# Patient Record
Sex: FEMALE | Race: BLACK OR AFRICAN AMERICAN | NOT HISPANIC OR LATINO | Employment: UNEMPLOYED | URBAN - METROPOLITAN AREA
[De-identification: names, ages, dates, MRNs, and addresses within clinical notes are randomized per-mention and may not be internally consistent; named-entity substitution may affect disease eponyms.]

---

## 2018-06-11 ENCOUNTER — APPOINTMENT (EMERGENCY)
Dept: RADIOLOGY | Facility: HOSPITAL | Age: 58
End: 2018-06-11
Payer: COMMERCIAL

## 2018-06-11 ENCOUNTER — HOSPITAL ENCOUNTER (EMERGENCY)
Facility: HOSPITAL | Age: 58
Discharge: HOME | End: 2018-06-11
Attending: EMERGENCY MEDICINE | Admitting: EMERGENCY MEDICINE
Payer: COMMERCIAL

## 2018-06-11 VITALS
SYSTOLIC BLOOD PRESSURE: 116 MMHG | HEART RATE: 84 BPM | RESPIRATION RATE: 19 BRPM | OXYGEN SATURATION: 95 % | DIASTOLIC BLOOD PRESSURE: 65 MMHG | TEMPERATURE: 97.8 F

## 2018-06-11 DIAGNOSIS — R76.11 PPD POSITIVE: Primary | ICD-10-CM

## 2018-06-11 DIAGNOSIS — Z11.1 NORMAL SCREENING CHEST X-RAY FOR TUBERCULOSIS: ICD-10-CM

## 2018-06-11 PROCEDURE — 99285 EMERGENCY DEPT VISIT HI MDM: CPT

## 2018-06-11 PROCEDURE — 93005 ELECTROCARDIOGRAM TRACING: CPT

## 2018-06-11 PROCEDURE — 71045 X-RAY EXAM CHEST 1 VIEW: CPT

## 2018-06-11 RX ORDER — NAPROXEN 500 MG/1
250 TABLET ORAL 2 TIMES DAILY WITH MEALS
COMMUNITY
End: 2020-05-24

## 2018-06-11 RX ORDER — INSULIN GLARGINE 100 [IU]/ML
20 INJECTION, SOLUTION SUBCUTANEOUS
COMMUNITY
End: 2019-03-22 | Stop reason: SDUPTHER

## 2018-06-13 LAB
ATRIAL RATE: 76 BPM
P AXIS: 71 DEGREES
PR INTERVAL: 130 MS
QRS AXIS: 6 DEGREES
QRSD INTERVAL: 78 MS
QT INTERVAL: 378 MS
QTC INTERVAL: 425 MS
T WAVE AXIS: 60 DEGREES
VENTRICULAR RATE: 76 BPM

## 2018-06-13 PROCEDURE — 93010 ELECTROCARDIOGRAM REPORT: CPT | Performed by: INTERNAL MEDICINE

## 2018-06-18 NOTE — ED PROVIDER NOTES
History  Chief Complaint   Patient presents with    Chest Pain     chest pains for two days  sleepy  states doesnt sleep well ,moved here from [address] a couple of weeks ago  had tb skin test which was positive on June 6, sent in for xray       History provided by:  Patient   used: No    Evaluation of Abnormal Diagnostic Test   Time since result:  5 days  Referred by: Methadone Program Intake Process - New Pt  Resulting agency:  External  Result type comment:  (+) PPD test      Prior to Admission Medications   Prescriptions Last Dose Informant Patient Reported? Taking? METHADONE HCL PO   Yes Yes   Sig: Take 35 mg by mouth   insulin glargine (LANTUS) 100 units/mL subcutaneous injection   Yes Yes   Sig: Inject 20 Units under the skin daily at bedtime   naproxen (NAPROSYN) 500 mg tablet   Yes Yes   Sig: Take 250 mg by mouth 2 (two) times a day with meals      Facility-Administered Medications: None       Past Medical History:   Diagnosis Date    Diabetes mellitus ([facility] [address] )        Past Surgical History:   Procedure Laterality Date    ABDOMINAL SURGERY         History reviewed  No pertinent family history  I have reviewed and agree with the history as documented  Social History   Substance Use Topics    Smoking status: Current Every Day Smoker     Packs/day: 1 00    Smokeless tobacco: Never Used    Alcohol use Yes      Comment: socially        Review of Systems   Constitutional: Positive for fatigue  Negative for chills, diaphoresis, fever and unexpected weight change  HENT: Negative for congestion  Respiratory: Negative for cough, chest tightness and shortness of breath  Cardiovascular: Negative for chest pain  Pt states since she heard about the (+) tb test I get on/off pain, now I don't have it   Gastrointestinal: Negative for abdominal pain, diarrhea and vomiting  Genitourinary: Negative for difficulty urinating     Musculoskeletal: Negative for back pain, neck pain and neck stiffness  Skin: Negative for color change, pallor, rash and wound  (+) redness where tb test was done   Allergic/Immunologic: Negative for immunocompromised state  Neurological: Negative for dizziness, syncope, light-headedness and headaches  Psychiatric/Behavioral: The patient is nervous/anxious  Physical Exam  Physical Exam   Constitutional: She is oriented to person, place, and time  She appears well-developed and well-nourished  No distress  HENT:   Head: Normocephalic and atraumatic  Mouth/Throat: Oropharynx is clear and moist    Eyes: Conjunctivae and EOM are normal  Pupils are equal, round, and reactive to light  2 mm pupils b/l   Neck: Normal range of motion  Neck supple  Cardiovascular: Normal rate, regular rhythm and intact distal pulses  Pulmonary/Chest: Effort normal and breath sounds normal  No respiratory distress  Abdominal: Soft  She exhibits no distension  There is no tenderness  Musculoskeletal: Normal range of motion  Neurological: She is alert and oriented to person, place, and time  Sleepy, but arousable  S/p Methadone, likely high   Skin: Skin is warm and dry  She is not diaphoretic  Psychiatric: She has a normal mood and affect  Pt appears high/intoxicated   Nursing note and vitals reviewed        Vital Signs  ED Triage Vitals [06/11/18 1120]   Temperature Pulse Respirations Blood Pressure SpO2   97 8 °F (36 6 °C) 83 20 116/65 96 %      Temp Source Heart Rate Source Patient Position - Orthostatic VS BP Location FiO2 (%)   Tympanic Monitor Lying Right arm --      Pain Score       9           Vitals:    06/11/18 1120 06/11/18 1130   BP: 116/65    Pulse: 83 84   Patient Position - Orthostatic VS: Lying        Visual Acuity      ED Medications  Medications - No data to display    Diagnostic Studies  Results Reviewed     None                 XR chest 1 view portable   Final Result by Hever Bourgeois DO (06/11 1156)   No active pulmonary disease on examination which is somewhat limited secondary to low lung volumes  Workstation performed: [id number]                    Procedures  Procedures       Phone Contacts  ED Phone Contact    ED Course                               MDM  Number of Diagnoses or Management Options  Normal screening chest x-ray for tuberculosis: new and does not require workup  PPD positive: new and requires workup  Diagnosis management comments: S/p CXR in ED  - f/u outpt       Amount and/or Complexity of Data Reviewed  Tests in the radiology section of CPT®: ordered and reviewed  Decide to obtain previous medical records or to obtain history from someone other than the patient: yes  Obtain history from someone other than the patient: yes (Friend who's at bedside  - reports pt started methadone program recently and just had her dose earlier today prior to coming to the ED, denies other drug/alcohol at this time)  Review and summarize past medical records: yes  Independent visualization of images, tracings, or specimens: yes    Risk of Complications, Morbidity, and/or Mortality  Presenting problems: moderate  Diagnostic procedures: low  Management options: low    Patient Progress  Patient progress: stable    CritCare Time    Disposition  Final diagnoses:   PPD positive   Normal screening chest x-ray for tuberculosis     Time reflects when diagnosis was documented in both MDM as applicable and the Disposition within this note     Time User Action Codes Description Comment    6/11/2018 12:42 PM Dorrine [place] Add [R76 11] PPD positive     6/11/2018 12:42 PM Dorrine [place] Add [Z11 1] Normal screening chest x-ray for tuberculosis       ED Disposition     ED Disposition Condition Comment    Discharge  Gwendel Rinne discharge to home/self care      Condition at discharge: Good        Follow-up Information     Follow up With Specialties Details Why Contact Info    [employer]  Schedule an appointment as soon as possible for a visit [phone]            Discharge Medication List as of 6/11/2018 12:42 PM      CONTINUE these medications which have NOT CHANGED    Details   insulin glargine (LANTUS) 100 units/mL subcutaneous injection Inject 20 Units under the skin daily at bedtime, Historical Med      METHADONE HCL PO Take 35 mg by mouth, Historical Med      naproxen (NAPROSYN) 500 mg tablet Take 250 mg by mouth 2 (two) times a day with meals, Historical Med           No discharge procedures on file      ED Provider  Electronically Signed by           Magdalena Chaidez MD  06/18/18 4291

## 2019-03-22 ENCOUNTER — OFFICE VISIT (OUTPATIENT)
Dept: FAMILY MEDICINE CLINIC | Facility: CLINIC | Age: 59
End: 2019-03-22
Payer: COMMERCIAL

## 2019-03-22 VITALS
OXYGEN SATURATION: 89 % | DIASTOLIC BLOOD PRESSURE: 84 MMHG | BODY MASS INDEX: 23.51 KG/M2 | TEMPERATURE: 98.9 F | HEIGHT: 57 IN | HEART RATE: 111 BPM | SYSTOLIC BLOOD PRESSURE: 122 MMHG | WEIGHT: 109 LBS

## 2019-03-22 DIAGNOSIS — Z12.2 ENCOUNTER FOR SCREENING FOR LUNG CANCER: Primary | ICD-10-CM

## 2019-03-22 DIAGNOSIS — E11.8 TYPE 2 DIABETES MELLITUS WITH COMPLICATION, WITH LONG-TERM CURRENT USE OF INSULIN (HCC): ICD-10-CM

## 2019-03-22 DIAGNOSIS — J45.909 ASTHMA, UNSPECIFIED ASTHMA SEVERITY, UNSPECIFIED WHETHER COMPLICATED, UNSPECIFIED WHETHER PERSISTENT: ICD-10-CM

## 2019-03-22 DIAGNOSIS — Z79.4 TYPE 2 DIABETES MELLITUS WITH COMPLICATION, WITH LONG-TERM CURRENT USE OF INSULIN (HCC): ICD-10-CM

## 2019-03-22 PROCEDURE — 99214 OFFICE O/P EST MOD 30 MIN: CPT | Performed by: FAMILY MEDICINE

## 2019-03-22 PROCEDURE — 3008F BODY MASS INDEX DOCD: CPT | Performed by: FAMILY MEDICINE

## 2019-03-22 RX ORDER — INSULIN GLARGINE 100 [IU]/ML
20 INJECTION, SOLUTION SUBCUTANEOUS
Qty: 5 PEN | Refills: 1 | Status: SHIPPED | OUTPATIENT
Start: 2019-03-22 | End: 2020-05-24 | Stop reason: ALTCHOICE

## 2019-03-22 RX ORDER — INSULIN GLARGINE 100 [IU]/ML
INJECTION, SOLUTION SUBCUTANEOUS
COMMUNITY
Start: 2019-01-09 | End: 2019-03-22 | Stop reason: SDUPTHER

## 2019-03-22 RX ORDER — ALBUTEROL SULFATE 90 UG/1
2 AEROSOL, METERED RESPIRATORY (INHALATION) EVERY 6 HOURS PRN
Qty: 18 G | Refills: 1 | Status: ON HOLD | OUTPATIENT
Start: 2019-03-22 | End: 2021-11-17 | Stop reason: SDUPTHER

## 2019-03-22 RX ORDER — INSULIN GLARGINE 100 [IU]/ML
20 INJECTION, SOLUTION SUBCUTANEOUS
Qty: 10 ML | Refills: 3 | Status: SHIPPED | OUTPATIENT
Start: 2019-03-22 | End: 2020-05-25 | Stop reason: HOSPADM

## 2019-03-22 NOTE — PROGRESS NOTES
Assessment/Plan:     Problem List Items Addressed This Visit        Endocrine    Type 2 diabetes mellitus with complication, with long-term current use of insulin ([facility] [address] )     · Patient advised to check blood sugars twice a day and p r n  Will follow up on lab results  Patient will return for annual physical and annual gynecology visit  Relevant Medications    insulin glargine (LANTUS) 100 units/mL subcutaneous injection    BASAGLAR KWIKPEN 100 units/mL injection pen    Insulin Pen Needle 29G X 12MM MISC    Other Relevant Orders    Glucometer    Lancets    HEMOGLOBIN A1C W/ EAG ESTIMATION    Comprehensive metabolic panel    Lipid Panel with Direct LDL reflex      Other Visit Diagnoses     Encounter for screening for lung cancer    -  Primary    Relevant Orders    CT lung screening program    Asthma, unspecified asthma severity, unspecified whether complicated, unspecified whether persistent        Relevant Medications    albuterol (VENTOLIN HFA) 90 mcg/act inhaler            Subjective:      Patient ID: Troy Hare is a 66 y o  female  HPI    The following portions of the patient's history were reviewed and updated as appropriate: current medications, past family history, past medical history, past social history and problem list     Patient presents to establish care  Patient has history of type 2 diabetes mellitus, arthritis, asthma, and tobacco abuse  Patient currently smokes 1 pack per day, started smoking at 14years old  Patient admits to prior alcohol abuse, currently does not use alcohol  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  Patient shares that her son passed away of lung cancer at 45 y o  which was a life altering event for her, she decided to quit substance abuse and alcohol abuse thereafter  Patient has not been taking insulin for the past 3 months  Current symptoms include: paresthesia of the feet, polydipsia, polyuria, visual disturbances and weight loss  Symptoms have gradually worsened, she admits she has not been taking care of herself well, not eating appropriately and not taking medications as she recently moved area 3 months ago and was awaiting to reestablish  Patient denies foot ulcerations, unable to assess blood sugars as she has not been checking blood sugars at home  In the past followed with PCP in [place], [place]  Patient has been on Lantus 20 units HS in the past     Past Medical History:   Diagnosis Date    Arthritis     Asthma     Diabetes mellitus ([address] )        Current Outpatient Medications:     BASAGLAR KWIKPEN 100 units/mL injection pen, Inject 20 Units under the skin daily at bedtime, Disp: 5 pen, Rfl: 1    insulin glargine (LANTUS) 100 units/mL subcutaneous injection, Inject 20 Units under the skin daily at bedtime, Disp: 10 mL, Rfl: 3    METHADONE HCL PO, Take 35 mg by mouth, Disp: , Rfl:     naproxen (NAPROSYN) 500 mg tablet, Take 250 mg by mouth 2 (two) times a day with meals, Disp: , Rfl:     albuterol (VENTOLIN HFA) 90 mcg/act inhaler, Inhale 2 puffs every 6 (six) hours as needed for wheezing, Disp: 18 g, Rfl: 1    Insulin Pen Needle 29G X 12MM MISC, by Does not apply route daily at bedtime, Disp: 30 each, Rfl: 3      Review of Systems   Constitutional: Positive for appetite change (decreased appetite) and unexpected weight change (weight loss, reports she was 197lbs in 2007)  Eyes: Positive for visual disturbance  Respiratory: Positive for cough and shortness of breath (With exertion)  Gastrointestinal: Negative for abdominal pain  Musculoskeletal: Positive for arthralgias (left knee joint)  Objective:      /84 (BP Location: Left arm, Patient Position: Sitting, Cuff Size: Large)   Pulse (!) 111   Temp 98 9 °F (37 2 °C) (Tympanic)   Ht 4' 9" (1 448 m)   Wt 49 4 kg (109 lb)   SpO2 (!) 89%   BMI 23 59 kg/m²        Physical Exam   Constitutional: She is oriented to person, place, and time   She appears well-developed  No distress  HENT:   Head: Normocephalic and atraumatic  Right Ear: External ear normal    Left Ear: External ear normal    Nose: Nose normal    Mouth/Throat: Oropharynx is clear and moist    Eyes: Pupils are equal, round, and reactive to light  Conjunctivae and EOM are normal    Neck: Normal range of motion  Neck supple  No thyromegaly present  Cardiovascular: Normal rate, regular rhythm, normal heart sounds and intact distal pulses  Pulmonary/Chest: Effort normal  She has no wheezes  Abdominal: Soft  Bowel sounds are normal  There is no tenderness  Musculoskeletal: Normal range of motion  Tenderness: upon palpation of left knee  Lymphadenopathy:     She has no cervical adenopathy  Neurological: She is alert and oriented to person, place, and time  No sensory deficit  Skin: Skin is warm and dry  Psychiatric: She has a normal mood and affect   Her behavior is normal  Thought content normal

## 2019-03-22 NOTE — ASSESSMENT & PLAN NOTE
· Patient advised to check blood sugars twice a day and p r n  Will follow up on lab results  Patient will return for annual physical and annual gynecology visit

## 2019-03-25 ENCOUNTER — TELEPHONE (OUTPATIENT)
Dept: FAMILY MEDICINE CLINIC | Facility: CLINIC | Age: 59
End: 2019-03-25

## 2019-03-26 ENCOUNTER — TELEPHONE (OUTPATIENT)
Dept: FAMILY MEDICINE CLINIC | Facility: CLINIC | Age: 59
End: 2019-03-26

## 2019-04-01 ENCOUNTER — TELEPHONE (OUTPATIENT)
Dept: FAMILY MEDICINE CLINIC | Facility: CLINIC | Age: 59
End: 2019-04-01

## 2019-05-23 ENCOUNTER — TRANSCRIBE ORDERS (OUTPATIENT)
Dept: ADMINISTRATIVE | Facility: HOSPITAL | Age: 59
End: 2019-05-23

## 2019-05-23 ENCOUNTER — HOSPITAL ENCOUNTER (OUTPATIENT)
Dept: RADIOLOGY | Facility: HOSPITAL | Age: 59
Discharge: HOME | End: 2019-05-23
Payer: COMMERCIAL

## 2019-05-23 DIAGNOSIS — Z12.2 ENCOUNTER FOR SCREENING FOR LUNG CANCER: ICD-10-CM

## 2019-06-13 ENCOUNTER — TRANSCRIBE ORDERS (OUTPATIENT)
Dept: ADMINISTRATIVE | Facility: HOSPITAL | Age: 59
End: 2019-06-13

## 2019-06-13 ENCOUNTER — OFFICE VISIT (OUTPATIENT)
Dept: LAB | Facility: HOSPITAL | Age: 59
End: 2019-06-13
Payer: COMMERCIAL

## 2019-06-13 DIAGNOSIS — R94.31 NONSPECIFIC ABNORMAL ELECTROCARDIOGRAM (ECG) (EKG): Primary | ICD-10-CM

## 2019-06-13 DIAGNOSIS — R94.31 NONSPECIFIC ABNORMAL ELECTROCARDIOGRAM (ECG) (EKG): ICD-10-CM

## 2019-06-13 DIAGNOSIS — I45.81 LONG QT SYNDROME: ICD-10-CM

## 2019-06-13 PROCEDURE — 93005 ELECTROCARDIOGRAM TRACING: CPT

## 2019-06-14 LAB
ATRIAL RATE: 87 BPM
P AXIS: 70 DEGREES
PR INTERVAL: 136 MS
QRS AXIS: 8 DEGREES
QRSD INTERVAL: 74 MS
QT INTERVAL: 412 MS
QTC INTERVAL: 495 MS
T WAVE AXIS: 70 DEGREES
VENTRICULAR RATE: 87 BPM

## 2019-06-14 PROCEDURE — 93010 ELECTROCARDIOGRAM REPORT: CPT | Performed by: INTERNAL MEDICINE

## 2019-06-19 ENCOUNTER — TELEPHONE (OUTPATIENT)
Dept: FAMILY MEDICINE CLINIC | Facility: CLINIC | Age: 59
End: 2019-06-19

## 2019-06-19 NOTE — TELEPHONE ENCOUNTER
Attempt made again to call patient  Patient did not present for annual gynecology visit  Patient needs follow-up in office for diabetes as well

## 2019-07-10 ENCOUNTER — APPOINTMENT (INPATIENT)
Dept: RADIOLOGY | Facility: HOSPITAL | Age: 59
DRG: 521 | End: 2019-07-10
Payer: COMMERCIAL

## 2019-07-10 ENCOUNTER — HOSPITAL ENCOUNTER (INPATIENT)
Facility: HOSPITAL | Age: 59
LOS: 1 days | Discharge: HOME | DRG: 521 | End: 2019-07-11
Attending: EMERGENCY MEDICINE | Admitting: FAMILY MEDICINE
Payer: COMMERCIAL

## 2019-07-10 ENCOUNTER — APPOINTMENT (EMERGENCY)
Dept: RADIOLOGY | Facility: HOSPITAL | Age: 59
DRG: 521 | End: 2019-07-10
Payer: COMMERCIAL

## 2019-07-10 DIAGNOSIS — F11.20 METHADONE DEPENDENCE (HCC): ICD-10-CM

## 2019-07-10 DIAGNOSIS — A41.9 SEPSIS (HCC): ICD-10-CM

## 2019-07-10 DIAGNOSIS — J69.0 ASPIRATION PNEUMONIA (HCC): ICD-10-CM

## 2019-07-10 DIAGNOSIS — R41.82 ALTERED MENTAL STATUS: Primary | ICD-10-CM

## 2019-07-10 DIAGNOSIS — F10.929 ALCOHOL INTOXICATION (HCC): ICD-10-CM

## 2019-07-10 DIAGNOSIS — I26.99 PULMONARY EMBOLISM (HCC): ICD-10-CM

## 2019-07-10 DIAGNOSIS — R45.851 SUICIDAL IDEATION: ICD-10-CM

## 2019-07-10 PROBLEM — F19.929 INTOXICATION BY DRUG (HCC): Status: ACTIVE | Noted: 2019-07-10

## 2019-07-10 PROBLEM — R09.02 HYPOXIA: Status: ACTIVE | Noted: 2019-07-10

## 2019-07-10 LAB
ALBUMIN SERPL BCP-MCNC: 3 G/DL (ref 3.5–5)
ALP SERPL-CCNC: 170 U/L (ref 46–116)
ALT SERPL W P-5'-P-CCNC: 18 U/L (ref 12–78)
ANION GAP SERPL CALCULATED.3IONS-SCNC: 12 MMOL/L (ref 4–13)
APTT PPP: 24 SECONDS (ref 23–37)
AST SERPL W P-5'-P-CCNC: 15 U/L (ref 5–45)
BASOPHILS # BLD AUTO: 0.04 THOUSANDS/ΜL (ref 0–0.1)
BASOPHILS NFR BLD AUTO: 1 % (ref 0–1)
BETA-HYDROXYBUTYRATE: 0.1 MMOL/L
BILIRUB SERPL-MCNC: 0.2 MG/DL (ref 0.2–1)
BUN SERPL-MCNC: 16 MG/DL (ref 5–25)
CALCIUM SERPL-MCNC: 7.9 MG/DL (ref 8.3–10.1)
CHLORIDE SERPL-SCNC: 109 MMOL/L (ref 100–108)
CO2 SERPL-SCNC: 27 MMOL/L (ref 21–32)
CREAT SERPL-MCNC: 0.67 MG/DL (ref 0.6–1.3)
EOSINOPHIL # BLD AUTO: 0.11 THOUSAND/ΜL (ref 0–0.61)
EOSINOPHIL NFR BLD AUTO: 2 % (ref 0–6)
ERYTHROCYTE [DISTWIDTH] IN BLOOD BY AUTOMATED COUNT: 16.4 % (ref 11.6–15.1)
ETHANOL SERPL-MCNC: 361 MG/DL (ref 0–3)
GFR SERPL CREATININE-BSD FRML MDRD: 113 ML/MIN/1.73SQ M
GLUCOSE SERPL-MCNC: 76 MG/DL (ref 65–140)
GLUCOSE SERPL-MCNC: 89 MG/DL (ref 65–140)
GLUCOSE SERPL-MCNC: 95 MG/DL (ref 65–140)
HCT VFR BLD AUTO: 40.1 % (ref 34.8–46.1)
HGB BLD-MCNC: 12.8 G/DL (ref 11.5–15.4)
HOLD SPECIMEN: NORMAL
IMM GRANULOCYTES # BLD AUTO: 0.03 THOUSAND/UL (ref 0–0.2)
IMM GRANULOCYTES NFR BLD AUTO: 1 % (ref 0–2)
INR PPP: 0.94 (ref 0.86–1.16)
LACTATE SERPL-SCNC: 2 MMOL/L (ref 0.5–2)
LACTATE SERPL-SCNC: 3.3 MMOL/L (ref 0.5–2)
LYMPHOCYTES # BLD AUTO: 2.5 THOUSANDS/ΜL (ref 0.6–4.47)
LYMPHOCYTES NFR BLD AUTO: 45 % (ref 14–44)
MCH RBC QN AUTO: 29.4 PG (ref 26.8–34.3)
MCHC RBC AUTO-ENTMCNC: 31.9 G/DL (ref 31.4–37.4)
MCV RBC AUTO: 92 FL (ref 82–98)
MONOCYTES # BLD AUTO: 0.55 THOUSAND/ΜL (ref 0.17–1.22)
MONOCYTES NFR BLD AUTO: 10 % (ref 4–12)
NEUTROPHILS # BLD AUTO: 2.27 THOUSANDS/ΜL (ref 1.85–7.62)
NEUTS SEG NFR BLD AUTO: 41 % (ref 43–75)
NRBC BLD AUTO-RTO: 0 /100 WBCS
PLATELET # BLD AUTO: 250 THOUSANDS/UL (ref 149–390)
PMV BLD AUTO: 9.8 FL (ref 8.9–12.7)
POTASSIUM SERPL-SCNC: 3.2 MMOL/L (ref 3.5–5.3)
PROT SERPL-MCNC: 7.1 G/DL (ref 6.4–8.2)
PROTHROMBIN TIME: 9.9 SECONDS (ref 9.4–11.7)
RBC # BLD AUTO: 4.36 MILLION/UL (ref 3.81–5.12)
SODIUM SERPL-SCNC: 148 MMOL/L (ref 136–145)
TROPONIN I SERPL-MCNC: <0.02 NG/ML
WBC # BLD AUTO: 5.5 THOUSAND/UL (ref 4.31–10.16)

## 2019-07-10 PROCEDURE — 71045 X-RAY EXAM CHEST 1 VIEW: CPT

## 2019-07-10 PROCEDURE — 99223 1ST HOSP IP/OBS HIGH 75: CPT | Performed by: FAMILY MEDICINE

## 2019-07-10 PROCEDURE — 80320 DRUG SCREEN QUANTALCOHOLS: CPT | Performed by: EMERGENCY MEDICINE

## 2019-07-10 PROCEDURE — 85610 PROTHROMBIN TIME: CPT | Performed by: PHYSICIAN ASSISTANT

## 2019-07-10 PROCEDURE — 96365 THER/PROPH/DIAG IV INF INIT: CPT

## 2019-07-10 PROCEDURE — 99285 EMERGENCY DEPT VISIT HI MDM: CPT

## 2019-07-10 PROCEDURE — 80053 COMPREHEN METABOLIC PANEL: CPT | Performed by: EMERGENCY MEDICINE

## 2019-07-10 PROCEDURE — 87081 CULTURE SCREEN ONLY: CPT | Performed by: FAMILY MEDICINE

## 2019-07-10 PROCEDURE — 83605 ASSAY OF LACTIC ACID: CPT | Performed by: PHYSICIAN ASSISTANT

## 2019-07-10 PROCEDURE — 87040 BLOOD CULTURE FOR BACTERIA: CPT | Performed by: PHYSICIAN ASSISTANT

## 2019-07-10 PROCEDURE — 82010 KETONE BODYS QUAN: CPT | Performed by: PHYSICIAN ASSISTANT

## 2019-07-10 PROCEDURE — 36415 COLL VENOUS BLD VENIPUNCTURE: CPT

## 2019-07-10 PROCEDURE — 85730 THROMBOPLASTIN TIME PARTIAL: CPT | Performed by: PHYSICIAN ASSISTANT

## 2019-07-10 PROCEDURE — 82948 REAGENT STRIP/BLOOD GLUCOSE: CPT

## 2019-07-10 PROCEDURE — 71275 CT ANGIOGRAPHY CHEST: CPT

## 2019-07-10 PROCEDURE — 93005 ELECTROCARDIOGRAM TRACING: CPT

## 2019-07-10 PROCEDURE — 84484 ASSAY OF TROPONIN QUANT: CPT | Performed by: PHYSICIAN ASSISTANT

## 2019-07-10 PROCEDURE — 70450 CT HEAD/BRAIN W/O DYE: CPT

## 2019-07-10 PROCEDURE — 83036 HEMOGLOBIN GLYCOSYLATED A1C: CPT | Performed by: STUDENT IN AN ORGANIZED HEALTH CARE EDUCATION/TRAINING PROGRAM

## 2019-07-10 PROCEDURE — 85025 COMPLETE CBC W/AUTO DIFF WBC: CPT | Performed by: EMERGENCY MEDICINE

## 2019-07-10 RX ORDER — ALBUTEROL SULFATE 90 UG/1
2 AEROSOL, METERED RESPIRATORY (INHALATION) EVERY 6 HOURS PRN
Status: DISCONTINUED | OUTPATIENT
Start: 2019-07-10 | End: 2019-07-11 | Stop reason: HOSPADM

## 2019-07-10 RX ORDER — LORAZEPAM 2 MG/ML
1 INJECTION INTRAMUSCULAR EVERY 6 HOURS PRN
Status: DISCONTINUED | OUTPATIENT
Start: 2019-07-10 | End: 2019-07-10

## 2019-07-10 RX ORDER — LORAZEPAM 2 MG/ML
1 INJECTION INTRAMUSCULAR ONCE
Status: COMPLETED | OUTPATIENT
Start: 2019-07-10 | End: 2019-07-10

## 2019-07-10 RX ORDER — LEVOFLOXACIN 5 MG/ML
750 INJECTION, SOLUTION INTRAVENOUS ONCE
Status: COMPLETED | OUTPATIENT
Start: 2019-07-10 | End: 2019-07-10

## 2019-07-10 RX ORDER — LORAZEPAM 2 MG/ML
INJECTION INTRAMUSCULAR
Status: COMPLETED
Start: 2019-07-10 | End: 2019-07-10

## 2019-07-10 RX ORDER — LORAZEPAM 2 MG/ML
1 INJECTION INTRAMUSCULAR EVERY 6 HOURS PRN
Status: DISCONTINUED | OUTPATIENT
Start: 2019-07-10 | End: 2019-07-11 | Stop reason: HOSPADM

## 2019-07-10 RX ORDER — SODIUM CHLORIDE 9 MG/ML
75 INJECTION, SOLUTION INTRAVENOUS CONTINUOUS
Status: DISCONTINUED | OUTPATIENT
Start: 2019-07-10 | End: 2019-07-11 | Stop reason: HOSPADM

## 2019-07-10 RX ORDER — NICOTINE 21 MG/24HR
1 PATCH, TRANSDERMAL 24 HOURS TRANSDERMAL DAILY
Status: DISCONTINUED | OUTPATIENT
Start: 2019-07-11 | End: 2019-07-11 | Stop reason: HOSPADM

## 2019-07-10 RX ORDER — ACETAMINOPHEN 325 MG/1
650 TABLET ORAL EVERY 6 HOURS PRN
Status: DISCONTINUED | OUTPATIENT
Start: 2019-07-10 | End: 2019-07-11 | Stop reason: HOSPADM

## 2019-07-10 RX ADMIN — SODIUM CHLORIDE 1000 ML: 0.9 INJECTION, SOLUTION INTRAVENOUS at 17:09

## 2019-07-10 RX ADMIN — SODIUM CHLORIDE 1000 ML: 0.9 INJECTION, SOLUTION INTRAVENOUS at 20:36

## 2019-07-10 RX ADMIN — LORAZEPAM 1 MG: 2 INJECTION INTRAMUSCULAR; INTRAVENOUS at 22:15

## 2019-07-10 RX ADMIN — LORAZEPAM 1 MG: 2 INJECTION INTRAMUSCULAR at 18:38

## 2019-07-10 RX ADMIN — PIPERACILLIN SODIUM,TAZOBACTAM SODIUM 3.38 G: 3; .375 INJECTION, POWDER, FOR SOLUTION INTRAVENOUS at 20:55

## 2019-07-10 RX ADMIN — LEVOFLOXACIN 750 MG: 5 INJECTION, SOLUTION INTRAVENOUS at 17:37

## 2019-07-10 RX ADMIN — LORAZEPAM 1 MG: 2 INJECTION INTRAMUSCULAR; INTRAVENOUS at 18:38

## 2019-07-10 NOTE — ED PROVIDER NOTES
History  Chief Complaint   Patient presents with    Altered Mental Status     Pt found on the sidewalk passed out, admits to drinking alcohol today  Pt c/o pain "everywhere", pt tearful and "wish I was dead"  Pt denies plan or attempt to kill self  64year old female hx diabetes, asthma presents here with altered mental status x1 day  She was brought in by the police after being found passed out on the ground after drinking  She feels sick and states that she needs help  She states that her son passed away several months ago and she has never been the same since  Her son was 45years old when he  from lung cancer  She states that she just wants to die  She has pain all over  She admits to drinking a pint of vodka and one beer today  She states she normally does not drink this much  She does not know with to do with herself  She is a current smoker  She is not on oxygen at home  She has a cough  She states it is sometimes productive  She denies any fevers or chills  She states she has not had her insulin in quite some time  She has not seen a doctor in several months  She is currently on methadone  Prior to Admission Medications   Prescriptions Last Dose Informant Patient Reported? Taking?    BASAGLAR KWIKPEN 100 units/mL injection pen   No No   Sig: Inject 20 Units under the skin daily at bedtime   Insulin Pen Needle 29G X 12MM MISC   No No   Sig: by Does not apply route daily at bedtime   METHADONE HCL PO   Yes No   Sig: Take 35 mg by mouth   albuterol (VENTOLIN HFA) 90 mcg/act inhaler   No No   Sig: Inhale 2 puffs every 6 (six) hours as needed for wheezing   insulin glargine (LANTUS) 100 units/mL subcutaneous injection   No No   Sig: Inject 20 Units under the skin daily at bedtime   naproxen (NAPROSYN) 500 mg tablet   Yes No   Sig: Take 250 mg by mouth 2 (two) times a day with meals      Facility-Administered Medications: None       Past Medical History:   Diagnosis Date    Arthritis  Asthma     Diabetes mellitus ([facility] )        Past Surgical History:   Procedure Laterality Date    ABDOMINAL SURGERY         History reviewed  No pertinent family history  I have reviewed and agree with the history as documented  Social History     Tobacco Use    Smoking status: Current Every Day Smoker     Packs/day: 1 00    Smokeless tobacco: Never Used   Substance Use Topics    Alcohol use: Yes     Comment: socially    Drug use: No     Comment: on methadone        Review of Systems   Constitutional: Negative for chills and fever  HENT: Negative for sneezing and sore throat  Respiratory: Positive for cough  Negative for shortness of breath  Cardiovascular: Negative for chest pain, palpitations and leg swelling  Gastrointestinal: Negative for abdominal pain, constipation, diarrhea, nausea and vomiting  Musculoskeletal: Negative for back pain, gait problem and joint swelling  Skin: Negative for color change, pallor, rash and wound  Neurological: Negative for dizziness, syncope, weakness, light-headedness, numbness and headaches  Psychiatric/Behavioral: Positive for dysphoric mood and suicidal ideas  All other systems reviewed and are negative  Physical Exam  Physical Exam   Constitutional: She appears well-developed and well-nourished  She appears distressed  Patient is thin appearing, tearful, crying in room   HENT:   Head: Normocephalic and atraumatic  Nose: Nose normal    Eyes: Pupils are equal, round, and reactive to light  Conjunctivae and EOM are normal  Right eye exhibits no discharge  Left eye exhibits no discharge  No scleral icterus  Neck: Normal range of motion  Neck supple  Cardiovascular: Normal rate, regular rhythm, normal heart sounds and intact distal pulses  Exam reveals no gallop and no friction rub  No murmur heard  Pulmonary/Chest: Effort normal  No stridor  No respiratory distress  She has no wheezes   She has rhonchi in the right upper field, the right middle field, the right lower field, the left upper field, the left middle field and the left lower field  She has no rales  Diffuse rhonchi noted throughout lung fields   Abdominal: Soft  Bowel sounds are normal  She exhibits no distension and no mass  There is no tenderness  There is no guarding  Neurological: She is alert  Skin: Skin is warm and dry  Capillary refill takes less than 2 seconds  No rash noted  She is not diaphoretic  No erythema  No pallor  Nursing note and vitals reviewed        Vital Signs  ED Triage Vitals [07/10/19 1623]   Temperature Pulse Respirations Blood Pressure SpO2   97 8 °F (36 6 °C) 100 16 98/55 (!) 89 %      Temp Source Heart Rate Source Patient Position - Orthostatic VS BP Location FiO2 (%)   Tympanic Monitor Lying Left arm --      Pain Score       --           Vitals:    07/10/19 1815 07/10/19 2100 07/10/19 2120 07/10/19 2126   BP: 129/83 104/63  116/79   Pulse: 84 85 101    Patient Position - Orthostatic VS:  Lying  Lying         Visual Acuity      ED Medications  Medications   albuterol (PROVENTIL HFA,VENTOLIN HFA) inhaler 2 puff (has no administration in time range)   nicotine (NICODERM CQ) 21 mg/24 hr TD 24 hr patch 1 patch (has no administration in time range)   enoxaparin (LOVENOX) subcutaneous injection 40 mg (has no administration in time range)   acetaminophen (TYLENOL) tablet 650 mg (has no administration in time range)   insulin lispro (HumaLOG) 100 units/mL subcutaneous injection 1-5 Units (has no administration in time range)   sodium chloride 0 9 % infusion (has no administration in time range)   sodium chloride 0 9 % bolus 1,000 mL (1,000 mL Intravenous New Bag 7/10/19 2036)   piperacillin-tazobactam (ZOSYN) 3 375 g in sodium chloride 0 9 % 50 mL IVPB (3 375 g Intravenous New Bag 7/10/19 2055)   LORazepam (ATIVAN) 2 mg/mL injection 1 mg (has no administration in time range)   sodium chloride 0 9 % bolus 1,000 mL (0 mL Intravenous Stopped 7/10/19 1836) levofloxacin (LEVAQUIN) IVPB (premix) 750 mg (750 mg Intravenous New Bag 7/10/19 1737)   LORazepam (ATIVAN) 2 mg/mL injection 1 mg (1 mg Intravenous Given 7/10/19 1838)   LORazepam (ATIVAN) 2 mg/mL injection 1 mg (1 mg Intravenous Given 7/10/19 2215)       Diagnostic Studies  Results Reviewed     Procedure Component Value Units Date/Time    Hemoglobin A1C w/ EAG Estimation [[id number]] Collected:  07/10/19 1633    Lab Status: In process Specimen:  Blood from Arm, Left Updated:  07/10/19 1956    Lactic acid x2 [[id number]]  (Normal) Collected:  07/10/19 1843    Lab Status:  Final result Specimen:  Blood from Arm, Left Updated:  07/10/19 1914     LACTIC ACID 2 0 mmol/L     Narrative:       Result may be elevated if tourniquet was used during collection  [place] draw [[id number]] Collected:  07/10/19 1633    Lab Status:  Final result Specimen:  Blood from Arm, Left Updated:  07/10/19 1801    Narrative: The following orders were created for panel order [place] draw  Procedure                               Abnormality         Status                     ---------                               -----------         ------                     Rosy Fern Top on hold[[id number]]                                                      Green / Black tube on hold[[id number]]                       Final result                 Please view results for these tests on the individual orders      Troponin I [[id number]]  (Normal) Collected:  07/10/19 1708    Lab Status:  Final result Specimen:  Blood from Arm, Left Updated:  07/10/19 1742     Troponin I <0 02 ng/mL     Beta Hydroxybutyrate [[id number]]  (Normal) Collected:  07/10/19 1708    Lab Status:  Final result Specimen:  Blood from Arm, Left Updated:  07/10/19 1722     BETA-HYDROXYBUTYRATE 0 1 mmol/L     Lactic acid x2 [[id number]]  (Abnormal) Collected:  07/10/19 1644    Lab Status:  Final result Specimen:  Blood from Arm, Left Updated:  07/10/19 1713     LACTIC ACID 3 3 mmol/L Narrative:       Result may be elevated if tourniquet was used during collection  APTT [[id number]]  (Normal) Collected:  07/10/19 1633    Lab Status:  Final result Specimen:  Blood Updated:  07/10/19 1711     PTT 24 seconds     Protime-INR [[id number]]  (Normal) Collected:  07/10/19 1633    Lab Status:  Final result Specimen:  Blood Updated:  07/10/19 1711     Protime 9 9 seconds      INR 0 94    Ethanol [[id number]]  (Abnormal) Collected:  07/10/19 1633    Lab Status:  Final result Specimen:  Blood from Arm, Left Updated:  07/10/19 1658     Ethanol Lvl 361 mg/dL     Comprehensive metabolic panel [[id number]]  (Abnormal) Collected:  07/10/19 1633    Lab Status:  Final result Specimen:  Blood from Arm, Left Updated:  07/10/19 1656     Sodium 148 mmol/L      Potassium 3 2 mmol/L      Chloride 109 mmol/L      CO2 27 mmol/L      ANION GAP 12 mmol/L      BUN 16 mg/dL      Creatinine 0 67 mg/dL      Glucose 95 mg/dL      Calcium 7 9 mg/dL      AST 15 U/L      ALT 18 U/L      Alkaline Phosphatase 170 U/L      Total Protein 7 1 g/dL      Albumin 3 0 g/dL      Total Bilirubin 0 20 mg/dL      eGFR 113 ml/min/1 73sq m     Narrative:       [place] guidelines for Chronic Kidney Disease (CKD):     Stage 1 with normal or high GFR (GFR > 90 mL/min/1 73 square meters)    Stage 2 Mild CKD (GFR = 60-89 mL/min/1 73 square meters)    Stage 3A Moderate CKD (GFR = 45-59 mL/min/1 73 square meters)    Stage 3B Moderate CKD (GFR = 30-44 mL/min/1 73 square meters)    Stage 4 Severe CKD (GFR = 15-29 mL/min/1 73 square meters)    Stage 5 End Stage CKD (GFR <15 mL/min/1 73 square meters)  Note: GFR calculation is accurate only with a steady state creatinine    Blood culture #2 [[id number]] Collected:  07/10/19 1645    Lab Status: In process Specimen:  Blood from Arm, Right Updated:  07/10/19 1649    Blood culture #1 [[id number]] Collected:  07/10/19 1644    Lab Status:   In process Specimen:  Blood from Arm, Left Updated:  07/10/19 1649    UA w Reflex to Microscopic w Reflex to Culture [[id number]]     Lab Status:  No result Specimen:  Urine     CBC and differential [[id number]]  (Abnormal) Collected:  07/10/19 1633    Lab Status:  Final result Specimen:  Blood from Arm, Left Updated:  07/10/19 1639     WBC 5 50 Thousand/uL      RBC 4 36 Million/uL      Hemoglobin 12 8 g/dL      Hematocrit 40 1 %      MCV 92 fL      MCH 29 4 pg      MCHC 31 9 g/dL      RDW 16 4 %      MPV 9 8 fL      Platelets 739 Thousands/uL      nRBC 0 /100 WBCs      Neutrophils Relative 41 %      Immat GRANS % 1 %      Lymphocytes Relative 45 %      Monocytes Relative 10 %      Eosinophils Relative 2 %      Basophils Relative 1 %      Neutrophils Absolute 2 27 Thousands/µL      Immature Grans Absolute 0 03 Thousand/uL      Lymphocytes Absolute 2 50 Thousands/µL      Monocytes Absolute 0 55 Thousand/µL      Eosinophils Absolute 0 11 Thousand/µL      Basophils Absolute 0 04 Thousands/µL     Fingerstick Glucose (POCT) [[id number]]  (Normal) Collected:  07/10/19 1624    Lab Status:  Final result Updated:  07/10/19 1630     POC Glucose 89 mg/dl                  CT head without contrast   Final Result by Emmanuel Givens DO (07/10 1758)      No acute intracranial abnormality  Workstation performed: [id number]         XR chest 1 view portable   Final Result by Emmanuel Givens DO (07/10 2055)      No acute cardiopulmonary disease  Workstation performed: [id number]         FL barium swallow video w speech    (Results Pending)   CT chest w contrast    (Results Pending)   CTA chest pe study    (Results Pending)              Procedures  ECG 12 Lead Documentation Only  Date/Time: 7/10/2019 4:47 PM  Performed by: Vesta Matta PA-C  Authorized by:  Vesta Matta PA-C     Indications / Diagnosis:  Ams  ECG reviewed by me, the ED Provider: yes    Patient location:  ED  Interpretation:     Interpretation: normal    Rate:     ECG rate:  88    ECG rate assessment: normal    Rhythm:     Rhythm: sinus rhythm    Ectopy:     Ectopy: none    QRS:     QRS axis:  Normal    QRS intervals:  Normal  Conduction:     Conduction: normal    ST segments:     ST segments:  Normal  T waves:     T waves: normal             ED Course  ED Course as of Jul 10 2222   Wed Jul 10, 2019   1700 Patient placed on 3L nasal cannula oxygen, 02 sat improved to 98-99%      1714 Called sepsis alert, patient likely has aspiration pneumonia      1800 Paged hospitalist      9880 Patient became agitated and states that she had to leave however her etoh is 361, would require several more hours to be clinically sober  Restraints were ordered  Face to face completed  [facility] resident Vanda Caicedo at bedside and evaluated patient                      Initial Sepsis Screening     [address] Name 07/10/19 1716                Is the patient's history suggestive of a new or worsening infection? (!) Yes (Proceed)  -YP        Suspected source of infection  pneumonia  -YP        Are two or more of the following signs & symptoms of infection both present and new to the patient? (!) Yes (Proceed)  -YP        Indicate SIRS criteria  Tachycardia > 90 bpm;Altered mental status  -YP        If the answer is yes to both questions, suspicion of sepsis is present          If severe sepsis is present AND tissue hypoperfusion perists in the hour after fluid resuscitation or lactate > 4, the patient meets criteria for SEPTIC SHOCK          Are any of the following organ dysfunction criteria present within 6 hours of suspected infection and SIRS criteria that are NOT considered to be chronic conditions?   (!) Yes  -YP        Organ dysfunction  Lactate > 2 0 mmol/L  -YP        Date of presentation of severe sepsis          Time of presentation of severe sepsis          Tissue hypoperfusion persists in the hour after crystalloid fluid administration, evidenced, by either:          Was hypotension present within one hour of the conclusion of crystalloid fluid administration?         Date of presentation of septic shock          Time of presentation of septic shock            User Key  (r) = Recorded By, (t) = Taken By, (c) = Cosigned By    [address] Name Provider Type    JORGE Floyd PA-C Physician Assistant                  MDM  Number of Diagnoses or Management Options  Alcohol intoxication ([facility] [address] ): Altered mental status:   Aspiration pneumonia ([facility] ):   Sepsis [facility]):   Diagnosis management comments: High clinical suspicion aspiration pneumonia, diffuse rhonchi on lung exam, hypoxia on room air, tachycardia  Patient meets criteria for sepsis, started on levaquin  Patient requires inpatient psych consult for thoughts of self harm, she became violent and agitated while getting patient ready for transport to upstairs bed assignment, she became a harm to herself and to others around her  She told several staff members that she just wanted to die   Ordered restraints, explained patient would need to wait several hours before clinically sober to sign out AMA as patient has altered mental status  Patient evaluated by [place] resident       Amount and/or Complexity of Data Reviewed  Clinical lab tests: reviewed and ordered  Tests in the radiology section of CPT®: reviewed and ordered  Tests in the medicine section of CPT®: ordered and reviewed  Discussion of test results with the performing providers: yes  Review and summarize past medical records: yes  Discuss the patient with other providers: yes (Dr See Dc, Dr Ella Rodriguez)  Independent visualization of images, tracings, or specimens: yes        Disposition  Final diagnoses:    Altered mental status   Sepsis ([facility] [address] )   Aspiration pneumonia ([facility] [address] )   Alcohol intoxication ([facility] )     Time reflects when diagnosis was documented in both MDM as applicable and the Disposition within this note     Time User Action Codes Description Comment    7/10/2019  6:05 PM Avtar Quintanilla Add [R40 06] Altered mental status     7/10/2019  6:05 PM Claudis Failing Add [A41 9] Sepsis ([facility] [address] )     7/10/2019  6:05 PM Claudis Failing Add [J69 0] Aspiration pneumonia ([facility] )     7/10/2019  6:05 PM Claudis Failing Add [F10 929] Alcohol intoxication [facility])       ED Disposition     ED Disposition Condition Date/Time Comment    Admit Stable Wed Jul 10, 2019  6:05 PM Case was discussed with Dr Sherif Santiago and the patient's admission status was agreed to be Admission Status: inpatient status to the service of Dr Sherif Santiago          Follow-up Information    None         Current Discharge Medication List      CONTINUE these medications which have NOT CHANGED    Details   albuterol (VENTOLIN HFA) 90 mcg/act inhaler Inhale 2 puffs every 6 (six) hours as needed for wheezing  Qty: 18 g, Refills: 1    Associated Diagnoses: Asthma, unspecified asthma severity, unspecified whether complicated, unspecified whether persistent      BASAGLAR KWIKPEN 100 units/mL injection pen Inject 20 Units under the skin daily at bedtime  Qty: 5 pen, Refills: 1    Associated Diagnoses: Type 2 diabetes mellitus with complication, with long-term current use of insulin ([facility])      insulin glargine (LANTUS) 100 units/mL subcutaneous injection Inject 20 Units under the skin daily at bedtime  Qty: 10 mL, Refills: 3    Associated Diagnoses: Type 2 diabetes mellitus with complication, with long-term current use of insulin ([facility])      Insulin Pen Needle 29G X 12MM MISC by Does not apply route daily at bedtime  Qty: 30 each, Refills: 3    Associated Diagnoses: Type 2 diabetes mellitus with complication, with long-term current use of insulin ([facility])      METHADONE HCL PO Take 35 mg by mouth      naproxen (NAPROSYN) 500 mg tablet Take 250 mg by mouth 2 (two) times a day with meals           No discharge procedures on file      ED Provider  Electronically Signed by           Josep Leyva PA-C  07/10/19 3212

## 2019-07-10 NOTE — RESTRAINT FACE TO FACE
Restraint Face to Face   Chani Hinkle 64 y o  female MRN: [id number]  Unit/Bed#: ED 07 Encounter: [id number]      Physical Evaluation - patient aggressive and agitated  Purpose for Restraints/ Seclusion High risk for self harm, harm to others  Patient's reaction to the intervention - angry  Patient's medical condition - alcohol intoxication, aspiration pneumonia  Patient's Behavioral condition - agitated  Restraints to be Continued

## 2019-07-10 NOTE — ED NOTES
Patient became agitated while staff was preparing to transfer her to assigned unit,yelling at staff and telling staff she is not staying,that she is leaving,that there is nothing wrong with her  ER Charge Nurse in to speak with patient,yelled and cursed at her telling her she is not staying here  ER MD at bedside,patient yelling cursing at him,giving the middle finger to him,security at bedside with restraints,patient became combative fighting staff and trying to hit and kick them  Restraints placed to all four limbs  PAtient was informed she cannot be released due to her elevated alcohol level,patient became angry swearing at staff and stating she is going to ruth the hospital and staff for not letting her go home  PAtient in restraints and 1:1 observation with tech at bedside       Blela Brown RN  07/10/19 6212

## 2019-07-11 ENCOUNTER — APPOINTMENT (INPATIENT)
Dept: RADIOLOGY | Facility: HOSPITAL | Age: 59
DRG: 521 | End: 2019-07-11
Payer: COMMERCIAL

## 2019-07-11 VITALS
HEIGHT: 59 IN | TEMPERATURE: 98.6 F | DIASTOLIC BLOOD PRESSURE: 78 MMHG | BODY MASS INDEX: 21.69 KG/M2 | HEART RATE: 96 BPM | OXYGEN SATURATION: 100 % | WEIGHT: 107.58 LBS | RESPIRATION RATE: 18 BRPM | SYSTOLIC BLOOD PRESSURE: 136 MMHG

## 2019-07-11 PROBLEM — F17.200 NICOTINE DEPENDENCE: Status: ACTIVE | Noted: 2019-07-11

## 2019-07-11 PROBLEM — F19.10 POLYSUBSTANCE ABUSE (HCC): Status: ACTIVE | Noted: 2019-07-10

## 2019-07-11 PROBLEM — F10.929 ALCOHOL INTOXICATION (HCC): Status: ACTIVE | Noted: 2019-07-10

## 2019-07-11 PROBLEM — I26.99 PULMONARY EMBOLISM (HCC): Status: ACTIVE | Noted: 2019-07-10

## 2019-07-11 PROBLEM — R45.851 SUICIDAL IDEATION: Status: RESOLVED | Noted: 2019-07-11 | Resolved: 2019-07-11

## 2019-07-11 PROBLEM — G92.8 TOXIC METABOLIC ENCEPHALOPATHY: Status: RESOLVED | Noted: 2019-07-11 | Resolved: 2019-07-11

## 2019-07-11 PROBLEM — G92.8 TOXIC METABOLIC ENCEPHALOPATHY: Status: ACTIVE | Noted: 2019-07-11

## 2019-07-11 PROBLEM — R45.851 SUICIDAL IDEATION: Status: ACTIVE | Noted: 2019-07-11

## 2019-07-11 LAB
ALBUMIN SERPL BCP-MCNC: 2.7 G/DL (ref 3.5–5)
ALP SERPL-CCNC: 153 U/L (ref 46–116)
ALT SERPL W P-5'-P-CCNC: 15 U/L (ref 12–78)
AMPHETAMINES SERPL QL SCN: NEGATIVE
ANION GAP SERPL CALCULATED.3IONS-SCNC: 9 MMOL/L (ref 4–13)
AST SERPL W P-5'-P-CCNC: 16 U/L (ref 5–45)
BACTERIA UR QL AUTO: ABNORMAL /HPF
BARBITURATES UR QL: NEGATIVE
BASOPHILS # BLD AUTO: 0.04 THOUSANDS/ΜL (ref 0–0.1)
BASOPHILS NFR BLD AUTO: 1 % (ref 0–1)
BENZODIAZ UR QL: NEGATIVE
BILIRUB SERPL-MCNC: 0.2 MG/DL (ref 0.2–1)
BILIRUB UR QL STRIP: NEGATIVE
BUN SERPL-MCNC: 9 MG/DL (ref 5–25)
CALCIUM SERPL-MCNC: 7.5 MG/DL (ref 8.3–10.1)
CHLORIDE SERPL-SCNC: 109 MMOL/L (ref 100–108)
CHOLEST SERPL-MCNC: 192 MG/DL (ref 50–200)
CLARITY UR: CLEAR
CO2 SERPL-SCNC: 26 MMOL/L (ref 21–32)
COCAINE UR QL: NEGATIVE
COLOR UR: YELLOW
CREAT SERPL-MCNC: 0.6 MG/DL (ref 0.6–1.3)
EOSINOPHIL # BLD AUTO: 0.12 THOUSAND/ΜL (ref 0–0.61)
EOSINOPHIL NFR BLD AUTO: 2 % (ref 0–6)
ERYTHROCYTE [DISTWIDTH] IN BLOOD BY AUTOMATED COUNT: 17.1 % (ref 11.6–15.1)
EST. AVERAGE GLUCOSE BLD GHB EST-MCNC: 128 MG/DL
ETHANOL SERPL-MCNC: <3 MG/DL (ref 0–3)
GFR SERPL CREATININE-BSD FRML MDRD: 117 ML/MIN/1.73SQ M
GLUCOSE SERPL-MCNC: 78 MG/DL (ref 65–140)
GLUCOSE SERPL-MCNC: 82 MG/DL (ref 65–140)
GLUCOSE SERPL-MCNC: 82 MG/DL (ref 65–140)
GLUCOSE SERPL-MCNC: 85 MG/DL (ref 65–140)
GLUCOSE UR STRIP-MCNC: NEGATIVE MG/DL
HBA1C MFR BLD: 6.1 % (ref 4.2–6.3)
HCT VFR BLD AUTO: 39.7 % (ref 34.8–46.1)
HDLC SERPL-MCNC: 87 MG/DL (ref 40–60)
HGB BLD-MCNC: 12 G/DL (ref 11.5–15.4)
HGB UR QL STRIP.AUTO: NEGATIVE
IMM GRANULOCYTES # BLD AUTO: 0.05 THOUSAND/UL (ref 0–0.2)
IMM GRANULOCYTES NFR BLD AUTO: 1 % (ref 0–2)
KETONES UR STRIP-MCNC: NEGATIVE MG/DL
LDLC SERPL CALC-MCNC: 84 MG/DL (ref 0–100)
LEUKOCYTE ESTERASE UR QL STRIP: NEGATIVE
LYMPHOCYTES # BLD AUTO: 1.7 THOUSANDS/ΜL (ref 0.6–4.47)
LYMPHOCYTES NFR BLD AUTO: 29 % (ref 14–44)
MAGNESIUM SERPL-MCNC: 1.7 MG/DL (ref 1.6–2.6)
MCH RBC QN AUTO: 29.6 PG (ref 26.8–34.3)
MCHC RBC AUTO-ENTMCNC: 30.2 G/DL (ref 31.4–37.4)
MCV RBC AUTO: 98 FL (ref 82–98)
METHADONE UR QL: POSITIVE
MONOCYTES # BLD AUTO: 0.39 THOUSAND/ΜL (ref 0.17–1.22)
MONOCYTES NFR BLD AUTO: 7 % (ref 4–12)
NEUTROPHILS # BLD AUTO: 3.59 THOUSANDS/ΜL (ref 1.85–7.62)
NEUTS SEG NFR BLD AUTO: 60 % (ref 43–75)
NITRITE UR QL STRIP: NEGATIVE
NON-SQ EPI CELLS URNS QL MICRO: ABNORMAL /HPF
NONHDLC SERPL-MCNC: 105 MG/DL
NRBC BLD AUTO-RTO: 0 /100 WBCS
OPIATES UR QL SCN: POSITIVE
PCP UR QL: NEGATIVE
PH UR STRIP.AUTO: 5.5 [PH]
PHOSPHATE SERPL-MCNC: 2 MG/DL (ref 2.7–4.5)
PLATELET # BLD AUTO: 232 THOUSANDS/UL (ref 149–390)
PMV BLD AUTO: 10.1 FL (ref 8.9–12.7)
POTASSIUM SERPL-SCNC: 3.7 MMOL/L (ref 3.5–5.3)
PROT SERPL-MCNC: 6.6 G/DL (ref 6.4–8.2)
PROT UR STRIP-MCNC: ABNORMAL MG/DL
RBC # BLD AUTO: 4.05 MILLION/UL (ref 3.81–5.12)
RBC #/AREA URNS AUTO: ABNORMAL /HPF
SODIUM SERPL-SCNC: 144 MMOL/L (ref 136–145)
SP GR UR STRIP.AUTO: 1.02 (ref 1–1.03)
THC UR QL: POSITIVE
TRIGL SERPL-MCNC: 103 MG/DL
UROBILINOGEN UR QL STRIP.AUTO: 0.2 E.U./DL
WBC # BLD AUTO: 5.89 THOUSAND/UL (ref 4.31–10.16)
WBC #/AREA URNS AUTO: ABNORMAL /HPF

## 2019-07-11 PROCEDURE — 80307 DRUG TEST PRSMV CHEM ANLYZR: CPT | Performed by: STUDENT IN AN ORGANIZED HEALTH CARE EDUCATION/TRAINING PROGRAM

## 2019-07-11 PROCEDURE — 80320 DRUG SCREEN QUANTALCOHOLS: CPT | Performed by: STUDENT IN AN ORGANIZED HEALTH CARE EDUCATION/TRAINING PROGRAM

## 2019-07-11 PROCEDURE — 93970 EXTREMITY STUDY: CPT

## 2019-07-11 PROCEDURE — 80061 LIPID PANEL: CPT | Performed by: STUDENT IN AN ORGANIZED HEALTH CARE EDUCATION/TRAINING PROGRAM

## 2019-07-11 PROCEDURE — 81001 URINALYSIS AUTO W/SCOPE: CPT | Performed by: PHYSICIAN ASSISTANT

## 2019-07-11 PROCEDURE — 99253 IP/OBS CNSLTJ NEW/EST LOW 45: CPT | Performed by: PSYCHIATRY & NEUROLOGY

## 2019-07-11 PROCEDURE — 80053 COMPREHEN METABOLIC PANEL: CPT | Performed by: STUDENT IN AN ORGANIZED HEALTH CARE EDUCATION/TRAINING PROGRAM

## 2019-07-11 PROCEDURE — 83735 ASSAY OF MAGNESIUM: CPT | Performed by: STUDENT IN AN ORGANIZED HEALTH CARE EDUCATION/TRAINING PROGRAM

## 2019-07-11 PROCEDURE — 85025 COMPLETE CBC W/AUTO DIFF WBC: CPT | Performed by: STUDENT IN AN ORGANIZED HEALTH CARE EDUCATION/TRAINING PROGRAM

## 2019-07-11 PROCEDURE — NC001 PR NO CHARGE: Performed by: FAMILY MEDICINE

## 2019-07-11 PROCEDURE — 82948 REAGENT STRIP/BLOOD GLUCOSE: CPT

## 2019-07-11 PROCEDURE — 84100 ASSAY OF PHOSPHORUS: CPT | Performed by: STUDENT IN AN ORGANIZED HEALTH CARE EDUCATION/TRAINING PROGRAM

## 2019-07-11 RX ORDER — DABIGATRAN ETEXILATE 150 MG/1
150 CAPSULE, COATED PELLETS ORAL EVERY 12 HOURS SCHEDULED
Status: DISCONTINUED | OUTPATIENT
Start: 2019-07-11 | End: 2019-07-11 | Stop reason: HOSPADM

## 2019-07-11 RX ORDER — DABIGATRAN ETEXILATE 150 MG/1
150 CAPSULE, COATED PELLETS ORAL EVERY 12 HOURS SCHEDULED
Qty: 180 CAPSULE | Refills: 1 | Status: SHIPPED | OUTPATIENT
Start: 2019-07-11 | End: 2020-05-24

## 2019-07-11 RX ORDER — NICOTINE 21 MG/24HR
21 PATCH, TRANSDERMAL 24 HOURS TRANSDERMAL DAILY
Status: DISCONTINUED | OUTPATIENT
Start: 2019-07-11 | End: 2019-07-11 | Stop reason: SDUPTHER

## 2019-07-11 RX ORDER — METHADONE HYDROCHLORIDE 10 MG/1
130 TABLET ORAL DAILY
Status: DISCONTINUED | OUTPATIENT
Start: 2019-07-11 | End: 2019-07-11 | Stop reason: HOSPADM

## 2019-07-11 RX ADMIN — METHADONE HYDROCHLORIDE 130 MG: 10 TABLET ORAL at 10:04

## 2019-07-11 RX ADMIN — SODIUM CHLORIDE 125 ML/HR: 0.9 INJECTION, SOLUTION INTRAVENOUS at 08:41

## 2019-07-11 RX ADMIN — LORAZEPAM 1 MG: 2 INJECTION INTRAMUSCULAR; INTRAVENOUS at 04:26

## 2019-07-11 RX ADMIN — SODIUM CHLORIDE 125 ML/HR: 0.9 INJECTION, SOLUTION INTRAVENOUS at 00:43

## 2019-07-11 RX ADMIN — IOHEXOL 85 ML: 350 INJECTION, SOLUTION INTRAVENOUS at 00:04

## 2019-07-11 RX ADMIN — NICOTINE 1 PATCH: 21 PATCH, EXTENDED RELEASE TRANSDERMAL at 08:41

## 2019-07-11 RX ADMIN — ENOXAPARIN SODIUM 50 MG: 60 INJECTION SUBCUTANEOUS at 02:08

## 2019-07-11 RX ADMIN — ENOXAPARIN SODIUM 50 MG: 60 INJECTION SUBCUTANEOUS at 08:42

## 2019-07-11 NOTE — PROGRESS NOTES
Patient had been asleep after IV ativan dose  Restraints had been removed from all limbs as intervention has been effective  Preparing patient to be transported to radiology for CTscan but found IV site to be infiltrated while NS bolus was ongoing

## 2019-07-11 NOTE — CONSULTS
Consultation - [address] Mariaelena 64 y o  female MRN: [id number]  Unit/Bed#: 2 [place] [address] Encounter: [id number]      Chief Complaint: alcohol intoxication, loss of consciousness    History of Present Illness   Physician Requesting Consult: Feroz Joshi MD  Reason for Consult / Principal Problem: suicidal statements    Patient is a 64 y o  female presents to the hospital with alcohol intoxication and loss of consciousness and consulted for suicidal ideation  Patient was admitted to medical unit for assessment and treatment of pulmonary embolism  Psychiatry consulted to evaluate above psychiatric complaint  Patient reports that she recalls drinking yesterday but does not recall how much she drink and does not recall making any suicidal statements in the emergency room  Her the primary team's H&P, the patient made statements of drinking and not taking my meds for the purpose of killing herself  She denies most depressive symptoms other than lack of interest and depressed mood and denies any suicidal intent  She denies any other anxiety, psychotic symptoms, manic symptoms  She reports she attends [facility] for methadone maintenance  She reports being interested in possibly seeing a therapist and/or psychiatrist for possible depression  She reports that she was drinking yesterday when she was visiting her younger sister  Made an attempt to call younger sister twice but there is no answer on the phone  Spoke with her son shot heme whom she is in regular contact with  He reports that though she has been more depressed lately she has not made any recent suicidal attempts, gestures or even statements  He reports that she does state at times that she does not like her life" but he is not concerned that she is high risk to harm herself if she is not admitted to a psychiatric inpatient facility    He also asked to get an update regarding her medical condition and I forwarded his number to the medical team   I asked the patient if it is okay for the primary team to talk about her medical condition with her son and she granted verbal consent  Primary complaints include: alcohol abuse, feeling depressed and feeling suicidal   Onset of symptoms was abrupt starting 1 day ago with completely resolved course since that time  Psychosocial Stressors drug and alcohol  Guns at home?: no    Consults    Psychiatric Review Of Systems:  Sleep changes: no  appetite changes: no  weight changes: no  Anergy?: no  Anhedonia?: yes  somatic symptoms: no  anxiety/panic: no  ashok: no  guilty/hopeless: no  self injurious behavior/risky behavior: no  Hallucinations: no  Delusions: no    Historical Information   Past Psychiatric History: Therapy, Out Patient -currently attends [facility] for methadone maintenance and In Patient -none  Past Suicide attempts:  Denies  Past Violent behavior:  Denies  Past Psychiatric medication trial:  Denies    Substance Abuse History: Denies all current drug use , has a past history of heroin and cocaine use  Use of Alcohol: Denies drinking as heavily as yesterday on a regular basis  States previous time she drank before yesterday which was a couple weeks prior to that  Longest clean time:  Does not remember  History of IP/OP rehabilitation program:  Does not remember  Smoking history: current - packs per day1 for Unknown year(s)  Use of Caffeine: denies use    Family Psychiatric History: Denies any psych, drug or suicide history    Social History  Marital history: single  Living arrangement, social support: The patient Lives alone but gets regular help with chores around the house from her son  Occupational History: unemployed  Functioning Relationships: good support system  Other Pertinent History: Legal: No legal history and  Service: No  history      Traumatic History:   Abuse: Not assessed  Other Traumatic Events: Not assessed    Past Medical History: Diagnosis Date    Arthritis     Asthma     Diabetes mellitus ([facility] [address] )        Medical Review Of Systems:  Review of Systems    Meds/Allergies   all current active meds have been reviewed and current meds:   Current Facility-Administered Medications   Medication Dose Route Frequency    acetaminophen (TYLENOL) tablet 650 mg  650 mg Oral Q6H PRN    albuterol (PROVENTIL HFA,VENTOLIN HFA) inhaler 2 puff  2 puff Inhalation Q6H PRN    dabigatran etexilate (PRADAXA) capsule 150 mg  150 mg Oral Q12H [facility]    insulin lispro (HumaLOG) 100 units/mL subcutaneous injection 1-5 Units  1-5 Units Subcutaneous 4x Daily (with meals and at bedtime)    LORazepam (ATIVAN) 2 mg/mL injection 1 mg  1 mg Intravenous Q6H PRN    methadone (DOLOPHINE) tablet 130 mg  130 mg Oral Daily    nicotine (NICODERM CQ) 21 mg/24 hr TD 24 hr patch 1 patch  1 patch Transdermal Daily    pneumococcal 23-valent polysaccharide vaccine (PNEUMOVAX-23) injection 0 5 mL  0 5 mL Subcutaneous Prior to discharge    sodium chloride 0 9 % infusion  75 mL/hr Intravenous Continuous     No Known Allergies    Objective   Vital signs in last 24 hours:  Temp:  [97 3 °F (36 3 °C)-98 6 °F (37 °C)] 98 6 °F (37 °C)  HR:  [] 96  Resp:  [16-20] 18  BP: ()/(55-83) 136/78      Intake/Output Summary (Last 24 hours) at 7/11/2019 1209  Last data filed at 7/11/2019 0617  Gross per 24 hour   Intake 4245 83 ml   Output    Net 4245 83 ml       Mental Status Evaluation:  Appearance:  disheveled and older than stated age   Behavior:  Calm, cooperative, falling asleep at times   Speech:  normal pitch and normal volume   Mood:  normal   Affect:  blunted and constricted   Language: naming objects and repeating phrases   Thought Process:  normal   Thought Content:  normal   Perceptual Disturbances: None   Risk Potential: Potential for Aggression Yes - past agitation in the ED and Denies SI/HI   Sensorium:  person, place, month of year and year   Cognition:  grossly intact Consciousness:  sedated    Attention: attention span appeared shorter than expected for age   Intellect: not examined   Fund of Knowledge: awareness of current events: yes   Insight:  limited   Judgment: limited   Muscle Strength and Tone: Not assessed   Gait/Station: normal gait/station and normal balance   Motor Activity: no abnormal movements     Lab Results:   UDS: positive for marijuana and Methadone, opiates  All pertinent labs reviewed, ethanol level currently <3  Imaging Studies: I have reviewed pertinent imaging   EKG, Pathology, and Other Studies: QTc = 495    Code Status: Level 1 - Full Code  Advance Directive and Living Will:      Power of :    POLST:    Assessment/Plan     Provisional Psychiatric Diagnosis:  Primary Diagnosis: Alcohol Use Disorder - Moderate and Depressive Disorder, Unspecified  Secondary Diagnosis: Deferred  Medical Diagnosis(es):  Pulmonary embolism, type 2 diabetes      Assessment:  Patient is a 64 y o  female presents with Alcohol intoxication and loss of consciousness, psych consulted for suicidal statements made in the ED  Patient reports that she has no desire to hurt herself but has been more depressed as of late  Patient's behaviors include calm and cooperative, somewhat sleepy during assessment  Relevant medical issues include -see above  Plan:   1  No need for psychiatric inpatient at this time  One-to-one discontinued  2  Will give referrals for outpatient psychiatric care  3  Will not start psychiatric medications at this time  Counseling / Coordination of Care  Total floor / unit time spent today 30 minutes  Greater than 50% of total time was spent with the patient and / or family counseling and / or coordination of care   A description of the counseling / coordination of care:  Collateral from son, assessment of patient, coordination with primary team    Basil Jane MD

## 2019-07-11 NOTE — PLAN OF CARE
Problem: Potential for Falls  Goal: Patient will remain free of falls  Description  INTERVENTIONS:  - Assess patient frequently for physical needs  -  Identify cognitive and physical deficits and behaviors that affect risk of falls    -  [place] fall precautions as indicated by assessment   - Educate patient/family on patient safety including physical limitations  - Instruct patient to call for assistance with activity based on assessment  - Modify environment to reduce risk of injury  - Consider OT/PT consult to assist with strengthening/mobility  7/11/2019 1415 by Juan Jose Mays RN  Outcome: Adequate for Discharge  7/11/2019 1049 by Juan Jose Mays RN  Outcome: Progressing

## 2019-07-11 NOTE — QUICK NOTE
Patient's CTA came back positive for a LLL PE  Patient started on Lovenox 1mg/kg  Barium swallow study and Zosyn D/cd

## 2019-07-11 NOTE — PROGRESS NOTES
Patient awake and agitated again  She states she doesn't want to stay here and will leave in the morning  She complains of being thirsty  Explained to her that she is not allowed anything by mouth pending a swallow eval in the morning  Patient states she doesn't want anymore testing done  Assisted her to the bathroom using a walker because of her unsteady gait  While in the bathroom she drank some water from the faucet  When kept from further doing so, she became more agitated and pushed nurse away while storming out of the bathroom  Patient medicated with IV ativan for this agitation

## 2019-07-11 NOTE — UTILIZATION REVIEW
Initial Clinical Review    Admission: Date/Time/Statement: 7/10/19 @ 1806     Orders Placed This Encounter   Procedures    Inpatient Admission     Standing Status:   Standing     Number of Occurrences:   1     Order Specific Question:   Admitting Physician     Answer:   Jordy Bryant [[id number]]     Order Specific Question:   Level of Care     Answer:   Med Surg [[id number]]     Order Specific Question:   Estimated length of stay     Answer:   More than 2 Midnights     Order Specific Question:   Certification     Answer:   I certify that inpatient services are medically necessary for this patient for a duration of greater than two midnights  See H&P and MD Progress Notes for additional information about the patient's course of treatment  ED Arrival Information     Expected Arrival Acuity Means of Arrival Escorted By Service Admission Type    - 7/10/2019 16:17 Emergent Walk-In [facility] Medicine Emergency    Arrival Complaint    -        Chief Complaint   Patient presents with    Altered Mental Status     Pt found on the sidewalk passed out, admits to drinking alcohol today  Pt c/o pain "everywhere", pt tearful and "wish I was dead"  Pt denies plan or attempt to kill self  Assessment/Plan:     64year old female presents to ed accompanied by police for evaluation and treatment after a fall after drinking alcohol and expressing suicidal ideation without a plan  She was found down on the sidewalk  Her son  of lung cancer recently at the age of 12  She has pain all over  She became agitated in ed  Yelling and cursing  Restrained and placed on 1-1 diffuse rhonchi present throughout all lung fields  O2 sats of 89% ra  Alcohol 361  UDS positive for methadone, opiaes and thc  Treated in ed with supplemental oxygen  3L , iv ativan, iv fluds and iv antibiotic  Plan CTA  Chest PE study, supplemental oxygen and iv fluids            ED Triage Vitals   07/10/19 1623 07/10/19 1623 07/10/19 1623 07/10/19 1623 07/10/19 1623   97 8 °F (36 6 °C) 100 16 98/55 (!) 89 %      No Pain       07/10/19 48 8 kg (107 lb 9 4 oz)     Additional Vital Signs:     07/10/19 2246    56  18  117/76  83 %Abnormal   Nasal cannula 2L     07/10/19 2126        116/79       07/10/19 2120    101  18    97 %  Nasal cannula  2L   07/10/19 2100    85  18  104/63  93 %  None (Room air)   07/10/19 1815    84  16  129/83  100 %     admit               07/10/19 1800    82  16  135/78  100 %     07/10/19 1745    82  18  143/81  100 %     07/10/19 1738    81  18  143/81  100 %  Nasal cannula  3L           Pertinent Labs/Diagnostic Test Results:     Ct head no acute finding  Chest   No acute finding           Results from last 7 days   Lab Units 07/11/19  0617 07/10/19  1633   WBC Thousand/uL 5 89 5 50   HEMOGLOBIN g/dL 12 0 12 8   HEMATOCRIT % 39 7 40 1   PLATELETS Thousands/uL 232 250   NEUTROS ABS Thousands/µL 3 59 2 27         Results from last 7 days   Lab Units 07/11/19  0617 07/10/19  1633   SODIUM mmol/L 144 148*   POTASSIUM mmol/L 3 7 3 2*   CHLORIDE mmol/L 109* 109*   CO2 mmol/L 26 27   ANION GAP mmol/L 9 12   BUN mg/dL 9 16   CREATININE mg/dL 0 60 0 67   EGFR ml/min/1 73sq m 117 113   CALCIUM mg/dL 7 5* 7 9*   MAGNESIUM mg/dL 1 7  --    PHOSPHORUS mg/dL 2 0*  --      Results from last 7 days   Lab Units 07/11/19  0617 07/10/19  1633   AST U/L 16 15   ALT U/L 15 18   ALK PHOS U/L 153* 170*   TOTAL PROTEIN g/dL 6 6 7 1   ALBUMIN g/dL 2 7* 3 0*   TOTAL BILIRUBIN mg/dL 0 20 0 20     Results from last 7 days   Lab Units 07/11/19  1125 07/11/19  0545 07/11/19  0035 07/10/19  2149 07/10/19  1624   POC GLUCOSE mg/dl 78 82 85 76 89           Results from last 7 days   Lab Units 07/10/19  1633   HEMOGLOBIN A1C % 6 1   EAG mg/dl 128       Results from last 7 days   Lab Units 07/10/19  1708   TROPONIN I ng/mL <0 02         Results from last 7 days   Lab Units 07/10/19  1633   PROTIME seconds 9 9   INR  0 94   PTT seconds 24 Results from last 7 days   Lab Units 07/10/19  1843 07/10/19  1644   LACTIC ACID mmol/L 2 0 3 3*       Results from last 7 days   Lab Units 07/11/19  0212   CLARITY UA  Clear   COLOR UA  Yellow   SPEC GRAV UA  1 020   PH UA  5 5   GLUCOSE UA mg/dl Negative   KETONES UA mg/dl Negative   BLOOD UA  Negative   PROTEIN UA mg/dl Trace*   NITRITE UA  Negative   BILIRUBIN UA  Negative   UROBILINOGEN UA E U /dl 0 2   LEUKOCYTES UA  Negative   WBC UA /hpf 0-1*   RBC UA /hpf None Seen   BACTERIA UA /hpf Occasional   EPITHELIAL CELLS WET PREP /hpf Occasional         Results from last 7 days   Lab Units 07/11/19  0212   AMPH/METH  Negative   BARBITURATE UR  Negative   BENZODIAZEPINE UR  Negative   COCAINE UR  Negative   METHADONE URINE  Positive*   OPIATE UR  Positive*   PCP UR  Negative   THC UR  Positive*     Results from last 7 days   Lab Units 07/11/19  0617 07/10/19  1633   ETHANOL LVL mg/dL <3 361*       ED Treatment:   Medication Administration from 07/10/2019 1617 to 07/10/2019 1928       Date/Time Order Dose Route     07/10/2019 1709 sodium chloride 0 9 % bolus 1,000 mL 1,000 mL Intravenous     07/10/2019 1737 levofloxacin (LEVAQUIN) IVPB (premix) 750 mg 750 mg Intravenous     07/10/2019 1838 LORazepam (ATIVAN) 2 mg/mL injection 1 mg 1 mg Intravenous        Past Medical History:   Diagnosis    Arthritis    Asthma    Diabetes mellitus ([place] [address] )     Present on Admission:  **None**      Admitting Diagnosis:   Alcohol intoxication ([place] [address] ) [F10 929]  Aspiration pneumonia ([place] [address] ) [J69 0]  Altered mental status [R41 82]  Altered mental state [R41 82]  Sepsis ([place] [address] ) [A41 9]    Age/Sex: 64 y o  female     Admission Orders:    Current Facility-Administered Medications:  acetaminophen 650 mg Oral Q6H PRN    albuterol 2 puff Inhalation Q6H PRN    dabigatran etexilate 150 mg Oral Q12H [address]    insulin lispro 1-5 Units Subcutaneous 4x Daily (with meals and at bedtime)    LORazepam 1 mg Intravenous Q6H PRN    methadone 130 mg Oral Daily nicotine 1 patch Transdermal Daily    pneumococcal 23-valent polysaccharide vaccine 0 5 mL Subcutaneous Prior to discharge    sodium chloride 75 mL/hr Intravenous Continuous Last Rate: 75 mL/hr (07/11/19 1004)       IP CONSULT TO PSYCHIATRY  IP CONSULT TO CASE MANAGEMENT  IP CONSULT TO ALCOHOL BRIEF INTERVENTION TRAUMA    Network Utilization Review Department  Phone: [phone]; Fax [phone]  [email or web address]  ATTENTION: Please call with any questions or concerns to [phone]  and carefully listen to the prompts so that you are directed to the right person  Send all requests for admission clinical reviews, approved or denied determinations and any other requests to fax [phone]   All voicemails are confidential

## 2019-07-11 NOTE — PLAN OF CARE
Problem: SAFETY, RESTRAINT - VIOLENT/SELF-DESTRUCTIVE  Goal: Remains free of harm/injury from restraints (Restraint for Violent/Self-Destructive Behavior)  Description  INTERVENTIONS:  - Instruct patient/family regarding restraint use   - Assess and monitor physiologic and psychological status   - Provide interventions and comfort measures to meet assessed patient needs   - Ensure continuous in person monitoring is provided   - Identify and implement measures to help patient regain control  - Assess readiness for release of restraint  Note:   Patient received from ED with 4-point restraints for violent behavior  Patient was initially sedated, coming to the floor, but after an hour was wide awake, verbally aggressive and uncooperative with care  She pushed overbed table, tipped it which threw contents on the floor out of spite because she couldn't get what she wanted

## 2019-07-11 NOTE — PROGRESS NOTES
Progress Note - Vivian Juan 64 y o  female MRN: [id number]    Unit/Bed#: [address] [place] [address] Encounter: [id number]      Assessment & Plan:    64year old female with PMH as previously noted, presenting initially with hypoxia and alcohol intoxication, found to have pulmonary embolism  Yesterday patient was extremely combative, tearful, and expressed suicidal ideation  Her son  recently at age 45 from lung cancer  Psychiatry is consulted  Continuing methadone, verified dose  Patient was transitioned from therapeutic lovenox to pradaxa  Obtain LE dopplers to r/o DVT  * [facility] )  Assessment & Plan  · As evidenced by CTA PE study  Small LLL embolus  · Started on therapeutic Lovenox 1 milligram/kilogram q 12 hours  · Transition to pradaxa 150 mg po bid today  · Bilateral LE duplex rule out DVTs  · Monitor O2 requirements  Suicidal ideation  Assessment & Plan  · Patient claimed that she was drinking and not taking any of her medications because she "wanted to die "  · Denies current SI/HI today  · Consult psychiatry  Recommendations greatly appreciated  Polysubstance abuse ([facility] )  Assessment & Plan  · Patient arrives acutely intoxicated, alcohol level on arrival 361  · AM alcohol level < 3  No tremors or anxiety on exam  Will hold off on CIWA or librium for now  · UDS positive for methadone (gets methadone at [facility]), THC and opiates  · Methadone dose confirmed at 135 mg with [place]  · Ativan 1 mg IV p r n  agitation  · Patient passed nursing dysphagia assessment  Can start on clear liquids as patient is diabetic and sugars have been running low  · Pending official speech / swallow eval   · Case management consulted         Type 2 diabetes mellitus with complication, with long-term current use of insulin [facility])  Assessment & Plan  No results found for: HGBA1C    Recent Labs     07/10/19  1624 07/10/19  2149   POCGLU 89 76       Blood Sugar Average: Last 72 hrs:  (P) 82 5     · Repeat A1c 6 1, despite patient claiming she does not take her medications  · Held hold basaglar overnight due to NPO status  Patient now has a diet  Can restart tonight  · ISS algorithm 2 with accuchecks qac qhs    Nicotine dependence  Assessment & Plan  · Current 1ppd smoker  · Nicotine patch 21 mg   · Offer smoking cessation materials and counseling prior to discharge  Global  · IVF NS 75 cc/h  · Clear liquids after nursing dysphagia assessment  Formal swallow eval pending  · Replete electrolytes as indicated  · VTE PPX Pradaxa  · Level 1 Full Code    Subjective:     Patient was seen and examined at bedside  She was found resting in bed with her 1:1 observer sitting in the chair next to her  She is irritated because she wants to eat breakfast and wants her methadone  She has no other complaints today  Objective:     Vitals: Blood pressure 136/78, pulse 96, temperature 98 6 °F (37 °C), temperature source Oral, resp  rate 18, height 4' 11" (1 499 m), weight 48 8 kg (107 lb 9 4 oz), SpO2 100 %  ,Body mass index is 21 73 kg/m²  Intake/Output Summary (Last 24 hours) at 7/11/2019 1022  Last data filed at 7/11/2019 0617  Gross per 24 hour   Intake 4245 83 ml   Output    Net 4245 83 ml       Physical Exam: /78 (BP Location: Left arm)   Pulse 96   Temp 98 6 °F (37 °C) (Oral)   Resp 18   Ht 4' 11" (1 499 m)   Wt 48 8 kg (107 lb 9 4 oz)   SpO2 100%   BMI 21 73 kg/m²   General appearance: alert and oriented, in no acute distress and irritable but not combative at this time  Lungs: faint rhonchi bilaterally   Slightly decreased breath sounds LLL  Heart: regular rate and rhythm, S1, S2 normal, no murmur, click, rub or gallop  Abdomen: soft, non-tender; bowel sounds normal; no masses,  no organomegaly  Extremities: extremities normal, warm and well-perfused; no cyanosis, clubbing, or edema  Neurologic: Grossly normal     Invasive Devices     Peripheral Intravenous Line            Peripheral IV 07/10/19 Left Wrist less than 1 day    Peripheral IV 07/10/19 Proximal;Right;Ventral (anterior) Antecubital less than 1 day                Lab, Imaging and other studies: I have personally reviewed pertinent reports  VTE Pharmacologic Prophylaxis: Pradaxa  VTE Mechanical Prophylaxis: sequential compression device     Lex Beckman DO  07/11/19  10:22 AM    Some portions of this record may have been generated with voice recognition software  There may be translation, syntax, or grammatical errors  Occasional wrong word or "sound-a-like" substitutions may have occurred due to the inherent limitations of the voice recognition software  Read the chart carefully and recognize, using context, where substations may have occurred   If you have any questions, please contact the dictating provider for clarification or correction, as needed

## 2019-07-11 NOTE — ASSESSMENT & PLAN NOTE
· Patient claimed that she was drinking and not taking any of her medications because she "wanted to die "  · Patient was aggressive and agitated initially in ED, but appears calmer though irritated today  · Denies current SI/HI today  · Consult psychiatry  Recommendations greatly appreciated

## 2019-07-11 NOTE — ASSESSMENT & PLAN NOTE
· Patient arrives acutely intoxicated, alcohol level on arrival 361  · AM alcohol level < 3  No tremors or anxiety on exam  Will hold off on CIWA or librium for now  · UDS positive for methadone (gets methadone at [facility]), THC and opiates  · Methadone dose confirmed at 135 mg with [place]  · Ativan 1 mg IV p r n  agitation  · Patient passed nursing dysphagia assessment  Can start on clear liquids as patient is diabetic and sugars have been running low  · Pending official speech / swallow eval   · Case management consulted

## 2019-07-11 NOTE — PLAN OF CARE
Problem: Potential for Falls  Goal: Patient will remain free of falls  Description  INTERVENTIONS:  - Assess patient frequently for physical needs  -  Identify cognitive and physical deficits and behaviors that affect risk of falls    -  [place] fall precautions as indicated by assessment   - Educate patient/family on patient safety including physical limitations  - Instruct patient to call for assistance with activity based on assessment  - Modify environment to reduce risk of injury  - Consider OT/PT consult to assist with strengthening/mobility  Outcome: Progressing

## 2019-07-11 NOTE — UTILIZATION REVIEW
Continued Stay Review    Date: 7-11-19                   Current Patient Class: inpatient   Current Level of Care: medical     HPI:59 y o  female initially admitted on 7-10-19  For alcohol intoxication, fall,  Suicidal ideation and hypoxemia     Assessment/Plan:     CTA completed and patient has LLL pulmonary embolism  New orders for pradaxa  Behavioral health consult completed 1-1 discontinued  Suicide ideatiion no longer present      Pertinent Labs/Diagnostic Results:     CTA chest        Subtle subsegmental branch left lower lobe pulmonary emboli    Venous duplex bilateral  No acute finding     Results from last 7 days   Lab Units 07/11/19  0617 07/10/19  1633   WBC Thousand/uL 5 89 5 50   HEMOGLOBIN g/dL 12 0 12 8   HEMATOCRIT % 39 7 40 1   PLATELETS Thousands/uL 232 250   NEUTROS ABS Thousands/µL 3 59 2 27         Results from last 7 days   Lab Units 07/11/19  0617 07/10/19  1633   SODIUM mmol/L 144 148*   POTASSIUM mmol/L 3 7 3 2*   CHLORIDE mmol/L 109* 109*   CO2 mmol/L 26 27   ANION GAP mmol/L 9 12   BUN mg/dL 9 16   CREATININE mg/dL 0 60 0 67   EGFR ml/min/1 73sq m 117 113   CALCIUM mg/dL 7 5* 7 9*   MAGNESIUM mg/dL 1 7  --    PHOSPHORUS mg/dL 2 0*  --      Results from last 7 days   Lab Units 07/11/19  0617 07/10/19  1633   AST U/L 16 15   ALT U/L 15 18   ALK PHOS U/L 153* 170*   TOTAL PROTEIN g/dL 6 6 7 1   ALBUMIN g/dL 2 7* 3 0*   TOTAL BILIRUBIN mg/dL 0 20 0 20     Results from last 7 days   Lab Units 07/11/19  1125 07/11/19  0545 07/11/19  0035 07/10/19  2149 07/10/19  1624   POC GLUCOSE mg/dl 78 82 85 76 89     Results from last 7 days   Lab Units 07/11/19  0617 07/10/19  1633   GLUCOSE RANDOM mg/dL 82 95     Results from last 7 days   Lab Units 07/10/19  1633   HEMOGLOBIN A1C % 6 1   EAG mg/dl 128       Results from last 7 days   Lab Units 07/10/19  1708   TROPONIN I ng/mL <0 02         Results from last 7 days   Lab Units 07/10/19  1633   PROTIME seconds 9 9   INR  0 94   PTT seconds 24 Results from last 7 days   Lab Units 07/10/19  1843 07/10/19  1644   LACTIC ACID mmol/L 2 0 3 3*       Results from last 7 days   Lab Units 07/11/19  0212   CLARITY UA  Clear   COLOR UA  Yellow   SPEC GRAV UA  1 020   PH UA  5 5   GLUCOSE UA mg/dl Negative   KETONES UA mg/dl Negative   BLOOD UA  Negative   PROTEIN UA mg/dl Trace*   NITRITE UA  Negative   BILIRUBIN UA  Negative   UROBILINOGEN UA E U /dl 0 2   LEUKOCYTES UA  Negative   WBC UA /hpf 0-1*   RBC UA /hpf None Seen   BACTERIA UA /hpf Occasional   EPITHELIAL CELLS WET PREP /hpf Occasional         Results from last 7 days   Lab Units 07/11/19  0212   AMPH/METH  Negative   BARBITURATE UR  Negative   BENZODIAZEPINE UR  Negative   COCAINE UR  Negative   METHADONE URINE  Positive*   OPIATE UR  Positive*   PCP UR  Negative   THC UR  Positive*     Results from last 7 days   Lab Units 07/11/19  0617 07/10/19  1633   ETHANOL LVL mg/dL <3 361*         Vital Signs:    Date/Time  Temp  Pulse  Resp  BP  SpO2  O2 Device   07/11/19 0750  98 6 °F (37 °C)  96  18  136/78  100 %  Nasal cannula   07/11/19 0414  98 4 °F (36 9 °C)  97  18  134/77  96 %  Nasal cannula   07/11/19 0132  97 3 °F (36 3 °C)Abnormal   92  20  119/73  97 %  Nasal cannula   07/11/19 0014  97 5 °F (36 4 °C)  91  20  121/77       07/10/19 2246    56  18  117/76  83 %Abnormal   Nasal cannula             Medications:     acetaminophen 650 mg Oral Q6H PRN    albuterol 2 puff Inhalation Q6H PRN    dabigatran etexilate 150 mg Oral Q12H [address]    insulin lispro 1-5 Units Subcutaneous 4x Daily (with meals and at bedtime)    LORazepam 1 mg Intravenous Q6H PRN    methadone 130 mg Oral Daily    nicotine 1 patch Transdermal Daily    pneumococcal 23-valent polysaccharide vaccine 0 5 mL Subcutaneous Prior to discharge    sodium chloride 75 mL/hr Intravenous Continuous Last Rate: 75 mL/hr (07/11/19 1004)       Discharge Plan: to be determined     Network Utilization Review Department  Phone: [phone];  Fax [phone]  [email or web address]  ATTENTION: Please call with any questions or concerns to [phone]  and carefully listen to the prompts so that you are directed to the right person  Send all requests for admission clinical reviews, approved or denied determinations and any other requests to fax [phone]   All voicemails are confidential

## 2019-07-11 NOTE — DISCHARGE INSTRUCTIONS
Pulmonary Embolism   WHAT YOU NEED TO KNOW:   A pulmonary embolism (PE) is the sudden blockage of a blood vessel in the lungs by an embolus  An embolus is a small piece of blood clot, fat, air, or tumor cells  The embolus cuts off the blood supply to your lungs  A pulmonary embolism can become life-threatening  DISCHARGE INSTRUCTIONS:   Call 911 for any of the following:   · You feel lightheaded, short of breath, and have chest pain  · You cough up blood  · You have a seizure  · You have slurred speech, increased sleepiness, or problems seeing, talking, or thinking  · You have weakness or cannot move your arm or leg on one side of your body  Seek care immediately if:   · You feel faint  · You have a severe headache  · Your heart is beating faster than normal   Contact your healthcare provider if:   · The skin on any part of your legs or hips turns purple  · Your gums or nose bleed  · You see blood in your urine or bowel movements  · Your bowel movements are black or darker than normal     · You have questions or concerns about your condition or care  Medicines:   · Blood thinners  help treat the PE and prevent new clots from forming  Examples of blood thinners include heparin, rivaroxaban, apixiban, and warfarin  The following are general safety guidelines to follow while you are taking a blood thinner:     ¨ Watch for bleeding and bruising  Watch for bleeding from your gums or nose  Watch for blood in your urine and bowel movements  Use a soft washcloth on your skin, and a soft toothbrush to brush your teeth  This can keep your skin and gums from bleeding  If you shave, use an electric shaver  Do not play contact sports  ¨ Tell your dentist and other healthcare providers that you take a blood thinner  Wear a bracelet or necklace that says you take this medicine  ¨ Do not start or stop any medicines unless your healthcare provider tells you to   Many medicines cannot be used with blood thinners  ¨ Tell your healthcare provider right away if you forget to take the blood thinner , or if you take too much  ¨ Warfarin  is a blood thinner that you may need to take  The following are additional things you should be aware of if you take warfarin:    § Foods and medicines can affect the amount of warfarin in your blood  Do not make major changes to your diet  Warfarin works best when you eat about the same amount of vitamin K every day  Vitamin K is found in green leafy vegetables and certain other foods  Ask for more information about what to eat or not to eat  § You will need to see your healthcare provider for follow-up visits  You will need regular blood tests to decide how much warfarin you need  · Take your medicine as directed  Contact your healthcare provider if you think your medicine is not helping or if you have side effects  Tell him or her if you are allergic to any medicine  Keep a list of the medicines, vitamins, and herbs you take  Include the amounts, and when and why you take them  Bring the list or the pill bottles to follow-up visits  Carry your medicine list with you in case of an emergency  Prevent another PE:   · Wear pressure stockings  The stockings are tight and put pressure on your legs  This improves blood flow and helps prevent clots  Wear the stockings during the day  Do not wear them when you sleep  · Exercise regularly  Ask about the best exercise plan for you  When you travel by car or work at a desk, take breaks to stand up and move around as much as possible  Rotate your feet in circles often if you sit for a long period of time  · Maintain a healthy weight  Ask your healthcare provider how much you should weigh  Ask him to help you create a weight loss plan if you are overweight  · Do not smoke    Nicotine and other chemicals in cigarettes and cigars can damage blood vessels and increase your risk for another PE  Ask your healthcare provider for information if you currently smoke and need help to quit  E-cigarettes or smokeless tobacco still contain nicotine  Talk to your healthcare provider before you use these products  Follow up with your healthcare provider as directed:  Write down your questions so you remember to ask them during your visits  © 2017 [address] [employer] Information is for End User's use only and may not be sold, redistributed or otherwise used for commercial purposes  All illustrations and images included in CareNotes® are the copyrighted property of [employer]  or [employer]  The above information is an  only  It is not intended as medical advice for individual conditions or treatments  Talk to your doctor, nurse or pharmacist before following any medical regimen to see if it is safe and effective for you  Alcohol Intoxication   WHAT YOU NEED TO KNOW:   Alcohol intoxication is a harmful physical condition caused when you drink more alcohol than your body can handle  It is also called ethanol poisoning, or being drunk  DISCHARGE INSTRUCTIONS:   Medicine: You may be given medicine to manage the signs and symptoms of alcohol intoxication  Take your medicine as directed  Contact your healthcare provider if you think your medicine is not helping or if you have side effects  Tell him if you are allergic to any medicine  Keep a list of the medicines, vitamins, and herbs you take  Include the amounts, and when and why you take them  Bring the list or the pill bottles to follow-up visits  Keep the list with you in case of emergency  Follow up with your healthcare provider as directed:  Write down your questions so you remember to ask them during your visits  Limit or avoid alcohol:  Men should not have more than 2 drinks per day  Women should not have more than 1 drink per day  A drink is 12 ounces of beer, 5 ounces of wine, or 1½ ounces of liquor     Do not drive or operate machines when you drink alcohol:  Make sure you always have someone to drive you when you drink alcohol  Learn ways to manage stress  Deep breathing, meditation, and listening to music may help you cope with stressful events  Talk to your caregiver about other ways to manage stress  For more information:   · Alcoholics Anonymous  Web Address: http://www [employer]/  Contact your healthcare provider if:   · You need help to stop drinking alcohol  · You have trouble with work or school because you drink too much alcohol  · You have physical or verbal fights because of alcohol  · You have questions or concerns about your condition or care  Seek care immediately or call 911 if:   · You have sudden trouble breathing or chest pain  · You have a seizure  · You feel sad enough to harm yourself or others  · You have hallucinations (you see or hear things that are not real)  · You cannot stop vomiting  · You were in an accident because of alcohol  © 2017 [address] [employer] Information is for End User's use only and may not be sold, redistributed or otherwise used for commercial purposes  All illustrations and images included in CareNotes® are the copyrighted property of [employer]  or [employer]  The above information is an  only  It is not intended as medical advice for individual conditions or treatments  Talk to your doctor, nurse or pharmacist before following any medical regimen to see if it is safe and effective for you

## 2019-07-11 NOTE — H&P
H&P Exam - Ronni Jordan 64 y o  female MRN: [id number]    Unit/Bed#: [address] [place] [address] Encounter: [id number]      Assessment/Plan     Assessment:  * Hypoxia  Assessment & Plan  CTA to rule out pulmonary embolism  NPO except for meds with applesauce  Barium swallow and speech therapy consult      Intoxication by drug [facility])  Assessment & Plan  -repeat ethanol level in the morning  - no CIWA or Librium protocol at this time   - Ativan 1 mg p r n  for agitation      Type 2 diabetes mellitus with complication, with long-term current use of insulin ([facility] [address] )  Assessment & Plan  No results found for: HGBA1C    Recent Labs     07/10/19  1624 07/10/19  2149   POCGLU 89 76       Blood Sugar Average: Last 72 hrs:  (P) 82 5     Repeat A1c     ISS algorithm 2          Plan:    History of Present Illness   HPI:  Ronni Jordan is a 64 y o  female past medical history significant for diabetes, asthma and alcoholism who presents to the ED via EMS  She was sent on the sidewalk passed out  Upon arrival to the ED and was found that her alcohol level was 361 and oxygen saturation was 89%  Lactic acid was elevated at 3 3  CT of the head showed normal no acute abnormality  Chest x-ray negative  Per nursing staff, patient noted upon arrival that she had not been taking her medication for months because she wanted to die  Per chart review she was previously using methadone however it is unknown if she is currently being prescribed methadone  Per chart review she is a past heroin and cocaine addict  Review of systems unable to be obtained as patient was combative and uncooperative       PCP: Anders Em,     Review of Systems   Unable to perform ROS: Mental status change       Historical Information   Past Medical History:   Diagnosis Date    Arthritis     Asthma     Diabetes mellitus ([facility] [address] )      Past Surgical History:   Procedure Laterality Date    ABDOMINAL SURGERY       Social History   Social History     Substance and Sexual Activity   Alcohol Use Yes    Comment: socially     Social History     Substance and Sexual Activity   Drug Use No    Comment: on methadone     Social History     Tobacco Use   Smoking Status Current Every Day Smoker    Packs/day: 1 00   Smokeless Tobacco Never Used     Family History: non-contributory    Meds/Allergies   all medications and allergies reviewed  No Known Allergies    Objective   Vitals: Blood pressure 116/79, pulse 101, temperature 97 8 °F (36 6 °C), temperature source Tympanic, resp  rate 18, SpO2 97 %  Intake/Output Summary (Last 24 hours) at 7/10/2019 2308  Last data filed at 7/10/2019 1836  Gross per 24 hour   Intake 2500 ml   Output    Net 2500 ml       Invasive Devices     Peripheral Intravenous Line            Peripheral IV 07/10/19 Left Wrist less than 1 day                Physical Exam   Psychiatric: Her mood appears anxious  Her affect is angry and inappropriate  She is agitated, aggressive and combative  unable to do full physical exam at this time as patient is combative  Lab Results: I have personally reviewed pertinent reports       Results for orders placed or performed during the hospital encounter of 07/10/19   CBC and differential   Result Value Ref Range    WBC 5 50 4 31 - 10 16 Thousand/uL    RBC 4 36 3 81 - 5 12 Million/uL    Hemoglobin 12 8 11 5 - 15 4 g/dL    Hematocrit 40 1 34 8 - 46 1 %    MCV 92 82 - 98 fL    MCH 29 4 26 8 - 34 3 pg    MCHC 31 9 31 4 - 37 4 g/dL    RDW 16 4 (H) 11 6 - 15 1 %    MPV 9 8 8 9 - 12 7 fL    Platelets 018 610 - 157 Thousands/uL    nRBC 0 /100 WBCs    Neutrophils Relative 41 (L) 43 - 75 %    Immat GRANS % 1 0 - 2 %    Lymphocytes Relative 45 (H) 14 - 44 %    Monocytes Relative 10 4 - 12 %    Eosinophils Relative 2 0 - 6 %    Basophils Relative 1 0 - 1 %    Neutrophils Absolute 2 27 1 85 - 7 62 Thousands/µL    Immature Grans Absolute 0 03 0 00 - 0 20 Thousand/uL    Lymphocytes Absolute 2 50 0 60 - 4 47 Thousands/µL    Monocytes Absolute 0  55 0 17 - 1 22 Thousand/µL    Eosinophils Absolute 0 11 0 00 - 0 61 Thousand/µL    Basophils Absolute 0 04 0 00 - 0 10 Thousands/µL   Comprehensive metabolic panel   Result Value Ref Range    Sodium 148 (H) 136 - 145 mmol/L    Potassium 3 2 (L) 3 5 - 5 3 mmol/L    Chloride 109 (H) 100 - 108 mmol/L    CO2 27 21 - 32 mmol/L    ANION GAP 12 4 - 13 mmol/L    BUN 16 5 - 25 mg/dL    Creatinine 0 67 0 60 - 1 30 mg/dL    Glucose 95 65 - 140 mg/dL    Calcium 7 9 (L) 8 3 - 10 1 mg/dL    AST 15 5 - 45 U/L    ALT 18 12 - 78 U/L    Alkaline Phosphatase 170 (H) 46 - 116 U/L    Total Protein 7 1 6 4 - 8 2 g/dL    Albumin 3 0 (L) 3 5 - 5 0 g/dL    Total Bilirubin 0 20 0 20 - 1 00 mg/dL    eGFR 113 ml/min/1 73sq m   Ethanol   Result Value Ref Range    Ethanol Lvl 361 (H) 0 - 3 mg/dL   APTT   Result Value Ref Range    PTT 24 23 - 37 seconds   Protime-INR   Result Value Ref Range    Protime 9 9 9 4 - 11 7 seconds    INR 0 94 0 86 - 1 16   Lactic acid x2   Result Value Ref Range    LACTIC ACID 3 3 (HH) 0 5 - 2 0 mmol/L   Lactic acid x2   Result Value Ref Range    LACTIC ACID 2 0 0 5 - 2 0 mmol/L   Beta Hydroxybutyrate   Result Value Ref Range    BETA-HYDROXYBUTYRATE 0 1 <0 6 mmol/L   Troponin I   Result Value Ref Range    Troponin I <0 02 <=0 04 ng/mL   Fingerstick Glucose (POCT)   Result Value Ref Range    POC Glucose 89 65 - 140 mg/dl   Fingerstick Glucose (POCT)   Result Value Ref Range    POC Glucose 76 65 - 140 mg/dl   Tanda Moros / Black tube on hold   Result Value Ref Range    Extra Tube Hold for add-ons  Imaging: I have personally reviewed pertinent reports  CT head without contrast   Final Result      No acute intracranial abnormality  Workstation performed: [id number]         XR chest 1 view portable   Final Result      No acute cardiopulmonary disease              Workstation performed: [id number]         FL barium swallow video w speech    (Results Pending)   CTA chest pe study    (Results Pending) EKG, Pathology, and Other Studies: I have personally reviewed pertinent reports  Code Status: Level 1 - Full Code  Advance Directive and Living Will:      Power of :    POLST:      Counseling / Coordination of Care  Total floor / unit time spent today 30 minutes  Greater than 50% of total time was spent with the patient and / or family counseling and / or coordination of care    A description of the counseling / coordination of care:

## 2019-07-11 NOTE — ASSESSMENT & PLAN NOTE
No results found for: HGBA1C    Recent Labs     07/10/19  1624 07/10/19  2149   POCGLU 89 76       Blood Sugar Average: Last 72 hrs:  (P) 82 5     · Repeat A1c 6 1, despite patient claiming she does not take her medications  · Held hold basaglar overnight due to NPO status  Patient now has a diet  Can restart tonight    · ISS algorithm 2 with accuchecks qac qhs

## 2019-07-11 NOTE — NURSING NOTE
Pt left via wheelchair accompanied by PCA and family  IV removed prior to discharge  AVS reviewed with patient and family

## 2019-07-11 NOTE — ASSESSMENT & PLAN NOTE
· Secondary to alcohol abuse vs polysubstance abuse  · UDS (+) methadone, opiates, THC  · Restraints as indicated  · After sobering up, patient was irritable but no longer combative and altered

## 2019-07-11 NOTE — DISCHARGE SUMMARY
[facility] Discharge Summary - [facility] Mariaelena 64 y o  female MRN: [id number]    [address]  Room / Bed: [address] Encounter: [id number]    BRIEF OVERVIEW  Admitting Provider: Kelsey York MD  Discharge Provider: No att  providers found    Discharge To: Home/Self-Care    Outpatient Follow-Up: [facility]  Outpatient psychiatric care (referrals given)  Things to address at first follow up visit: Compliance with anticoagulation  Address polysubstance abuse  Follow up with psychiatry for mental health  Labs and results pending at discharge: N/A    Admission Date: 7/10/2019     Discharge Date: 7/11/2019    Primary Discharge Diagnosis  Principal Problem:    Pulmonary embolism ([facility] [address] )  Active Problems:    Polysubstance abuse ([facility] [address] )    Type 2 diabetes mellitus with complication, with long-term current use of insulin ([facility] [address] )    Nicotine dependence  Resolved Problems:    Suicidal ideation    Toxic metabolic encephalopathy        Consulting Providers   Psychiatry        [address] STAY    Procedures Performed/Pertinent Test results    CTA chest pe study   Final Result      Subtle subsegmental branch left lower lobe pulmonary emboli             I personally discussed this study with Sang Barriga on 7/11/2019 at 12:36 AM                            Workstation performed: [id number]         CT head without contrast   Final Result      No acute intracranial abnormality  Workstation performed: [id number]         XR chest 1 view portable   Final Result      No acute cardiopulmonary disease              Workstation performed: [id number]         VAS lower limb venous duplex study, complete bilateral    (Results Pending)       Component      Latest Ref Rng & Units 7/10/2019 7/11/2019   MEDICAL ALCOHOL      0 - 3 mg/dL 361 (H) <3       HPI  As per admission H&P:    Lavell Height is a 64 y o  female past medical history significant for diabetes, asthma and alcoholism who presents to the ED via EMS  She was sent on the sidewalk passed out  Upon arrival to the ED and was found that her alcohol level was 361 and oxygen saturation was 89%  Lactic acid was elevated at 3 3  CT of the head showed normal no acute abnormality  Chest x-ray negative  Per nursing staff, patient noted upon arrival that she had not been taking her medication for months because she wanted to die  Per chart review she was previously using methadone however it is unknown if she is currently being prescribed methadone  Per chart review she is a past heroin and cocaine addict  Review of systems unable to be obtained as patient was combative and uncooperative  Hospital Course    Patient was admitted for management of hypoxemia initially thought to be due to aspiration pneumonia, alcohol intoxication and withdrawal, as well as suicidal intent  After CTA scan, it became evident that patient had small LLL PE  She was started on therapeutic lovenox and bridged to pradaxa without difficulty  She will continue pradaxa on discharge  Patient's alcohol cessation was uncomplicated  On exam she displayed no tremors  She was not anxious  Neither librium nor CIWA protocol was warranted  Patient was initially combative and required restraints likely due to intoxication, but on exam this morning she was irritable but cooperative  Mostly irritable because she wanted her methadone and her breakfast  She was initially NPO, but nursing dysphagia assessment cleared her as speech therapy was not available for a formal evaluation  She was seen by psychiatry and cleared for outpatient psychiatric services and alcohol rehabilitation  It was determined that she did not display true suicidal ideation  Referrals for this were provided by the in-house psychiatrist  The patient gave both the psychiatrist and the primary team permission to update him on her status, which was done via telephone      On discharge, she is stable  She is not displaying effects of alcohol withdrawal  She will be compliant with her pradaxa and follow up in office for management  She will also follow up with outpatient psychiatric services  Physical Exam at Discharge  As per AM progress note    Medications     Prior to Admission medications    Medication Sig Start Date End Date Taking? Authorizing Provider   albuterol (VENTOLIN HFA) 90 mcg/act inhaler Inhale 2 puffs every 6 (six) hours as needed for wheezing 3/22/19  Yes Jeanna Walton, DO   BASAGLAR KWIKPEN 100 units/mL injection pen Inject 20 Units under the skin daily at bedtime 3/22/19  Yes Jeanna Meade DO   insulin glargine (LANTUS) 100 units/mL subcutaneous injection Inject 20 Units under the skin daily at bedtime 3/22/19  Yes Jeanna Meade DO   METHADONE HCL PO Take 35 mg by mouth   Yes Historical Provider, MD   dabigatran etexilate (PRADAXA) 150 mg capsu Take 1 capsule (150 mg total) by mouth every 12 (twelve) hours 7/11/19   Jameson Zapata DO   Insulin Pen Needle 29G X 12MM MISC by Does not apply route daily at bedtime 3/22/19   Raphael Zuniga,    naproxen (NAPROSYN) 500 mg tablet Take 250 mg by mouth 2 (two) times a day with meals    Historical Provider, MD         Allergies  No Known Allergies    Diet restrictions: ADA  Activity restrictions: As tolerated  Code Status: Level 1 - Full Code    Discharge Condition: stable      Discharge  Statement   I spent 28 minutes discharging the patient  This time was spent on the day of discharge  I had direct contact with the patient on the day of discharge  Additional documentation is required if more than 30 minutes were spent on discharge  Jameson Zapata DO  07/11/19  5:30 PM    Some portions of this record may have been generated with voice recognition software  There may be translation, syntax, or grammatical errors   Occasional wrong word or "sound-a-like" substitutions may have occurred due to the inherent limitations of the voice recognition software  Read the chart carefully and recognize, using context, where substations may have occurred   If you have any questions, please contact the dictating provider for clarification or correction, as needed

## 2019-07-11 NOTE — DISCHARGE INSTR - AVS FIRST PAGE
Take your pradaxa 150 mg by mouth twice daily to prevent future blood clots  Please follow up with referrals for mental health care

## 2019-07-11 NOTE — ASSESSMENT & PLAN NOTE
· Current 1ppd smoker  · Nicotine patch 21 mg   · Offer smoking cessation materials and counseling prior to discharge

## 2019-07-11 NOTE — PROGRESS NOTES
Paged resident physician to remind her order for restraints are expiring and that patient needs to be evaluated for need to continue restraints  Will administer IV ativan as ordered after patient is done eating

## 2019-07-12 LAB
ATRIAL RATE: 88 BPM
MRSA NOSE QL CULT: NORMAL
P AXIS: 73 DEGREES
PR INTERVAL: 136 MS
QRS AXIS: 12 DEGREES
QRSD INTERVAL: 78 MS
QT INTERVAL: 396 MS
QTC INTERVAL: 479 MS
T WAVE AXIS: 63 DEGREES
VENTRICULAR RATE: 88 BPM

## 2019-07-12 PROCEDURE — 93010 ELECTROCARDIOGRAM REPORT: CPT | Performed by: INTERNAL MEDICINE

## 2019-07-12 PROCEDURE — 93970 EXTREMITY STUDY: CPT | Performed by: SURGERY

## 2019-07-15 ENCOUNTER — TRANSITIONAL CARE MANAGEMENT (OUTPATIENT)
Dept: FAMILY MEDICINE CLINIC | Facility: CLINIC | Age: 59
End: 2019-07-15

## 2019-07-15 LAB
BACTERIA BLD CULT: NORMAL
BACTERIA BLD CULT: NORMAL

## 2019-07-16 NOTE — UTILIZATION REVIEW
Initial Clinical Review     Admission: Date/Time/Statement: 7/10/19 @ 1806            Orders Placed This Encounter   Procedures    Inpatient Admission       Standing Status:   Standing       Number of Occurrences:   1       Order Specific Question:   Admitting Physician       Answer:   Jarrod Abdi [[id number]]       Order Specific Question:   Level of Care       Answer:   Med Surg [16]       Order Specific Question:   Estimated length of stay       Answer:   More than 2 Midnights       Order Specific Question:   Certification       Answer:   I certify that inpatient services are medically necessary for this patient for a duration of greater than two midnights  See H&P and MD Progress Notes for additional information about the patient's course of treatment                 ED Arrival Information      Expected Arrival Acuity Means of Arrival Escorted By Service Admission Type     - 7/10/2019 16:17 Emergent [facility] Emergency     Arrival Complaint     -               Chief Complaint   Patient presents with    Altered Mental Status       Pt found on the sidewalk passed out, admits to drinking alcohol today  Pt c/o pain "everywhere", pt tearful and "wish I was dead"  Pt denies plan or attempt to kill self       Assessment/Plan:      64year old female presents to ed accompanied by police for evaluation and treatment after a fall after drinking alcohol and expressing suicidal ideation without a plan  She was found down on the sidewalk  Her son  of lung cancer recently at the age of 12  She has pain all over  She became agitated in ed  Yelling and cursing  Restrained and placed on 1-1 diffuse rhonchi present throughout all lung fields  O2 sats of 89% ra  Alcohol 361  UDS positive for methadone, opiaes and thc    Treated in ed with supplemental oxygen  3L , iv ativan, iv fluds and iv antibiotic  Plan CTA  Chest PE study, supplemental oxygen and iv fluids                       ED Triage Vitals   07/10/19 1623 07/10/19 1623 07/10/19 1623 07/10/19 1623 07/10/19 1623   97 8 °F (36 6 °C) 100 16 98/55 (!) 89 %       No Pain           07/10/19 48 8 kg (107 lb 9 4 oz)      Additional Vital Signs:      07/10/19 2246      56   18   117/76   83 %Abnormal    Nasal cannula 2L     07/10/19 2126            116/79         07/10/19 2120      101   18      97 %   Nasal cannula  2L   07/10/19 2100      85   18   104/63   93 %   None (Room air)   07/10/19 1815      84   16   129/83   100 %      admit                           07/10/19 1800      82   16   135/78   100 %      07/10/19 1745      82   18   143/81   100 %      07/10/19 1738      81   18   143/81   100 %   Nasal cannula  3L               Pertinent Labs/Diagnostic Test Results:      Ct head no acute finding  Chest   No acute finding                     Results from last 7 days   Lab Units 07/11/19  0617 07/10/19  1633   WBC Thousand/uL 5 89 5 50   HEMOGLOBIN g/dL 12 0 12 8   HEMATOCRIT % 39 7 40 1   PLATELETS Thousands/uL 232 250   NEUTROS ABS Thousands/µL 3 59 2 27                Results from last 7 days   Lab Units 07/11/19  0617 07/10/19  1633   SODIUM mmol/L 144 148*   POTASSIUM mmol/L 3 7 3 2*   CHLORIDE mmol/L 109* 109*   CO2 mmol/L 26 27   ANION GAP mmol/L 9 12   BUN mg/dL 9 16   CREATININE mg/dL 0 60 0 67   EGFR ml/min/1 73sq m 117 113   CALCIUM mg/dL 7 5* 7 9*   MAGNESIUM mg/dL 1 7  --    PHOSPHORUS mg/dL 2 0*  --             Results from last 7 days   Lab Units 07/11/19  0617 07/10/19  1633   AST U/L 16 15   ALT U/L 15 18   ALK PHOS U/L 153* 170*   TOTAL PROTEIN g/dL 6 6 7 1   ALBUMIN g/dL 2 7* 3 0*   TOTAL BILIRUBIN mg/dL 0 20 0 20               Results from last 7 days   Lab Units 07/11/19  1125 07/11/19  0545 07/11/19  0035 07/10/19  2149 07/10/19  1624   POC GLUCOSE mg/dl 78 82 85 76 89                  Results from last 7 days   Lab Units 07/10/19  1633   HEMOGLOBIN A1C % 6 1   EAG mg/dl 128              Results from last 7 days   Lab Units 07/10/19  1708   TROPONIN I ng/mL <0 02               Results from last 7 days   Lab Units 07/10/19  1633   PROTIME seconds 9 9   INR   0 94   PTT seconds 24                Results from last 7 days   Lab Units 07/10/19  1843 07/10/19  1644   LACTIC ACID mmol/L 2 0 3 3*              Results from last 7 days   Lab Units 07/11/19  0212   CLARITY UA   Clear   COLOR UA   Yellow   SPEC GRAV UA   1 020   PH UA   5 5   GLUCOSE UA mg/dl Negative   KETONES UA mg/dl Negative   BLOOD UA   Negative   PROTEIN UA mg/dl Trace*   NITRITE UA   Negative   BILIRUBIN UA   Negative   UROBILINOGEN UA E U /dl 0 2   LEUKOCYTES UA   Negative   WBC UA /hpf 0-1*   RBC UA /hpf None Seen   BACTERIA UA /hpf Occasional   EPITHELIAL CELLS WET PREP /hpf Occasional               Results from last 7 days   Lab Units 07/11/19  0212   AMPH/METH   Negative   BARBITURATE UR   Negative   BENZODIAZEPINE UR   Negative   COCAINE UR   Negative   METHADONE URINE   Positive*   OPIATE UR   Positive*   PCP UR   Negative   THC UR   Positive*            Results from last 7 days   Lab Units 07/11/19  0617 07/10/19  1633   ETHANOL LVL mg/dL <3 361*         ED Treatment:            Medication Administration from 07/10/2019 1617 to 07/10/2019 1928        Date/Time Order Dose Route       07/10/2019 1709 sodium chloride 0 9 % bolus 1,000 mL 1,000 mL Intravenous       07/10/2019 1737 levofloxacin (LEVAQUIN) IVPB (premix) 750 mg 750 mg Intravenous       07/10/2019 1838 LORazepam (ATIVAN) 2 mg/mL injection 1 mg 1 mg Intravenous               Past Medical History:   Diagnosis     Arthritis     Asthma     Diabetes mellitus ([facility] [address] )       Present on Admission:  **None**        Admitting Diagnosis:   Alcohol intoxication ([facility]) [F10 929]  Aspiration pneumonia (HCC) [J69 0]  Altered mental status [R41 82]  Altered mental state [R41 82]  Sepsis ([facility] [address] ) [A41 9]     Age/Sex: 64 y o  female      Admission Orders:     Current Facility-Administered Medications:  acetaminophen 650 mg Oral Q6H PRN     albuterol 2 puff Inhalation Q6H PRN     dabigatran etexilate 150 mg Oral Q12H ALEC     insulin lispro 1-5 Units Subcutaneous 4x Daily (with meals and at bedtime)     LORazepam 1 mg Intravenous Q6H PRN     methadone 130 mg Oral Daily     nicotine 1 patch Transdermal Daily     pneumococcal 23-valent polysaccharide vaccine 0 5 mL Subcutaneous Prior to discharge     sodium chloride 75 mL/hr Intravenous Continuous Last Rate: 75 mL/hr (07/11/19 1004)         IP CONSULT TO PSYCHIATRY  IP CONSULT TO CASE MANAGEMENT  IP CONSULT TO ALCOHOL BRIEF INTERVENTION TRAUMA  Continued Stay Review     Date: 7-11-19                    Current Patient Class: inpatient                   Current Level of Care: medical      HPI:59 y o  female initially admitted on 7-10-19  For alcohol intoxication, fall,  Suicidal ideation and hypoxemia      Assessment/Plan:      CTA completed and patient has LLL pulmonary embolism  New orders for pradaxa  Behavioral health consult completed 1-1 discontinued    Suicide ideatiion no longer present      Pertinent Labs/Diagnostic Results:      CTA chest        Subtle subsegmental branch left lower lobe pulmonary emboli     Venous duplex bilateral  No acute finding      Results from last 7 days   Lab Units 07/11/19  0617 07/10/19  1633   WBC Thousand/uL 5 89 5 50   HEMOGLOBIN g/dL 12 0 12 8   HEMATOCRIT % 39 7 40 1   PLATELETS Thousands/uL 232 250   NEUTROS ABS Thousands/µL 3 59 2 27                Results from last 7 days   Lab Units 07/11/19  0617 07/10/19  1633   SODIUM mmol/L 144 148*   POTASSIUM mmol/L 3 7 3 2*   CHLORIDE mmol/L 109* 109*   CO2 mmol/L 26 27   ANION GAP mmol/L 9 12   BUN mg/dL 9 16   CREATININE mg/dL 0 60 0 67   EGFR ml/min/1 73sq m 117 113   CALCIUM mg/dL 7 5* 7 9*   MAGNESIUM mg/dL 1 7  --    PHOSPHORUS mg/dL 2 0*  --             Results from last 7 days   Lab Units 07/11/19  0617 07/10/19  1633   AST U/L 16 15   ALT U/L 15 18   ALK PHOS U/L 153* 170*   TOTAL PROTEIN g/dL 6 6 7 1   ALBUMIN g/dL 2 7* 3 0*   TOTAL BILIRUBIN mg/dL 0 20 0 20               Results from last 7 days   Lab Units 07/11/19  1125 07/11/19  0545 07/11/19  0035 07/10/19  2149 07/10/19  1624   POC GLUCOSE mg/dl 78 82 85 76 89      Results from last 7 days   Lab Units 07/11/19  0617 07/10/19  1633   GLUCOSE RANDOM mg/dL 82 95           Results from last 7 days   Lab Units 07/10/19  1633   HEMOGLOBIN A1C % 6 1   EAG mg/dl 128              Results from last 7 days   Lab Units 07/10/19  1708   TROPONIN I ng/mL <0 02               Results from last 7 days   Lab Units 07/10/19  1633   PROTIME seconds 9 9   INR   0 94   PTT seconds 24                Results from last 7 days   Lab Units 07/10/19  1843 07/10/19  1644   LACTIC ACID mmol/L 2 0 3 3*            Results from last 7 days   Lab Units 07/11/19  0212   CLARITY UA   Clear   COLOR UA   Yellow   SPEC GRAV UA   1 020   PH UA   5 5   GLUCOSE UA mg/dl Negative   KETONES UA mg/dl Negative   BLOOD UA   Negative   PROTEIN UA mg/dl Trace*   NITRITE UA   Negative   BILIRUBIN UA   Negative   UROBILINOGEN UA E U /dl 0 2   LEUKOCYTES UA   Negative   WBC UA /hpf 0-1*   RBC UA /hpf None Seen   BACTERIA UA /hpf Occasional   EPITHELIAL CELLS WET PREP /hpf Occasional               Results from last 7 days   Lab Units 07/11/19  0212   AMPH/METH   Negative   BARBITURATE UR   Negative   BENZODIAZEPINE UR   Negative   COCAINE UR   Negative   METHADONE URINE   Positive*   OPIATE UR   Positive*   PCP UR   Negative   THC UR   Positive*            Results from last 7 days   Lab Units 07/11/19  0617 07/10/19  1633   ETHANOL LVL mg/dL <3 361*          Vital Signs:     Date/Time   Temp   Pulse   Resp   BP   SpO2   O2 Device   07/11/19 0750   98 6 °F (37 °C)   96   18   136/78   100 %   Nasal cannula   07/11/19 0414   98 4 °F (36 9 °C)   97   18   134/77   96 %   Nasal cannula   07/11/19 0132   97 3 °F (36 3 °C)Abnormal    92   20   119/73   97 %   Nasal cannula   07/11/19 0014   97 5 °F (36 4 °C)   91   20   121/77         07/10/19 2246      56   18   117/76   83 %Abnormal    Nasal cannula                  Medications:      acetaminophen 650 mg Oral Q6H PRN     albuterol 2 puff Inhalation Q6H PRN     dabigatran etexilate 150 mg Oral Q12H [facility]     insulin lispro 1-5 Units Subcutaneous 4x Daily (with meals and at bedtime)     LORazepam 1 mg Intravenous Q6H PRN     methadone 130 mg Oral Daily     nicotine 1 patch Transdermal Daily     pneumococcal 23-valent polysaccharide vaccine 0 5 mL Subcutaneous Prior to discharge     sodium chloride 75 mL/hr Intravenous Continuous Last Rate: 75 mL/hr (07/11/19 1004)         Discharge Plan: to be determined      Notification of Discharge  This is a Notification of Discharge from our facility [address]  Please be advised that this patient has been discharge from our facility  Below you will find the admission and discharge date and time including the patients disposition  PRESENTATION DATE: 7/10/2019  4:18 PM  IP ADMISSION DATE: 7/10/19 1806   DISCHARGE DATE: 7/11/2019  3:33 PM  DISPOSITION: Home/Self Care Home/Self Care   Admission Orders listed below:  Admission Orders (From admission, onward)    Ordered        07/10/19 1806  Inpatient Admission  Once             Please contact the UR Department if additional information is required to close this patient's authorization/case  [address]  Utilization Review Department  Phone: [phone]; Fax [phone]  [email or web address]  org  ATTENTION: Please call with any questions or concerns to [phone]  and carefully listen to the prompts so that you are directed to the right person  Send all requests for admission clinical reviews, approved or denied determinations and any other requests to fax [phone]   All voicemails are confidential

## 2019-07-22 DIAGNOSIS — F19.10 POLYSUBSTANCE ABUSE (HCC): Primary | ICD-10-CM

## 2019-07-23 ENCOUNTER — PATIENT OUTREACH (OUTPATIENT)
Dept: FAMILY MEDICINE CLINIC | Facility: CLINIC | Age: 59
End: 2019-07-23

## 2019-07-25 ENCOUNTER — PATIENT OUTREACH (OUTPATIENT)
Dept: FAMILY MEDICINE CLINIC | Facility: CLINIC | Age: 59
End: 2019-07-25

## 2019-07-25 NOTE — PROGRESS NOTES
OP CM called to pt and she states she is interested in OP mental health therapy again  Pt states she has been to [facility] [place] in the past and would like to return there  Pt given contact number and advised to contact OP CM if she is unable to secure an appt

## 2019-07-30 ENCOUNTER — APPOINTMENT (EMERGENCY)
Dept: RADIOLOGY | Facility: HOSPITAL | Age: 59
End: 2019-07-30
Payer: COMMERCIAL

## 2019-07-30 ENCOUNTER — HOSPITAL ENCOUNTER (EMERGENCY)
Facility: HOSPITAL | Age: 59
Discharge: HOME | End: 2019-07-30
Attending: EMERGENCY MEDICINE | Admitting: EMERGENCY MEDICINE
Payer: COMMERCIAL

## 2019-07-30 VITALS
HEIGHT: 59 IN | WEIGHT: 98 LBS | RESPIRATION RATE: 18 BRPM | SYSTOLIC BLOOD PRESSURE: 147 MMHG | TEMPERATURE: 100.2 F | BODY MASS INDEX: 19.76 KG/M2 | OXYGEN SATURATION: 97 % | HEART RATE: 98 BPM | DIASTOLIC BLOOD PRESSURE: 88 MMHG
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
## 2019-07-30 DIAGNOSIS — S50.00XA ELBOW CONTUSION: ICD-10-CM

## 2019-07-30 DIAGNOSIS — H66.90 OTITIS MEDIA: ICD-10-CM

## 2019-07-30 DIAGNOSIS — S83.90XA KNEE SPRAIN: ICD-10-CM

## 2019-07-30 PROCEDURE — 72100 X-RAY EXAM L-S SPINE 2/3 VWS: CPT

## 2019-07-30 PROCEDURE — 73564 X-RAY EXAM KNEE 4 OR MORE: CPT

## 2019-07-30 PROCEDURE — 70450 CT HEAD/BRAIN W/O DYE: CPT

## 2019-07-30 PROCEDURE — 73080 X-RAY EXAM OF ELBOW: CPT

## 2019-07-30 PROCEDURE — 72125 CT NECK SPINE W/O DYE: CPT

## 2019-07-30 PROCEDURE — 99284 EMERGENCY DEPT VISIT MOD MDM: CPT

## 2019-07-30 RX ORDER — HYDROCODONE BITARTRATE AND ACETAMINOPHEN 5; 325 MG/1; MG/1
1 TABLET ORAL ONCE
Status: COMPLETED | OUTPATIENT
Start: 2019-07-30 | End: 2019-07-30

## 2019-07-30 RX ORDER — AMOXICILLIN AND CLAVULANATE POTASSIUM 875; 125 MG/1; MG/1
1 TABLET, FILM COATED ORAL EVERY 12 HOURS
Qty: 14 TABLET | Refills: 0 | Status: SHIPPED | OUTPATIENT
Start: 2019-07-30 | End: 2019-08-06

## 2019-07-30 RX ORDER — HYDROCODONE BITARTRATE AND ACETAMINOPHEN 5; 325 MG/1; MG/1
1 TABLET ORAL EVERY 6 HOURS PRN
Qty: 10 TABLET | Refills: 0 | Status: SHIPPED | OUTPATIENT
Start: 2019-07-30 | End: 2019-08-02

## 2019-07-30 RX ADMIN — HYDROCODONE BITARTRATE AND ACETAMINOPHEN 1 TABLET: 5; 325 TABLET ORAL at 19:14

## 2019-07-30 NOTE — ED PROVIDER NOTES
History  Chief Complaint   Patient presents with    Fall     Patient sts she fell down about 5-6 steps this morning on her way to an appointment  States she crawled back up steps and got in taxi to leave  Denies LOC  Pt c/o b/l knee pain and swelling and right elbow pain and swelling and body aches  42-year-old female presents after a fall  The fall happened this morning  She was climbing stairs when she tripped and fell on down a few steps to she says she tumbled down  She is on Pradaxa  Says she hit her head did not pass out no loss of consciousness no nausea vomiting headache chest pain abdominal pain  Does have lower back pain neck pain  Also has bilateral knee pain and right elbow pain  History provided by:  Patient   used: No        Prior to Admission Medications   Prescriptions Last Dose Informant Patient Reported? Taking?    BASAGLAR KWIKPEN 100 units/mL injection pen 7/29/2019 at Unknown time  No Yes   Sig: Inject 20 Units under the skin daily at bedtime   Insulin Pen Needle 29G X 12MM MISC   No No   Sig: by Does not apply route daily at bedtime   METHADONE HCL PO 7/30/2019 at Unknown time  Yes Yes   Sig: Take 35 mg by mouth   albuterol (VENTOLIN HFA) 90 mcg/act inhaler 7/30/2019 at Unknown time  No Yes   Sig: Inhale 2 puffs every 6 (six) hours as needed for wheezing   dabigatran etexilate (PRADAXA) 150 mg capsu 7/30/2019 at 0730  No Yes   Sig: Take 1 capsule (150 mg total) by mouth every 12 (twelve) hours   insulin glargine (LANTUS) 100 units/mL subcutaneous injection Not Taking at Unknown time  No No   Sig: Inject 20 Units under the skin daily at bedtime   Patient not taking: Reported on 7/30/2019   naproxen (NAPROSYN) 500 mg tablet Not Taking at Unknown time  Yes No   Sig: Take 250 mg by mouth 2 (two) times a day with meals      Facility-Administered Medications: None       Past Medical History:   Diagnosis Date    Arthritis     Asthma     [facility] Past Surgical History:   Procedure Laterality Date    ABDOMINAL SURGERY         History reviewed  No pertinent family history  I have reviewed and agree with the history as documented  Social History     Tobacco Use    Smoking status: Current Every Day Smoker     Packs/day: 1 00    Smokeless tobacco: Never Used   Substance Use Topics    Alcohol use: Yes     Frequency: Monthly or less     Drinks per session: 1 or 2     Binge frequency: Less than monthly     Comment: socially    Drug use: No     Comment: on methadone        Review of Systems   All other systems reviewed and are negative  Physical Exam  Physical Exam   Constitutional: She is oriented to person, place, and time  She appears well-developed and well-nourished  HENT:   Head: Normocephalic and atraumatic  Left ear has serous effusion with erythema in the ear canal   No evidence of mastoiditis  Right ear normal exam    Eyes: Pupils are equal, round, and reactive to light  EOM are normal    Neck: Normal range of motion  Neck supple  Cardiovascular: Normal rate and regular rhythm  Pulmonary/Chest: Effort normal and breath sounds normal    Abdominal: Soft  Bowel sounds are normal    Musculoskeletal: Normal range of motion  Right knee:  Mild effusion and abrasions noted on the right knee range of motion at the knee joint is intact  Mild tenderness noted Popliteal pulses are intact  Peroneal nerve function is intact  Left knee:  Mild effusion abrasions noted on the left knee range of motion is intact  Mild tenderness noted Popliteal pulses are intact  Peroneal nerve function is intact  Right elbow mild abrasions and edema mild tenderness noted around the olecranon  Range of motion is intact  Diffuse lumbar tenderness noted with the mild midline tenderness and no step-offs  Neurological: She is alert and oriented to person, place, and time  She displays normal reflexes  No cranial nerve deficit or sensory deficit  She exhibits normal muscle tone  Coordination normal    Skin: Skin is warm and dry  Psychiatric: She has a normal mood and affect  Nursing note and vitals reviewed  Vital Signs  ED Triage Vitals [07/30/19 1904]   Temperature Pulse Respirations Blood Pressure SpO2   100 2 °F (37 9 °C) 98 18 147/88 97 %      Temp Source Heart Rate Source Patient Position - Orthostatic VS BP Location FiO2 (%)   Tympanic Monitor Sitting Left arm --      Pain Score       Worst Possible Pain           Vitals:    07/30/19 1904   BP: 147/88   Pulse: 98   Patient Position - Orthostatic VS: Sitting         Visual Acuity      ED Medications  Medications   HYDROcodone-acetaminophen (NORCO) 5-325 mg per tablet 1 tablet (1 tablet Oral Given 7/30/19 1914)       Diagnostic Studies  Results Reviewed     None                 CT head without contrast   Final Result by Carmel Mejia MD (07/30 1942)      No acute intracranial abnormality  Microangiopathic changes  Workstation performed: [id number]         CT spine cervical without contrast   Final Result by Carmel Mejia MD (07/30 1946)      No cervical spine fracture or traumatic malalignment  Multilevel degenerative disc disease  Suspect mild left-sided otitis media  Workstation performed: [id number]         XR knee 4+ vw left injury    (Results Pending)   XR knee 4+ vw right injury    (Results Pending)   XR elbow 3+ vw RIGHT    (Results Pending)   XR spine lumbar 2 or 3 views injury    (Results Pending)              Procedures  Procedures       ED Course                               MDM  Number of Diagnoses or Management Options  Elbow contusion:   Fall, initial encounter:   Knee sprain:   Otitis media:   Diagnosis management comments: Patient evaluated with imaging  I reviewed the results and discussed them with the patient  Placed in ace wrap, and given an arm sling   Patient discharged with appropriate instructions medications and follow-up  Patient verbalized understanding had no further questions at the time of discharge  Patient had stable vital signs and well-appearing at the time of discharge  Amount and/or Complexity of Data Reviewed  Tests in the radiology section of CPT®: ordered and reviewed  Tests in the medicine section of CPT®: ordered and reviewed    Patient Progress  Patient progress: stable      Disposition  Final diagnoses:   Fall, initial encounter   Knee sprain   Elbow contusion   Otitis media     Time reflects when diagnosis was documented in both MDM as applicable and the Disposition within this note     Time User Action Codes Description Comment    7/30/2019  8:19 PM Anepu, Glenys Add [P70  Lacy Keep Fall, initial encounter     7/30/2019  8:19 PM Anepu, Aung Tomas Add [S83 90XA] Knee sprain     7/30/2019  8:19 PM Anepu, Glenys Add [S50 00XA] Elbow contusion     7/30/2019  8:24 PM Anepu, Glenys Add [H66 90] Otitis media       ED Disposition     ED Disposition Condition Date/Time Comment    Discharge Stable Tue Jul 30, 2019  8:19 PM Jay Cramer discharge to home/self care              Follow-up Information     Follow up With Specialties Details Why Contact Info Additional Information    [address] [facility] Department Emergency Medicine  If symptoms worsen [address] [facility] ED, [place], [place], [address]          Discharge Medication List as of 7/30/2019  8:20 PM      START taking these medications    Details   HYDROcodone-acetaminophen (NORCO) 5-325 mg per tablet Take 1 tablet by mouth every 6 (six) hours as needed for pain for up to 3 daysMax Daily Amount: 4 tablets, Starting Tue 7/30/2019, Until Fri 8/2/2019, Print         CONTINUE these medications which have NOT CHANGED    Details   albuterol (VENTOLIN HFA) 90 mcg/act inhaler Inhale 2 puffs every 6 (six) hours as needed for wheezing, Starting Fri 3/22/2019, Normal      BASAGLAR KWIKPEN 100 units/mL injection pen Inject 20 Units under the skin daily at bedtime, Starting Fri 3/22/2019, Normal      dabigatran etexilate (PRADAXA) 150 mg capsu Take 1 capsule (150 mg total) by mouth every 12 (twelve) hours, Starting Thu 7/11/2019, Normal      insulin glargine (LANTUS) 100 units/mL subcutaneous injection Inject 20 Units under the skin daily at bedtime, Starting Fri 3/22/2019, Normal      Insulin Pen Needle 29G X 12MM MISC by Does not apply route daily at bedtime, Starting Fri 3/22/2019, Normal      METHADONE HCL PO Take 35 mg by mouth, Historical Med      naproxen (NAPROSYN) 500 mg tablet Take 250 mg by mouth 2 (two) times a day with meals, Historical Med           No discharge procedures on file      ED Provider  Electronically Signed by           Dee Navarro DO  07/30/19 9121

## 2019-07-31 NOTE — ED NOTES
As per MD, ace wraps applied to b/l knees  Patient requesting boxed lunch  Lunch box given and transportation home to be set up       Edin Huang RN  07/30/19 2033

## 2019-07-31 NOTE — ED NOTES
As per MD patient to be ambulated  Patient OOB with this nurse's assistance  Able to walk but with increased pain  MD notified       Yaneli Salvador RN  07/30/19 2023       Yaneli Salvador RN  07/30/19 9761

## 2019-08-09 ENCOUNTER — HOSPITAL ENCOUNTER (EMERGENCY)
Facility: HOSPITAL | Age: 59
Discharge: HOME | End: 2019-08-09
Attending: EMERGENCY MEDICINE | Admitting: EMERGENCY MEDICINE
Payer: COMMERCIAL

## 2019-08-09 VITALS
SYSTOLIC BLOOD PRESSURE: 155 MMHG | RESPIRATION RATE: 20 BRPM | DIASTOLIC BLOOD PRESSURE: 86 MMHG | HEART RATE: 110 BPM | OXYGEN SATURATION: 100 % | TEMPERATURE: 99 F | HEIGHT: 59 IN | BODY MASS INDEX: 19.35 KG/M2 | WEIGHT: 96 LBS

## 2019-08-09 DIAGNOSIS — F11.23 OPIATE WITHDRAWAL (HCC): Primary | ICD-10-CM

## 2019-08-09 PROCEDURE — 96361 HYDRATE IV INFUSION ADD-ON: CPT

## 2019-08-09 PROCEDURE — 99284 EMERGENCY DEPT VISIT MOD MDM: CPT

## 2019-08-09 PROCEDURE — 96375 TX/PRO/DX INJ NEW DRUG ADDON: CPT

## 2019-08-09 PROCEDURE — 96374 THER/PROPH/DIAG INJ IV PUSH: CPT

## 2019-08-09 RX ORDER — ONDANSETRON 2 MG/ML
4 INJECTION INTRAMUSCULAR; INTRAVENOUS ONCE
Status: COMPLETED | OUTPATIENT
Start: 2019-08-09 | End: 2019-08-09

## 2019-08-09 RX ORDER — KETOROLAC TROMETHAMINE 30 MG/ML
15 INJECTION, SOLUTION INTRAMUSCULAR; INTRAVENOUS ONCE
Status: COMPLETED | OUTPATIENT
Start: 2019-08-09 | End: 2019-08-09

## 2019-08-09 RX ORDER — DICYCLOMINE HYDROCHLORIDE 10 MG/1
20 CAPSULE ORAL ONCE
Status: DISCONTINUED | OUTPATIENT
Start: 2019-08-09 | End: 2019-08-09

## 2019-08-09 RX ORDER — ONDANSETRON 4 MG/1
4 TABLET, FILM COATED ORAL EVERY 6 HOURS
Qty: 12 TABLET | Refills: 0 | Status: SHIPPED | OUTPATIENT
Start: 2019-08-09 | End: 2020-05-24

## 2019-08-09 RX ORDER — DICYCLOMINE HCL 20 MG
20 TABLET ORAL 2 TIMES DAILY
Qty: 20 TABLET | Refills: 0 | Status: SHIPPED | OUTPATIENT
Start: 2019-08-09 | End: 2020-05-24

## 2019-08-09 RX ORDER — ONDANSETRON 4 MG/1
4 TABLET, ORALLY DISINTEGRATING ORAL ONCE
Status: DISCONTINUED | OUTPATIENT
Start: 2019-08-09 | End: 2019-08-09

## 2019-08-09 RX ORDER — KETOROLAC TROMETHAMINE 30 MG/ML
15 INJECTION, SOLUTION INTRAMUSCULAR; INTRAVENOUS ONCE
Status: DISCONTINUED | OUTPATIENT
Start: 2019-08-09 | End: 2019-08-09

## 2019-08-09 RX ADMIN — SODIUM CHLORIDE 1000 ML: 0.9 INJECTION, SOLUTION INTRAVENOUS at 17:43

## 2019-08-09 RX ADMIN — ONDANSETRON 4 MG: 2 INJECTION INTRAMUSCULAR; INTRAVENOUS at 17:43

## 2019-08-09 RX ADMIN — KETOROLAC TROMETHAMINE 15 MG: 30 INJECTION, SOLUTION INTRAMUSCULAR at 17:44

## 2019-08-09 NOTE — ED PROVIDER NOTES
History  Chief Complaint   Patient presents with    Withdrawal - Drug     patient states she could not walk down the stairs for the past two days, so she was unable to get to the methadone clinic  Experiencing generalized body pains, vomiting and diarhea  HPI    This is a 64 year female that presents today with withdrawals  Patient is on methadone  States she fell the past 2 days she was unable to walk down the stairs and has nobody to help her  Patient states she has been experiencing body pains vomiting and diarrhea  States she wants something stronger for pain  Denies any headaches  No back pain  Patient was seen here had x-rays done cough CT which were all negative  Patient has been ambulatory in her house  64 year female that presents today with withdrawal symptoms  Will do symptomatic treatment    Prior to Admission Medications   Prescriptions Last Dose Informant Patient Reported? Taking? BASAGLAR KWIKPEN 100 units/mL injection pen   No No   Sig: Inject 20 Units under the skin daily at bedtime   Insulin Pen Needle 29G X 12MM MISC   No No   Sig: by Does not apply route daily at bedtime   METHADONE HCL PO   Yes No   Sig: Take 35 mg by mouth   albuterol (VENTOLIN HFA) 90 mcg/act inhaler   No No   Sig: Inhale 2 puffs every 6 (six) hours as needed for wheezing   dabigatran etexilate (PRADAXA) 150 mg capsu   No No   Sig: Take 1 capsule (150 mg total) by mouth every 12 (twelve) hours   insulin glargine (LANTUS) 100 units/mL subcutaneous injection   No No   Sig: Inject 20 Units under the skin daily at bedtime   Patient not taking: Reported on 7/30/2019   naproxen (NAPROSYN) 500 mg tablet   Yes No   Sig: Take 250 mg by mouth 2 (two) times a day with meals      Facility-Administered Medications: None       Past Medical History:   Diagnosis Date    Arthritis     Asthma     Diabetes mellitus ([facility] )        Past Surgical History:   Procedure Laterality Date    ABDOMINAL SURGERY         History reviewed  No pertinent family history  I have reviewed and agree with the history as documented  Social History     Tobacco Use    Smoking status: Current Every Day Smoker     Packs/day: 1 00    Smokeless tobacco: Never Used   Substance Use Topics    Alcohol use: Yes     Frequency: Monthly or less     Drinks per session: 1 or 2     Binge frequency: Less than monthly     Comment: socially    Drug use: No     Comment: on methadone        Review of Systems   Constitutional: Negative  Negative for diaphoresis and fever  HENT: Negative  Respiratory: Negative  Negative for cough, shortness of breath and wheezing  Cardiovascular: Negative  Negative for chest pain, palpitations and leg swelling  Gastrointestinal: Negative for abdominal distention, abdominal pain, nausea and vomiting  Genitourinary: Negative  Musculoskeletal: Negative  Skin: Negative  Neurological: Negative  Psychiatric/Behavioral: Negative  All other systems reviewed and are negative  Physical Exam  Physical Exam   Constitutional: She is oriented to person, place, and time  She appears well-developed and well-nourished  HENT:   Head: Normocephalic and atraumatic  Eyes: Pupils are equal, round, and reactive to light  EOM are normal    Neck: Normal range of motion  Neck supple  Cardiovascular: Normal rate, regular rhythm and normal heart sounds  No murmur heard  Pulmonary/Chest: Effort normal and breath sounds normal  No respiratory distress  Abdominal: Soft  Bowel sounds are normal  She exhibits no distension  There is no tenderness  There is no rebound and no guarding  Musculoskeletal: Normal range of motion  She exhibits no edema or deformity  Neurological: She is alert and oriented to person, place, and time  Skin: Skin is warm and dry  Psychiatric: She has a normal mood and affect  Vitals reviewed        Vital Signs  ED Triage Vitals [08/09/19 1710]   Temperature Pulse Respirations Blood Pressure SpO2 99 8 °F (37 7 °C) 105 16 (!) 209/93 98 %      Temp Source Heart Rate Source Patient Position - Orthostatic VS BP Location FiO2 (%)   Tympanic Monitor Sitting Right arm --      Pain Score       Worst Possible Pain           Vitals:    08/09/19 1710 08/09/19 1913   BP: (!) 209/93 155/86   Pulse: 105 (!) 110   Patient Position - Orthostatic VS: Sitting Lying         Visual Acuity      ED Medications  Medications   sodium chloride 0 9 % bolus 1,000 mL (0 mL Intravenous Stopped 8/9/19 1927)   ketorolac (TORADOL) injection 15 mg (15 mg Intravenous Given 8/9/19 1744)   ondansetron (ZOFRAN) injection 4 mg (4 mg Intravenous Given 8/9/19 1743)       Diagnostic Studies  Results Reviewed     None                 No orders to display              Procedures  Procedures       ED Course                               MDM    Disposition  Final diagnoses:   Opiate withdrawal ([facility] )     Time reflects when diagnosis was documented in both MDM as applicable and the Disposition within this note     Time User Action Codes Description Comment    8/9/2019  7:17 PM Radha Grimm Add [F11 23] Opiate withdrawal [facility])       ED Disposition     ED Disposition Condition Date/Time Comment    Discharge Stable Fri Aug 9, 2019  7:17 PM Delia Campbell discharge to home/self care              Follow-up Information     Follow up With Specialties Details Why [address]   As needed Stevenosn Mcgregor [phone] [phone]          Discharge Medication List as of 8/9/2019  7:18 PM      START taking these medications    Details   dicyclomine (BENTYL) 20 mg tablet Take 1 tablet (20 mg total) by mouth 2 (two) times a day, Starting Fri 8/9/2019, Print      ondansetron (ZOFRAN) 4 mg tablet Take 1 tablet (4 mg total) by mouth every 6 (six) hours, Starting Fri 8/9/2019, Print         CONTINUE these medications which have NOT CHANGED    Details   albuterol (VENTOLIN HFA) 90 mcg/act inhaler Inhale 2 puffs every 6 (six) hours as needed for wheezing, Starting Fri 3/22/2019, Normal      BASAGLAR KWIKPEN 100 units/mL injection pen Inject 20 Units under the skin daily at bedtime, Starting Fri 3/22/2019, Normal      dabigatran etexilate (PRADAXA) 150 mg capsu Take 1 capsule (150 mg total) by mouth every 12 (twelve) hours, Starting Thu 7/11/2019, Normal      insulin glargine (LANTUS) 100 units/mL subcutaneous injection Inject 20 Units under the skin daily at bedtime, Starting Fri 3/22/2019, Normal      Insulin Pen Needle 29G X 12MM MISC by Does not apply route daily at bedtime, Starting Fri 3/22/2019, Normal      METHADONE HCL PO Take 35 mg by mouth, Historical Med      naproxen (NAPROSYN) 500 mg tablet Take 250 mg by mouth 2 (two) times a day with meals, Historical Med           No discharge procedures on file      ED Provider  Electronically Signed by           Jorge Monk MD  08/09/19 8465

## 2019-10-08 ENCOUNTER — HOSPITAL ENCOUNTER (EMERGENCY)
Facility: HOSPITAL | Age: 59
Discharge: HOME | End: 2019-10-08
Attending: EMERGENCY MEDICINE
Payer: COMMERCIAL

## 2019-10-08 VITALS
TEMPERATURE: 97.9 F | SYSTOLIC BLOOD PRESSURE: 167 MMHG | DIASTOLIC BLOOD PRESSURE: 72 MMHG | HEART RATE: 94 BPM | BODY MASS INDEX: 17.14 KG/M2 | WEIGHT: 85 LBS | HEIGHT: 59 IN | OXYGEN SATURATION: 95 % | RESPIRATION RATE: 16 BRPM

## 2019-10-08 DIAGNOSIS — S61.213A LACERATION OF LEFT MIDDLE FINGER WITHOUT FOREIGN BODY WITHOUT DAMAGE TO NAIL, INITIAL ENCOUNTER: Primary | ICD-10-CM

## 2019-10-08 PROCEDURE — 99283 EMERGENCY DEPT VISIT LOW MDM: CPT

## 2019-10-08 RX ORDER — GINSENG 100 MG
1 CAPSULE ORAL ONCE
Status: COMPLETED | OUTPATIENT
Start: 2019-10-08 | End: 2019-10-08

## 2019-10-08 RX ORDER — ASPIRIN 81 MG/1
324 TABLET, CHEWABLE ORAL ONCE
Status: DISCONTINUED | OUTPATIENT
Start: 2019-10-08 | End: 2019-10-08

## 2019-10-08 RX ORDER — ACETAMINOPHEN 325 MG/1
650 TABLET ORAL ONCE
Status: COMPLETED | OUTPATIENT
Start: 2019-10-08 | End: 2019-10-08

## 2019-10-08 RX ADMIN — BACITRACIN 1 SMALL APPLICATION: 500 OINTMENT TOPICAL at 19:46

## 2019-10-08 RX ADMIN — ACETAMINOPHEN 650 MG: 325 TABLET, FILM COATED ORAL at 19:46

## 2019-10-08 NOTE — ED NOTES
Pt states that she doesn't have anyone to pick her up to take her home and she has no money to pay for brinda Santos Charge RN made aware        Leeann Mcclain RN  10/08/19 1954

## 2019-10-08 NOTE — ED NOTES
Pt ambulates to exam room without any problem, talking on the phone        Gasper Nguyen RN  10/08/19 2454

## 2019-10-08 NOTE — ED NOTES
Area of cut is cleaned with saline and bacitracin applied as order        Janae Dorado RN  10/08/19 7856

## 2019-10-09 NOTE — ED PROVIDER NOTES
History  Chief Complaint   Patient presents with    Pain     patient called twice to triage, but insisted on smoking a cigarette first  States "I came here with my baby sister and I don't have a ride home, now I'm having pain all over"  Continues on to say "I cut my finger and I'm a type 2 diabetic, that's probably why I feel this way"  Patient ambualting with steady gait, speaking clear full sentences, no distress noted  Patient presents for evaluation of wound on her left middle finger that occurred a few days ago from a sharp knife  Concerned its infected and has pain everywhere  Of note patient came in with her sister  When she found out her sister was admitted she asked for a ride home  Since she wasn't a patient she was told we werent responsible for her ride home  Patient then signed in  History provided by:  Patient   used: No        Prior to Admission Medications   Prescriptions Last Dose Informant Patient Reported? Taking?    BASAGLAR KWIKPEN 100 units/mL injection pen   No No   Sig: Inject 20 Units under the skin daily at bedtime   Insulin Pen Needle 29G X 12MM MISC   No No   Sig: by Does not apply route daily at bedtime   METHADONE HCL PO   Yes No   Sig: Take 35 mg by mouth   albuterol (VENTOLIN HFA) 90 mcg/act inhaler   No No   Sig: Inhale 2 puffs every 6 (six) hours as needed for wheezing   dabigatran etexilate (PRADAXA) 150 mg capsu   No No   Sig: Take 1 capsule (150 mg total) by mouth every 12 (twelve) hours   dicyclomine (BENTYL) 20 mg tablet   No No   Sig: Take 1 tablet (20 mg total) by mouth 2 (two) times a day   insulin glargine (LANTUS) 100 units/mL subcutaneous injection   No No   Sig: Inject 20 Units under the skin daily at bedtime   Patient not taking: Reported on 7/30/2019   naproxen (NAPROSYN) 500 mg tablet   Yes No   Sig: Take 250 mg by mouth 2 (two) times a day with meals   ondansetron (ZOFRAN) 4 mg tablet   No No   Sig: Take 1 tablet (4 mg total) by mouth every 6 (six) hours      Facility-Administered Medications: None       Past Medical History:   Diagnosis Date    Arthritis     Asthma     Diabetes mellitus ([facility]        Past Surgical History:   Procedure Laterality Date    ABDOMINAL SURGERY         History reviewed  No pertinent family history  I have reviewed and agree with the history as documented  Social History     Tobacco Use    Smoking status: Current Every Day Smoker     Packs/day: 1 00    Smokeless tobacco: Never Used   Substance Use Topics    Alcohol use: Yes     Frequency: Monthly or less     Drinks per session: 1 or 2     Binge frequency: Less than monthly     Comment: "once in awhile I drink every other day"    Drug use: No     Comment: on methadone        Review of Systems   All other systems reviewed and are negative  Physical Exam  Physical Exam   Constitutional: She is oriented to person, place, and time  No distress  Cardiovascular: Normal rate, regular rhythm and intact distal pulses  Pulmonary/Chest: Effort normal and breath sounds normal  No respiratory distress  Musculoskeletal: Normal range of motion  Neurological: She is alert and oriented to person, place, and time  Skin: Capillary refill takes less than 2 seconds  She is not diaphoretic  Nursing note and vitals reviewed        Vital Signs  ED Triage Vitals [10/08/19 1930]   Temperature Pulse Respirations Blood Pressure SpO2   97 9 °F (36 6 °C) 94 16 167/72 95 %      Temp Source Heart Rate Source Patient Position - Orthostatic VS BP Location FiO2 (%)   Tympanic Monitor Sitting Left arm --      Pain Score       Worst Possible Pain           Vitals:    10/08/19 1930   BP: 167/72   Pulse: 94   Patient Position - Orthostatic VS: Sitting         Visual Acuity      ED Medications  Medications   bacitracin topical ointment 1 small application (1 small application Topical Given 10/8/19 1946)   acetaminophen (TYLENOL) tablet 650 mg (650 mg Oral Given 10/8/19 1946) Diagnostic Studies  Results Reviewed     None                 No orders to display              Procedures  Procedures       ED Course                               MDM  Number of Diagnoses or Management Options  Laceration of left middle finger without foreign body without damage to nail, initial encounter:   Diagnosis management comments: Pulse ox 95% on RA indicating adequate oxygenation       Amount and/or Complexity of Data Reviewed  Decide to obtain previous medical records or to obtain history from someone other than the patient: yes  Review and summarize past medical records: yes    Patient Progress  Patient progress: stable      Disposition  Final diagnoses:   Laceration of left middle finger without foreign body without damage to nail, initial encounter     Time reflects when diagnosis was documented in both MDM as applicable and the Disposition within this note     Time User Action Codes Description Comment    10/8/2019  7:39 PM Farooq Bright Laceration of left middle finger without foreign body without damage to nail, initial encounter       ED Disposition     ED Disposition Condition Date/Time Comment    Discharge Stable Tue Oct 8, 2019  7:39 PM Lorrie Christianson discharge to home/self care              Follow-up Information    None         Discharge Medication List as of 10/8/2019  7:40 PM      START taking these medications    Details   mupirocin (BACTROBAN) 2 % ointment Apply topically 3 (three) times a day, Starting Tue 10/8/2019, Normal         CONTINUE these medications which have NOT CHANGED    Details   albuterol (VENTOLIN HFA) 90 mcg/act inhaler Inhale 2 puffs every 6 (six) hours as needed for wheezing, Starting Fri 3/22/2019, Normal      BASAGLAR KWIKPEN 100 units/mL injection pen Inject 20 Units under the skin daily at bedtime, Starting Fri 3/22/2019, Normal      dabigatran etexilate (PRADAXA) 150 mg capsu Take 1 capsule (150 mg total) by mouth every 12 (twelve) hours, Starting Thu 7/11/2019, Normal      dicyclomine (BENTYL) 20 mg tablet Take 1 tablet (20 mg total) by mouth 2 (two) times a day, Starting Fri 8/9/2019, Print      insulin glargine (LANTUS) 100 units/mL subcutaneous injection Inject 20 Units under the skin daily at bedtime, Starting Fri 3/22/2019, Normal      Insulin Pen Needle 29G X 12MM MISC by Does not apply route daily at bedtime, Starting Fri 3/22/2019, Normal      METHADONE HCL PO Take 35 mg by mouth, Historical Med      naproxen (NAPROSYN) 500 mg tablet Take 250 mg by mouth 2 (two) times a day with meals, Historical Med      ondansetron (ZOFRAN) 4 mg tablet Take 1 tablet (4 mg total) by mouth every 6 (six) hours, Starting Fri 8/9/2019, Print           No discharge procedures on file      ED Provider  Electronically Signed by           Jo Lyles DO  10/08/19 6328

## 2019-10-09 NOTE — ED NOTES
Meg Eriksson called [facility] for transport, pt signed waiver and awaiting in the lobby for yanci Hadren RN  10/08/19 2000

## 2020-02-29 ENCOUNTER — HOSPITAL ENCOUNTER (EMERGENCY)
Facility: HOSPITAL | Age: 60
Discharge: HOME | End: 2020-03-01
Attending: EMERGENCY MEDICINE | Admitting: EMERGENCY MEDICINE
Payer: COMMERCIAL

## 2020-02-29 ENCOUNTER — APPOINTMENT (EMERGENCY)
Dept: RADIOLOGY | Facility: HOSPITAL | Age: 60
End: 2020-02-29
Payer: COMMERCIAL

## 2020-02-29 ENCOUNTER — APPOINTMENT (EMERGENCY)
Dept: RADIOLOGY | Facility: HOSPITAL | Age: 60
End: 2020-02-29
Attending: EMERGENCY MEDICINE
Payer: COMMERCIAL

## 2020-02-29 DIAGNOSIS — F10.929 ALCOHOL INTOXICATION (HCC): Primary | ICD-10-CM

## 2020-02-29 LAB
ALBUMIN SERPL BCP-MCNC: 3.4 G/DL (ref 3.5–5)
ALP SERPL-CCNC: 192 U/L (ref 46–116)
ALT SERPL W P-5'-P-CCNC: 25 U/L (ref 12–78)
AMPHETAMINES SERPL QL SCN: NEGATIVE
ANION GAP SERPL CALCULATED.3IONS-SCNC: 12 MMOL/L (ref 4–13)
AST SERPL W P-5'-P-CCNC: 38 U/L (ref 5–45)
BARBITURATES UR QL: NEGATIVE
BASOPHILS # BLD AUTO: 0.05 THOUSANDS/ΜL (ref 0–0.1)
BASOPHILS NFR BLD AUTO: 1 % (ref 0–1)
BENZODIAZ UR QL: NEGATIVE
BILIRUB SERPL-MCNC: 0.2 MG/DL (ref 0.2–1)
BUN SERPL-MCNC: 15 MG/DL (ref 5–25)
CALCIUM SERPL-MCNC: 8.5 MG/DL (ref 8.3–10.1)
CHLORIDE SERPL-SCNC: 101 MMOL/L (ref 100–108)
CO2 SERPL-SCNC: 28 MMOL/L (ref 21–32)
COCAINE UR QL: NEGATIVE
CREAT SERPL-MCNC: 0.67 MG/DL (ref 0.6–1.3)
EOSINOPHIL # BLD AUTO: 0.13 THOUSAND/ΜL (ref 0–0.61)
EOSINOPHIL NFR BLD AUTO: 2 % (ref 0–6)
ERYTHROCYTE [DISTWIDTH] IN BLOOD BY AUTOMATED COUNT: 15.8 % (ref 11.6–15.1)
ETHANOL SERPL-MCNC: 303 MG/DL (ref 0–3)
GFR SERPL CREATININE-BSD FRML MDRD: 113 ML/MIN/1.73SQ M
GLUCOSE SERPL-MCNC: 80 MG/DL (ref 65–140)
HCT VFR BLD AUTO: 40.3 % (ref 34.8–46.1)
HGB BLD-MCNC: 13.3 G/DL (ref 11.5–15.4)
IMM GRANULOCYTES # BLD AUTO: 0.01 THOUSAND/UL (ref 0–0.2)
IMM GRANULOCYTES NFR BLD AUTO: 0 % (ref 0–2)
LYMPHOCYTES # BLD AUTO: 2.99 THOUSANDS/ΜL (ref 0.6–4.47)
LYMPHOCYTES NFR BLD AUTO: 43 % (ref 14–44)
MAGNESIUM SERPL-MCNC: 2.1 MG/DL (ref 1.6–2.6)
MCH RBC QN AUTO: 30.2 PG (ref 26.8–34.3)
MCHC RBC AUTO-ENTMCNC: 33 G/DL (ref 31.4–37.4)
MCV RBC AUTO: 91 FL (ref 82–98)
METHADONE UR QL: POSITIVE
MONOCYTES # BLD AUTO: 0.56 THOUSAND/ΜL (ref 0.17–1.22)
MONOCYTES NFR BLD AUTO: 8 % (ref 4–12)
NEUTROPHILS # BLD AUTO: 3.23 THOUSANDS/ΜL (ref 1.85–7.62)
NEUTS SEG NFR BLD AUTO: 46 % (ref 43–75)
NRBC BLD AUTO-RTO: 0 /100 WBCS
OPIATES UR QL SCN: NEGATIVE
PCP UR QL: NEGATIVE
PLATELET # BLD AUTO: 303 THOUSANDS/UL (ref 149–390)
PMV BLD AUTO: 10.5 FL (ref 8.9–12.7)
POTASSIUM SERPL-SCNC: 3.4 MMOL/L (ref 3.5–5.3)
PROT SERPL-MCNC: 8 G/DL (ref 6.4–8.2)
RBC # BLD AUTO: 4.41 MILLION/UL (ref 3.81–5.12)
SODIUM SERPL-SCNC: 141 MMOL/L (ref 136–145)
THC UR QL: POSITIVE
WBC # BLD AUTO: 6.97 THOUSAND/UL (ref 4.31–10.16)

## 2020-02-29 PROCEDURE — 71045 X-RAY EXAM CHEST 1 VIEW: CPT

## 2020-02-29 PROCEDURE — 99285 EMERGENCY DEPT VISIT HI MDM: CPT

## 2020-02-29 PROCEDURE — 83735 ASSAY OF MAGNESIUM: CPT

## 2020-02-29 PROCEDURE — 36415 COLL VENOUS BLD VENIPUNCTURE: CPT

## 2020-02-29 PROCEDURE — 80320 DRUG SCREEN QUANTALCOHOLS: CPT

## 2020-02-29 PROCEDURE — 80053 COMPREHEN METABOLIC PANEL: CPT

## 2020-02-29 PROCEDURE — 72125 CT NECK SPINE W/O DYE: CPT

## 2020-02-29 PROCEDURE — 70450 CT HEAD/BRAIN W/O DYE: CPT

## 2020-02-29 PROCEDURE — 85025 COMPLETE CBC W/AUTO DIFF WBC: CPT

## 2020-02-29 PROCEDURE — 80307 DRUG TEST PRSMV CHEM ANLYZR: CPT | Performed by: EMERGENCY MEDICINE

## 2020-02-29 PROCEDURE — 96374 THER/PROPH/DIAG INJ IV PUSH: CPT

## 2020-02-29 PROCEDURE — 99284 EMERGENCY DEPT VISIT MOD MDM: CPT | Performed by: EMERGENCY MEDICINE

## 2020-02-29 RX ORDER — NALOXONE HYDROCHLORIDE 0.4 MG/ML
0.1 INJECTION, SOLUTION INTRAMUSCULAR; INTRAVENOUS; SUBCUTANEOUS ONCE
Status: COMPLETED | OUTPATIENT
Start: 2020-02-29 | End: 2020-02-29

## 2020-02-29 RX ADMIN — NALXONE HYDROCHLORIDE 0.1 MG: 0.4 INJECTION INTRAMUSCULAR; INTRAVENOUS; SUBCUTANEOUS at 22:36

## 2020-03-01 VITALS
OXYGEN SATURATION: 92 % | HEART RATE: 86 BPM | TEMPERATURE: 97.4 F | DIASTOLIC BLOOD PRESSURE: 67 MMHG | WEIGHT: 87 LBS | RESPIRATION RATE: 10 BRPM | BODY MASS INDEX: 17.54 KG/M2 | HEIGHT: 59 IN | SYSTOLIC BLOOD PRESSURE: 140 MMHG

## 2020-03-01 NOTE — ED NOTES
Pt assisted on to bed pan  Cursing at staff  Bed alarm applied  Urine sample at the bedside       Suzie Matos RN  02/29/20 4327

## 2020-03-01 NOTE — ED NOTES
C collar removed  Pt resting on continuous cardiac monitoring        Evelin Rucker RN  02/29/20 9822

## 2020-03-01 NOTE — ED PROVIDER NOTES
History  Chief Complaint   Patient presents with    Fall     per bystander pt arrived to bar apparently intoxicated, ordered additional drinks, fell off bar stool face forward + loc  upon MICU arrival pt aaox3 c/o all over body pain, c collar applied  sugar 71 100ml d10 given     Alcohol Intoxication     HPI    64 year female that presents after fall  Patient walked into a bar intoxicated  Will had 2 vodkas and fell off the stool positive loss of conscious  Patient blood sugar was 71 for which she received dextrose  Patient is complaining of pain all over  Patient is alert and oriented easily awake will  Clinically appears to be intoxicated will get labs and images  Prior to Admission Medications   Prescriptions Last Dose Informant Patient Reported? Taking?    BASAGLAR KWIKPEN 100 units/mL injection pen   No No   Sig: Inject 20 Units under the skin daily at bedtime   Insulin Pen Needle 29G X 12MM MISC   No No   Sig: by Does not apply route daily at bedtime   METHADONE HCL PO   Yes No   Sig: Take 35 mg by mouth   albuterol (VENTOLIN HFA) 90 mcg/act inhaler   No No   Sig: Inhale 2 puffs every 6 (six) hours as needed for wheezing   dabigatran etexilate (PRADAXA) 150 mg capsu   No No   Sig: Take 1 capsule (150 mg total) by mouth every 12 (twelve) hours   dicyclomine (BENTYL) 20 mg tablet   No No   Sig: Take 1 tablet (20 mg total) by mouth 2 (two) times a day   insulin glargine (LANTUS) 100 units/mL subcutaneous injection   No No   Sig: Inject 20 Units under the skin daily at bedtime   Patient not taking: Reported on 7/30/2019   mupirocin (BACTROBAN) 2 % ointment   No No   Sig: Apply topically 3 (three) times a day   naproxen (NAPROSYN) 500 mg tablet   Yes No   Sig: Take 250 mg by mouth 2 (two) times a day with meals   ondansetron (ZOFRAN) 4 mg tablet   No No   Sig: Take 1 tablet (4 mg total) by mouth every 6 (six) hours      Facility-Administered Medications: None       Past Medical History:   Diagnosis Date  Arthritis     Asthma     Diabetes mellitus ([facility] [address] )        Past Surgical History:   Procedure Laterality Date    ABDOMINAL SURGERY         History reviewed  No pertinent family history  I have reviewed and agree with the history as documented  E-Cigarette/Vaping     E-Cigarette/Vaping Substances     Social History     Tobacco Use    Smoking status: Current Every Day Smoker     Packs/day: 1 00    Smokeless tobacco: Never Used   Substance Use Topics    Alcohol use: Yes     Drinks per session: 3 or 4    Drug use: No     Comment: on methadone       Review of Systems   Constitutional: Negative  Negative for diaphoresis and fever  HENT: Negative  Respiratory: Negative  Negative for cough, shortness of breath and wheezing  Cardiovascular: Negative  Negative for chest pain, palpitations and leg swelling  Gastrointestinal: Negative for abdominal distention, abdominal pain, nausea and vomiting  Genitourinary: Negative  Musculoskeletal: Negative  Skin: Negative  Neurological: Negative  Psychiatric/Behavioral: Negative  All other systems reviewed and are negative  Physical Exam  Physical Exam   Constitutional: She is oriented to person, place, and time  She appears well-developed  Clinically appears to be intoxicated   HENT:   Head: Normocephalic and atraumatic  Eyes: Pupils are equal, round, and reactive to light  EOM are normal    Neck: Normal range of motion  Neck supple  Cardiovascular: Normal rate, regular rhythm and normal heart sounds  No murmur heard  Pulmonary/Chest: Effort normal and breath sounds normal    Abdominal: Soft  Bowel sounds are normal  She exhibits no distension  There is no tenderness  There is no rebound and no guarding  Musculoskeletal: Normal range of motion  Neurological: She is alert and oriented to person, place, and time  Skin: Skin is warm and dry  Capillary refill takes less than 2 seconds     Psychiatric: She has a normal mood and affect  Vitals reviewed  Vital Signs  ED Triage Vitals [02/29/20 2101]   Temperature Pulse Respirations Blood Pressure SpO2   (!) 97 4 °F (36 3 °C) 85 15 149/92 (!) 89 %      Temp Source Heart Rate Source Patient Position - Orthostatic VS BP Location FiO2 (%)   Tympanic Monitor Lying Left arm --      Pain Score       Worst Possible Pain           Vitals:    02/29/20 2101   BP: 149/92   Pulse: 85   Patient Position - Orthostatic VS: Lying         Visual Acuity      ED Medications  Medications - No data to display    Diagnostic Studies  Results Reviewed     Procedure Component Value Units Date/Time    CBC and differential [[id number]]  (Abnormal) Collected:  02/29/20 2103    Lab Status:  Final result Specimen:  Blood from Arm, Left Updated:  02/29/20 2109     WBC 6 97 Thousand/uL      RBC 4 41 Million/uL      Hemoglobin 13 3 g/dL      Hematocrit 40 3 %      MCV 91 fL      MCH 30 2 pg      MCHC 33 0 g/dL      RDW 15 8 %      MPV 10 5 fL      Platelets 489 Thousands/uL      nRBC 0 /100 WBCs      Neutrophils Relative 46 %      Immat GRANS % 0 %      Lymphocytes Relative 43 %      Monocytes Relative 8 %      Eosinophils Relative 2 %      Basophils Relative 1 %      Neutrophils Absolute 3 23 Thousands/µL      Immature Grans Absolute 0 01 Thousand/uL      Lymphocytes Absolute 2 99 Thousands/µL      Monocytes Absolute 0 56 Thousand/µL      Eosinophils Absolute 0 13 Thousand/µL      Basophils Absolute 0 05 Thousands/µL     Ethanol [[id number]] Collected:  02/29/20 2103    Lab Status: In process Specimen:  Blood from Arm, Left Updated:  02/29/20 2106    Comprehensive metabolic panel [[id number]] Collected:  02/29/20 2103    Lab Status: In process Specimen:  Blood from Arm, Left Updated:  02/29/20 2106    Magnesium [[id number]] Collected:  02/29/20 2103    Lab Status:   In process Specimen:  Blood from Arm, Left Updated:  02/29/20 2106                 CT head without contrast    (Results Pending)   CT cervical spine without contrast    (Results Pending)              Procedures  Procedures         ED Course                               MDM      Disposition  Final diagnoses:   None     ED Disposition     None      Follow-up Information    None         Patient's Medications   Discharge Prescriptions    No medications on file     No discharge procedures on file      PDMP Review     None          ED Provider  Electronically Signed by           Toña Philip MD  03/01/20 9743

## 2020-03-01 NOTE — ED NOTES
Pt resting on stretcher  Bed alarm active  Pt will be sober at 5A       Evelin Rucker RN  03/01/20 7506

## 2020-05-24 ENCOUNTER — HOSPITAL ENCOUNTER (OUTPATIENT)
Facility: HOSPITAL | Age: 60
Setting detail: OBSERVATION
Discharge: HOME | End: 2020-05-25
Attending: EMERGENCY MEDICINE | Admitting: FAMILY MEDICINE
Payer: COMMERCIAL

## 2020-05-24 ENCOUNTER — APPOINTMENT (EMERGENCY)
Dept: RADIOLOGY | Facility: HOSPITAL | Age: 60
End: 2020-05-24
Payer: COMMERCIAL

## 2020-05-24 DIAGNOSIS — E16.2 HYPOGLYCEMIA: ICD-10-CM

## 2020-05-24 DIAGNOSIS — E78.5 DYSLIPIDEMIA: ICD-10-CM

## 2020-05-24 DIAGNOSIS — I10 ESSENTIAL HYPERTENSION: ICD-10-CM

## 2020-05-24 DIAGNOSIS — F10.920 ALCOHOLIC INTOXICATION WITHOUT COMPLICATION (HCC): ICD-10-CM

## 2020-05-24 DIAGNOSIS — R07.9 CHEST PAIN, UNSPECIFIED TYPE: Primary | ICD-10-CM

## 2020-05-24 DIAGNOSIS — Z72.0 NICOTINE ABUSE: ICD-10-CM

## 2020-05-24 PROBLEM — R11.2 NAUSEA & VOMITING: Status: ACTIVE | Noted: 2020-05-24

## 2020-05-24 PROBLEM — F10.929 ALCOHOL INTOXICATION (HCC): Status: ACTIVE | Noted: 2020-05-24

## 2020-05-24 PROBLEM — Z87.11 HISTORY OF GASTRIC ULCER: Status: ACTIVE | Noted: 2020-05-24

## 2020-05-24 PROBLEM — R74.01 ELEVATED AST (SGOT): Status: ACTIVE | Noted: 2020-05-24

## 2020-05-24 LAB
ALBUMIN SERPL BCP-MCNC: 3.6 G/DL (ref 3.5–5)
ALP SERPL-CCNC: 195 U/L (ref 46–116)
ALT SERPL W P-5'-P-CCNC: 33 U/L (ref 12–78)
ANION GAP SERPL CALCULATED.3IONS-SCNC: 9 MMOL/L (ref 4–13)
APAP SERPL-MCNC: <2 UG/ML (ref 10–20)
APTT PPP: 27 SECONDS (ref 23–37)
AST SERPL W P-5'-P-CCNC: 47 U/L (ref 5–45)
BASOPHILS # BLD AUTO: 0.07 THOUSANDS/ΜL (ref 0–0.1)
BASOPHILS NFR BLD AUTO: 1 % (ref 0–1)
BILIRUB SERPL-MCNC: 0.4 MG/DL (ref 0.2–1)
BUN SERPL-MCNC: 14 MG/DL (ref 5–25)
CALCIUM SERPL-MCNC: 8.6 MG/DL (ref 8.3–10.1)
CHLORIDE SERPL-SCNC: 101 MMOL/L (ref 100–108)
CO2 SERPL-SCNC: 30 MMOL/L (ref 21–32)
CREAT SERPL-MCNC: 0.38 MG/DL (ref 0.6–1.3)
EOSINOPHIL # BLD AUTO: 0.09 THOUSAND/ΜL (ref 0–0.61)
EOSINOPHIL NFR BLD AUTO: 2 % (ref 0–6)
ERYTHROCYTE [DISTWIDTH] IN BLOOD BY AUTOMATED COUNT: 15.4 % (ref 11.6–15.1)
ETHANOL SERPL-MCNC: 184 MG/DL (ref 0–3)
GFR SERPL CREATININE-BSD FRML MDRD: 135 ML/MIN/1.73SQ M
GLUCOSE SERPL-MCNC: 109 MG/DL (ref 65–140)
GLUCOSE SERPL-MCNC: 43 MG/DL (ref 65–140)
GLUCOSE SERPL-MCNC: 48 MG/DL (ref 65–140)
GLUCOSE SERPL-MCNC: 76 MG/DL (ref 65–140)
GLUCOSE SERPL-MCNC: 82 MG/DL (ref 65–140)
HCT VFR BLD AUTO: 41.2 % (ref 34.8–46.1)
HGB BLD-MCNC: 13.5 G/DL (ref 11.5–15.4)
IMM GRANULOCYTES # BLD AUTO: 0.01 THOUSAND/UL (ref 0–0.2)
IMM GRANULOCYTES NFR BLD AUTO: 0 % (ref 0–2)
INR PPP: 0.86 (ref 0.84–1.19)
LYMPHOCYTES # BLD AUTO: 2.26 THOUSANDS/ΜL (ref 0.6–4.47)
LYMPHOCYTES NFR BLD AUTO: 45 % (ref 14–44)
MAGNESIUM SERPL-MCNC: 2.1 MG/DL (ref 1.6–2.6)
MCH RBC QN AUTO: 30.6 PG (ref 26.8–34.3)
MCHC RBC AUTO-ENTMCNC: 32.8 G/DL (ref 31.4–37.4)
MCV RBC AUTO: 93 FL (ref 82–98)
MONOCYTES # BLD AUTO: 0.5 THOUSAND/ΜL (ref 0.17–1.22)
MONOCYTES NFR BLD AUTO: 10 % (ref 4–12)
NEUTROPHILS # BLD AUTO: 2.09 THOUSANDS/ΜL (ref 1.85–7.62)
NEUTS SEG NFR BLD AUTO: 42 % (ref 43–75)
NRBC BLD AUTO-RTO: 0 /100 WBCS
PHOSPHATE SERPL-MCNC: 3.6 MG/DL (ref 2.7–4.5)
PLATELET # BLD AUTO: 294 THOUSANDS/UL (ref 149–390)
PMV BLD AUTO: 10.1 FL (ref 8.9–12.7)
POTASSIUM SERPL-SCNC: 5.2 MMOL/L (ref 3.5–5.3)
PROT SERPL-MCNC: 8.1 G/DL (ref 6.4–8.2)
PROTHROMBIN TIME: 11.9 SECONDS (ref 11.6–14.5)
RBC # BLD AUTO: 4.41 MILLION/UL (ref 3.81–5.12)
SALICYLATES SERPL-MCNC: 6.2 MG/DL (ref 3–20)
SODIUM SERPL-SCNC: 140 MMOL/L (ref 136–145)
TROPONIN I SERPL-MCNC: <0.02 NG/ML
TROPONIN I SERPL-MCNC: <0.02 NG/ML
WBC # BLD AUTO: 5.02 THOUSAND/UL (ref 4.31–10.16)

## 2020-05-24 PROCEDURE — 80329 ANALGESICS NON-OPIOID 1 OR 2: CPT | Performed by: EMERGENCY MEDICINE

## 2020-05-24 PROCEDURE — 36415 COLL VENOUS BLD VENIPUNCTURE: CPT | Performed by: EMERGENCY MEDICINE

## 2020-05-24 PROCEDURE — 82948 REAGENT STRIP/BLOOD GLUCOSE: CPT

## 2020-05-24 PROCEDURE — 84484 ASSAY OF TROPONIN QUANT: CPT | Performed by: EMERGENCY MEDICINE

## 2020-05-24 PROCEDURE — 80320 DRUG SCREEN QUANTALCOHOLS: CPT | Performed by: EMERGENCY MEDICINE

## 2020-05-24 PROCEDURE — 99220 PR INITIAL OBSERVATION CARE/DAY 70 MINUTES: CPT | Performed by: NURSE PRACTITIONER

## 2020-05-24 PROCEDURE — 96374 THER/PROPH/DIAG INJ IV PUSH: CPT

## 2020-05-24 PROCEDURE — 85730 THROMBOPLASTIN TIME PARTIAL: CPT | Performed by: EMERGENCY MEDICINE

## 2020-05-24 PROCEDURE — 80053 COMPREHEN METABOLIC PANEL: CPT | Performed by: EMERGENCY MEDICINE

## 2020-05-24 PROCEDURE — 84100 ASSAY OF PHOSPHORUS: CPT | Performed by: NURSE PRACTITIONER

## 2020-05-24 PROCEDURE — 99285 EMERGENCY DEPT VISIT HI MDM: CPT

## 2020-05-24 PROCEDURE — 93005 ELECTROCARDIOGRAM TRACING: CPT

## 2020-05-24 PROCEDURE — 85610 PROTHROMBIN TIME: CPT | Performed by: EMERGENCY MEDICINE

## 2020-05-24 PROCEDURE — 99285 EMERGENCY DEPT VISIT HI MDM: CPT | Performed by: EMERGENCY MEDICINE

## 2020-05-24 PROCEDURE — 83735 ASSAY OF MAGNESIUM: CPT | Performed by: NURSE PRACTITIONER

## 2020-05-24 PROCEDURE — 71045 X-RAY EXAM CHEST 1 VIEW: CPT

## 2020-05-24 PROCEDURE — 85025 COMPLETE CBC W/AUTO DIFF WBC: CPT | Performed by: EMERGENCY MEDICINE

## 2020-05-24 RX ORDER — DEXTROSE MONOHYDRATE 25 G/50ML
50 INJECTION, SOLUTION INTRAVENOUS ONCE
Status: COMPLETED | OUTPATIENT
Start: 2020-05-24 | End: 2020-05-24

## 2020-05-24 RX ORDER — METHADONE HYDROCHLORIDE 10 MG/1
130 TABLET ORAL DAILY
Status: DISCONTINUED | OUTPATIENT
Start: 2020-05-25 | End: 2020-05-25 | Stop reason: HOSPADM

## 2020-05-24 RX ORDER — FOLIC ACID 1 MG/1
1 TABLET ORAL DAILY
Status: DISCONTINUED | OUTPATIENT
Start: 2020-05-24 | End: 2020-05-25 | Stop reason: HOSPADM

## 2020-05-24 RX ORDER — NICOTINE 21 MG/24HR
1 PATCH, TRANSDERMAL 24 HOURS TRANSDERMAL DAILY
Status: DISCONTINUED | OUTPATIENT
Start: 2020-05-25 | End: 2020-05-25 | Stop reason: HOSPADM

## 2020-05-24 RX ORDER — KETOROLAC TROMETHAMINE 30 MG/ML
15 INJECTION, SOLUTION INTRAMUSCULAR; INTRAVENOUS EVERY 6 HOURS PRN
Status: DISCONTINUED | OUTPATIENT
Start: 2020-05-24 | End: 2020-05-25 | Stop reason: HOSPADM

## 2020-05-24 RX ORDER — LIDOCAINE 50 MG/G
1 PATCH TOPICAL ONCE
Status: COMPLETED | OUTPATIENT
Start: 2020-05-24 | End: 2020-05-25

## 2020-05-24 RX ORDER — ONDANSETRON 2 MG/ML
4 INJECTION INTRAMUSCULAR; INTRAVENOUS EVERY 6 HOURS PRN
Status: DISCONTINUED | OUTPATIENT
Start: 2020-05-24 | End: 2020-05-25 | Stop reason: HOSPADM

## 2020-05-24 RX ORDER — ALBUTEROL SULFATE 90 UG/1
2 AEROSOL, METERED RESPIRATORY (INHALATION) EVERY 6 HOURS PRN
Status: DISCONTINUED | OUTPATIENT
Start: 2020-05-24 | End: 2020-05-25 | Stop reason: HOSPADM

## 2020-05-24 RX ORDER — THIAMINE MONONITRATE (VIT B1) 100 MG
100 TABLET ORAL DAILY
Status: DISCONTINUED | OUTPATIENT
Start: 2020-05-24 | End: 2020-05-25 | Stop reason: HOSPADM

## 2020-05-24 RX ORDER — SODIUM CHLORIDE, SODIUM LACTATE, POTASSIUM CHLORIDE, CALCIUM CHLORIDE 600; 310; 30; 20 MG/100ML; MG/100ML; MG/100ML; MG/100ML
75 INJECTION, SOLUTION INTRAVENOUS CONTINUOUS
Status: DISCONTINUED | OUTPATIENT
Start: 2020-05-24 | End: 2020-05-25

## 2020-05-24 RX ADMIN — KETOROLAC TROMETHAMINE 15 MG: 30 INJECTION, SOLUTION INTRAMUSCULAR at 23:34

## 2020-05-24 RX ADMIN — SODIUM CHLORIDE, SODIUM LACTATE, POTASSIUM CHLORIDE, AND CALCIUM CHLORIDE 75 ML/HR: .6; .31; .03; .02 INJECTION, SOLUTION INTRAVENOUS at 23:40

## 2020-05-24 RX ADMIN — FOLIC ACID 1 MG: 1 TABLET ORAL at 23:53

## 2020-05-24 RX ADMIN — LIDOCAINE 1 PATCH: 50 PATCH TOPICAL at 23:39

## 2020-05-24 RX ADMIN — Medication 100 MG: at 23:53

## 2020-05-24 RX ADMIN — FAMOTIDINE 20 MG: 10 INJECTION, SOLUTION INTRAVENOUS at 23:36

## 2020-05-24 RX ADMIN — B-COMPLEX W/ C & FOLIC ACID TAB 1 TABLET: TAB at 23:36

## 2020-05-24 RX ADMIN — DEXTROSE MONOHYDRATE 50 ML: 500 INJECTION PARENTERAL at 19:52

## 2020-05-25 VITALS
WEIGHT: 87 LBS | HEIGHT: 59 IN | BODY MASS INDEX: 17.54 KG/M2 | SYSTOLIC BLOOD PRESSURE: 129 MMHG | DIASTOLIC BLOOD PRESSURE: 98 MMHG | OXYGEN SATURATION: 97 % | TEMPERATURE: 98.3 F | RESPIRATION RATE: 17 BRPM | HEART RATE: 86 BPM

## 2020-05-25 PROBLEM — R11.2 NAUSEA & VOMITING: Status: RESOLVED | Noted: 2020-05-24 | Resolved: 2020-05-25

## 2020-05-25 PROBLEM — R07.9 CHEST PAIN: Status: RESOLVED | Noted: 2020-05-24 | Resolved: 2020-05-25

## 2020-05-25 PROBLEM — E78.5 DYSLIPIDEMIA: Status: ACTIVE | Noted: 2020-05-25

## 2020-05-25 PROBLEM — F10.929 ALCOHOL INTOXICATION (HCC): Status: RESOLVED | Noted: 2020-05-24 | Resolved: 2020-05-25

## 2020-05-25 LAB
ALBUMIN SERPL BCP-MCNC: 3.1 G/DL (ref 3.5–5)
ALP SERPL-CCNC: 170 U/L (ref 46–116)
ALT SERPL W P-5'-P-CCNC: 25 U/L (ref 12–78)
ANION GAP SERPL CALCULATED.3IONS-SCNC: 9 MMOL/L (ref 4–13)
AST SERPL W P-5'-P-CCNC: 29 U/L (ref 5–45)
BETA-HYDROXYBUTYRATE: 0.1 MMOL/L
BILIRUB SERPL-MCNC: 0.4 MG/DL (ref 0.2–1)
BUN SERPL-MCNC: 12 MG/DL (ref 5–25)
CALCIUM SERPL-MCNC: 8.9 MG/DL (ref 8.3–10.1)
CHLORIDE SERPL-SCNC: 100 MMOL/L (ref 100–108)
CHOLEST SERPL-MCNC: 221 MG/DL (ref 50–200)
CO2 SERPL-SCNC: 29 MMOL/L (ref 21–32)
CREAT SERPL-MCNC: 0.66 MG/DL (ref 0.6–1.3)
ERYTHROCYTE [DISTWIDTH] IN BLOOD BY AUTOMATED COUNT: 14.9 % (ref 11.6–15.1)
EST. AVERAGE GLUCOSE BLD GHB EST-MCNC: 108 MG/DL
GFR SERPL CREATININE-BSD FRML MDRD: 113 ML/MIN/1.73SQ M
GLUCOSE SERPL-MCNC: 105 MG/DL (ref 65–140)
GLUCOSE SERPL-MCNC: 106 MG/DL (ref 65–140)
GLUCOSE SERPL-MCNC: 148 MG/DL (ref 65–140)
GLUCOSE SERPL-MCNC: 150 MG/DL (ref 65–140)
GLUCOSE SERPL-MCNC: 81 MG/DL (ref 65–140)
GLUCOSE SERPL-MCNC: 91 MG/DL (ref 65–140)
HBA1C MFR BLD: 5.4 %
HCT VFR BLD AUTO: 41.1 % (ref 34.8–46.1)
HDLC SERPL-MCNC: 106 MG/DL
HGB BLD-MCNC: 13.5 G/DL (ref 11.5–15.4)
LDLC SERPL CALC-MCNC: 110 MG/DL (ref 0–100)
MAGNESIUM SERPL-MCNC: 1.8 MG/DL (ref 1.6–2.6)
MCH RBC QN AUTO: 30.3 PG (ref 26.8–34.3)
MCHC RBC AUTO-ENTMCNC: 32.8 G/DL (ref 31.4–37.4)
MCV RBC AUTO: 92 FL (ref 82–98)
PHOSPHATE SERPL-MCNC: 3.5 MG/DL (ref 2.7–4.5)
PLATELET # BLD AUTO: 285 THOUSANDS/UL (ref 149–390)
PMV BLD AUTO: 10.5 FL (ref 8.9–12.7)
POTASSIUM SERPL-SCNC: 3.6 MMOL/L (ref 3.5–5.3)
PROT SERPL-MCNC: 7.2 G/DL (ref 6.4–8.2)
RBC # BLD AUTO: 4.46 MILLION/UL (ref 3.81–5.12)
SODIUM SERPL-SCNC: 138 MMOL/L (ref 136–145)
TRIGL SERPL-MCNC: 27 MG/DL
TROPONIN I SERPL-MCNC: <0.02 NG/ML
WBC # BLD AUTO: 4.35 THOUSAND/UL (ref 4.31–10.16)

## 2020-05-25 PROCEDURE — 84484 ASSAY OF TROPONIN QUANT: CPT | Performed by: NURSE PRACTITIONER

## 2020-05-25 PROCEDURE — 99217 PR OBSERVATION CARE DISCHARGE MANAGEMENT: CPT | Performed by: STUDENT IN AN ORGANIZED HEALTH CARE EDUCATION/TRAINING PROGRAM

## 2020-05-25 PROCEDURE — 85027 COMPLETE CBC AUTOMATED: CPT | Performed by: NURSE PRACTITIONER

## 2020-05-25 PROCEDURE — 80053 COMPREHEN METABOLIC PANEL: CPT | Performed by: NURSE PRACTITIONER

## 2020-05-25 PROCEDURE — 82010 KETONE BODYS QUAN: CPT | Performed by: NURSE PRACTITIONER

## 2020-05-25 PROCEDURE — 84100 ASSAY OF PHOSPHORUS: CPT | Performed by: NURSE PRACTITIONER

## 2020-05-25 PROCEDURE — 82948 REAGENT STRIP/BLOOD GLUCOSE: CPT

## 2020-05-25 PROCEDURE — 80061 LIPID PANEL: CPT | Performed by: NURSE PRACTITIONER

## 2020-05-25 PROCEDURE — 83735 ASSAY OF MAGNESIUM: CPT | Performed by: NURSE PRACTITIONER

## 2020-05-25 PROCEDURE — 83036 HEMOGLOBIN GLYCOSYLATED A1C: CPT | Performed by: NURSE PRACTITIONER

## 2020-05-25 PROCEDURE — 94760 N-INVAS EAR/PLS OXIMETRY 1: CPT

## 2020-05-25 RX ORDER — NICOTINE 21 MG/24HR
1 PATCH, TRANSDERMAL 24 HOURS TRANSDERMAL DAILY
Qty: 28 PATCH | Refills: 0 | Status: SHIPPED | OUTPATIENT
Start: 2020-05-26 | End: 2021-11-17 | Stop reason: HOSPADM

## 2020-05-25 RX ORDER — LISINOPRIL 10 MG/1
10 TABLET ORAL DAILY
Status: DISCONTINUED | OUTPATIENT
Start: 2020-05-25 | End: 2020-05-25 | Stop reason: HOSPADM

## 2020-05-25 RX ORDER — ATORVASTATIN CALCIUM 20 MG/1
20 TABLET, FILM COATED ORAL
Qty: 90 TABLET | Refills: 0 | Status: SHIPPED | OUTPATIENT
Start: 2020-05-25 | End: 2021-04-23 | Stop reason: HOSPADM

## 2020-05-25 RX ORDER — LISINOPRIL 10 MG/1
10 TABLET ORAL DAILY
Qty: 90 TABLET | Refills: 0 | Status: SHIPPED | OUTPATIENT
Start: 2020-05-26 | End: 2021-04-23 | Stop reason: HOSPADM

## 2020-05-25 RX ORDER — ATORVASTATIN CALCIUM 20 MG/1
20 TABLET, FILM COATED ORAL
Status: DISCONTINUED | OUTPATIENT
Start: 2020-05-25 | End: 2020-05-25 | Stop reason: HOSPADM

## 2020-05-25 RX ADMIN — FOLIC ACID 1 MG: 1 TABLET ORAL at 08:17

## 2020-05-25 RX ADMIN — METHADONE HYDROCHLORIDE 130 MG: 10 TABLET ORAL at 09:00

## 2020-05-25 RX ADMIN — ATORVASTATIN CALCIUM 20 MG: 20 TABLET, FILM COATED ORAL at 15:31

## 2020-05-25 RX ADMIN — LISINOPRIL 10 MG: 10 TABLET ORAL at 10:21

## 2020-05-25 RX ADMIN — Medication 100 MG: at 08:17

## 2020-05-25 RX ADMIN — ENOXAPARIN SODIUM 40 MG: 40 INJECTION SUBCUTANEOUS at 08:17

## 2020-05-25 RX ADMIN — NICOTINE 1 PATCH: 21 PATCH, EXTENDED RELEASE TRANSDERMAL at 08:17

## 2020-05-25 RX ADMIN — B-COMPLEX W/ C & FOLIC ACID TAB 1 TABLET: TAB at 08:17

## 2020-05-25 RX ADMIN — FAMOTIDINE 20 MG: 10 INJECTION, SOLUTION INTRAVENOUS at 09:08

## 2020-05-27 LAB
ATRIAL RATE: 98 BPM
P AXIS: 66 DEGREES
PR INTERVAL: 144 MS
QRS AXIS: 13 DEGREES
QRSD INTERVAL: 78 MS
QT INTERVAL: 382 MS
QTC INTERVAL: 487 MS
T WAVE AXIS: 61 DEGREES
VENTRICULAR RATE: 98 BPM

## 2020-05-27 PROCEDURE — 93010 ELECTROCARDIOGRAM REPORT: CPT | Performed by: INTERNAL MEDICINE

## 2020-06-26 ENCOUNTER — TRANSCRIBE ORDERS (OUTPATIENT)
Dept: ADMINISTRATIVE | Facility: HOSPITAL | Age: 60
End: 2020-06-26

## 2020-06-26 DIAGNOSIS — I45.81 LONG QT SYNDROME: ICD-10-CM

## 2020-06-26 DIAGNOSIS — R94.31 NONSPECIFIC ABNORMAL ELECTROCARDIOGRAM (ECG) (EKG): ICD-10-CM

## 2020-06-26 DIAGNOSIS — R94.31 NONSPECIFIC ABNORMAL ELECTROCARDIOGRAM (ECG) (EKG): Primary | ICD-10-CM

## 2020-06-26 PROCEDURE — 93005 ELECTROCARDIOGRAM TRACING: CPT

## 2020-06-27 LAB
ATRIAL RATE: 96 BPM
P AXIS: 68 DEGREES
PR INTERVAL: 138 MS
QRS AXIS: 10 DEGREES
QRSD INTERVAL: 80 MS
QT INTERVAL: 412 MS
QTC INTERVAL: 520 MS
T WAVE AXIS: 67 DEGREES
VENTRICULAR RATE: 96 BPM

## 2020-06-27 PROCEDURE — 93010 ELECTROCARDIOGRAM REPORT: CPT | Performed by: INTERNAL MEDICINE

## 2021-01-28 ENCOUNTER — APPOINTMENT (EMERGENCY)
Dept: RADIOLOGY | Facility: HOSPITAL | Age: 61
End: 2021-01-28
Payer: COMMERCIAL

## 2021-01-28 ENCOUNTER — HOSPITAL ENCOUNTER (EMERGENCY)
Facility: HOSPITAL | Age: 61
Discharge: HOME | End: 2021-01-28
Attending: EMERGENCY MEDICINE | Admitting: EMERGENCY MEDICINE
Payer: COMMERCIAL

## 2021-01-28 VITALS
HEART RATE: 112 BPM | TEMPERATURE: 99 F | RESPIRATION RATE: 12 BRPM | SYSTOLIC BLOOD PRESSURE: 144 MMHG | DIASTOLIC BLOOD PRESSURE: 87 MMHG | OXYGEN SATURATION: 96 %

## 2021-01-28 DIAGNOSIS — F10.929 ALCOHOL INTOXICATION (HCC): ICD-10-CM
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
## 2021-01-28 LAB
ALBUMIN SERPL BCP-MCNC: 2.9 G/DL (ref 3.5–5)
ALP SERPL-CCNC: 195 U/L (ref 46–116)
ALT SERPL W P-5'-P-CCNC: 17 U/L (ref 12–78)
ANION GAP SERPL CALCULATED.3IONS-SCNC: 9 MMOL/L (ref 4–13)
AST SERPL W P-5'-P-CCNC: 23 U/L (ref 5–45)
BASOPHILS # BLD AUTO: 0.03 THOUSANDS/ΜL (ref 0–0.1)
BASOPHILS NFR BLD AUTO: 1 % (ref 0–1)
BILIRUB SERPL-MCNC: 0.2 MG/DL (ref 0.2–1)
BUN SERPL-MCNC: 17 MG/DL (ref 5–25)
CALCIUM ALBUM COR SERPL-MCNC: 9.4 MG/DL (ref 8.3–10.1)
CALCIUM SERPL-MCNC: 8.5 MG/DL (ref 8.3–10.1)
CHLORIDE SERPL-SCNC: 103 MMOL/L (ref 100–108)
CO2 SERPL-SCNC: 29 MMOL/L (ref 21–32)
CREAT SERPL-MCNC: 0.97 MG/DL (ref 0.6–1.3)
EOSINOPHIL # BLD AUTO: 0.04 THOUSAND/ΜL (ref 0–0.61)
EOSINOPHIL NFR BLD AUTO: 1 % (ref 0–6)
ERYTHROCYTE [DISTWIDTH] IN BLOOD BY AUTOMATED COUNT: 14.8 % (ref 11.6–15.1)
ETHANOL SERPL-MCNC: 167 MG/DL (ref 0–3)
GFR SERPL CREATININE-BSD FRML MDRD: 74 ML/MIN/1.73SQ M
GLUCOSE SERPL-MCNC: 91 MG/DL (ref 65–140)
HCT VFR BLD AUTO: 43.7 % (ref 34.8–46.1)
HGB BLD-MCNC: 13.9 G/DL (ref 11.5–15.4)
IMM GRANULOCYTES # BLD AUTO: 0.01 THOUSAND/UL (ref 0–0.2)
IMM GRANULOCYTES NFR BLD AUTO: 0 % (ref 0–2)
LYMPHOCYTES # BLD AUTO: 1.41 THOUSANDS/ΜL (ref 0.6–4.47)
LYMPHOCYTES NFR BLD AUTO: 37 % (ref 14–44)
MCH RBC QN AUTO: 29.2 PG (ref 26.8–34.3)
MCHC RBC AUTO-ENTMCNC: 31.8 G/DL (ref 31.4–37.4)
MCV RBC AUTO: 92 FL (ref 82–98)
MONOCYTES # BLD AUTO: 0.27 THOUSAND/ΜL (ref 0.17–1.22)
MONOCYTES NFR BLD AUTO: 7 % (ref 4–12)
NEUTROPHILS # BLD AUTO: 2.06 THOUSANDS/ΜL (ref 1.85–7.62)
NEUTS SEG NFR BLD AUTO: 54 % (ref 43–75)
NRBC BLD AUTO-RTO: 0 /100 WBCS
PLATELET # BLD AUTO: 269 THOUSANDS/UL (ref 149–390)
PMV BLD AUTO: 9.9 FL (ref 8.9–12.7)
POTASSIUM SERPL-SCNC: 3.3 MMOL/L (ref 3.5–5.3)
PROT SERPL-MCNC: 8 G/DL (ref 6.4–8.2)
RBC # BLD AUTO: 4.76 MILLION/UL (ref 3.81–5.12)
SODIUM SERPL-SCNC: 141 MMOL/L (ref 136–145)
WBC # BLD AUTO: 3.82 THOUSAND/UL (ref 4.31–10.16)

## 2021-01-28 PROCEDURE — 72125 CT NECK SPINE W/O DYE: CPT

## 2021-01-28 PROCEDURE — 85025 COMPLETE CBC W/AUTO DIFF WBC: CPT | Performed by: EMERGENCY MEDICINE

## 2021-01-28 PROCEDURE — 70450 CT HEAD/BRAIN W/O DYE: CPT

## 2021-01-28 PROCEDURE — G1004 CDSM NDSC: HCPCS

## 2021-01-28 PROCEDURE — 82077 ASSAY SPEC XCP UR&BREATH IA: CPT | Performed by: EMERGENCY MEDICINE

## 2021-01-28 PROCEDURE — 93005 ELECTROCARDIOGRAM TRACING: CPT

## 2021-01-28 PROCEDURE — 99285 EMERGENCY DEPT VISIT HI MDM: CPT

## 2021-01-28 PROCEDURE — 80053 COMPREHEN METABOLIC PANEL: CPT | Performed by: EMERGENCY MEDICINE

## 2021-01-28 PROCEDURE — 36415 COLL VENOUS BLD VENIPUNCTURE: CPT | Performed by: EMERGENCY MEDICINE

## 2021-01-28 PROCEDURE — 96372 THER/PROPH/DIAG INJ SC/IM: CPT

## 2021-01-28 PROCEDURE — 99285 EMERGENCY DEPT VISIT HI MDM: CPT | Performed by: EMERGENCY MEDICINE

## 2021-01-28 RX ORDER — LORAZEPAM 2 MG/ML
2 INJECTION INTRAMUSCULAR ONCE
Status: COMPLETED | OUTPATIENT
Start: 2021-01-28 | End: 2021-01-28

## 2021-01-28 RX ORDER — OLANZAPINE 10 MG/1
10 INJECTION, POWDER, LYOPHILIZED, FOR SOLUTION INTRAMUSCULAR ONCE
Status: COMPLETED | OUTPATIENT
Start: 2021-01-28 | End: 2021-01-28

## 2021-01-28 RX ADMIN — OLANZAPINE 10 MG: 10 INJECTION, POWDER, FOR SOLUTION INTRAMUSCULAR at 11:57

## 2021-01-28 RX ADMIN — LORAZEPAM 2 MG: 2 INJECTION INTRAMUSCULAR; INTRAVENOUS at 11:57
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
## 2021-01-28 NOTE — ED NOTES
pts nephew Cheryle Neff called looking for [phone]  No information provided,  Not listed as emergency contact  He states he will get in touch with his cousin to call the ER for information        Sumanth Desir RN  01/28/21 6310
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
## 2021-01-28 NOTE — ED NOTES
Pt yelling at staff- said  When she gets out of here she is coming back to "fuck you up"  Demanding to leave- needs to get her methadone or she will die"  "Fuck you white ass bitch"  Remains in restraints  Moved to secure holding with 1:1 observation       Sruthi Lee RN  01/28/21 2817

## 2021-01-28 NOTE — ED PROVIDER NOTES
History  Chief Complaint   Patient presents with    Fall     Pt states that she fell last night when she was drinking, pt states that her whole body hurts  82-year-old female presents with alcohol intoxication  She said she fell last night and her whole body hurts  Currently is belligerent agitated will not answer my questions or let me examine her  Appears to be moving her extremities  Vital signs reviewed normal   Slightly tachycardic from the agitation  Denies any medical complaints illicit drug use or any other complaints at this time  No overt signs of trauma noted  History provided by:  Patient   used: No        Prior to Admission Medications   Prescriptions Last Dose Informant Patient Reported? Taking? Insulin Pen Needle 29G X 12MM MISC   No No   Sig: by Does not apply route daily at bedtime   METHADONE HCL PO   Yes No   Sig: Take 130 mg by mouth daily    albuterol (VENTOLIN HFA) 90 mcg/act inhaler   No No   Sig: Inhale 2 puffs every 6 (six) hours as needed for wheezing   atorvastatin (LIPITOR) 20 mg tablet   No No   Sig: Take 1 tablet (20 mg total) by mouth daily with dinner   lisinopril (ZESTRIL) 10 mg tablet   No No   Sig: Take 1 tablet (10 mg total) by mouth daily   nicotine (NICODERM CQ) 21 mg/24 hr TD 24 hr patch   No No   Sig: Place 1 patch on the skin daily      Facility-Administered Medications: None       Past Medical History:   Diagnosis Date    Alcohol abuse     Arthritis     Asthma     Diabetes mellitus ([facility] [address] )     Opiate abuse, continuous ([facility] [address] )        Past Surgical History:   Procedure Laterality Date    ABDOMINAL SURGERY      stomach uler with perforation       History reviewed  No pertinent family history  I have reviewed and agree with the history as documented      E-Cigarette/Vaping    E-Cigarette Use Never User      E-Cigarette/Vaping Substances     Social History     Tobacco Use    Smoking status: Current Every Day Smoker     Packs/day: 2 00 Types: Cigarettes    Smokeless tobacco: Never Used   Substance Use Topics    Alcohol use: Yes     Frequency: 4 or more times a week     Drinks per session: 7 to 9     Binge frequency: Daily or almost daily     Comment: Drinking  1 pint of vodka daily up to 2 month ago for 2 years,started drinking again yesterday    Drug use: Yes     Comment: on methadone       Review of Systems   Constitutional: Negative  HENT: Negative  Eyes: Negative  Respiratory: Negative  Cardiovascular: Negative  Gastrointestinal: Negative  Endocrine: Negative  Genitourinary: Negative  Musculoskeletal: Negative  Skin: Negative  Allergic/Immunologic: Negative  Neurological: Negative  Hematological: Negative  Psychiatric/Behavioral: Positive for agitation and behavioral problems  All other systems reviewed and are negative  Physical Exam  Physical Exam  Vitals signs and nursing note reviewed  Constitutional:       Appearance: She is well-developed  HENT:      Head: Normocephalic and atraumatic  Eyes:      Pupils: Pupils are equal, round, and reactive to light  Neck:      Musculoskeletal: Normal range of motion and neck supple  Cardiovascular:      Rate and Rhythm: Normal rate and regular rhythm  Pulmonary:      Effort: Pulmonary effort is normal       Breath sounds: Normal breath sounds  Abdominal:      General: Bowel sounds are normal       Palpations: Abdomen is soft  Musculoskeletal: Normal range of motion  Skin:     General: Skin is warm and dry  Neurological:      General: No focal deficit present  Mental Status: She is alert  Coordination: Coordination normal       Gait: Gait normal       Comments: No gross neurologic deficits noted           Vital Signs  ED Triage Vitals [01/28/21 1012]   Temperature Pulse Respirations Blood Pressure SpO2   99 °F (37 2 °C) (!) 112 12 144/87 96 %      Temp Source Heart Rate Source Patient Position - Orthostatic VS BP Location FiO2 (%)   Tympanic Monitor Lying Right arm --      Pain Score       Worst Possible Pain           Vitals:    01/28/21 1012   BP: 144/87   Pulse: (!) 112   Patient Position - Orthostatic VS: Lying         Visual Acuity      ED Medications  Medications   OLANZapine (ZyPREXA) IM injection 10 mg (10 mg Intramuscular Given 1/28/21 1157)   LORazepam (ATIVAN) injection 2 mg (2 mg Intramuscular Given 1/28/21 1157)       Diagnostic Studies  Results Reviewed     Procedure Component Value Units Date/Time    Comprehensive metabolic panel [[id number]]  (Abnormal) Collected: 01/28/21 1320    Lab Status: Final result Specimen: Blood from Arm, Left Updated: 01/28/21 1350     Sodium 141 mmol/L      Potassium 3 3 mmol/L      Chloride 103 mmol/L      CO2 29 mmol/L      ANION GAP 9 mmol/L      BUN 17 mg/dL      Creatinine 0 97 mg/dL      Glucose 91 mg/dL      Calcium 8 5 mg/dL      Corrected Calcium 9 4 mg/dL      AST 23 U/L      ALT 17 U/L      Alkaline Phosphatase 195 U/L      Total Protein 8 0 g/dL      Albumin 2 9 g/dL      Total Bilirubin 0 20 mg/dL      eGFR 74 ml/min/1 73sq m     Narrative:      Mala guidelines for Chronic Kidney Disease (CKD):     Stage 1 with normal or high GFR (GFR > 90 mL/min/1 73 square meters)    Stage 2 Mild CKD (GFR = 60-89 mL/min/1 73 square meters)    Stage 3A Moderate CKD (GFR = 45-59 mL/min/1 73 square meters)    Stage 3B Moderate CKD (GFR = 30-44 mL/min/1 73 square meters)    Stage 4 Severe CKD (GFR = 15-29 mL/min/1 73 square meters)    Stage 5 End Stage CKD (GFR <15 mL/min/1 73 square meters)  Note: GFR calculation is accurate only with a steady state creatinine    Ethanol [[id number]]  (Abnormal) Collected: 01/28/21 1320    Lab Status: Final result Specimen: Blood from Arm, Left Updated: 01/28/21 1346     Ethanol Lvl 167 mg/dL     CBC and differential [[id number]]  (Abnormal) Collected: 01/28/21 1320    Lab Status: Final result Specimen: Blood from Arm, Left Updated: 01/28/21 1326     WBC 3 82 Thousand/uL      RBC 4 76 Million/uL      Hemoglobin 13 9 g/dL      Hematocrit 43 7 %      MCV 92 fL      MCH 29 2 pg      MCHC 31 8 g/dL      RDW 14 8 %      MPV 9 9 fL      Platelets 375 Thousands/uL      nRBC 0 /100 WBCs      Neutrophils Relative 54 %      Immat GRANS % 0 %      Lymphocytes Relative 37 %      Monocytes Relative 7 %      Eosinophils Relative 1 %      Basophils Relative 1 %      Neutrophils Absolute 2 06 Thousands/µL      Immature Grans Absolute 0 01 Thousand/uL      Lymphocytes Absolute 1 41 Thousands/µL      Monocytes Absolute 0 27 Thousand/µL      Eosinophils Absolute 0 04 Thousand/µL      Basophils Absolute 0 03 Thousands/µL                  CT head without contrast   Final Result by Thalia Stout DO (01/28 1430)      No acute intracranial abnormality  Workstation performed: [id number]         CT spine cervical without contrast   Final Result by Thalia Stout DO (01/28 1437)      No cervical spine fracture or traumatic malalignment  Incidental findings as above  Workstation performed: [id number]                    Procedures  Procedures         ED Course                                           MDM  Number of Diagnoses or Management Options  Alcohol intoxication ([facility] [address] ):   Fall, initial encounter:   Diagnosis management comments: Patient evaluated with lab, imaging  I reviewed the results and discussed them with the patient  Patient discharged with appropriate instructions and follow-up  Patient verbalized understanding had no further questions at the time of discharge  Patient had stable vital signs and well-appearing at the time of discharge         Amount and/or Complexity of Data Reviewed  Clinical lab tests: ordered and reviewed  Tests in the radiology section of CPT®: ordered and reviewed  Tests in the medicine section of CPT®: ordered and reviewed    Patient Progress  Patient progress: stable      Disposition  Final diagnoses:   Fall, initial encounter   Alcohol intoxication ([address] )     Time reflects when diagnosis was documented in both MDM as applicable and the Disposition within this note     Time User Action Codes Description Comment    1/28/2021  4:05 PM Glenys Nelson [S60  XXXA] Fall, initial encounter     1/28/2021  4:05 PM Glenys Nelson Add [F10 929] Alcohol intoxication [facility])       ED Disposition     ED Disposition Condition Date/Time Comment    Discharge Stable Thu Jan 28, 2021  4:05 PM Ansley Michaelsisa discharge to home/self care  Follow-up Information     Follow up With Specialties Details Why Contact Info Additional Information    Jasvir Harding MD  Schedule an appointment as soon as possible for a visit   [place] [address] [facility] [phone]  [phone] [address] [facility] Emergency Department Emergency Medicine  If symptoms worsen [address] [address] Emergency Department, [facility], Replaced by [facility]          Discharge Medication List as of 1/28/2021  4:05 PM      CONTINUE these medications which have NOT CHANGED    Details   albuterol (VENTOLIN HFA) 90 mcg/act inhaler Inhale 2 puffs every 6 (six) hours as needed for wheezing, Starting Fri 3/22/2019, Normal      atorvastatin (LIPITOR) 20 mg tablet Take 1 tablet (20 mg total) by mouth daily with dinner, Starting Mon 5/25/2020, Normal      Insulin Pen Needle 29G X 12MM MISC by Does not apply route daily at bedtime, Starting Fri 3/22/2019, Normal      lisinopril (ZESTRIL) 10 mg tablet Take 1 tablet (10 mg total) by mouth daily, Starting Tue 5/26/2020, Normal      METHADONE HCL PO Take 130 mg by mouth daily , Historical Med      nicotine (NICODERM CQ) 21 mg/24 hr TD 24 hr patch Place 1 patch on the skin daily, Starting Tue 5/26/2020, Normal           No discharge procedures on file      PDMP Review     None          ED Provider  Electronically Signed by           Genesis Garcia DO  01/28/21 9422 0

## 2021-01-28 NOTE — ED NOTES
Patient is resting in her room, Spoke to her nephew, he will be picking her up at [place] [address]  01/28/21 5453

## 2021-01-29 LAB
ATRIAL RATE: 114 BPM
P AXIS: 75 DEGREES
PR INTERVAL: 142 MS
QRS AXIS: 17 DEGREES
QRSD INTERVAL: 74 MS
QT INTERVAL: 370 MS
QTC INTERVAL: 509 MS
T WAVE AXIS: 62 DEGREES
VENTRICULAR RATE: 114 BPM

## 2021-01-29 PROCEDURE — 93010 ELECTROCARDIOGRAM REPORT: CPT | Performed by: INTERNAL MEDICINE

## 2021-02-22 ENCOUNTER — HOSPITAL ENCOUNTER (EMERGENCY)
Facility: HOSPITAL | Age: 61
Discharge: HOME | End: 2021-02-22
Attending: EMERGENCY MEDICINE | Admitting: EMERGENCY MEDICINE
Payer: COMMERCIAL

## 2021-02-22 VITALS
RESPIRATION RATE: 18 BRPM | DIASTOLIC BLOOD PRESSURE: 70 MMHG | SYSTOLIC BLOOD PRESSURE: 150 MMHG | OXYGEN SATURATION: 95 % | TEMPERATURE: 97.1 F | HEART RATE: 111 BPM

## 2021-02-22 DIAGNOSIS — F10.929 ALCOHOL INTOXICATION (HCC): Primary | ICD-10-CM

## 2021-02-22 LAB
ALBUMIN SERPL BCP-MCNC: 3.7 G/DL (ref 3.5–5)
ALP SERPL-CCNC: 247 U/L (ref 46–116)
ALT SERPL W P-5'-P-CCNC: 25 U/L (ref 12–78)
ANION GAP SERPL CALCULATED.3IONS-SCNC: 14 MMOL/L (ref 4–13)
AST SERPL W P-5'-P-CCNC: 33 U/L (ref 5–45)
BASOPHILS # BLD AUTO: 0.04 THOUSANDS/ΜL (ref 0–0.1)
BASOPHILS NFR BLD AUTO: 1 % (ref 0–1)
BILIRUB SERPL-MCNC: 0.4 MG/DL (ref 0.2–1)
BUN SERPL-MCNC: 13 MG/DL (ref 5–25)
CALCIUM SERPL-MCNC: 9.9 MG/DL (ref 8.3–10.1)
CHLORIDE SERPL-SCNC: 103 MMOL/L (ref 100–108)
CO2 SERPL-SCNC: 29 MMOL/L (ref 21–32)
CREAT SERPL-MCNC: 0.92 MG/DL (ref 0.6–1.3)
EOSINOPHIL # BLD AUTO: 0.1 THOUSAND/ΜL (ref 0–0.61)
EOSINOPHIL NFR BLD AUTO: 2 % (ref 0–6)
ERYTHROCYTE [DISTWIDTH] IN BLOOD BY AUTOMATED COUNT: 14.7 % (ref 11.6–15.1)
ETHANOL SERPL-MCNC: 270 MG/DL (ref 0–3)
GFR SERPL CREATININE-BSD FRML MDRD: 79 ML/MIN/1.73SQ M
GLUCOSE SERPL-MCNC: 115 MG/DL (ref 65–140)
HCT VFR BLD AUTO: 50.2 % (ref 34.8–46.1)
HGB BLD-MCNC: 15.5 G/DL (ref 11.5–15.4)
IMM GRANULOCYTES # BLD AUTO: 0.01 THOUSAND/UL (ref 0–0.2)
IMM GRANULOCYTES NFR BLD AUTO: 0 % (ref 0–2)
LYMPHOCYTES # BLD AUTO: 3.52 THOUSANDS/ΜL (ref 0.6–4.47)
LYMPHOCYTES NFR BLD AUTO: 58 % (ref 14–44)
MCH RBC QN AUTO: 28.9 PG (ref 26.8–34.3)
MCHC RBC AUTO-ENTMCNC: 30.9 G/DL (ref 31.4–37.4)
MCV RBC AUTO: 94 FL (ref 82–98)
MONOCYTES # BLD AUTO: 0.5 THOUSAND/ΜL (ref 0.17–1.22)
MONOCYTES NFR BLD AUTO: 8 % (ref 4–12)
NEUTROPHILS # BLD AUTO: 1.89 THOUSANDS/ΜL (ref 1.85–7.62)
NEUTS SEG NFR BLD AUTO: 31 % (ref 43–75)
NRBC BLD AUTO-RTO: 0 /100 WBCS
PLATELET # BLD AUTO: 349 THOUSANDS/UL (ref 149–390)
PMV BLD AUTO: 9.6 FL (ref 8.9–12.7)
POTASSIUM SERPL-SCNC: 3.8 MMOL/L (ref 3.5–5.3)
PROT SERPL-MCNC: 9.8 G/DL (ref 6.4–8.2)
RBC # BLD AUTO: 5.36 MILLION/UL (ref 3.81–5.12)
SODIUM SERPL-SCNC: 146 MMOL/L (ref 136–145)
WBC # BLD AUTO: 6.06 THOUSAND/UL (ref 4.31–10.16)

## 2021-02-22 PROCEDURE — 85025 COMPLETE CBC W/AUTO DIFF WBC: CPT | Performed by: EMERGENCY MEDICINE

## 2021-02-22 PROCEDURE — 36415 COLL VENOUS BLD VENIPUNCTURE: CPT | Performed by: EMERGENCY MEDICINE

## 2021-02-22 PROCEDURE — 99285 EMERGENCY DEPT VISIT HI MDM: CPT

## 2021-02-22 PROCEDURE — 99284 EMERGENCY DEPT VISIT MOD MDM: CPT | Performed by: EMERGENCY MEDICINE

## 2021-02-22 PROCEDURE — 96372 THER/PROPH/DIAG INJ SC/IM: CPT

## 2021-02-22 PROCEDURE — 80053 COMPREHEN METABOLIC PANEL: CPT | Performed by: EMERGENCY MEDICINE

## 2021-02-22 PROCEDURE — 82077 ASSAY SPEC XCP UR&BREATH IA: CPT | Performed by: EMERGENCY MEDICINE

## 2021-02-22 RX ORDER — OLANZAPINE 10 MG/1
5 INJECTION, POWDER, LYOPHILIZED, FOR SOLUTION INTRAMUSCULAR ONCE
Status: COMPLETED | OUTPATIENT
Start: 2021-02-22 | End: 2021-02-22

## 2021-02-22 RX ORDER — LORAZEPAM 2 MG/ML
2 INJECTION INTRAMUSCULAR ONCE
Status: COMPLETED | OUTPATIENT
Start: 2021-02-22 | End: 2021-02-22

## 2021-02-22 RX ADMIN — OLANZAPINE 5 MG: 10 INJECTION, POWDER, FOR SOLUTION INTRAMUSCULAR at 03:16

## 2021-02-22 RX ADMIN — LORAZEPAM 2 MG: 2 INJECTION INTRAMUSCULAR; INTRAVENOUS at 02:31

## 2021-02-22 NOTE — ED NOTES
Pt L foot restraint removed  Circulation adequate, cap refill <3 seconds  Skin intact       Francine Whitney, URI  02/22/21 6615
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
## 2021-02-22 NOTE — ED NOTES
Pt skin intact, no redness, no swelling, and appropriate capillary refill on all extremities and previously locked sites        Trinity Sandoval RN  02/22/21 8215

## 2021-02-22 NOTE — ED NOTES
2/22/21 @ 1000:  PES attempted to meet with 64year-old female, who was intoxicated last night with BAL @ 270, but is now medically cleared  Patient couldn't be aroused, but PES will make another attempt in 30 minutes  [address]    1020: Patient woke up and said, "I need to leave "  PES didn't complete full crisis assessment, as patient adamantly denied any suicidal ideations, and is only concerned about getting to [facility] her Methadone  If patient actually made comment to Police, she was intoxicated and completely denies current  Patient was calm and cooperative  Patient was offered assistance with ETOH abuse, but she refused  Patient to be discharged home        Alcohol intoxication with severe use disorder:  [id number]    Jakub MS

## 2021-02-22 NOTE — ED NOTES
Pt final restraints removed from R leg and L hand  Pt asleep and no longer demonstrating criteria for continued restraint        Nolan Vega RN  02/22/21 0648

## 2021-02-22 NOTE — ED PROVIDER NOTES
History  Chief Complaint   Patient presents with    Psychiatric Evaluation     patient brought in by police, screaming obsentities, called 911 herself and refused help, second time she called she said someone was trying to kill her     HPI    64 year female presents with psychiatric evaluation  Patient initially called the  because she felt like somebody was trying to kill at her  Patient then stated she did not treat help  She called again stating some restrain her  There is nobody in the house  Patient denies any suicidal or homicidal ideations  Patient is currently uncooperative screaming and yelling  No other complaints  Patient is medically cleared    Prior to Admission Medications   Prescriptions Last Dose Informant Patient Reported? Taking? Insulin Pen Needle 29G X 12MM MISC   No No   Sig: by Does not apply route daily at bedtime   METHADONE HCL PO   Yes No   Sig: Take 130 mg by mouth daily    albuterol (VENTOLIN HFA) 90 mcg/act inhaler   No No   Sig: Inhale 2 puffs every 6 (six) hours as needed for wheezing   atorvastatin (LIPITOR) 20 mg tablet   No No   Sig: Take 1 tablet (20 mg total) by mouth daily with dinner   lisinopril (ZESTRIL) 10 mg tablet   No No   Sig: Take 1 tablet (10 mg total) by mouth daily   nicotine (NICODERM CQ) 21 mg/24 hr TD 24 hr patch   No No   Sig: Place 1 patch on the skin daily      Facility-Administered Medications: None       Past Medical History:   Diagnosis Date    Alcohol abuse     Arthritis     Asthma     Diabetes mellitus ([facility] [address] )     Opiate abuse, continuous ([facility] [address] )        Past Surgical History:   Procedure Laterality Date    ABDOMINAL SURGERY      stomach uler with perforation       History reviewed  No pertinent family history  I have reviewed and agree with the history as documented      E-Cigarette/Vaping    E-Cigarette Use Never User      E-Cigarette/Vaping Substances     Social History     Tobacco Use    Smoking status: Current Every Day Smoker Packs/day: 2 00     Types: Cigarettes    Smokeless tobacco: Never Used   Substance Use Topics    Alcohol use: Yes     Frequency: 4 or more times a week     Drinks per session: 7 to 9     Binge frequency: Daily or almost daily     Comment: Drinking  1 pint of vodka daily up to 2 month ago for 2 years,started drinking again yesterday    Drug use: Yes     Comment: on methadone       Review of Systems   Constitutional: Negative  Negative for diaphoresis and fever  HENT: Negative  Respiratory: Negative  Negative for cough, shortness of breath and wheezing  Cardiovascular: Negative  Negative for chest pain, palpitations and leg swelling  Gastrointestinal: Negative for abdominal distention, abdominal pain, nausea and vomiting  Genitourinary: Negative  Musculoskeletal: Negative  Skin: Negative  Neurological: Negative  Psychiatric/Behavioral: The patient is nervous/anxious and is hyperactive  All other systems reviewed and are negative  Physical Exam  Physical Exam  Constitutional:       Appearance: She is well-developed  HENT:      Head: Normocephalic and atraumatic  Eyes:      Pupils: Pupils are equal, round, and reactive to light  Neck:      Musculoskeletal: Normal range of motion and neck supple  Cardiovascular:      Rate and Rhythm: Normal rate and regular rhythm  Heart sounds: Normal heart sounds  No murmur  Pulmonary:      Effort: Pulmonary effort is normal       Breath sounds: Normal breath sounds  Abdominal:      General: Bowel sounds are normal  There is no distension  Palpations: Abdomen is soft  Tenderness: There is no abdominal tenderness  There is no guarding or rebound  Musculoskeletal: Normal range of motion  Skin:     General: Skin is warm and dry  Neurological:      Mental Status: She is alert and oriented to person, place, and time  Psychiatric:         Behavior: Behavior is agitated, aggressive and hyperactive           Thought Content: Thought content is paranoid           Vital Signs  ED Triage Vitals [02/22/21 0301]   Temperature Pulse Respirations Blood Pressure SpO2   (!) 97 1 °F (36 2 °C) (!) 118 20 115/67 92 %      Temp Source Heart Rate Source Patient Position - Orthostatic VS BP Location FiO2 (%)   Tympanic Monitor Lying Right arm --      Pain Score       --           Vitals:    02/22/21 0301 02/22/21 1102   BP: 115/67 150/70   Pulse: (!) 118 (!) 111   Patient Position - Orthostatic VS: Lying Sitting         Visual Acuity      ED Medications  Medications   LORazepam (ATIVAN) injection 2 mg (2 mg Intramuscular Given 2/22/21 0231)   OLANZapine (ZyPREXA) IM injection 5 mg (5 mg Intramuscular Given 2/22/21 0316)       Diagnostic Studies  Results Reviewed     Procedure Component Value Units Date/Time    Comprehensive metabolic panel [[id number]]  (Abnormal) Collected: 02/22/21 0239    Lab Status: Final result Specimen: Blood from Arm, Left Updated: 02/22/21 0302     Sodium 146 mmol/L      Potassium 3 8 mmol/L      Chloride 103 mmol/L      CO2 29 mmol/L      ANION GAP 14 mmol/L      BUN 13 mg/dL      Creatinine 0 92 mg/dL      Glucose 115 mg/dL      Calcium 9 9 mg/dL      AST 33 U/L      ALT 25 U/L      Alkaline Phosphatase 247 U/L      Total Protein 9 8 g/dL      Albumin 3 7 g/dL      Total Bilirubin 0 40 mg/dL      eGFR 79 ml/min/1 73sq m     Narrative:      Mala guidelines for Chronic Kidney Disease (CKD):     Stage 1 with normal or high GFR (GFR > 90 mL/min/1 73 square meters)    Stage 2 Mild CKD (GFR = 60-89 mL/min/1 73 square meters)    Stage 3A Moderate CKD (GFR = 45-59 mL/min/1 73 square meters)    Stage 3B Moderate CKD (GFR = 30-44 mL/min/1 73 square meters)    Stage 4 Severe CKD (GFR = 15-29 mL/min/1 73 square meters)    Stage 5 End Stage CKD (GFR <15 mL/min/1 73 square meters)  Note: GFR calculation is accurate only with a steady state creatinine    Ethanol [[id number]]  (Abnormal) Collected: 02/22/21 0239    Lab Status: Final result Specimen: Blood from Arm, Left Updated: 02/22/21 0257     Ethanol Lvl 270 mg/dL     CBC and differential [[id number]]  (Abnormal) Collected: 02/22/21 0239    Lab Status: Final result Specimen: Blood from Arm, Left Updated: 02/22/21 0245     WBC 6 06 Thousand/uL      RBC 5 36 Million/uL      Hemoglobin 15 5 g/dL      Hematocrit 50 2 %      MCV 94 fL      MCH 28 9 pg      MCHC 30 9 g/dL      RDW 14 7 %      MPV 9 6 fL      Platelets 295 Thousands/uL      nRBC 0 /100 WBCs      Neutrophils Relative 31 %      Immat GRANS % 0 %      Lymphocytes Relative 58 %      Monocytes Relative 8 %      Eosinophils Relative 2 %      Basophils Relative 1 %      Neutrophils Absolute 1 89 Thousands/µL      Immature Grans Absolute 0 01 Thousand/uL      Lymphocytes Absolute 3 52 Thousands/µL      Monocytes Absolute 0 50 Thousand/µL      Eosinophils Absolute 0 10 Thousand/µL      Basophils Absolute 0 04 Thousands/µL                  No orders to display              Procedures  Procedures         ED Course                                           MDM    Disposition  Final diagnoses:   Alcohol intoxication ([facility] [address] )     Time reflects when diagnosis was documented in both MDM as applicable and the Disposition within this note     Time User Action Codes Description Comment    2/22/2021 10:26 AM Lily Corea Add [F10 209] Alcohol intoxication [facility])       ED Disposition     ED Disposition Condition Date/Time Comment    Discharge Stable Mon Feb 22, 2021 10:26 AM Krishna Prajapati discharge to home/self care              Follow-up Information     Follow up With Specialties Details Why Contact Info Additional [address] Emergency Department Emergency Medicine Go to  As needed, If symptoms worsen, for re-evaluation [address] [place] [address] [id number] [address] Emergency Department, [place], [place], [address] Discharge Medication List as of 2/22/2021 10:26 AM      CONTINUE these medications which have NOT CHANGED    Details   albuterol (VENTOLIN HFA) 90 mcg/act inhaler Inhale 2 puffs every 6 (six) hours as needed for wheezing, Starting Fri 3/22/2019, Normal      atorvastatin (LIPITOR) 20 mg tablet Take 1 tablet (20 mg total) by mouth daily with dinner, Starting Mon 5/25/2020, Normal      Insulin Pen Needle 29G X 12MM MISC by Does not apply route daily at bedtime, Starting Fri 3/22/2019, Normal      lisinopril (ZESTRIL) 10 mg tablet Take 1 tablet (10 mg total) by mouth daily, Starting Tue 5/26/2020, Normal      METHADONE HCL PO Take 130 mg by mouth daily , Historical Med      nicotine (NICODERM CQ) 21 mg/24 hr TD 24 hr patch Place 1 patch on the skin daily, Starting Tue 5/26/2020, Normal           No discharge procedures on file      PDMP Review     None          ED Provider  Electronically Signed by           Bo Benjamin MD  02/23/21 9370

## 2021-04-20 ENCOUNTER — HOSPITAL ENCOUNTER (EMERGENCY)
Facility: HOSPITAL | Age: 61
End: 2021-04-20
Attending: EMERGENCY MEDICINE | Admitting: EMERGENCY MEDICINE
Payer: COMMERCIAL

## 2021-04-20 ENCOUNTER — APPOINTMENT (EMERGENCY)
Dept: RADIOLOGY | Facility: HOSPITAL | Age: 61
End: 2021-04-20
Payer: COMMERCIAL

## 2021-04-20 ENCOUNTER — HOSPITAL ENCOUNTER (INPATIENT)
Dept: NON INVASIVE DIAGNOSTICS | Facility: HOSPITAL | Age: 61
LOS: 3 days | Discharge: HOME | DRG: 174 | End: 2021-04-23
Attending: INTERNAL MEDICINE | Admitting: INTERNAL MEDICINE
Payer: COMMERCIAL

## 2021-04-20 VITALS
TEMPERATURE: 97 F | DIASTOLIC BLOOD PRESSURE: 100 MMHG | RESPIRATION RATE: 22 BRPM | HEART RATE: 96 BPM | SYSTOLIC BLOOD PRESSURE: 136 MMHG | OXYGEN SATURATION: 93 %

## 2021-04-20 DIAGNOSIS — E78.5 DYSLIPIDEMIA: ICD-10-CM

## 2021-04-20 DIAGNOSIS — I21.3 STEMI (ST ELEVATION MYOCARDIAL INFARCTION) (HCC): Primary | ICD-10-CM

## 2021-04-20 DIAGNOSIS — I21.11 ST ELEVATION MYOCARDIAL INFARCTION INVOLVING RIGHT CORONARY ARTERY (HCC): Primary | ICD-10-CM

## 2021-04-20 DIAGNOSIS — I21.9 AMI (ACUTE MYOCARDIAL INFARCTION) (HCC): ICD-10-CM

## 2021-04-20 PROBLEM — F10.10 ALCOHOL ABUSE: Status: ACTIVE | Noted: 2021-04-20

## 2021-04-20 LAB
ALBUMIN SERPL BCP-MCNC: 2.8 G/DL (ref 3.5–5)
ALP SERPL-CCNC: 179 U/L (ref 46–116)
ALT SERPL W P-5'-P-CCNC: 33 U/L (ref 12–78)
ANION GAP SERPL CALCULATED.3IONS-SCNC: 13 MMOL/L (ref 4–13)
ANION GAP SERPL CALCULATED.3IONS-SCNC: 7 MMOL/L (ref 4–13)
APTT PPP: 24 SECONDS (ref 23–37)
AST SERPL W P-5'-P-CCNC: 225 U/L (ref 5–45)
BASOPHILS # BLD AUTO: 0.04 THOUSANDS/ΜL (ref 0–0.1)
BASOPHILS NFR BLD AUTO: 0 % (ref 0–1)
BILIRUB SERPL-MCNC: 0.34 MG/DL (ref 0.2–1)
BUN SERPL-MCNC: 5 MG/DL (ref 5–25)
BUN SERPL-MCNC: 5 MG/DL (ref 5–25)
CALCIUM ALBUM COR SERPL-MCNC: 9.9 MG/DL (ref 8.3–10.1)
CALCIUM SERPL-MCNC: 8.9 MG/DL (ref 8.3–10.1)
CALCIUM SERPL-MCNC: 9.3 MG/DL (ref 8.3–10.1)
CHLORIDE SERPL-SCNC: 89 MMOL/L (ref 100–108)
CHLORIDE SERPL-SCNC: 93 MMOL/L (ref 100–108)
CO2 SERPL-SCNC: 30 MMOL/L (ref 21–32)
CO2 SERPL-SCNC: 31 MMOL/L (ref 21–32)
CREAT SERPL-MCNC: 0.48 MG/DL (ref 0.6–1.3)
CREAT SERPL-MCNC: 0.5 MG/DL (ref 0.6–1.3)
EOSINOPHIL # BLD AUTO: 0.01 THOUSAND/ΜL (ref 0–0.61)
EOSINOPHIL NFR BLD AUTO: 0 % (ref 0–6)
ERYTHROCYTE [DISTWIDTH] IN BLOOD BY AUTOMATED COUNT: 13.2 % (ref 11.6–15.1)
EST. AVERAGE GLUCOSE BLD GHB EST-MCNC: 120 MG/DL
ETHANOL SERPL-MCNC: 22 MG/DL (ref 0–3)
GFR SERPL CREATININE-BSD FRML MDRD: 123 ML/MIN/1.73SQ M
GFR SERPL CREATININE-BSD FRML MDRD: 124 ML/MIN/1.73SQ M
GLUCOSE SERPL-MCNC: 130 MG/DL (ref 65–140)
GLUCOSE SERPL-MCNC: 130 MG/DL (ref 65–140)
GLUCOSE SERPL-MCNC: 140 MG/DL (ref 65–140)
GLUCOSE SERPL-MCNC: 147 MG/DL (ref 65–140)
HBA1C MFR BLD: 5.8 %
HCT VFR BLD AUTO: 48.4 % (ref 34.8–46.1)
HGB BLD-MCNC: 15.9 G/DL (ref 11.5–15.4)
IMM GRANULOCYTES # BLD AUTO: 0.06 THOUSAND/UL (ref 0–0.2)
IMM GRANULOCYTES NFR BLD AUTO: 1 % (ref 0–2)
INR PPP: 1 (ref 0.84–1.19)
KCT BLD-ACNC: 364 SEC (ref 89–137)
KCT BLD-ACNC: 588 SEC (ref 89–137)
LIPASE SERPL-CCNC: 162 U/L (ref 73–393)
LYMPHOCYTES # BLD AUTO: 0.91 THOUSANDS/ΜL (ref 0.6–4.47)
LYMPHOCYTES NFR BLD AUTO: 10 % (ref 14–44)
MAGNESIUM SERPL-MCNC: 1.3 MG/DL (ref 1.6–2.6)
MAGNESIUM SERPL-MCNC: 2.4 MG/DL (ref 1.6–2.6)
MCH RBC QN AUTO: 28.8 PG (ref 26.8–34.3)
MCHC RBC AUTO-ENTMCNC: 32.9 G/DL (ref 31.4–37.4)
MCV RBC AUTO: 88 FL (ref 82–98)
MONOCYTES # BLD AUTO: 0.82 THOUSAND/ΜL (ref 0.17–1.22)
MONOCYTES NFR BLD AUTO: 9 % (ref 4–12)
NEUTROPHILS # BLD AUTO: 7.68 THOUSANDS/ΜL (ref 1.85–7.62)
NEUTS SEG NFR BLD AUTO: 80 % (ref 43–75)
NRBC BLD AUTO-RTO: 0 /100 WBCS
PLATELET # BLD AUTO: 479 THOUSANDS/UL (ref 149–390)
PLATELET # BLD AUTO: 523 THOUSANDS/UL (ref 149–390)
PMV BLD AUTO: 9.3 FL (ref 8.9–12.7)
PMV BLD AUTO: 9.5 FL (ref 8.9–12.7)
POTASSIUM SERPL-SCNC: 2.9 MMOL/L (ref 3.5–5.3)
POTASSIUM SERPL-SCNC: 3.4 MMOL/L (ref 3.5–5.3)
PROT SERPL-MCNC: 8.4 G/DL (ref 6.4–8.2)
PROTHROMBIN TIME: 13.1 SECONDS (ref 11.6–14.5)
RBC # BLD AUTO: 5.52 MILLION/UL (ref 3.81–5.12)
SODIUM SERPL-SCNC: 131 MMOL/L (ref 136–145)
SODIUM SERPL-SCNC: 132 MMOL/L (ref 136–145)
SPECIMEN SOURCE: ABNORMAL
SPECIMEN SOURCE: ABNORMAL
TROPONIN I SERPL-MCNC: 18.4 NG/ML
WBC # BLD AUTO: 9.52 THOUSAND/UL (ref 4.31–10.16)

## 2021-04-20 PROCEDURE — 85025 COMPLETE CBC W/AUTO DIFF WBC: CPT | Performed by: EMERGENCY MEDICINE

## 2021-04-20 PROCEDURE — C1725 CATH, TRANSLUMIN NON-LASER: HCPCS | Performed by: INTERNAL MEDICINE

## 2021-04-20 PROCEDURE — 80053 COMPREHEN METABOLIC PANEL: CPT | Performed by: EMERGENCY MEDICINE

## 2021-04-20 PROCEDURE — 84484 ASSAY OF TROPONIN QUANT: CPT | Performed by: EMERGENCY MEDICINE

## 2021-04-20 PROCEDURE — 99153 MOD SED SAME PHYS/QHP EA: CPT | Performed by: INTERNAL MEDICINE

## 2021-04-20 PROCEDURE — 99285 EMERGENCY DEPT VISIT HI MDM: CPT

## 2021-04-20 PROCEDURE — 85347 COAGULATION TIME ACTIVATED: CPT

## 2021-04-20 PROCEDURE — 96361 HYDRATE IV INFUSION ADD-ON: CPT

## 2021-04-20 PROCEDURE — 92941 PRQ TRLML REVSC TOT OCCL AMI: CPT | Performed by: INTERNAL MEDICINE

## 2021-04-20 PROCEDURE — C1757 CATH, THROMBECTOMY/EMBOLECT: HCPCS | Performed by: INTERNAL MEDICINE

## 2021-04-20 PROCEDURE — 96374 THER/PROPH/DIAG INJ IV PUSH: CPT

## 2021-04-20 PROCEDURE — 02C03ZZ EXTIRPATION OF MATTER FROM CORONARY ARTERY, ONE ARTERY, PERCUTANEOUS APPROACH: ICD-10-PCS | Performed by: INTERNAL MEDICINE

## 2021-04-20 PROCEDURE — C1769 GUIDE WIRE: HCPCS | Performed by: INTERNAL MEDICINE

## 2021-04-20 PROCEDURE — 99152 MOD SED SAME PHYS/QHP 5/>YRS: CPT | Performed by: INTERNAL MEDICINE

## 2021-04-20 PROCEDURE — 83036 HEMOGLOBIN GLYCOSYLATED A1C: CPT | Performed by: INTERNAL MEDICINE

## 2021-04-20 PROCEDURE — C1887 CATHETER, GUIDING: HCPCS | Performed by: INTERNAL MEDICINE

## 2021-04-20 PROCEDURE — 96375 TX/PRO/DX INJ NEW DRUG ADDON: CPT

## 2021-04-20 PROCEDURE — 83735 ASSAY OF MAGNESIUM: CPT | Performed by: STUDENT IN AN ORGANIZED HEALTH CARE EDUCATION/TRAINING PROGRAM

## 2021-04-20 PROCEDURE — 93458 L HRT ARTERY/VENTRICLE ANGIO: CPT | Performed by: INTERNAL MEDICINE

## 2021-04-20 PROCEDURE — 4A023N7 MEASUREMENT OF CARDIAC SAMPLING AND PRESSURE, LEFT HEART, PERCUTANEOUS APPROACH: ICD-10-PCS | Performed by: INTERNAL MEDICINE

## 2021-04-20 PROCEDURE — C1894 INTRO/SHEATH, NON-LASER: HCPCS | Performed by: INTERNAL MEDICINE

## 2021-04-20 PROCEDURE — 83690 ASSAY OF LIPASE: CPT | Performed by: EMERGENCY MEDICINE

## 2021-04-20 PROCEDURE — 93005 ELECTROCARDIOGRAM TRACING: CPT

## 2021-04-20 PROCEDURE — 71045 X-RAY EXAM CHEST 1 VIEW: CPT

## 2021-04-20 PROCEDURE — 36415 COLL VENOUS BLD VENIPUNCTURE: CPT | Performed by: EMERGENCY MEDICINE

## 2021-04-20 PROCEDURE — 85610 PROTHROMBIN TIME: CPT | Performed by: EMERGENCY MEDICINE

## 2021-04-20 PROCEDURE — 027034Z DILATION OF CORONARY ARTERY, ONE ARTERY WITH DRUG-ELUTING INTRALUMINAL DEVICE, PERCUTANEOUS APPROACH: ICD-10-PCS | Performed by: INTERNAL MEDICINE

## 2021-04-20 PROCEDURE — C9606 PERC D-E COR REVASC W AMI S: HCPCS | Performed by: INTERNAL MEDICINE

## 2021-04-20 PROCEDURE — B2111ZZ FLUOROSCOPY OF MULTIPLE CORONARY ARTERIES USING LOW OSMOLAR CONTRAST: ICD-10-PCS | Performed by: INTERNAL MEDICINE

## 2021-04-20 PROCEDURE — 85730 THROMBOPLASTIN TIME PARTIAL: CPT | Performed by: EMERGENCY MEDICINE

## 2021-04-20 PROCEDURE — 85049 AUTOMATED PLATELET COUNT: CPT | Performed by: STUDENT IN AN ORGANIZED HEALTH CARE EDUCATION/TRAINING PROGRAM

## 2021-04-20 PROCEDURE — 82077 ASSAY SPEC XCP UR&BREATH IA: CPT | Performed by: EMERGENCY MEDICINE

## 2021-04-20 PROCEDURE — C1874 STENT, COATED/COV W/DEL SYS: HCPCS

## 2021-04-20 PROCEDURE — NC001 PR NO CHARGE: Performed by: INTERNAL MEDICINE

## 2021-04-20 PROCEDURE — 99291 CRITICAL CARE FIRST HOUR: CPT | Performed by: EMERGENCY MEDICINE

## 2021-04-20 PROCEDURE — B2151ZZ FLUOROSCOPY OF LEFT HEART USING LOW OSMOLAR CONTRAST: ICD-10-PCS | Performed by: INTERNAL MEDICINE

## 2021-04-20 PROCEDURE — 82948 REAGENT STRIP/BLOOD GLUCOSE: CPT

## 2021-04-20 PROCEDURE — 80048 BASIC METABOLIC PNL TOTAL CA: CPT | Performed by: STUDENT IN AN ORGANIZED HEALTH CARE EDUCATION/TRAINING PROGRAM

## 2021-04-20 PROCEDURE — 83735 ASSAY OF MAGNESIUM: CPT | Performed by: EMERGENCY MEDICINE

## 2021-04-20 RX ORDER — LORAZEPAM 2 MG/ML
1 INJECTION INTRAMUSCULAR ONCE
Status: COMPLETED | OUTPATIENT
Start: 2021-04-20 | End: 2021-04-20

## 2021-04-20 RX ORDER — NITROGLYCERIN 0.4 MG/1
0.4 TABLET SUBLINGUAL ONCE
Status: COMPLETED | OUTPATIENT
Start: 2021-04-20 | End: 2021-04-20

## 2021-04-20 RX ORDER — LANOLIN ALCOHOL/MO/W.PET/CERES
100 CREAM (GRAM) TOPICAL DAILY
Status: DISCONTINUED | OUTPATIENT
Start: 2021-04-20 | End: 2021-04-23 | Stop reason: HOSPADM

## 2021-04-20 RX ORDER — MAGNESIUM SULFATE HEPTAHYDRATE 40 MG/ML
2 INJECTION, SOLUTION INTRAVENOUS ONCE
Status: COMPLETED | OUTPATIENT
Start: 2021-04-20 | End: 2021-04-21

## 2021-04-20 RX ORDER — MAGNESIUM SULFATE HEPTAHYDRATE 500 MG/ML
INJECTION, SOLUTION INTRAMUSCULAR; INTRAVENOUS CODE/TRAUMA/SEDATION MEDICATION
Status: COMPLETED | OUTPATIENT
Start: 2021-04-20 | End: 2021-04-20

## 2021-04-20 RX ORDER — LISINOPRIL 5 MG/1
5 TABLET ORAL DAILY
Status: DISCONTINUED | OUTPATIENT
Start: 2021-04-21 | End: 2021-04-20

## 2021-04-20 RX ORDER — ONDANSETRON 2 MG/ML
4 INJECTION INTRAMUSCULAR; INTRAVENOUS ONCE
Status: COMPLETED | OUTPATIENT
Start: 2021-04-20 | End: 2021-04-20

## 2021-04-20 RX ORDER — LORAZEPAM 2 MG/ML
INJECTION INTRAMUSCULAR
Status: DISPENSED
Start: 2021-04-20 | End: 2021-04-21

## 2021-04-20 RX ORDER — POTASSIUM CHLORIDE 14.9 MG/ML
20 INJECTION INTRAVENOUS
Status: DISCONTINUED | OUTPATIENT
Start: 2021-04-20 | End: 2021-04-20

## 2021-04-20 RX ORDER — FENTANYL CITRATE 50 UG/ML
INJECTION, SOLUTION INTRAMUSCULAR; INTRAVENOUS CODE/TRAUMA/SEDATION MEDICATION
Status: COMPLETED | OUTPATIENT
Start: 2021-04-20 | End: 2021-04-20

## 2021-04-20 RX ORDER — SODIUM CHLORIDE 9 MG/ML
INJECTION, SOLUTION INTRAVENOUS
Status: COMPLETED | OUTPATIENT
Start: 2021-04-20 | End: 2021-04-20

## 2021-04-20 RX ORDER — ASPIRIN 81 MG/1
324 TABLET, CHEWABLE ORAL ONCE
Status: COMPLETED | OUTPATIENT
Start: 2021-04-20 | End: 2021-04-20

## 2021-04-20 RX ORDER — HEPARIN SODIUM 1000 [USP'U]/ML
INJECTION, SOLUTION INTRAVENOUS; SUBCUTANEOUS CODE/TRAUMA/SEDATION MEDICATION
Status: COMPLETED | OUTPATIENT
Start: 2021-04-20 | End: 2021-04-20

## 2021-04-20 RX ORDER — NICOTINE 21 MG/24HR
1 PATCH, TRANSDERMAL 24 HOURS TRANSDERMAL DAILY
Status: DISCONTINUED | OUTPATIENT
Start: 2021-04-21 | End: 2021-04-23 | Stop reason: HOSPADM

## 2021-04-20 RX ORDER — FUROSEMIDE 10 MG/ML
INJECTION INTRAMUSCULAR; INTRAVENOUS CODE/TRAUMA/SEDATION MEDICATION
Status: COMPLETED | OUTPATIENT
Start: 2021-04-20 | End: 2021-04-20

## 2021-04-20 RX ORDER — ONDANSETRON 2 MG/ML
4 INJECTION INTRAMUSCULAR; INTRAVENOUS EVERY 6 HOURS PRN
Status: DISCONTINUED | OUTPATIENT
Start: 2021-04-20 | End: 2021-04-20

## 2021-04-20 RX ORDER — METOPROLOL TARTRATE 5 MG/5ML
5 INJECTION INTRAVENOUS ONCE
Status: COMPLETED | OUTPATIENT
Start: 2021-04-20 | End: 2021-04-20

## 2021-04-20 RX ORDER — HEPARIN SODIUM 1000 [USP'U]/ML
4000 INJECTION, SOLUTION INTRAVENOUS; SUBCUTANEOUS ONCE
Status: COMPLETED | OUTPATIENT
Start: 2021-04-20 | End: 2021-04-20

## 2021-04-20 RX ORDER — VERAPAMIL HYDROCHLORIDE 2.5 MG/ML
INJECTION, SOLUTION INTRAVENOUS CODE/TRAUMA/SEDATION MEDICATION
Status: COMPLETED | OUTPATIENT
Start: 2021-04-20 | End: 2021-04-20

## 2021-04-20 RX ORDER — NITROGLYCERIN 20 MG/100ML
INJECTION INTRAVENOUS CODE/TRAUMA/SEDATION MEDICATION
Status: COMPLETED | OUTPATIENT
Start: 2021-04-20 | End: 2021-04-20

## 2021-04-20 RX ORDER — SODIUM NITROPRUSSIDE 25 MG/ML
INJECTION INTRAVENOUS CODE/TRAUMA/SEDATION MEDICATION
Status: COMPLETED | OUTPATIENT
Start: 2021-04-20 | End: 2021-04-20

## 2021-04-20 RX ORDER — MIDAZOLAM HYDROCHLORIDE 2 MG/2ML
INJECTION, SOLUTION INTRAMUSCULAR; INTRAVENOUS CODE/TRAUMA/SEDATION MEDICATION
Status: COMPLETED | OUTPATIENT
Start: 2021-04-20 | End: 2021-04-20

## 2021-04-20 RX ORDER — POTASSIUM CHLORIDE 14.9 MG/ML
20 INJECTION INTRAVENOUS
Status: COMPLETED | OUTPATIENT
Start: 2021-04-20 | End: 2021-04-21

## 2021-04-20 RX ORDER — ATORVASTATIN CALCIUM 80 MG/1
80 TABLET, FILM COATED ORAL EVERY EVENING
Status: DISCONTINUED | OUTPATIENT
Start: 2021-04-20 | End: 2021-04-23 | Stop reason: HOSPADM

## 2021-04-20 RX ORDER — POTASSIUM CHLORIDE 29.8 MG/ML
INJECTION INTRAVENOUS
Status: COMPLETED | OUTPATIENT
Start: 2021-04-20 | End: 2021-04-20

## 2021-04-20 RX ORDER — ASPIRIN 81 MG/1
81 TABLET, CHEWABLE ORAL DAILY
Status: DISCONTINUED | OUTPATIENT
Start: 2021-04-21 | End: 2021-04-23 | Stop reason: HOSPADM

## 2021-04-20 RX ORDER — POTASSIUM CHLORIDE 20 MEQ/1
40 TABLET, EXTENDED RELEASE ORAL ONCE
Status: DISCONTINUED | OUTPATIENT
Start: 2021-04-20 | End: 2021-04-23 | Stop reason: HOSPADM

## 2021-04-20 RX ORDER — LORAZEPAM 2 MG/ML
1 INJECTION INTRAMUSCULAR EVERY 4 HOURS PRN
Status: DISCONTINUED | OUTPATIENT
Start: 2021-04-20 | End: 2021-04-20

## 2021-04-20 RX ORDER — ACETAMINOPHEN 325 MG/1
650 TABLET ORAL EVERY 4 HOURS PRN
Status: DISCONTINUED | OUTPATIENT
Start: 2021-04-20 | End: 2021-04-23 | Stop reason: HOSPADM

## 2021-04-20 RX ORDER — LIDOCAINE HYDROCHLORIDE 10 MG/ML
INJECTION, SOLUTION EPIDURAL; INFILTRATION; INTRACAUDAL; PERINEURAL CODE/TRAUMA/SEDATION MEDICATION
Status: COMPLETED | OUTPATIENT
Start: 2021-04-20 | End: 2021-04-20

## 2021-04-20 RX ORDER — SODIUM CHLORIDE 9 MG/ML
100 INJECTION, SOLUTION INTRAVENOUS CONTINUOUS
Status: DISPENSED | OUTPATIENT
Start: 2021-04-20 | End: 2021-04-20

## 2021-04-20 RX ADMIN — FENTANYL CITRATE 25 MCG: 50 INJECTION INTRAMUSCULAR; INTRAVENOUS at 16:06

## 2021-04-20 RX ADMIN — Medication 12.5 MG: at 21:01

## 2021-04-20 RX ADMIN — MIDAZOLAM 1 MG: 1 INJECTION INTRAMUSCULAR; INTRAVENOUS at 15:44

## 2021-04-20 RX ADMIN — VERAPAMIL HYDROCHLORIDE 2.5 MG: 2.5 INJECTION, SOLUTION INTRAVENOUS at 15:45

## 2021-04-20 RX ADMIN — NITROGLYCERIN 0.4 MG: 0.4 TABLET SUBLINGUAL at 14:32

## 2021-04-20 RX ADMIN — SODIUM CHLORIDE 100 ML/HR: 0.9 INJECTION, SOLUTION INTRAVENOUS at 15:28

## 2021-04-20 RX ADMIN — LIDOCAINE HYDROCHLORIDE 1 ML: 10 INJECTION, SOLUTION EPIDURAL; INFILTRATION; INTRACAUDAL; PERINEURAL at 15:41

## 2021-04-20 RX ADMIN — POTASSIUM CHLORIDE 20 MEQ: 29.8 INJECTION, SOLUTION INTRAVENOUS at 15:42

## 2021-04-20 RX ADMIN — METOPROLOL TARTRATE 5 MG: 5 INJECTION INTRAVENOUS at 14:31

## 2021-04-20 RX ADMIN — MAGNESIUM SULFATE HEPTAHYDRATE 2 G: 500 INJECTION, SOLUTION INTRAMUSCULAR; INTRAVENOUS at 15:38

## 2021-04-20 RX ADMIN — ASPIRIN 81 MG CHEWABLE TABLET 324 MG: 81 TABLET CHEWABLE at 14:27

## 2021-04-20 RX ADMIN — SODIUM CHLORIDE 1000 ML: 0.9 INJECTION, SOLUTION INTRAVENOUS at 14:22

## 2021-04-20 RX ADMIN — HEPARIN SODIUM 4000 UNITS: 1000 INJECTION INTRAVENOUS; SUBCUTANEOUS at 14:44

## 2021-04-20 RX ADMIN — POTASSIUM CHLORIDE 20 MEQ: 14.9 INJECTION, SOLUTION INTRAVENOUS at 20:53

## 2021-04-20 RX ADMIN — Medication 200 MCG: at 15:44

## 2021-04-20 RX ADMIN — LORAZEPAM 1 MG: 2 INJECTION INTRAMUSCULAR; INTRAVENOUS at 14:32

## 2021-04-20 RX ADMIN — ACETAMINOPHEN 650 MG: 325 TABLET ORAL at 21:41

## 2021-04-20 RX ADMIN — HEPARIN SODIUM 6000 UNITS: 1000 INJECTION INTRAVENOUS; SUBCUTANEOUS at 15:45

## 2021-04-20 RX ADMIN — FENTANYL CITRATE 25 MCG: 50 INJECTION INTRAMUSCULAR; INTRAVENOUS at 15:44

## 2021-04-20 RX ADMIN — MIDAZOLAM 1 MG: 1 INJECTION INTRAMUSCULAR; INTRAVENOUS at 16:06

## 2021-04-20 RX ADMIN — TICAGRELOR 180 MG: 90 TABLET ORAL at 14:51

## 2021-04-20 RX ADMIN — POTASSIUM CHLORIDE 20 MEQ: 14.9 INJECTION, SOLUTION INTRAVENOUS at 17:34

## 2021-04-20 RX ADMIN — FUROSEMIDE 40 MG: 10 INJECTION, SOLUTION INTRAMUSCULAR; INTRAVENOUS at 16:25

## 2021-04-20 RX ADMIN — ONDANSETRON 4 MG: 2 INJECTION INTRAMUSCULAR; INTRAVENOUS at 14:31

## 2021-04-20 RX ADMIN — MAGNESIUM SULFATE HEPTAHYDRATE 2 G: 40 INJECTION, SOLUTION INTRAVENOUS at 16:44

## 2021-04-20 RX ADMIN — SODIUM NITROPRUSSIDE 200 MCG: 50 INJECTION INTRAVENOUS at 16:01

## 2021-04-20 RX ADMIN — POTASSIUM CHLORIDE 20 MEQ: 14.9 INJECTION, SOLUTION INTRAVENOUS at 23:07

## 2021-04-20 RX ADMIN — MIDAZOLAM 1 MG: 1 INJECTION INTRAMUSCULAR; INTRAVENOUS at 16:02

## 2021-04-20 RX ADMIN — FENTANYL CITRATE 25 MCG: 50 INJECTION INTRAMUSCULAR; INTRAVENOUS at 16:02

## 2021-04-20 RX ADMIN — IOHEXOL 80 ML: 350 INJECTION, SOLUTION INTRAVENOUS at 16:07

## 2021-04-20 NOTE — ED PROCEDURE NOTE
PROCEDURE  ECG 12 Lead Documentation Only    Date/Time: 4/20/2021 2:59 PM  Performed by: Richy Do DO  Authorized by: Richy Do DO     ECG reviewed by me, the ED Provider: yes    Patient location:  ED  Interpretation:     Interpretation: abnormal    Rate:     ECG rate:  103    ECG rate assessment: tachycardic    Rhythm:     Rhythm: sinus rhythm    Ectopy:     Ectopy: none    ST segments:     ST segments:  Abnormal    Elevation:  II, III and aVF    Depression:  I, aVL, V3, V4, V5 and V6  T waves:     T waves: inverted      Inverted:  II, III and aVF         Richy Do DO  04/20/21 1500

## 2021-04-20 NOTE — H&P
Consultation - Cardiology   Gilmar Taylor 63 y o  female MRN: [id number]  Unit/Bed#:  Encounter: [id number]      Assessment and Plan: Active Problems:    * No active hospital problems  *    #   STEMI  -  Status post aspirin and Brilinta load  -  Cardiac catheterization showing acute plaque rupture MI in distal RCA status post EDMUND placement  -  Continue aspirin 81 mg once a day  Continue Brilinta 90 mg twice a day  -  Continue with atorvastatin 80 mg once a day  Last LDL 5/25  Was 110  -  Metoprolol 25 mg b i d  Up titrate as tolerated  -  Radial band care  -  Post catheterization IV fluid 100 cc/hour for 6 hours  -  Obtain TTE  -  Replete 4 kg more than 4 and Mag more than 2    #  Alcohol use disorder  -  History of frequent alcohol withdrawal emergency room visits  -  History of admission for seizure in the past ( no documentation in our system )  -  Blood alcohol level 22 on arrival  -  Librium taper  -  CIWA protocol with p r n  Ativan  -  IV/ p o   thiamine 100 mg once a day    #  Polysubstance use   #  Opiate use disorder  -  Start home dose methadone  -  Might be a barrier to care  -  , avoid Zofran or other QT prolonging agents    #  Diabetes  -  Obtain A1c  -  Sliding scale insulin  -  Start long-acting insulin as per Medicine team    #  History of PE  -    Subtle subsegmental PE in 2019 was previously on Pradaxa  -  DVT prophylaxis    #  Protein energy malnutrition  -  BMI of 17 5  -  Nutrition consult    History of Present Illness   Physician Requesting Consult: Alexandra Flores DO  Reason for Consult / Principal Problem:  STEMI  HPI: Gilmar Taylor is a 63y o  year old female  With above-mentioned history presented to emergency room with complaints of  Shortness of breath  The history was obtained from documentation and EMS as patient was not able to provide history due to disorientation from Ativan  She has been having symptoms for past generalized body ache, nausea and vomiting   In the [facility] Emergency Room she was found to have inferior ST-elevation MI and was transferred to [facility] for emergent cardiac catheterization  On arrival to the cath lab patient was disoriented but denied any chest pain  She was severely hypokalemic and hypomagnesemic  Consults    Review of Systems:  Review of Systems   all negative except as mentioned above    Historical Information   Past Medical History:   Diagnosis Date    Alcohol abuse     Arthritis     Asthma     Diabetes mellitus ([facility] [address] )     [facility]      Past Surgical History:   Procedure Laterality Date    ABDOMINAL SURGERY      stomach uler with perforation     Social History     Substance and Sexual Activity   Alcohol Use Yes    Frequency: 4 or more times a week    Drinks per session: 7 to 9    Binge frequency: Daily or almost daily    Comment: Drinking  1 pint of vodka daily up to 2 month ago for 2 years,started drinking again yesterday     Social History     Substance and Sexual Activity   Drug Use Yes    Comment: on methadone     Social History     Tobacco Use   Smoking Status Current Every Day Smoker    Packs/day: 2 00    Types: Cigarettes   Smokeless Tobacco Never Used     Family History: non-contributory    Meds/Allergies   all current active meds have been reviewed  No Known Allergies    Objective   Vitals: not currently breastfeeding  , There is no height or weight on file to calculate BMI ,     No intake or output data in the 24 hours ending 04/20/21 1547    Invasive Devices     Peripheral Intravenous Line            Peripheral IV 04/20/21 Right Antecubital less than 1 day    Peripheral IV 04/20/21 Right Forearm less than 1 day          Line            Arterial Sheath 6 Fr   Right Radial less than 1 day                    Physical Exam:  Physical Exam  General: alert, oriented X 0 , comfortable  Neck: No JVD  Cardiac: normal S1, S2, no m/r/g  Respiratory: normal breath sounds, no wheezes or crackles  Abdomen: soft and non-tender  Extremities: warm, no cyanosis, no lower extremity edema      Lab Results:     Lab Results   Component Value Date    TROPONINI 18 40 (H) 04/20/2021    TROPONINI <0 02 05/25/2020    TROPONINI <0 02 05/24/2020       Lab Results   Component Value Date    CALCIUM 8 9 04/20/2021    K 2 9 (L) 04/20/2021    CO2 30 04/20/2021    CL 89 (L) 04/20/2021    BUN 5 04/20/2021    CREATININE 0 48 (L) 04/20/2021       Lab Results   Component Value Date    WBC 9 52 04/20/2021    HGB 15 9 (H) 04/20/2021    HCT 48 4 (H) 04/20/2021    MCV 88 04/20/2021     (H) 04/20/2021       No results found for: CHOL  Lab Results   Component Value Date     05/25/2020    HDL 87 (H) 07/11/2019     Lab Results   Component Value Date    LDLCALC 110 (H) 05/25/2020    LDLCALC 84 07/11/2019     Lab Results   Component Value Date    TRIG 27 05/25/2020    TRIG 103 07/11/2019       Lab Results   Component Value Date    ALT 33 04/20/2021     (H) 04/20/2021       Results from last 7 days   Lab Units 04/20/21  1422   INR  1 00         Imaging: I have personally reviewed pertinent reports        EKG: NSR, Inferior STEMI

## 2021-04-20 NOTE — ED PROVIDER NOTES
History  Chief Complaint   Patient presents with    Shortness of Breath     pt arrives via EMS complaining of sob, cp, n/v/d x 3days     Chest Pain     x3 days      Patient presents for evaluation of chest pain described as pressure across her chest for last 3 days  States she has had a general feeling of unwell feeling shortness of breath generalized body aches  Patient also on nausea vomiting diarrhea over the same timeframe  Patient's last drink was this weekend  Denies any cocaine or methamphetamine abuse  Denies any other drug use  History provided by:  Patient   used: No    Shortness of Breath  Associated symptoms: abdominal pain, chest pain and vomiting    Associated symptoms: no cough, no fever, no headaches, no neck pain, no rash and no sore throat    Chest Pain  Associated symptoms: abdominal pain, fatigue, nausea, shortness of breath and vomiting    Associated symptoms: no back pain, no cough, no fever, no headache, no numbness and no weakness        Prior to Admission Medications   Prescriptions Last Dose Informant Patient Reported? Taking?    Insulin Pen Needle 29G X 12MM MISC   No No   Sig: by Does not apply route daily at bedtime   METHADONE HCL PO   Yes No   Sig: Take 130 mg by mouth daily    albuterol (VENTOLIN HFA) 90 mcg/act inhaler   No No   Sig: Inhale 2 puffs every 6 (six) hours as needed for wheezing   atorvastatin (LIPITOR) 20 mg tablet   No No   Sig: Take 1 tablet (20 mg total) by mouth daily with dinner   lisinopril (ZESTRIL) 10 mg tablet   No No   Sig: Take 1 tablet (10 mg total) by mouth daily   nicotine (NICODERM CQ) 21 mg/24 hr TD 24 hr patch   No No   Sig: Place 1 patch on the skin daily      Facility-Administered Medications: None       Past Medical History:   Diagnosis Date    Alcohol abuse     Arthritis     Asthma     Diabetes mellitus ([facility] [address] )     [facility]        Past Surgical History:   Procedure Laterality Date    ABDOMINAL SURGERY      stomach uler with perforation       History reviewed  No pertinent family history  I have reviewed and agree with the history as documented  E-Cigarette/Vaping    E-Cigarette Use Never User      E-Cigarette/Vaping Substances     Social History     Tobacco Use    Smoking status: Current Every Day Smoker     Packs/day: 2 00     Types: Cigarettes    Smokeless tobacco: Never Used   Substance Use Topics    Alcohol use: Yes     Frequency: 4 or more times a week     Drinks per session: 7 to 9     Binge frequency: Daily or almost daily     Comment: Drinking  1 pint of vodka daily up to 2 month ago for 2 years,started drinking again yesterday    Drug use: Yes     Comment: on methadone       Review of Systems   Constitutional: Positive for fatigue  Negative for chills and fever  HENT: Negative for congestion and sore throat  Respiratory: Positive for shortness of breath  Negative for cough  Cardiovascular: Positive for chest pain  Gastrointestinal: Positive for abdominal pain, diarrhea, nausea and vomiting  Genitourinary: Negative for difficulty urinating and dysuria  Musculoskeletal: Negative for back pain and neck pain  Skin: Negative for rash  Neurological: Negative for weakness, numbness and headaches  Physical Exam  Physical Exam  Vitals signs and nursing note reviewed  Constitutional:       General: She is in acute distress  HENT:      Head: Atraumatic  Right Ear: External ear normal       Left Ear: External ear normal       Nose: Nose normal       Mouth/Throat:      Mouth: Mucous membranes are moist       Pharynx: Oropharynx is clear  Eyes:      General: No scleral icterus  Conjunctiva/sclera: Conjunctivae normal    Cardiovascular:      Rate and Rhythm: Regular rhythm  Tachycardia present  Pulses: Normal pulses  Pulmonary:      Effort: Pulmonary effort is normal  No respiratory distress  Breath sounds: Normal breath sounds   No wheezing, rhonchi or rales    Abdominal:      General: Abdomen is flat  Bowel sounds are normal  There is no distension  Palpations: Abdomen is soft  Tenderness: There is no abdominal tenderness  There is no guarding or rebound  Musculoskeletal: Normal range of motion  General: No deformity  Right lower leg: No edema  Left lower leg: No edema  Skin:     Capillary Refill: Capillary refill takes less than 2 seconds  Findings: No rash  Neurological:      General: No focal deficit present  Mental Status: She is alert and oriented to person, place, and time           Vital Signs  ED Triage Vitals [04/20/21 1411]   Temperature Pulse Respirations Blood Pressure SpO2   (!) 97 °F (36 1 °C) (!) 126 20 (!) 172/118 94 %      Temp Source Heart Rate Source Patient Position - Orthostatic VS BP Location FiO2 (%)   Tympanic Monitor Lying Left arm --      Pain Score       Worst Possible Pain           Vitals:    04/20/21 1411 04/20/21 1415 04/20/21 1430 04/20/21 1445   BP: (!) 172/118 (!) 172/118 (!) 162/110 136/100   Pulse: (!) 126 (!) 124 102 96   Patient Position - Orthostatic VS: Lying            Visual Acuity      ED Medications  Medications   sodium chloride 0 9 % bolus 1,000 mL (1,000 mL Intravenous New Bag 4/20/21 1422)   ondansetron (ZOFRAN) injection 4 mg (4 mg Intravenous Given 4/20/21 1431)   aspirin chewable tablet 324 mg (324 mg Oral Given 4/20/21 1427)   nitroglycerin (NITROSTAT) SL tablet 0 4 mg (0 4 mg Sublingual Given 4/20/21 1432)   LORazepam (ATIVAN) injection 1 mg (1 mg Intravenous Given 4/20/21 1432)   metoprolol (LOPRESSOR) injection 5 mg (5 mg Intravenous Given 4/20/21 1431)   heparin (porcine) injection 4,000 Units (4,000 Units Intravenous Given 4/20/21 1444)   ticagrelor (BRILINTA) tablet 180 mg (180 mg Oral Given 4/20/21 1451)       Diagnostic Studies  Results Reviewed     Procedure Component Value Units Date/Time    Troponin I [[id number]]  (Abnormal) Collected: 04/20/21 1422    Lab Status: Final result Specimen: Blood from Arm, Right Updated: 04/20/21 1451     Troponin I 18 40 ng/mL     Comprehensive metabolic panel [[id number]]  (Abnormal) Collected: 04/20/21 1422    Lab Status: Final result Specimen: Blood from Arm, Right Updated: 04/20/21 1448     Sodium 132 mmol/L      Potassium 2 9 mmol/L      Chloride 89 mmol/L      CO2 30 mmol/L      ANION GAP 13 mmol/L      BUN 5 mg/dL      Creatinine 0 48 mg/dL      Glucose 147 mg/dL      Calcium 8 9 mg/dL      Corrected Calcium 9 9 mg/dL       U/L      ALT 33 U/L      Alkaline Phosphatase 179 U/L      Total Protein 8 4 g/dL      Albumin 2 8 g/dL      Total Bilirubin 0 34 mg/dL      eGFR 124 ml/min/1 73sq m     Narrative:      [place] guidelines for Chronic Kidney Disease (CKD):     Stage 1 with normal or high GFR (GFR > 90 mL/min/1 73 square meters)    Stage 2 Mild CKD (GFR = 60-89 mL/min/1 73 square meters)    Stage 3A Moderate CKD (GFR = 45-59 mL/min/1 73 square meters)    Stage 3B Moderate CKD (GFR = 30-44 mL/min/1 73 square meters)    Stage 4 Severe CKD (GFR = 15-29 mL/min/1 73 square meters)    Stage 5 End Stage CKD (GFR <15 mL/min/1 73 square meters)  Note: GFR calculation is accurate only with a steady state creatinine    Lipase [[id number]]  (Normal) Collected: 04/20/21 1422    Lab Status: Final result Specimen: Blood from Arm, Right Updated: 04/20/21 1448     Lipase 162 u/L     Magnesium [[id number]]  (Abnormal) Collected: 04/20/21 1422    Lab Status: Final result Specimen: Blood from Arm, Right Updated: 04/20/21 1448     Magnesium 1 3 mg/dL     Ethanol [[id number]]  (Abnormal) Collected: 04/20/21 1422    Lab Status: Final result Specimen: Blood from Arm, Right Updated: 04/20/21 1443     Ethanol Lvl 22 mg/dL     Protime-INR [[id number]]  (Normal) Collected: 04/20/21 1422    Lab Status: Final result Specimen: Blood from Arm, Right Updated: 04/20/21 1442     Protime 13 1 seconds      INR 1 00    APTT [[id number]]  (Normal) Collected: 04/20/21 1422    Lab Status: Final result Specimen: Blood from Arm, Right Updated: 04/20/21 1442     PTT 24 seconds     CBC and differential [[id number]]  (Abnormal) Collected: 04/20/21 1422    Lab Status: Final result Specimen: Blood from Arm, Right Updated: 04/20/21 1429     WBC 9 52 Thousand/uL      RBC 5 52 Million/uL      Hemoglobin 15 9 g/dL      Hematocrit 48 4 %      MCV 88 fL      MCH 28 8 pg      MCHC 32 9 g/dL      RDW 13 2 %      MPV 9 3 fL      Platelets 770 Thousands/uL      nRBC 0 /100 WBCs      Neutrophils Relative 80 %      Immat GRANS % 1 %      Lymphocytes Relative 10 %      Monocytes Relative 9 %      Eosinophils Relative 0 %      Basophils Relative 0 %      Neutrophils Absolute 7 68 Thousands/µL      Immature Grans Absolute 0 06 Thousand/uL      Lymphocytes Absolute 0 91 Thousands/µL      Monocytes Absolute 0 82 Thousand/µL      Eosinophils Absolute 0 01 Thousand/µL      Basophils Absolute 0 04 Thousands/µL     UA (URINE) with reflex to Scope [[id number]]     Lab Status: No result Specimen: Urine     Rapid drug screen, urine [[id number]]     Lab Status: No result Specimen: Urine                  XR chest 1 view portable    (Results Pending)              Procedures  CriticalCare Time  Performed by: Carlitos Sampson DO  Authorized by: Carlitos Sampson DO     Critical care provider statement:     Critical care time (minutes):  45    Critical care time was exclusive of:  Separately billable procedures and treating other patients    Critical care was necessary to treat or prevent imminent or life-threatening deterioration of the following conditions: STEMI      Critical care was time spent personally by me on the following activities:  Blood draw for specimens, obtaining history from patient or surrogate, development of treatment plan with patient or surrogate, discussions with consultants, evaluation of patient's response to treatment, examination of patient, review of old charts, re-evaluation of patient's condition, ordering and review of radiographic studies, ordering and review of laboratory studies, ordering and performing treatments and interventions and interpretation of cardiac output measurements             ED Course                                           MDM  Number of Diagnoses or Management Options  STEMI (ST elevation myocardial infarction) [facility]):   Diagnosis management comments: Pulse ox 93% on room air indicating adequate oxygenation  CXR: NAD as read by me      Initial EKG showed tachycardia and signs of ST-elevation inferior leads  Patient given aspirin nitro for the pain given a beta-blocker slow down her heart rate and lower blood pressure as well as Ativan potential withdrawal this patient is a known alcoholic and has not been drinking  Heart rate improved after medications EKG showing clear ST elevation inferior leads with some ST depressions is typically as well  MI alert called Dr Marylou Mai, cardiology accepted the patient has had a cath lab at [facility]  Requesting heparin bolus 4,000 units and Brilinta 180 mg         Amount and/or Complexity of Data Reviewed  Clinical lab tests: ordered and reviewed  Tests in the radiology section of CPT®: ordered and reviewed  Decide to obtain previous medical records or to obtain history from someone other than the patient: yes  Review and summarize past medical records: yes  Discuss the patient with other providers: yes  Independent visualization of images, tracings, or specimens: yes        Disposition  Final diagnoses:   STEMI (ST elevation myocardial infarction) ([address] )     Time reflects when diagnosis was documented in both MDM as applicable and the Disposition within this note     Time User Action Codes Description Comment    4/20/2021  2:43 PM Hali Bright [I21 3] STEMI (ST elevation myocardial infarction) [facility])       ED Disposition     ED Disposition Condition Date/Time Comment    Transfer to Another Facility-In Network  Tue Apr 20, 2021  2:43 PM Yanira Sawant should be transferred out to [facility]          MD Documentation      Most Recent Value   Patient Condition  The patient has been stabilized such that within reasonable medical probability, no material deterioration of the patient condition or the condition of the unborn child(tom) is likely to result from the transfer   Reason for Transfer  Level of Care needed not available at this facility   Benefits of Transfer  Specialized equipment and/or services available at the receiving facility (Include comment)________________________   Risks of Transfer  Potential for delay in receiving treatment, Potential deterioration of medical condition, Loss of IV, Increased discomfort during transfer, Possible worsening of condition or death during transfer   Accepting Physician  Dr Omero Sharp Name, [address] by (Company and Unit #)  [facility]   Sending MD  Dr Crystal Golden   Provider Certification  General risk, such as traffic hazards, adverse weather conditions, rough terrain or turbulence, possible failure of equipment (including vehicle or aircraft), or consequences of actions of persons outside the control of the transport personnel      RN Documentation      Most [address] [facility] [address] Name, [facility]   Bed Assignment  Cath Lab   Report Given to  Domingo Barroso   Medications Reviewed with Next Provider of Service  Yes   Transport Mode  Ambulance   Transported by [employer] and Unit #)  [facility]   Level of Care  CCT-Nurse   Copies of Medical Records Sent  History and Physical, Orders, Progress note, Transfer form, Nursing note, Radiology, EKG   Transfer Date  04/20/21   Transfer Time  1450      Follow-up Information    None         Discharge Medication List as of 4/20/2021  3:02 PM      CONTINUE these medications which have NOT CHANGED    Details   albuterol (VENTOLIN HFA) 90 mcg/act inhaler Inhale 2 puffs every 6 (six) hours as needed for wheezing, Starting Fri 3/22/2019, Normal      atorvastatin (LIPITOR) 20 mg tablet Take 1 tablet (20 mg total) by mouth daily with dinner, Starting Mon 5/25/2020, Normal      Insulin Pen Needle 29G X 12MM MISC by Does not apply route daily at bedtime, Starting Fri 3/22/2019, Normal      lisinopril (ZESTRIL) 10 mg tablet Take 1 tablet (10 mg total) by mouth daily, Starting Tue 5/26/2020, Normal      METHADONE HCL PO Take 130 mg by mouth daily , Historical Med      nicotine (NICODERM CQ) 21 mg/24 hr TD 24 hr patch Place 1 patch on the skin daily, Starting Tue 5/26/2020, Normal           No discharge procedures on file      PDMP Review     None          ED Provider  Electronically Signed by           Ronald Monte DO  04/20/21 6350

## 2021-04-20 NOTE — ED PROCEDURE NOTE
PROCEDURE  ECG 12 Lead Documentation Only    Date/Time: 4/20/2021 2:59 PM  Performed by: Dotty Escobar DO  Authorized by: Dotty Escobar DO     ECG reviewed by me, the ED Provider: yes    Patient location:  ED  Interpretation:     Interpretation: abnormal    Quality:     Tracing quality:  Limited by artifact  Rate:     ECG rate:  123    ECG rate assessment: tachycardic    Rhythm:     Rhythm: sinus rhythm    Ectopy:     Ectopy: none    ST segments:     ST segments:  Abnormal    Elevation:  II, III and aVF    Depression:  I and aVL  T waves:     T waves: inverted      Inverted:  II, III and aVF         Dotty Escobar DO  04/20/21 1459

## 2021-04-20 NOTE — EMTALA/ACUTE CARE TRANSFER
[address] [address]  Dept: [phone]      EMTALA TRANSFER CONSENT    NAME Gilmar Taylor                                         1960                              MRN [id number]    I have been informed of my rights regarding examination, treatment, and transfer   by Dr Russel Marie DO    Benefits: Specialized equipment and/or services available at the receiving facility (Include comment)________________________    Risks: Potential for delay in receiving treatment, Potential deterioration of medical condition, Loss of IV, Increased discomfort during transfer, Possible worsening of condition or death during transfer      Consent for Transfer:  I acknowledge that my medical condition has been evaluated and explained to me by the emergency department physician or other qualified medical person and/or my attending physician, who has recommended that I be transferred to the service of  Accepting Physician: Dr Evelio Maxwell at [facility] Name, [address] : [place]hel[place]ose Flood  The above potential benefits of such transfer, the potential risks associated with such transfer, and the probable risks of not being transferred have been explained to me, and I fully understand them  The doctor has explained that, in my case, the benefits of transfer outweigh the risks  I agree to be transferred  I authorize the performance of emergency medical procedures and treatments upon me in both transit and upon arrival at the receiving facility  Additionally, I authorize the release of any and all medical records to the receiving facility and request they be transported with me, if possible  I understand that the safest mode of transportation during a medical emergency is an ambulance and that the Hospital advocates the use of this mode of transport   Risks of traveling to the receiving facility by car, including absence of medical control, life sustaining equipment, such as oxygen, and medical personnel has been explained to me and I fully understand them  ([place] CORRECT BOX BELOW)  [  ]  I consent to the stated transfer and to be transported by ambulance/helicopter  [  ]  I consent to the stated transfer, but refuse transportation by ambulance and accept full responsibility for my transportation by car  I understand the risks of non-ambulance transfers and I exonerate the Hospital and its staff from any deterioration in my condition that results from this refusal     X___________________________________________    DATE  21  TIME________  Signature of patient or legally responsible individual signing on patient behalf           RELATIONSHIP TO PATIENT_________________________          Provider Certification    NAME Henri Goldmann                                         1960                              MRN [id number]    A medical screening exam was performed on the above named patient  Based on the examination:    Condition Necessitating Transfer The encounter diagnosis was STEMI (ST elevation myocardial infarction) ([facility] [address] )      Patient Condition: The patient has been stabilized such that within reasonable medical probability, no material deterioration of the patient condition or the condition of the unborn child(tom) is likely to result from the transfer    Reason for Transfer: Level of Care needed not available at this facility    Transfer Requirements: [address] [place]   · Space available and qualified personnel available for treatment as acknowledged by    · Agreed to accept transfer and to provide appropriate medical treatment as acknowledged by       Dr Leonarda Win  · Appropriate medical records of the examination and treatment of the patient are provided at the time of transfer   [address] _______  · Transfer will be performed by qualified personnel from    and appropriate transfer equipment as required, including the use of necessary and appropriate life support measures  Provider Certification: I have examined the patient and explained the following risks and benefits of being transferred/refusing transfer to the patient/family:  General risk, such as traffic hazards, adverse weather conditions, rough terrain or turbulence, possible failure of equipment (including vehicle or aircraft), or consequences of actions of persons outside the control of the transport personnel      Based on these reasonable risks and benefits to the patient and/or the unborn child(tom), and based upon the information available at the time of the patients examination, I certify that the medical benefits reasonably to be expected from the provision of appropriate medical treatments at another medical facility outweigh the increasing risks, if any, to the individuals medical condition, and in the case of labor to the unborn child, from effecting the transfer      X____________________________________________ DATE 04/20/21        TIME_______      ORIGINAL - SEND TO MEDICAL RECORDS   COPY - SEND WITH PATIENT DURING TRANSFER

## 2021-04-21 ENCOUNTER — APPOINTMENT (INPATIENT)
Dept: NON INVASIVE DIAGNOSTICS | Facility: HOSPITAL | Age: 61
DRG: 174 | End: 2021-04-21
Payer: COMMERCIAL

## 2021-04-21 PROBLEM — R94.31 QT PROLONGATION: Status: ACTIVE | Noted: 2021-04-21

## 2021-04-21 PROBLEM — I21.11 ST ELEVATION MYOCARDIAL INFARCTION INVOLVING RIGHT CORONARY ARTERY (HCC): Status: ACTIVE | Noted: 2021-04-20

## 2021-04-21 PROBLEM — Z86.711 HISTORY OF PULMONARY EMBOLISM: Status: ACTIVE | Noted: 2019-07-10

## 2021-04-21 LAB
ANION GAP SERPL CALCULATED.3IONS-SCNC: 7 MMOL/L (ref 4–13)
ATRIAL RATE: 103 BPM
ATRIAL RATE: 109 BPM
ATRIAL RATE: 110 BPM
ATRIAL RATE: 123 BPM
BUN SERPL-MCNC: 9 MG/DL (ref 5–25)
CALCIUM SERPL-MCNC: 9.1 MG/DL (ref 8.3–10.1)
CHLORIDE SERPL-SCNC: 97 MMOL/L (ref 100–108)
CHOLEST SERPL-MCNC: 196 MG/DL (ref 50–200)
CO2 SERPL-SCNC: 30 MMOL/L (ref 21–32)
CREAT SERPL-MCNC: 0.55 MG/DL (ref 0.6–1.3)
ERYTHROCYTE [DISTWIDTH] IN BLOOD BY AUTOMATED COUNT: 13.8 % (ref 11.6–15.1)
GFR SERPL CREATININE-BSD FRML MDRD: 119 ML/MIN/1.73SQ M
GLUCOSE SERPL-MCNC: 115 MG/DL (ref 65–140)
GLUCOSE SERPL-MCNC: 121 MG/DL (ref 65–140)
GLUCOSE SERPL-MCNC: 166 MG/DL (ref 65–140)
GLUCOSE SERPL-MCNC: 93 MG/DL (ref 65–140)
GLUCOSE SERPL-MCNC: 98 MG/DL (ref 65–140)
HCT VFR BLD AUTO: 46.4 % (ref 34.8–46.1)
HDLC SERPL-MCNC: 54 MG/DL
HGB BLD-MCNC: 15.4 G/DL (ref 11.5–15.4)
LDLC SERPL CALC-MCNC: 120 MG/DL (ref 0–100)
MAGNESIUM SERPL-MCNC: 2.4 MG/DL (ref 1.6–2.6)
MCH RBC QN AUTO: 29.4 PG (ref 26.8–34.3)
MCHC RBC AUTO-ENTMCNC: 33.2 G/DL (ref 31.4–37.4)
MCV RBC AUTO: 89 FL (ref 82–98)
NONHDLC SERPL-MCNC: 142 MG/DL
P AXIS: 75 DEGREES
P AXIS: 80 DEGREES
P AXIS: 82 DEGREES
P AXIS: 83 DEGREES
PHOSPHATE SERPL-MCNC: 2.7 MG/DL (ref 2.7–4.5)
PLATELET # BLD AUTO: 496 THOUSANDS/UL (ref 149–390)
PMV BLD AUTO: 9.5 FL (ref 8.9–12.7)
POTASSIUM SERPL-SCNC: 4 MMOL/L (ref 3.5–5.3)
PR INTERVAL: 132 MS
PR INTERVAL: 134 MS
PR INTERVAL: 156 MS
PR INTERVAL: 160 MS
QRS AXIS: 82 DEGREES
QRS AXIS: 83 DEGREES
QRS AXIS: 88 DEGREES
QRS AXIS: 90 DEGREES
QRSD INTERVAL: 82 MS
QRSD INTERVAL: 84 MS
QRSD INTERVAL: 88 MS
QRSD INTERVAL: 98 MS
QT INTERVAL: 338 MS
QT INTERVAL: 374 MS
QT INTERVAL: 386 MS
QT INTERVAL: 396 MS
QTC INTERVAL: 483 MS
QTC INTERVAL: 506 MS
QTC INTERVAL: 518 MS
QTC INTERVAL: 519 MS
RBC # BLD AUTO: 5.23 MILLION/UL (ref 3.81–5.12)
SODIUM SERPL-SCNC: 134 MMOL/L (ref 136–145)
T WAVE AXIS: -29 DEGREES
T WAVE AXIS: -30 DEGREES
T WAVE AXIS: -30 DEGREES
T WAVE AXIS: -41 DEGREES
T4 FREE SERPL-MCNC: 1.67 NG/DL (ref 0.76–1.46)
TRIGL SERPL-MCNC: 110 MG/DL
TSH SERPL DL<=0.05 MIU/L-ACNC: 0.33 UIU/ML (ref 0.36–3.74)
VENTRICULAR RATE: 103 BPM
VENTRICULAR RATE: 109 BPM
VENTRICULAR RATE: 110 BPM
VENTRICULAR RATE: 123 BPM
WBC # BLD AUTO: 13.43 THOUSAND/UL (ref 4.31–10.16)

## 2021-04-21 PROCEDURE — 99221 1ST HOSP IP/OBS SF/LOW 40: CPT | Performed by: INTERNAL MEDICINE

## 2021-04-21 PROCEDURE — 80061 LIPID PANEL: CPT | Performed by: STUDENT IN AN ORGANIZED HEALTH CARE EDUCATION/TRAINING PROGRAM

## 2021-04-21 PROCEDURE — 93010 ELECTROCARDIOGRAM REPORT: CPT | Performed by: INTERNAL MEDICINE

## 2021-04-21 PROCEDURE — 93306 TTE W/DOPPLER COMPLETE: CPT

## 2021-04-21 PROCEDURE — 80307 DRUG TEST PRSMV CHEM ANLYZR: CPT | Performed by: STUDENT IN AN ORGANIZED HEALTH CARE EDUCATION/TRAINING PROGRAM

## 2021-04-21 PROCEDURE — 93005 ELECTROCARDIOGRAM TRACING: CPT

## 2021-04-21 PROCEDURE — 82948 REAGENT STRIP/BLOOD GLUCOSE: CPT

## 2021-04-21 PROCEDURE — 84439 ASSAY OF FREE THYROXINE: CPT | Performed by: STUDENT IN AN ORGANIZED HEALTH CARE EDUCATION/TRAINING PROGRAM

## 2021-04-21 PROCEDURE — NC001 PR NO CHARGE: Performed by: INTERNAL MEDICINE

## 2021-04-21 PROCEDURE — 80048 BASIC METABOLIC PNL TOTAL CA: CPT | Performed by: STUDENT IN AN ORGANIZED HEALTH CARE EDUCATION/TRAINING PROGRAM

## 2021-04-21 PROCEDURE — 99232 SBSQ HOSP IP/OBS MODERATE 35: CPT | Performed by: INTERNAL MEDICINE

## 2021-04-21 PROCEDURE — 83735 ASSAY OF MAGNESIUM: CPT | Performed by: STUDENT IN AN ORGANIZED HEALTH CARE EDUCATION/TRAINING PROGRAM

## 2021-04-21 PROCEDURE — 84443 ASSAY THYROID STIM HORMONE: CPT | Performed by: STUDENT IN AN ORGANIZED HEALTH CARE EDUCATION/TRAINING PROGRAM

## 2021-04-21 PROCEDURE — 94760 N-INVAS EAR/PLS OXIMETRY 1: CPT

## 2021-04-21 PROCEDURE — 84100 ASSAY OF PHOSPHORUS: CPT | Performed by: INTERNAL MEDICINE

## 2021-04-21 PROCEDURE — 85027 COMPLETE CBC AUTOMATED: CPT | Performed by: STUDENT IN AN ORGANIZED HEALTH CARE EDUCATION/TRAINING PROGRAM

## 2021-04-21 RX ORDER — METHADONE HYDROCHLORIDE 10 MG/1
80 TABLET ORAL DAILY
Status: DISCONTINUED | OUTPATIENT
Start: 2021-04-21 | End: 2021-04-23 | Stop reason: HOSPADM

## 2021-04-21 RX ORDER — SODIUM CHLORIDE 9 MG/ML
100 INJECTION, SOLUTION INTRAVENOUS CONTINUOUS
Status: DISCONTINUED | OUTPATIENT
Start: 2021-04-21 | End: 2021-04-22

## 2021-04-21 RX ORDER — CLOPIDOGREL BISULFATE 75 MG/1
75 TABLET ORAL DAILY
Status: DISCONTINUED | OUTPATIENT
Start: 2021-04-22 | End: 2021-04-23 | Stop reason: HOSPADM

## 2021-04-21 RX ORDER — CLOPIDOGREL BISULFATE 75 MG/1
600 TABLET ORAL ONCE
Status: COMPLETED | OUTPATIENT
Start: 2021-04-21 | End: 2021-04-21

## 2021-04-21 RX ORDER — METOPROLOL TARTRATE 50 MG/1
50 TABLET, FILM COATED ORAL EVERY 12 HOURS SCHEDULED
Status: DISCONTINUED | OUTPATIENT
Start: 2021-04-21 | End: 2021-04-22

## 2021-04-21 RX ADMIN — THIAMINE HCL TAB 100 MG 100 MG: 100 TAB at 08:10

## 2021-04-21 RX ADMIN — SODIUM CHLORIDE 500 ML: 0.9 INJECTION, SOLUTION INTRAVENOUS at 13:34

## 2021-04-21 RX ADMIN — PERFLUTREN 0.4 ML/MIN: 6.52 INJECTION, SUSPENSION INTRAVENOUS at 17:14

## 2021-04-21 RX ADMIN — ACETAMINOPHEN 650 MG: 325 TABLET ORAL at 08:09

## 2021-04-21 RX ADMIN — TICAGRELOR 90 MG: 90 TABLET ORAL at 08:09

## 2021-04-21 RX ADMIN — METHADONE HYDROCHLORIDE 80 MG: 10 TABLET ORAL at 11:47

## 2021-04-21 RX ADMIN — ENOXAPARIN SODIUM 40 MG: 40 INJECTION SUBCUTANEOUS at 08:10

## 2021-04-21 RX ADMIN — ATORVASTATIN CALCIUM 80 MG: 80 TABLET, FILM COATED ORAL at 17:19

## 2021-04-21 RX ADMIN — CLOPIDOGREL BISULFATE 600 MG: 75 TABLET ORAL at 21:48

## 2021-04-21 RX ADMIN — METOPROLOL TARTRATE 50 MG: 50 TABLET, FILM COATED ORAL at 21:48

## 2021-04-21 RX ADMIN — ASPIRIN 81 MG: 81 TABLET, CHEWABLE ORAL at 08:10

## 2021-04-21 RX ADMIN — SODIUM CHLORIDE 100 ML/HR: 0.9 INJECTION, SOLUTION INTRAVENOUS at 14:53

## 2021-04-21 RX ADMIN — METOPROLOL TARTRATE 25 MG: 25 TABLET, FILM COATED ORAL at 06:26

## 2021-04-21 RX ADMIN — INSULIN LISPRO 1 UNITS: 100 INJECTION, SOLUTION INTRAVENOUS; SUBCUTANEOUS at 06:29

## 2021-04-21 NOTE — UTILIZATION REVIEW
Initial Clinical Review    Admission: Date/Time/Statement:   Admission Orders (From admission, onward)     Ordered        04/20/21 1609  Inpatient Admission  Once                Orders Placed This Encounter   Procedures    Inpatient Admission     Standing Status:   Standing     Number of Occurrences:   1     Order Specific Question:   Level of Care     Answer:   Level 1 Stepdown [13]     Order Specific Question:   Estimated length of stay     Answer:   More than 2 Midnights     Order Specific Question:   Certification     Answer:   I certify that inpatient services are medically necessary for this patient for a duration of greater than two midnights  See H&P and MD Progress Notes for additional information about the patient's course of treatment  Arrival Information:   EMERGENT INPATIENT TRANSFER FROM [facility] ED TO [address] [place] [address] CATH LAB 2ND ACUTE INFERIOR WALL STEMI     Initial Presentation:   63year old female with PMHx DM, polysubstance opiate use on methadone, etoh with prior withdrawal, active smoker  Initially presented to [address] [place] [address] on 4/20/21 2nd 3 day history of precordial chest pain, SOB and generalized body aches with nausea vomiting diarrhea  In ED - EKG showed acute Inferior wall ST elevation with troponin 18 4     Diagnosed Acute inferior STEMI - ED Tx with IVF, IV Heparin, NTG sl, ASA+ Brilinta, IV Zofran + Ativan  and  transferred to [facility] for emergent cardiac catheterization  Also severely hypokalemic/ K+ 2 9 and hypomagnesemic/ Mg+ 1 3 - replacement     4/20 CARDIAC CATHETERIZATION:    CORONARY VESSELS:   --  The coronary circulation is right dominant  --  There was 1-vessel coronary artery disease  --  Left main: Angiography showed minor luminal irregularities  --  LAD: Angiography showed minor luminal irregularities  --  Circumflex: Angiography showed minor luminal irregularities  --  Distal RCA: There was a 100 % stenosis   The lesion was associated with a moderate filling defect consistent with thrombus    IMPRESSIONS:PCI distal RCA with EDMUND   RECOMMENDATIONS: gdmt cad, dapt x 1 year    4/20 Cardiology:     STEMI  -  Status post aspirin and Brilinta load  - ** Cardiac catheterization showing acute plaque rupture MI in distal RCA status post EDMUND placement  -  Continue aspirin 81 mg once a day  Continue Brilinta 90 mg twice a day  -  Continue with atorvastatin 80 mg once a day  Last LDL 5/25  Was 110  -  Metoprolol 25 mg b i d    Up titrate as tolerated  -  Radial band care  -  Post catheterization IV fluid 100 cc/hour for 6 hours  -  Obtain TTE  -  Replete 4 kg more than 4 and Mag more than 2      Initial Vitals   Temperature Pulse Respirations Blood Pressure SpO2   04/20/21 1828 04/20/21 1620 04/20/21 1620 04/20/21 1620 04/20/21 1620   98 8 °F (37 1 °C) 103 20 100/79 98 %      Temp Source Heart Rate Source Patient Position - Orthostatic VS BP Location FiO2 (%)   04/20/21 1828 04/20/21 1620 04/20/21 1652 04/20/21 1620 --   Oral Monitor Lying Left arm       Wt Readings from Last 1 Encounters:   04/20/21 39 4 kg (86 lb 14 4 oz)     Additional Vital Signs:   04/20/21 2300  98 7 °F (37 1 °C)  96  18  128/90  103  98 %  --  --  Nasal cannula  Lying   04/20/21 2101  --  108Abnormal   --  136/99  --  --  --  --  --  --   04/20/21 2000  --  --  --  --  --  98 %  28  2 L/min  Nasal cannula  --     Pertinent Labs/Diagnostic Test Results:     Results from last 7 days   Lab Units 04/20/21  1754 04/20/21  1422   WBC Thousand/uL  --  9 52   HEMOGLOBIN g/dL  --  15 9*   HEMATOCRIT %  --  48 4*   PLATELETS Thousands/uL 523* 479*   NEUTROS ABS Thousands/µL  --  7 68*       Results from last 7 days   Lab Units 04/20/21  2059 04/20/21  1422   SODIUM mmol/L 131* 132*   POTASSIUM mmol/L 3 4* 2 9*   CHLORIDE mmol/L 93* 89*   CO2 mmol/L 31 30   ANION GAP mmol/L 7 13   BUN mg/dL 5 5   CREATININE mg/dL 0 50* 0 48*   EGFR ml/min/1 73sq m 123 124   CALCIUM mg/dL 9 3 8 9   MAGNESIUM mg/dL 2 4 1 3*   PHOSPHORUS mg/dL  --   --      Results from last 7 days   Lab Units 04/20/21  1422   AST U/L 225*   ALT U/L 33   ALK PHOS U/L 179*   TOTAL PROTEIN g/dL 8 4*   ALBUMIN g/dL 2 8*   TOTAL BILIRUBIN mg/dL 0 34     Results from last 7 days   Lab Units 04/20/21 2059 04/20/21  1422   GLUCOSE RANDOM mg/dL 130 147*       Results from last 7 days   Lab Units 04/20/21  1754   HEMOGLOBIN A1C % 5 8*   EAG mg/dl 120     Results from last 7 days   Lab Units 04/20/21  1422   TROPONIN I ng/mL 18 40*     Results from last 7 days   Lab Units 04/20/21  1422   PROTIME seconds 13 1   INR  1 00   PTT seconds 24     Results from last 7 days   Lab Units 04/20/21  1422   LIPASE u/L 162     Results from last 7 days   Lab Units 04/20/21  1422   ETHANOL LVL mg/dL 22*     12 LEAD EKG  Sinus tachycardia  Right atrial enlargement  ST elevation consider inferior injury or acute infarct  Abnormal ECG  When compared with ECG of 28-JAN-2021 10:13,  Questionable change in QRS axis  ST elevation now present in Inferior leads  ST now depressed in Anterolateral leads  T wave inversion now evident in Inferior leads    CHEST X RAY  No acute cardiopulmonary disease       [address] ED Medications  Medications   sodium chloride 0 9 % bolus 1,000 mL (1,000 mL Intravenous New Bag 4/20/21 1422)   ondansetron (ZOFRAN) injection 4 mg (4 mg Intravenous Given 4/20/21 1431)   aspirin chewable tablet 324 mg (324 mg Oral Given 4/20/21 1427)   nitroglycerin (NITROSTAT) SL tablet 0 4 mg (0 4 mg Sublingual Given 4/20/21 1432)   LORazepam (ATIVAN) injection 1 mg (1 mg Intravenous Given 4/20/21 1432)   metoprolol (LOPRESSOR) injection 5 mg (5 mg Intravenous Given 4/20/21 1431)   heparin (porcine) injection 4,000 Units (4,000 Units Intravenous Given 4/20/21 1444)   ticagrelor (BRILINTA) tablet 180 mg (180 mg Oral Given 4/20/21 1451)     Past Medical History:   Diagnosis Date    Alcohol abuse     Arthritis     Asthma     Diabetes mellitus ([facility] )     Opiate abuse, continuous (HCC)      Present on Admission:   ST elevation myocardial infarction involving right coronary artery (HCC)   Alcohol abuse   Polysubstance abuse ([facility] )    Admitting Diagnosis: [facility]acute [facility] [I21 9]    Age/Sex: 63 y o  female    Admission Orders:  Telemetry  ST Segment Monitoring  VS q4hrs  Nasal O2 at 2 L/min - Titrate SpO2 > 92 %  Continuous Pulse Oximetry  Up + OOB as tolerated  Diet Cardiovascular; Cardiac  I+O q shift  Daily weight  Repeat EKG in am  Serial Troponin q3hrs x 3    Scheduled Medications:  aspirin, 81 mg, Oral, Daily  atorvastatin, 80 mg, Oral, QPM  enoxaparin, 40 mg, Subcutaneous, Daily  insulin lispro, 1-5 Units, Subcutaneous, TID AC  methadone, 80 mg, Oral, Daily  metoprolol tartrate, 50 mg, Oral, Q12H ALEC  nicotine, 1 patch, Transdermal, Daily  potassium chloride, 40 mEq, Oral, Once  sodium chloride, 500 mL, Intravenous, Once  thiamine, 100 mg, Oral, Daily  ticagrelor, 90 mg, Oral, BID    Continuous IV Infusions:  Medications 04/20 04/21   potassium chloride 40 mEq IVPB (premix)   Freq: Code/trauma/sedation continuous med  Last Dose: Stopped (04/20/21 1700)  Start: 04/20/21 1542 End: 04/20/21 1542    1542     1700      0655        sodium chloride 0 9 % infusion   Rate: 100 mL/hr Dose: 100 mL/hr  Freq: Continuous Route: IV  Indications of Use: IV Hydration  Last Dose: Stopped (04/21/21 0016)  Start: 04/20/21 1615 End: 04/20/21 2214    1615     1851     2214-D/C'd    0016        sodium chloride 0 9 % infusion   Freq: Code/trauma/sedation continuous med  Last Dose: Stopped (04/21/21 0655)  Start: 04/20/21 1528 End: 04/20/21 1528    1528      0655         PRN Meds:  acetaminophen, 650 mg, Oral, Q4H PRN GIVEN X 1/ 24 HRS    IP CONSULT TO CASE MANAGEMENT    Network Utilization Review Department  ATTENTION: Please call with any questions or concerns to [phone] and carefully listen to the prompts so that you are directed to the right person   All voicemails are confidential   Jen Bee all requests for admission clinical reviews, approved or denied determinations and any other requests to dedicated fax number below belonging to the campus where the patient is receiving treatment   List of dedicated fax numbers for the Facilities:  [address] DENIALS (Administrative/Medical Necessity) [phone]   [address] (Maternity/NICU/Pediatrics) [phone]   [address] [place] [address] [facility] [place] 1960 [address] [place] [address] Tosha Aguilar 1481 [address] [facility] [phone]

## 2021-04-21 NOTE — PROGRESS NOTES
Attempted to visit patient   Patient was not available for visit      04/21/21 1600   Clinical Encounter Type   Visited With Patient not available

## 2021-04-21 NOTE — ASSESSMENT & PLAN NOTE
Qtc 518    Plan:  -patient endorses intermittent nausea  Likely multifactorial- 2/2 MI, anesthesia s/p cath, and withdrawal from EtOH   -AVOID QTc prolonging agents- Zofran, Reglan     -patient was spoken to regarding discontinuation of methadone and switching to Suboxone due to QT prolongation, however the patient adamantly refused    See quick note for details   -Treat patient per [facility] protocol

## 2021-04-21 NOTE — ASSESSMENT & PLAN NOTE
Denies recent usage of cocaine or meth  On Methadone at home    Plan:  -continue methadone 80 mg daily- dose confirmed by [facility] [place]   -patient was spoken to regarding discontinuation of methadone and switching to Suboxone due to QT prolongation, however the patient adamantly refused  See quick note for details

## 2021-04-21 NOTE — PROGRESS NOTES
INTERNAL MEDICINE RESIDENCY SENIOR ADMISSION NOTE     Name: Gwen Martinez   Age & Sex: 63 y o  female   MRN: [id number]  Unit/Bed#: [facility] 555-58   Encounter: [id number]  Primary Care Provider: Eva Rodriguez MD    Admit to team: SOD Team A    Patient seen and examined  Reviewed H&P per Dr Nicole Ferreira  Agree with the assessment and plan with any exception/addition as noted below:    #STEMI  Patient initially presented to the ED with complaints of shortness of breath  Associated symptoms included nausea and vomiting  Found to have inferior ST-elevation myocardial infarction and was transferred to [facility] for emergent cardiac catheterization  Status post load with ASA and Brillinta  Cardiac catheterization with acute plaque rupture MI in distal RCA status post drug-eluting stent placement  Aspirin 81 mg daily  Brilinta 90 mg BID  Atorvastatin 80 mg daily  Metoprolol tartrate 25 mg BID  Repeat TTE  Radial band care  Post-catheterization IV fluid hydration  Monitor and replete electrolytes as needed  Cardiac diet  Monitor on telemetry  Appreciate Cardiology recommendations  #Alcohol Use Disorder  Patient has a history of frequent emergency room visits for alcohol withdrawal   Apparently has had a withdrawal seizure in the past   WA protocol  Seizure precautions  Patient is currently on continue observation as she has been somewhat agitated in the room post cardiac catheterization and is recommended to remain laying flat  Thiamine 100 mg daily  #Pre-Diabetes  Hemoglobin A1c 6 1% in 2019 with improvement to 5 4% in 2020  Blood glucose levels have been acceptable so far during hospital course  Will obtain hemoglobin A1c  Insulin sliding scale      Code Status: Level 1 - Full Code  Admission Status: INPATIENT   Disposition: Patient requires Level 2 Step Down   Expected Length of Stay: >2 midnights

## 2021-04-21 NOTE — PROGRESS NOTES
Spoke with patient regarding her home dose of methadone  States that she receives her methadone from [facility] in [place], [place] ([phone])  Spoke with patient's nurse who informed me that she currently receives methadone 80 mg daily  This medication has been ordered so as to avoid withdrawal while the patient is currently admitted for STEMI s/p EDMUND

## 2021-04-21 NOTE — PLAN OF CARE
Problem: PAIN - ADULT  Goal: Verbalizes/displays adequate comfort level or baseline comfort level  Description: Interventions:  - Encourage patient to monitor pain and request assistance  - Assess pain using appropriate pain scale  - Administer analgesics based on type and severity of pain and evaluate response  - Implement non-pharmacological measures as appropriate and evaluate response  - Consider cultural and social influences on pain and pain management  - Notify physician/advanced practitioner if interventions unsuccessful or patient reports new pain  Outcome: Progressing     Problem: INFECTION - ADULT  Goal: Absence or prevention of progression during hospitalization  Description: INTERVENTIONS:  - Assess and monitor for signs and symptoms of infection  - Monitor lab/diagnostic results  - Monitor all insertion sites, i e  indwelling lines, tubes, and drains  - Monitor endotracheal if appropriate and nasal secretions for changes in amount and color  - [place] appropriate cooling/warming therapies per order  - Administer medications as ordered  - Instruct and encourage patient and family to use good hand hygiene technique  - Identify and instruct in appropriate isolation precautions for identified infection/condition  Outcome: Progressing  Goal: Absence of fever/infection during neutropenic period  Description: INTERVENTIONS:  - Monitor WBC    Outcome: Progressing     Problem: SAFETY ADULT  Goal: Patient will remain free of falls  Description: INTERVENTIONS:  - Assess patient frequently for physical needs  -  Identify cognitive and physical deficits and behaviors that affect risk of falls    -  [place] fall precautions as indicated by assessment   - Educate patient/family on patient safety including physical limitations  - Instruct patient to call for assistance with activity based on assessment  - Modify environment to reduce risk of injury  - Consider OT/PT consult to assist with strengthening/mobility  Outcome: Progressing  Goal: Maintain or return to baseline ADL function  Description: INTERVENTIONS:  -  Assess patient's ability to carry out ADLs; assess patient's baseline for ADL function and identify physical deficits which impact ability to perform ADLs (bathing, care of mouth/teeth, toileting, grooming, dressing, etc )  - Assess/evaluate cause of self-care deficits   - Assess range of motion  - Assess patient's mobility; develop plan if impaired  - Assess patient's need for assistive devices and provide as appropriate  - Encourage maximum independence but intervene and supervise when necessary  - Involve family in performance of ADLs  - Assess for home care needs following discharge   - Consider OT consult to assist with ADL evaluation and planning for discharge  - Provide patient education as appropriate  Outcome: Progressing  Goal: Maintain or return mobility status to optimal level  Description: INTERVENTIONS:  - Assess patient's baseline mobility status (ambulation, transfers, stairs, etc )    - Identify cognitive and physical deficits and behaviors that affect mobility  - Identify mobility aids required to assist with transfers and/or ambulation (gait belt, sit-to-stand, lift, walker, cane, etc )  - [place] fall precautions as indicated by assessment  - Record patient progress and toleration of activity level on Mobility SBAR; progress patient to next Phase/Stage  - Instruct patient to call for assistance with activity based on assessment  - Consider rehabilitation consult to assist with strengthening/weightbearing, etc   Outcome: Progressing     Problem: DISCHARGE PLANNING  Goal: Discharge to home or other facility with appropriate resources  Description: INTERVENTIONS:  - Identify barriers to discharge w/patient and caregiver  - Arrange for needed discharge resources and transportation as appropriate  - Identify discharge learning needs (meds, wound care, etc )  - Arrange for interpretive services to assist at discharge as needed  - Refer to Case Management Department for coordinating discharge planning if the patient needs post-hospital services based on physician/advanced practitioner order or complex needs related to functional status, cognitive ability, or social support system  Outcome: Progressing     Problem: Knowledge Deficit  Goal: Patient/family/caregiver demonstrates understanding of disease process, treatment plan, medications, and discharge instructions  Description: Complete learning assessment and assess knowledge base    Interventions:  - Provide teaching at level of understanding  - Provide teaching via preferred learning methods  Outcome: Progressing

## 2021-04-21 NOTE — UTILIZATION REVIEW
Continued Stay Review    Date: 4/21/21 Wednesday                         Current Patient Class: Inpatient  Current Level of Care: Level 1 Stepdown    HPI:   63year old female with PMHx DM, polysubstance opiate use on methadone, etoh with prior withdrawal, active smoker  Initially presented to [address] [place] [address] on 4/20/21 2nd 3 day history of precordial chest pain, SOB and generalized body aches with nausea vomiting diarrhea  EKG showed acute Inferior wall ST elevation with troponin 18 4    Ttx with IVF, IV Heparin, NTG sl, ASA+ Brilinta, IV Zofran + Ativan  and  transferred to [facility] for emergent cardiac catheterization   Cardiac catheterization showed acute plaque rupture MI in distal RCA status post EDMUND placement    4/21:  Coronary artery disease, new diagnosis of inferior STEMI - s/p PCI +EDMUND of the RCA  Drowsy - opens her eyes to verbal stimuli and intermittently participates in conversation  ECG shows improvement in failure ST elevations but does show persistent QT prolongation around 600 milliseconds of corrected QT interval - QT prolongation likely secondary to methadone  On DAPT with aspirin 81 mg daily, and Brilinta 90 mg q 12 hours  -On atorvastatin 80 mg daily, and metoprolol tartrate 25 mg q 12 hours  -resume lisinopril 10 mg daily tomorrow      Vital Signs:  Blood pressure 138/60, pulse 95, temperature 98 7 °F (37 1 °C), temperature source Oral, resp  rate 18  SpO2 96 %  height 4' 9" (1 448 m), weight 39 4 kg (86 lb 14 4 oz),   Body mass index is 18 8 kg/m²  ,     Systolic (98GRK), VUJ:660 , Min:100 , QHY:508    Diastolic (88PEZ), CYC:29, Min:60, Max:118      Intake/Output Summary (Last 24 hours) at 4/21/2021 1154:  Gross per 24 hour   Intake 1000 ml   Output 700 ml   Net 300 ml      Weight (last 2 days)      Date/Time   Weight     04/20/21 1828    39 4 (86 9)     Pertinent Labs/Diagnostic Results:     Results from last 7 days   Lab Units 04/21/21  0442 04/20/21  1754 04/20/21  1422 WBC Thousand/uL 13 43*  --  9 52   HEMOGLOBIN g/dL 15 4  --  15 9*   HEMATOCRIT % 46 4*  --  48 4*   PLATELETS Thousands/uL 496* 523* 479*   NEUTROS ABS Thousands/µL  --   --  7 68*       Results from last 7 days   Lab Units 04/21/21  0631 04/21/21  0442 04/20/21 2059 04/20/21  1422   SODIUM mmol/L  --  134* 131* 132*   POTASSIUM mmol/L  --  4 0 3 4* 2 9*   CHLORIDE mmol/L  --  97* 93* 89*   CO2 mmol/L  --  30 31 30   ANION GAP mmol/L  --  7 7 13   BUN mg/dL  --  9 5 5   CREATININE mg/dL  --  0 55* 0 50* 0 48*   EGFR ml/min/1 73sq m  --  119 123 124   CALCIUM mg/dL  --  9 1 9 3 8 9   MAGNESIUM mg/dL  --  2 4 2 4 1 3*   PHOSPHORUS mg/dL 2 7  --   --   --      Results from last 7 days   Lab Units 04/20/21  1422   AST U/L 225*   ALT U/L 33   ALK PHOS U/L 179*   TOTAL PROTEIN g/dL 8 4*   ALBUMIN g/dL 2 8*   TOTAL BILIRUBIN mg/dL 0 34     Results from last 7 days   Lab Units 04/21/21  1108 04/21/21  0614 04/20/21 2108 04/20/21  1705   POC GLUCOSE mg/dl 115 166* 130 140     Results from last 7 days   Lab Units 04/21/21  0442 04/20/21 2059 04/20/21  1422   GLUCOSE RANDOM mg/dL 121 130 147*     Results from last 7 days   Lab Units 04/20/21  1754   HEMOGLOBIN A1C % 5 8*   EAG mg/dl 120     Results from last 7 days   Lab Units 04/20/21  1422   TROPONIN I ng/mL 18 40*     Results from last 7 days   Lab Units 04/20/21  1422   PROTIME seconds 13 1   INR  1 00   PTT seconds 24     Results from last 7 days   Lab Units 04/21/21  0442   TSH 3RD GENERATON uIU/mL 0 331*     Diet Cardiovascular; Cardiac    Scheduled Medications:  aspirin, 81 mg, Oral, Daily  atorvastatin, 80 mg, Oral, QPM  enoxaparin, 40 mg, Subcutaneous, Daily  insulin lispro, 1-5 Units, Subcutaneous, TID AC  methadone, 80 mg, Oral, Daily  metoprolol tartrate, 50 mg, Oral, Q12H ALEC  nicotine, 1 patch, Transdermal, Daily  potassium chloride, 40 mEq, Oral, Once  sodium chloride, 500 mL, Intravenous, Once  thiamine, 100 mg, Oral, Daily  ticagrelor, 90 mg, Oral, BID    Continuous IV Infusions:  IVF sodium chloride, 100 mL/hr, Intravenous, Continuous    PRN Meds:  acetaminophen, 650 mg, Oral, Q4H PRN GIVEN X 1/ 24 HRS    Discharge Plan: To be determined   Inpatient Case Management following for all discharge needs    Network Utilization Review Department  ATTENTION: Please call with any questions or concerns to [phone] and carefully listen to the prompts so that you are directed to the right person  All voicemails are confidential   Delonte Valadez all requests for admission clinical reviews, approved or denied determinations and any other requests to dedicated fax number below belonging to the campus where the patient is receiving treatment   List of dedicated fax numbers for the Facilities:  [address] DENIALS (Administrative/Medical Necessity) [phone]   [address] (Maternity/NICU/Pediatrics) [phone] [address] Tasha Dillon 28[address] [address] [address] Mayra Aguilar 1481 [address] [place] [address] [phone]

## 2021-04-21 NOTE — ASSESSMENT & PLAN NOTE
Presented with SOB, generalized body aches, n/v x3 days  Troponin 18 4    ECG: ST elevations in leads II, III, aVF, and ST depression in I, aVL, V3-V6  T waves were inverted in II, III, and aVF  Plan:  -inferior wall STEMI 2/2 distal RCA acute plaque rupture   Now s/p thrombectomy and EDMUND placement 4/20   -continue ASA 81 qd  -cont Brilinta 90 mg bid  -cont lipitor 80 hs  -cont metoprolol 25 mg bid  -maintain K >4, Mag >2  -ISAIAH showed an EF of 45%

## 2021-04-21 NOTE — QUICK NOTE
Checked with patient's primary pharmacy Brilinta will not be covered under her current prescription plan  Will DC Brillinta and load with Plavix 600 mg tonight and start Plavix 75 mg daily tomorrow

## 2021-04-21 NOTE — PROGRESS NOTES
General Cardiology   Progress Note -  Team One   Ronen Doe 63 y o  female MRN: [id number]    Unit/Bed#: [facility] 519-01 Encounter: [id number]    Assessment  1  Acute inferior STEMI  2  CAD s/p PCI/EDMUND x1 to the distal RCA  -Presented to the [facility] ED with complaints of dyspnea  -ECG in the ED demonstrated inferior ST elevations  -Troponin elevation 18 40  -Loaded with Brilinta 180 mg and 4000 units of IV heparin  -Emergent LHC 4/20/21; 1 vessel CAD, distal % stenosis -- lesion was associated with a moderate filling defect consistent with thrombus    -On DAPT with aspirin 81 mg daily, and Brilinta 90 mg q 12 hours  -On atorvastatin 80 mg daily, and metoprolol tartrate 25 mg q 12 hours    3  Presumed newly diagnosed cardiomyopathy-EF 40% per ventriculography with noted akinesis of the basal-mid inferior wall  -Appears dry  on PE, peripheral extremities are warm,  denies any specific cardiac complaints  -TTE (pending)    4  QTC prolongation  -Severe electrolyte derangements (POA); K +2 9, and Mag 1 3  -12 lead ECG 4/20; ST, rate 109 bpm,  msec    5  Dyslipidemia  -Lipid profile 4/21/21; cholesterol 196, triglycerides 110, HDL 54,   -On atorvastatin 80 mg daily    6  Hypertension  -Average /93, last recorded 130/60, HR 95  -Previously on lisinopril 10 mg daily (on hold)  -Currently on metoprolol tartrate 25 mg b i d     7  History of polysubstance abuse  -Denies recent usage of cocaine or methamphetamines  -On methadone 80 mg daily   8  Alcohol abuse  -Daily alcohol consumption, Medical alcohol level 22 on admission  -[facility] protocol -- issues with alcohol withdrawal in the past    9   DM type 2  -HGB A1c 5 8 -- 4/20/2021    Plan  -Follow-up TTE   -Repeat 12 lead ECG today -- will assess ST segments, and QTC interval  -Appears to be dry on PE, will order 500 ml NSS bolus now and assess response  -Dapt with aspirin 81 mg daily, and Brilinta 90 mg q 12 hours (will need price check)  -High-intensity statin  -Continue metoprolol tartrate 25 mg b i d  -- will switch to Toprol XL 50 mg daily tomorrow  -Would resume lisinopril 10 mg daily tomorrow  -Strict I&Os, daily weights  -Monitor renal function electrolytes closely -- replete to maintain K + at 4 0, magnesium 2 0  -Continue to monitor closely on telemetry     Subjective  Review of Systems   Cardiovascular: Negative for chest pain  Respiratory: Positive for cough  Negative for shortness of breath and sputum production  Gastrointestinal: Negative for abdominal pain  Neurological: Negative for dizziness, headaches and light-headedness  Objective:   Physical Exam  Vitals signs and nursing note reviewed  Constitutional:       General: She is not in acute distress  Appearance: She is ill-appearing  Comments: Drowsy  She does open her eyes to verbal stimuli  She intermittently participates in conversation  She does follow commands   HENT:      Head: Normocephalic and atraumatic  Mouth/Throat:      Mouth: Mucous membranes are dry  Eyes:      General: No scleral icterus  Neck:      Comments: No JVD  Cardiovascular:      Rate and Rhythm: Regular rhythm  Tachycardia present  Pulses: Normal pulses  Heart sounds: Normal heart sounds  No murmur  Pulmonary:      Breath sounds: No rales  Comments: Upper airway congestion/coarse lung fields  Abdominal:      Palpations: Abdomen is soft  Tenderness: There is no abdominal tenderness  Musculoskeletal:      Right lower leg: No edema  Left lower leg: No edema  Skin:     General: Skin is warm and dry  Capillary Refill: Capillary refill takes less than 2 seconds  Neurological:      General: No focal deficit present  Psychiatric:         Mood and Affect: Mood normal          Behavior: Behavior normal          Vitals: Blood pressure 138/60, pulse 95, temperature 98 7 °F (37 1 °C), temperature source Oral, resp   rate 18, height 4' 9" (1 448 m), weight 39 4 kg (86 lb 14 4 oz), SpO2 96 %, not currently breastfeeding ,     Body mass index is 18 8 kg/m²  ,   Systolic (23VZS), UXX:129 , Min:100 , DGY:457     Diastolic (90OWO), SXA:57, Min:60, Max:118      Intake/Output Summary (Last 24 hours) at 4/21/2021 1154  Last data filed at 4/21/2021 0016  Gross per 24 hour   Intake 1000 ml   Output 700 ml   Net 300 ml     Weight (last 2 days)     Date/Time   Weight    04/20/21 1828   39 4 (86 9)              LABORATORY RESULTS  Results from last 7 days   Lab Units 04/20/21  1422   TROPONIN I ng/mL 18 40*     CBC with diff:   Results from last 7 days   Lab Units 04/21/21 0442 04/20/21 1754 04/20/21  1422   WBC Thousand/uL 13 43*  --  9 52   HEMOGLOBIN g/dL 15 4  --  15 9*   HEMATOCRIT % 46 4*  --  48 4*   MCV fL 89  --  88   PLATELETS Thousands/uL 496* 523* 479*   MCH pg 29 4  --  28 8   MCHC g/dL 33 2  --  32 9   RDW % 13 8  --  13 2   MPV fL 9 5 9 5 9 3   NRBC AUTO /100 WBCs  --   --  0       CMP:  Results from last 7 days   Lab Units 04/21/21 0442 04/20/21 2059 04/20/21  1422   POTASSIUM mmol/L 4 0 3 4* 2 9*   CHLORIDE mmol/L 97* 93* 89*   CO2 mmol/L 30 31 30   BUN mg/dL 9 5 5   CREATININE mg/dL 0 55* 0 50* 0 48*   CALCIUM mg/dL 9 1 9 3 8 9   AST U/L  --   --  225*   ALT U/L  --   --  33   ALK PHOS U/L  --   --  179*   EGFR ml/min/1 73sq m 119 123 124       BMP:  Results from last 7 days   Lab Units 04/21/21 0442 04/20/21 2059 04/20/21  1422   POTASSIUM mmol/L 4 0 3 4* 2 9*   CHLORIDE mmol/L 97* 93* 89*   CO2 mmol/L 30 31 30   BUN mg/dL 9 5 5   CREATININE mg/dL 0 55* 0 50* 0 48*   CALCIUM mg/dL 9 1 9 3 8 9       No results found for: NTBNP     Results from last 7 days   Lab Units 04/21/21  0442 04/20/21  2059 04/20/21  1422   MAGNESIUM mg/dL 2 4 2 4 1 3*       Results from last 7 days   Lab Units 04/20/21  1754   HEMOGLOBIN A1C % 5 8*       Results from last 7 days   Lab Units 04/21/21  0442   TSH 3RD GENERATON uIU/mL 0 331*   FREE T4 ng/dL 1 67*       Results from last 7 days   Lab Units 04/20/21  1422   INR  1 00       Lipid Profile:   No results found for: CHOL  Lab Results   Component Value Date    HDL 54 04/21/2021     05/25/2020    HDL 87 (H) 07/11/2019     Lab Results   Component Value Date    LDLCALC 120 (H) 04/21/2021    LDLCALC 110 (H) 05/25/2020    LDLCALC 84 07/11/2019     Lab Results   Component Value Date    TRIG 110 04/21/2021    TRIG 27 05/25/2020    TRIG 103 07/11/2019       Cardiac testing:   No results found for this or any previous visit  No results found for this or any previous visit  No results found for this or any previous visit  No procedure found  No results found for this or any previous visit      Meds/Allergies   all current active meds have been reviewed and current meds:   Current Facility-Administered Medications   Medication Dose Route Frequency    acetaminophen (TYLENOL) tablet 650 mg  650 mg Oral Q4H PRN    aspirin chewable tablet 81 mg  81 mg Oral Daily    atorvastatin (LIPITOR) tablet 80 mg  80 mg Oral QPM    enoxaparin (LOVENOX) subcutaneous injection 40 mg  40 mg Subcutaneous Daily    insulin lispro (HumaLOG) 100 units/mL subcutaneous injection 1-5 Units  1-5 Units Subcutaneous TID AC    methadone (DOLOPHINE) tablet 80 mg  80 mg Oral Daily    metoprolol tartrate (LOPRESSOR) tablet 25 mg  25 mg Oral Q12H [facility]    nicotine (NICODERM CQ) 21 mg/24 hr TD 24 hr patch 1 patch  1 patch Transdermal Daily    potassium chloride (K-DUR,KLOR-CON) CR tablet 40 mEq  40 mEq Oral Once    thiamine tablet 100 mg  100 mg Oral Daily    ticagrelor (BRILINTA) tablet 90 mg  90 mg Oral BID          EKG personally reviewed by YASMIN Cummings    Assessment:  Principal Problem:    ST elevation myocardial infarction involving right coronary artery (HCC)  Active Problems:    Type 2 diabetes mellitus with complication, with long-term current use of insulin (HCC)    Polysubstance abuse (HCC)    Alcohol abuse    QT prolongation    Counseling / Coordination of Care  Total floor / unit time spent today 20 minutes  Greater than 50% of total time was spent with the patient and / or family counseling and / or coordination of care  ** Please Note: Dragon 360 Dictation voice to text software may have been used in the creation of this document   **

## 2021-04-21 NOTE — PROGRESS NOTES
Patient with complaints of nausea  EKG performed showing QTc interval of 519  This patient is extremely high risk of tachyarrhythmia in the setting of recent ischemia and electrolyte abnormalities  Will hold off on administering Zofran  Continue CIWA protocol with symptom-directed administration of Ativan

## 2021-04-21 NOTE — ASSESSMENT & PLAN NOTE
Patient drinks daily, last drink was 2-3 days prior to admission  History of multiple ED visits for withdrawal  History of past withdrawal seizure    Blood alcohol level 22 on arrival    Plan:  -MINWA protocol with p r n   Ativan  -100 mg thiamine daily

## 2021-04-21 NOTE — ASSESSMENT & PLAN NOTE
Lab Results   Component Value Date    HGBA1C 5 8 (H) 04/20/2021       Recent Labs     04/21/21  1552 04/21/21  2119 04/22/21  0638 04/22/21  1059   POCGLU 98 93 84 102       Blood Sugar Average: Last 72 hrs:  (P) 116     Plan:  -sugars are well controlled  -SSI

## 2021-04-22 ENCOUNTER — APPOINTMENT (INPATIENT)
Dept: RADIOLOGY | Facility: HOSPITAL | Age: 61
DRG: 174 | End: 2021-04-22
Payer: COMMERCIAL

## 2021-04-22 LAB
ANION GAP SERPL CALCULATED.3IONS-SCNC: 5 MMOL/L (ref 4–13)
BASE EX.OXY STD BLDV CALC-SCNC: 88 % (ref 60–80)
BASE EXCESS BLDV CALC-SCNC: 4.9 MMOL/L
BUN SERPL-MCNC: 13 MG/DL (ref 5–25)
CALCIUM SERPL-MCNC: 8.4 MG/DL (ref 8.3–10.1)
CHLORIDE SERPL-SCNC: 101 MMOL/L (ref 100–108)
CO2 SERPL-SCNC: 29 MMOL/L (ref 21–32)
CREAT SERPL-MCNC: 0.5 MG/DL (ref 0.6–1.3)
ERYTHROCYTE [DISTWIDTH] IN BLOOD BY AUTOMATED COUNT: 13.9 % (ref 11.6–15.1)
GFR SERPL CREATININE-BSD FRML MDRD: 123 ML/MIN/1.73SQ M
GLUCOSE SERPL-MCNC: 102 MG/DL (ref 65–140)
GLUCOSE SERPL-MCNC: 107 MG/DL (ref 65–140)
GLUCOSE SERPL-MCNC: 144 MG/DL (ref 65–140)
GLUCOSE SERPL-MCNC: 84 MG/DL (ref 65–140)
GLUCOSE SERPL-MCNC: 84 MG/DL (ref 65–140)
HCO3 BLDV-SCNC: 28.6 MMOL/L (ref 24–30)
HCT VFR BLD AUTO: 40.1 % (ref 34.8–46.1)
HGB BLD-MCNC: 13.2 G/DL (ref 11.5–15.4)
MCH RBC QN AUTO: 29.3 PG (ref 26.8–34.3)
MCHC RBC AUTO-ENTMCNC: 32.9 G/DL (ref 31.4–37.4)
MCV RBC AUTO: 89 FL (ref 82–98)
NASAL CANNULA: 3
NT-PROBNP SERPL-MCNC: 8477 PG/ML
O2 CT BLDV-SCNC: 17.7 ML/DL
PCO2 BLDV: 39.1 MM HG (ref 42–50)
PH BLDV: 7.48 [PH] (ref 7.3–7.4)
PLATELET # BLD AUTO: 474 THOUSANDS/UL (ref 149–390)
PMV BLD AUTO: 9.6 FL (ref 8.9–12.7)
PO2 BLDV: 57 MM HG (ref 35–45)
POTASSIUM SERPL-SCNC: 3.9 MMOL/L (ref 3.5–5.3)
RBC # BLD AUTO: 4.51 MILLION/UL (ref 3.81–5.12)
SODIUM SERPL-SCNC: 135 MMOL/L (ref 136–145)
WBC # BLD AUTO: 18.46 THOUSAND/UL (ref 4.31–10.16)

## 2021-04-22 PROCEDURE — 93306 TTE W/DOPPLER COMPLETE: CPT | Performed by: INTERNAL MEDICINE

## 2021-04-22 PROCEDURE — 97167 OT EVAL HIGH COMPLEX 60 MIN: CPT

## 2021-04-22 PROCEDURE — 83880 ASSAY OF NATRIURETIC PEPTIDE: CPT | Performed by: STUDENT IN AN ORGANIZED HEALTH CARE EDUCATION/TRAINING PROGRAM

## 2021-04-22 PROCEDURE — 82805 BLOOD GASES W/O2 SATURATION: CPT | Performed by: STUDENT IN AN ORGANIZED HEALTH CARE EDUCATION/TRAINING PROGRAM

## 2021-04-22 PROCEDURE — 80048 BASIC METABOLIC PNL TOTAL CA: CPT | Performed by: STUDENT IN AN ORGANIZED HEALTH CARE EDUCATION/TRAINING PROGRAM

## 2021-04-22 PROCEDURE — 97163 PT EVAL HIGH COMPLEX 45 MIN: CPT

## 2021-04-22 PROCEDURE — 82948 REAGENT STRIP/BLOOD GLUCOSE: CPT

## 2021-04-22 PROCEDURE — 99449 NTRPROF PH1/NTRNET/EHR 31/>: CPT | Performed by: EMERGENCY MEDICINE

## 2021-04-22 PROCEDURE — 85027 COMPLETE CBC AUTOMATED: CPT | Performed by: STUDENT IN AN ORGANIZED HEALTH CARE EDUCATION/TRAINING PROGRAM

## 2021-04-22 PROCEDURE — 99233 SBSQ HOSP IP/OBS HIGH 50: CPT | Performed by: INTERNAL MEDICINE

## 2021-04-22 PROCEDURE — 94760 N-INVAS EAR/PLS OXIMETRY 1: CPT

## 2021-04-22 PROCEDURE — 99232 SBSQ HOSP IP/OBS MODERATE 35: CPT | Performed by: INTERNAL MEDICINE

## 2021-04-22 PROCEDURE — 71045 X-RAY EXAM CHEST 1 VIEW: CPT

## 2021-04-22 RX ORDER — METOPROLOL SUCCINATE 100 MG/1
100 TABLET, EXTENDED RELEASE ORAL DAILY
Status: DISCONTINUED | OUTPATIENT
Start: 2021-04-23 | End: 2021-04-23 | Stop reason: HOSPADM

## 2021-04-22 RX ORDER — ALBUTEROL SULFATE 2.5 MG/3ML
2.5 SOLUTION RESPIRATORY (INHALATION) EVERY 6 HOURS PRN
Status: DISCONTINUED | OUTPATIENT
Start: 2021-04-22 | End: 2021-04-22

## 2021-04-22 RX ORDER — ALBUTEROL SULFATE 90 UG/1
2 AEROSOL, METERED RESPIRATORY (INHALATION) EVERY 4 HOURS PRN
Status: DISCONTINUED | OUTPATIENT
Start: 2021-04-22 | End: 2021-04-23 | Stop reason: HOSPADM

## 2021-04-22 RX ORDER — METOPROLOL TARTRATE 50 MG/1
50 TABLET, FILM COATED ORAL ONCE
Status: COMPLETED | OUTPATIENT
Start: 2021-04-22 | End: 2021-04-22

## 2021-04-22 RX ORDER — LISINOPRIL 5 MG/1
5 TABLET ORAL DAILY
Status: DISCONTINUED | OUTPATIENT
Start: 2021-04-22 | End: 2021-04-22

## 2021-04-22 RX ADMIN — SODIUM CHLORIDE 100 ML/HR: 0.9 INJECTION, SOLUTION INTRAVENOUS at 01:00

## 2021-04-22 RX ADMIN — METOPROLOL TARTRATE 50 MG: 50 TABLET, FILM COATED ORAL at 08:03

## 2021-04-22 RX ADMIN — METHADONE HYDROCHLORIDE 80 MG: 10 TABLET ORAL at 08:02

## 2021-04-22 RX ADMIN — ENOXAPARIN SODIUM 40 MG: 40 INJECTION SUBCUTANEOUS at 08:03

## 2021-04-22 RX ADMIN — ASPIRIN 81 MG: 81 TABLET, CHEWABLE ORAL at 08:03

## 2021-04-22 RX ADMIN — CLOPIDOGREL BISULFATE 75 MG: 75 TABLET ORAL at 08:03

## 2021-04-22 RX ADMIN — THIAMINE HCL TAB 100 MG 100 MG: 100 TAB at 08:03

## 2021-04-22 RX ADMIN — METOPROLOL TARTRATE 50 MG: 50 TABLET, FILM COATED ORAL at 21:01

## 2021-04-22 RX ADMIN — ATORVASTATIN CALCIUM 80 MG: 80 TABLET, FILM COATED ORAL at 17:16

## 2021-04-22 NOTE — PHYSICAL THERAPY NOTE
Physical Therapy Evaluation     Patient's Name: Donna Hurst    Admitting Diagnosis  AMI (acute myocardial infarction) [facility]) [I21 9]    Problem List  Patient Active Problem List   Diagnosis    Type 2 diabetes mellitus with complication, with long-term current use [facility] insulin [facility]    Polysubstance abuse [facility] )    History of pulmonary embolism    Nicotine dependence    Nicotine abuse    History of gastric ulcer    Elevated AST (SGOT)    Essential hypertension    Asthma    Dyslipidemia    ST elevation myocardial infarction involving right coronary artery ([facility] )    Alcohol abuse    QT prolongation       Past Medical History  Past Medical History:   Diagnosis Date    Alcohol abuse     Arthritis     Asthma     Diabetes mellitus ([facility] )     Opiate abuse, continuous ([facility] )        Past Surgical History  Past Surgical History:   Procedure Laterality Date    ABDOMINAL SURGERY      stomach uler with perforation          04/22/21 1333   PT Last Visit   PT Visit Date 04/22/21   Note Type   Note type Evaluation   Pain Assessment   Pain Assessment Tool 0-10   Pain Score No Pain   Home Living   Type of Home Apartment   Home Layout One level;Performs ADLs on one level; Able to live on main level with bedroom/bathroom   Bathroom Shower/Tub Tub/shower unit   Ayse   (mike)   Additional Comments Pt poor historian 2/2 cog  Pt reports she lives in a 1 story apartment with 0 ANAYA  Prior Function   Level of [place] Independent with ADLs and functional mobility   Lives With Alone   Receives Help From Family   ADL Assistance Independent   IADLs Needs assistance   Falls in the last 6 months 0   Vocational Unemployed   Comments Pt poor historian 2/2 cog  Restrictions/Precautions   Weight Bearing Precautions Per Order No   Other Precautions Cognitive; Chair Alarm; Bed Alarm;Seizure;Telemetry;O2;Fall Risk;1:1  (2L O2 NC)   General   Family/Caregiver Present No   Cognition   Overall Cognitive Status Impaired   Arousal/Participation Responsive   Attention Attends with cues to redirect   Orientation Level Oriented to person;Disoriented to place; Disoriented to time;Disoriented to situation   Memory Decreased recall of precautions;Decreased recall of recent events;Decreased short term memory   Following Commands Follows one step commands with increased time or repetition   Comments Pt disoriented, but able to attend to task with cues  Repetition of cues needed  Decreased insight into deficits and safety awareness  RLE Assessment   RLE Assessment   (4/5 functionally)   LLE Assessment   LLE Assessment   (4/5 functionally)   Bed Mobility   Additional Comments Pt upright in chair upon PT arrival  Pt left upright in bedside chair with 1:1 present with all needs in reach  Transfers   Sit to Stand 4  Minimal assistance   Additional items Assist x 1; Armrests; Increased time required; Impulsive;Verbal cues   Stand to Sit 4  Minimal assistance   Additional items Assist x 1; Increased time required;Armrests; Verbal cues; Impulsive   Additional Comments Tranfers with RW  Pt somewhat impulsive needing cuing for pacing, hand placement, safety  Ambulation/Elevation   Gait pattern Excessively slow; Step to;Short stride; Foward flexed; Inconsistent rach; Shuffling;Decreased foot clearance;Narrow CHRISTOPHER   Gait Assistance 4  Minimal assist   Additional items Assist x 1;Verbal cues   Assistive Device Rolling walker   Distance 120 ft x 1 with sitting rest break  Could not resume ambulation due to fatigue and reports of dizziness  Dizziness subsided with sitting   Balance   Static Sitting Fair   Dynamic Sitting Fair -   Static Standing Fair -   Dynamic Standing Poor +   Ambulatory Poor   Endurance Deficit   Endurance Deficit Yes   Endurance Deficit Description fatigue, weakness, dizziness      Activity Tolerance   Activity Tolerance Patient limited by fatigue   Medical Staff Made Aware PT Fabiola Iverson   Nurse Made Aware RN cleared pt to be seen by PT  Assessment   Prognosis Good   Problem List Decreased strength;Decreased range of motion;Decreased endurance; Impaired balance;Decreased mobility; Decreased cognition; Impaired judgement;Decreased safety awareness   Assessment Pt seen for PT evaluation today due to decrease mobility compared to baseline  Pt is a 63 y o  female who was admitted to [facility] for STEMI on 4/20/2021  Active orders for PT eval/treat and up with assistance at this time  Pt  has a past medical history of Alcohol abuse, Arthritis, Asthma, Diabetes mellitus ([facility] ), and Opiate abuse, continuous ([facility] )  Pt lives alone  in a 1  with 0STE  Pt presents with decreased strength, endurance, balance  resulting in decreased bed mobility, transfers, functional mobility upon evaluation  Pt currently requires min A x 1 for transfers and ambulation  Pt would benefit from skilled physical therapy to address these functional limitations  PT to follow pt and recommending rehab  Pt left upright in beside chair with 1:1 present and with all needs in place at end of therapy session  The patient's AM-PAC Basic Mobility Inpatient Short Form Raw Score is 17, Standardized Score is 39 67  A standardized score less than 42 9 suggests the patient may benefit from discharge to post-acute rehabilitation services  Please also refer to the recommendation of the Physical Therapist for safe discharge planning  Barriers to Discharge Decreased caregiver support   Goals   Patient Goals to get better   STG Expiration Date 05/06/21   Short Term Goal #1 1  Pt will be independent with HEP upon DC  2  Pt will demonstrate improved balance by 1 grade in order to decrease risk for falls  3  Pt will be be able to demonstrate bed mobility with S A to increase independence  4  Pt will be able to demonstrate functional transfers with S A in order to decrease burden on caregiver   5  Pt will be able to ambulate 200 ft with S A with LRAD in order to return to household/community mobility safely  6  Pt will demonstrate ability to climb at least FF stairs with S A in order to safely navigate home/community  Plan   Treatment/Interventions Functional transfer training;LE strengthening/ROM; Elevations; Therapeutic exercise; Endurance training;Patient/family training;Cognitive reorientation;Equipment eval/education; Bed mobility;Gait training;Spoke to nursing   PT Frequency   (3-5x/ wk)   Recommendation   PT Discharge Recommendation Post acute rehabilitation services   Equipment Recommended [address] [place] [address] Recommended Wheeled walker   Change/add to [place]?  No   PT - OK to Discharge Yes  (to rehab once medically cleared)   AM-PAC Basic Mobility Inpatient   Turning in Bed Without Bedrails 3   Lying on Back to Sitting on Edge of Flat Bed 3   Moving Bed to Chair 3   Standing Up From Chair 3   Walk in Room 3   Climb 3-5 Stairs 2   Basic Mobility Inpatient Raw Score 17   Basic Mobility Standardized Score 39 67   Modified [place] Scale   Modified [place] Scale 4         ANA Fuller

## 2021-04-22 NOTE — PROGRESS NOTES
Patients BP 89/64  BP was rechecked manually and correlated with the automatic BP  Patient was asymptomatic  [facility] resident Dr Marilyn Eaton made aware  No new orders at this time  Will continue to monitor

## 2021-04-22 NOTE — RESPIRATORY THERAPY NOTE
RT Protocol Note  Surjit Viramontes 63 y o  female MRN: [id number]  Unit/Bed#: [facility] 519-01 Encounter: [id number]    Assessment    Principal Problem:    ST elevation myocardial infarction involving right coronary artery ([place] [address] )  Active Problems:    Type 2 diabetes mellitus with complication, with long-term current use of insulin ([facility])    Polysubstance abuse ([facility])    Alcohol abuse    QT prolongation      Home Pulmonary Medications: MDI          Past Medical History:   Diagnosis Date    Alcohol abuse     Arthritis     Asthma     Diabetes mellitus ([place] [address] )     Opiate abuse, continuous ([place] [address] )      Social History     Socioeconomic History    Marital status: Single     Spouse name: Not on file    Number of children: Not on file    Years of education: Not on file    Highest education level: Not on file   Occupational History    Not on file   Social Needs    Financial resource strain: Not on file    Food insecurity     Worry: Not on file     Inability: Not on file    Transportation needs     Medical: Not on file     Non-medical: Not on file   Tobacco Use    Smoking status: Current Every Day Smoker     Packs/day: 2 00     Types: Cigarettes    Smokeless tobacco: Never Used   Substance and Sexual Activity    Alcohol use: Yes     Frequency: 4 or more times a week     Drinks per session: 7 to 9     Binge frequency: Daily or almost daily     Comment: Drinking  1 pint of vodka daily up to 2 month ago for 2 years,started drinking again yesterday    Drug use: Yes     Comment: on methadone    Sexual activity: Not on file   Lifestyle    Physical activity     Days per week: Not on file     Minutes per session: Not on file    Stress: Not on file   Relationships    Social connections     Talks on phone: Not on file     Gets together: Not on file     Attends Muslim service: Not on file     Active member of club or organization: Not on file     Attends meetings of clubs or organizations: Not on file     Relationship status: Not on file    Intimate partner violence     Fear of current or ex partner: Not on file     Emotionally abused: Not on file     Physically abused: Not on file     Forced sexual activity: Not on file   Other Topics Concern    Not on file   Social History Narrative    Not on file       Subjective         Objective    Physical Exam:   Assessment Type: (P) Assess only  Respiratory Pattern: (P) Normal  Chest Assessment: (P) Chest expansion symmetrical  Bilateral Breath Sounds: (P) Clear, Diminished    Vitals:  Blood pressure 117/85, pulse (!) 107, temperature 98 °F (36 7 °C), temperature source Oral, resp  rate (!) 23, height 4' 9" (1 448 m), weight 39 4 kg (86 lb 14 4 oz), SpO2 95 %, not currently breastfeeding  Imaging and other studies: I have personally reviewed pertinent reports  Plan    Respiratory Plan: (P) Home Bronchodilator Patient pathway        Resp Comments: (P) Pt OOB in chair, no distress  Uses Albuterol MDI at home, PRN Per protocol will order same here

## 2021-04-22 NOTE — QUICK NOTE
Spoke with the patient regarding her methadone use along with her prolonged QT interval that was seen on EKG  It was highly recommended to the patient that she stop taking methadone and switch to Suboxone  Also reached out to Dr Alexis Rey with Toxicology who was agreeable to accepting the patient at [address] [place] [address] detox unit for weaning of methadone and the initiation of Suboxone treatment  However the patient was adamant that she did not want to go and did not want to stop taking methadone or switch to Suboxone  It was explained to the patient that she has a high chance of a life-threatening arrhythmia developing but she continued to adamantly refuse stopping  Will reach out to the patient's primary care provider in [place] to make them aware of the patient's prolonged QT and to recommend discontinuing methadone in the future

## 2021-04-22 NOTE — PLAN OF CARE
Problem: OCCUPATIONAL THERAPY ADULT  Goal: Performs self-care activities at highest level of function for planned discharge setting  See evaluation for individualized goals  Description: Treatment Interventions: ADL retraining, Functional transfer training, UE strengthening/ROM, Endurance training, Cognitive reorientation, Patient/family training, Equipment evaluation/education, Compensatory technique education, Energy conservation, Activityengagement          See flowsheet documentation for full assessment, interventions and recommendations  Note: Limitation: Decreased ADL status, Decreased Safe judgement during ADL, Decreased cognition, Decreased endurance, Decreased self-care trans, Decreased high-level ADLs, Decreased UE strength  Prognosis: Fair  Assessment: Pt is a 63 y o  female seen for OT evaluation on 4/22/2021 after being admitted to [facility] on 4/20/2021 w/ ST elevation myocardial infarction involving right coronary artery  Pt has a past medical history of Alcohol abuse, Arthritis, Asthma, Diabetes mellitus ([facility] ), and Opiate abuse, continuous ([facility] )  Pt with active OT orders and activity as tolerated orders  Pt resides in a single story apartment alone with no ANAYA  Pt inconsistent historian, however, reports I w/ ADL's and IADL's, does not drive, & required no use of DME PTA  Pt initially reported having no social support and later revealed weekend visits from son  Currently pt completes UB ADL tasks w/ Min A and LB ADL tasks w/ Mod A  Pt completed functional transfers and mobility w/ Min A using a RW  Pt is limited at this time 2'   endurance, activity tolerance, functional mobility, decreased I w/ ADLS/IADLS, strength, cognitive impairments, decreased safety awareness and decreased insight into deficits   The following occupational performance areas to address include: ADL retraining, functional transfer training, UE strengthening/ROM, endurance training, cognitive reorientation, patient/family training, equipment evaluation/education, compensatory technique education, energy conservation, and activity engagement  The patient's raw score on the AM-PAC Daily Activity inpatient short form is 16, standardized score is 35 96, less than 39 4  Patients at this level are likely to benefit from discharge to post-acute rehabilitation services  Please refer to the recommendation of the Occupational Therapist for safe discharge planning ) Based on the aforementioned OT evaluation, functional performance deficits, and assessments; pt has been identified as high complexity evaluation  From OT standpoint, anticipate d/c post-acute rehabilitation services   Pt to continue to benefit from immediate acute care OT services to address the following goals  3-5x/week to  w/in 10-14 days:       OT Discharge Recommendation: Post acute rehabilitation services  OT - OK to Discharge: Yes(to rehab when medically appropriate)

## 2021-04-22 NOTE — NURSING NOTE
23:30 pt was noted to be saturating in the mid 80's on 3L nasal cannula  Tried repositioning, and increasing oxygen with no changes in O2 saturations  Messaged on call SOD resident  Placed pt on 15-13Lmidflow, saturations increased and was able to be weaned back down to 3L nasal cannula  Goals of O2 89-94%  VBG, BNP, CXR ordered  No new orders at this time

## 2021-04-22 NOTE — OCCUPATIONAL THERAPY NOTE
Occupational Therapy Evaluation     Patient Name: Ajay Gonzalez  [id number] Date: 4/22/2021  Problem List  Principal Problem:    ST elevation myocardial infarction involving right coronary artery [facility])  Active Problems:    Type 2 diabetes mellitus with complication, with long-term current use of insulin (HCC)    Polysubstance abuse (HCC)    Alcohol abuse    QT prolongation    Past Medical History  Past Medical History:   Diagnosis Date    Alcohol abuse     Arthritis     Asthma     [facility] ([facility] )     Opiate abuse, continuous ([facility] )      Past Surgical History  Past Surgical History:   Procedure Laterality Date    ABDOMINAL SURGERY      stomach uler with perforation             04/22/21 0839   OT Last Visit   OT Visit Date 04/22/21   Note Type   Note type Evaluation   Restrictions/Precautions   Weight Bearing Precautions Per Order No   Other Precautions Multiple lines;Telemetry; Fall Risk;O2;Seizure;Cognitive; Impulsive;1:1  (2L nasal cannula O2)   Pain Assessment   Pain Assessment Tool 0-10   Pain Score No Pain   Home Living   Type of [address] [place] [address] One level;Performs ADLs on one level  (no ANAYA)   Bathroom Shower/Tub Tub/shower unit   Bathroom Toilet Standard   Bathroom Equipment   (denies DME)   [address]   (pt denies)   Prior Function   Level of [place] Independent with ADLs and functional mobility; Needs assistance with IADLs   Lives With Alone   Receives Help From Family   ADL Assistance Independent   IADLs Needs assistance   Falls in the last 6 months 0   Vocational Unemployed  (Pt reports not working at this time)   Comments Pt inconsistent historian  Pt reports IND in ADL's /IADL's and then mentioned son assisting w/ IADL's  Pt lives alone and reports having a son who visits her every Saturday  Lifestyle   Autonomy Pt resides alone in a first floor apartment and reports IND in ADL's and some assistance from son in IADL's   Pt does not drive and reports not working currently  Reciprocal Relationships Son lives in [place] and visits her every Saturday  She reports son has a spouse and 7 children  Service to Others Not working currently  Intrinsic Gratification Pt enjoys being home   Psychosocial   Psychosocial (WDL) WDL   Subjective   Subjective "I have a good son"   ADL   Where Assessed Edge of bed   Eating Assistance 5  Supervision/Setup   Grooming Assistance 5  Supervision/Setup   UB Bathing Assistance 4  Minimal Assistance   LB Bathing Assistance 3  Moderate Assistance   [address] 4  Minimal [address] 3  Moderate [address]  4  Minimal Assistance   Functional Assistance 4  Minimal Assistance   Bed Mobility   Supine to Sit 5  Supervision   Additional items HOB elevated; Bedrails; Increased time required;Verbal cues  (VC for body mechanics/redirection to task )   Additional Comments Pt was found supine in bed w/ 1:1 present  Pt was left in room in bedside chair w/ 1:1 present and all needed items within reach  Transfers   Sit to Stand 4  Minimal assistance   Additional items Assist x 1; Armrests; Increased time required;Verbal cues  (VC for hand placement on bed/RW for safety)   Stand to Sit 4  Minimal assistance   Additional items Assist x 1; Armrests; Increased time required;Verbal cues  (VC for hand placement/sequencing for safety)   Additional Comments Pt required Min A for functional transfers using a RW  Pt benefits from increased time and VC/TC for hand placement and safe DME usage  Functional Mobility   Functional Mobility 4  Minimal assistance   Additional Comments Pt completed short distance household functional mobility using RW  Pt required VC/TC for walker management and safe navigation  Pt required 2 standing rest breaks for SOB     Additional items Rolling walker   Balance   Static Sitting Fair +   Dynamic Sitting Fair   Static Standing Fair -   Dynamic Standing Poor + Ambulatory Poor +   Activity Tolerance   Activity Tolerance Patient tolerated treatment well;Patient limited by fatigue;Treatment limited secondary to medical complications (Comment)  (cognitive deficits)   Medical Staff Made Aware OT Anabella   Nurse Made Aware RN cleared pt for OT evaluation   RUE Assessment   RUE Assessment WFL   LUE Assessment   LUE Assessment WFL   Hand Function   Gross Motor Coordination Functional   Fine Motor Coordination Functional   Cognition   Overall Cognitive Status Impaired   Arousal/Participation Alert; Responsive   Attention Attends with cues to redirect   Orientation Level Oriented to person;Oriented to situation;Disoriented to place; Disoriented to time   Memory Decreased recall of precautions;Decreased recall of biographical information;Decreased recall of recent events;Decreased short term memory   Following Commands Follows one step commands with increased time or repetition   Comments Pt resistant to movement in the beginning of OT evaluation  Through pt interview, pt became cooperative and demonstrated she understood the purpose of the session  Pt inconsistent historian and required consistent VC/TC to redirect to task at hand/safety awareness  Pt demonstrated lack of insight to current functional deficits  Assessment   Limitation Decreased ADL status; Decreased Safe judgement during ADL;Decreased cognition;Decreased endurance;Decreased self-care trans;Decreased high-level ADLs; Decreased UE strength   Prognosis Fair   Assessment Pt is a 63 y o  female seen for OT evaluation on 4/22/2021 after being admitted to hospitals on 4/20/2021 w/ ST elevation myocardial infarction involving right coronary artery  Pt has a past medical history of Alcohol abuse, Arthritis, Asthma, Diabetes mellitus ([facility] [address] [facility] ), and [facility] )  Pt with active OT orders and activity as tolerated orders  Pt resides in a single story apartment alone with no ANAYA   Pt inconsistent historian, however, reports I w/ ADL's and IADL's, does not drive, & required no use of DME PTA  Pt initially reported having no social support and later revealed weekend visits from son  Currently pt completes UB ADL tasks w/ Min A and LB ADL tasks w/ Mod A  Pt completed functional transfers and mobility w/ Min A using a RW  Pt is limited at this time 2'   endurance, activity tolerance, functional mobility, decreased I w/ ADLS/IADLS, strength, cognitive impairments, decreased safety awareness and decreased insight into deficits  The following occupational performance areas to address include: ADL retraining, functional transfer training, UE strengthening/ROM, endurance training, cognitive reorientation, patient/family training, equipment evaluation/education, compensatory technique education, energy conservation, and activity engagement  The patient's raw score on the AM-PAC Daily Activity inpatient short form is 16, standardized score is 35 96, less than 39 4  Patients at this level are likely to benefit from discharge to post-acute rehabilitation services  Please refer to the recommendation of the Occupational Therapist for safe discharge planning ) Based on the aforementioned OT evaluation, functional performance deficits, and assessments; pt has been identified as high complexity evaluation  From OT standpoint, anticipate d/c post-acute rehabilitation services  Pt to continue to benefit from immediate acute care OT services to address the following goals  3-5x/week to  w/in 10-14 days:     Goals   Patient Goals "to go home"   LTG Time Frame 10-14   Long Term Goal #1 Please refer to OT note for goals   Plan   Treatment Interventions ADL retraining;Functional transfer training;UE strengthening/ROM; Endurance training;Cognitive reorientation;Patient/family training;Equipment evaluation/education; Compensatory technique education; Energy conservation; Activityengagement   Goal Expiration Date 21   OT Frequency 3-5x/wk Recommendation   OT Discharge Recommendation Post acute rehabilitation services   OT - OK to Discharge Yes  (to rehab when medically appropriate)   AM-PAC Daily Activity Inpatient   Lower Body Dressing 2   Bathing 2   Toileting 3   Upper Body Dressing 3   Grooming 3   Eating 3   Daily Activity Raw Score 16   Daily Activity Standardized Score (Calc for Raw Score >=11) 35 96   AM-PAC Applied Cognition Inpatient   Following a Speech/Presentation 3   Understanding Ordinary Conversation 4   Taking Medications 2   Remembering Where Things Are Placed or Put Away 3   Remembering List of 4-5 Errands 2   Taking Care of Complicated Tasks 2   Applied Cognition Raw Score 16   Applied Cognition Standardized Score 35 03   Modified [place] Scale   Modified [place] Scale 4       Goals:    Pt will complete UB ADL activities w/ S w/ the use of adaptive equipment/compensatory strategies as needed  Pt will complete LB ADL activities w/ S w/ the use of adaptive equipment/compensatory strategies as needed  Pt will demonstrate Good carryover of safety awareness/body mechanics/energy conservation/breathing techniques education t/o participation in meaningful ADL tasks w/o % of the time  Pt will complete safe, functional transfers to all necessary surfaces within desired daily activities w/ Mod I while demonstrating good safety awareness  Pt will complete safe, functional mobility w/ Mod I w/ the use of recommended DME  Pt will be attentive t/o a formal cognitive evaluation 100% of the time to prepare for safe discharge planning  Pt will follow single-step directions to complete ADL tasks w/ less than 2 VC for redirection to tasks  Pt will demonstrate the ability to problem-solve/sequence through desired ADL/IADL activities w/o environmental/verbal cueing 75% of the time  Pt will identify and complete a leisure activity of enjoyment for 20 minutes to promote physical, emotional, and cognitive well being      Pt will demonstrate Good, functional activity tolerance for 30 minutes t/o desired ADL activities w/ S  Pt will demonstrate Good carryover of recommended DME proper and safe usage during functional tasks 100% of the time  Pt will demonstrate Good carryover of patient/caregiver education of necessary safety precautions/DME/adaptive equipment/energy conservation techniques        Brookie Felty, OTS

## 2021-04-22 NOTE — CONSULTS
INTERPROFESSIONAL (PHONE) [facility]  Bruna Braun 63 y o  female MRN: [id number]  Unit/Bed#: [address] Encounter: [id number]      Reason for Consult / Principal Problem: QT prolongation   Consults  04/22/21      ASSESSMENT:  63year-old female with QT prolongation associated with methadone treatment  1  QT prolongation associated with methadone treatment  2  Opioid use disorder  3  Ethanol withdrawal  4  Ethanol use disorder     RECOMMENDATIONS:  I was contacted by Dr Willow Santiago for recommendations for alternatives to methadone for treatment for opioid use disorder  Given her QT prolongation, I agree that she needs to discontinue methadone  Additionally, her tachycardia is suspicious for ethanol withdrawal that likely requires more aggressive treatment with OPAL agonists  Beta blockade is ineffective in this setting  She may be transferred to the medical detox unit at [facility] to continue her medical care with acute management of the opioid and ethanol withdrawal that are currently evident; however, she does not seem to be agreeable to discontinuing her methadone  For further questions, please contact the medical  on call via [place] Text or throughl the [facility]  Service or Patient [employer]  Please see additional teaching note below:    Hx and PE limited by the dynamics of a phone consultation  I have not personally interviewed or evaluated the patient, but only advised based on the information provided to me  Primary provider is responsible for all clinical decisions  Pertinent history, physical exam and clinical findings and course discussed: Bruna Braun is a 63y o  year old female who presents with STEMI   She also have prolonged QT interval from methadone use and showed symptoms of opioid and ethanol withdrawal  I was contacted to assist with care but patient did not want to come off of her methadone despite the risks explained to her by medical team      Review of systems and physical exam not performed by me  Historical Information   Past Medical History:   Diagnosis Date    Alcohol abuse     Arthritis     Asthma     [facility] [address] )     Opiate abuse, continuous ([facility] )      Past Surgical History:   Procedure Laterality Date    ABDOMINAL SURGERY      stomach uler with perforation     Social History   Social History     Substance and Sexual Activity   Alcohol Use Yes    Frequency: 4 or more times a week    Drinks per session: 7 to 9    Binge frequency: Daily or almost daily    Comment: Drinking  1 pint of vodka daily up to 2 month ago for 2 years,started drinking again yesterday     Social History     Substance and Sexual Activity   Drug Use Yes    Comment: on methadone     Social History     Tobacco Use   Smoking Status Current Every Day Smoker    Packs/day: 2 00    Types: Cigarettes   Smokeless Tobacco Never Used     No family history on file  Prior to Admission medications    Medication Sig Start Date End Date Taking?  Authorizing Provider   albuterol (VENTOLIN HFA) 90 mcg/act inhaler Inhale 2 puffs every 6 (six) hours as needed for wheezing 3/22/19   Mukesh Hairston,    atorvastatin (LIPITOR) 20 mg tablet Take 1 tablet (20 mg total) by mouth daily with dinner 5/25/20   Damien Kumari MD   Insulin Pen Needle 29G X 12MM MISC by Does not apply route daily at bedtime 3/22/19   Mukesh Hairston, DO   lisinopril (ZESTRIL) 10 mg tablet Take 1 tablet (10 mg total) by mouth daily 5/26/20   Damien Kumari MD   METHADONE HCL PO Take 130 mg by mouth daily     Historical Provider, MD   nicotine (NICODERM CQ) 21 mg/24 hr TD 24 hr patch Place 1 patch on the skin daily 5/26/20   Damien Kumari MD       Current Facility-Administered Medications   Medication Dose Route Frequency    acetaminophen (TYLENOL) tablet 650 mg  650 mg Oral Q4H PRN    albuterol (PROVENTIL HFA,VENTOLIN HFA) inhaler 2 puff  2 puff Inhalation Q4H PRN    aspirin chewable tablet 81 mg  81 mg Oral Daily    atorvastatin (LIPITOR) tablet 80 mg  80 mg Oral QPM    clopidogrel (PLAVIX) tablet 75 mg  75 mg Oral Daily    enoxaparin (LOVENOX) subcutaneous injection 40 mg  40 mg Subcutaneous Daily    insulin lispro (HumaLOG) 100 units/mL subcutaneous injection 1-5 Units  1-5 Units Subcutaneous TID AC    methadone (DOLOPHINE) tablet 80 mg  80 mg Oral Daily    [START ON 4/23/2021] metoprolol succinate (TOPROL-XL) 24 hr tablet 100 mg  100 mg Oral Daily    metoprolol tartrate (LOPRESSOR) tablet 50 mg  50 mg Oral Once    nicotine (NICODERM CQ) 21 mg/24 hr TD 24 hr patch 1 patch  1 patch Transdermal Daily    potassium chloride (K-DUR,KLOR-CON) CR tablet 40 mEq  40 mEq Oral Once    thiamine tablet 100 mg  100 mg Oral Daily       No Known Allergies    Objective       Intake/Output Summary (Last 24 hours) at 4/22/2021 1605  Last data filed at 4/22/2021 1127  Gross per 24 hour   Intake 1358 33 ml   Output 370 ml   Net 988 33 ml       Invasive Devices:   Peripheral IV 04/20/21 Right Antecubital (Active)   Site Assessment [facility] 04/22/21 0800   Dressing Type Transparent 04/22/21 0800   Line Status Flushed;Saline locked 04/22/21 0800   Dressing Status Clean;Dry; Intact 04/22/21 0800   Dressing Change Due 04/24/21 04/22/21 0800   Reason Not Rotated Not due 04/21/21 2010       Peripheral IV 04/20/21 Right Forearm (Active)   Site Assessment [facility] 04/22/21 0800   Dressing Type Transparent 04/22/21 0800   Line Status Flushed;Saline locked 04/22/21 0800   Dressing Status Clean;Dry; Intact 04/22/21 0800   Dressing Change Due 04/24/21 04/22/21 0800   Reason Not Rotated Not due 04/21/21 2010       Vitals   Vitals:    04/22/21 1127 04/22/21 1515 04/22/21 1515 04/22/21 1518   BP: (!) 89/64      TempSrc: Oral      Pulse: 95      Resp: 16      Patient Position - Orthostatic VS:       Temp: (!) 97 °F (36 1 °C) 98 2 °F (36 8 °C) 98 2 °F (36 8 °C) 98 2 °F (36 8 °C)         EKG, Pathology, and/or Other Studies: I have personally reviewed pertinent reports  Lab Results: I have personally reviewed pertinent reports  Labs:    Results from last 7 days   Lab Units 04/22/21  0619  04/20/21  1422   WBC Thousand/uL 18 46*   < > 9 52   HEMOGLOBIN g/dL 13 2   < > 15 9*   HEMATOCRIT % 40 1   < > 48 4*   PLATELETS Thousands/uL 474*   < > 479*   NEUTROS PCT %  --   --  80*   LYMPHS PCT %  --   --  10*   MONOS PCT %  --   --  9    < > = values in this interval not displayed  Results from last 7 days   Lab Units 04/22/21  0619 04/21/21  0631 04/21/21  0442  04/20/21  1422   SODIUM mmol/L 135*  --  134*   < > 132*   POTASSIUM mmol/L 3 9  --  4 0   < > 2 9*   CHLORIDE mmol/L 101  --  97*   < > 89*   CO2 mmol/L 29  --  30   < > 30   BUN mg/dL 13  --  9   < > 5   CREATININE mg/dL 0 50*  --  0 55*   < > 0 48*   CALCIUM mg/dL 8 4  --  9 1   < > 8 9   ALK PHOS U/L  --   --   --   --  179*   ALT U/L  --   --   --   --  33   AST U/L  --   --   --   --  225*   MAGNESIUM mg/dL  --   --  2 4   < > 1 3*   PHOSPHORUS mg/dL  --  2 7  --   --   --     < > = values in this interval not displayed  Results from last 7 days   Lab Units 04/20/21  1422   INR  1 00   PTT seconds 24         0   Lab Value Date/Time    TROPONINI 18 40 (H) 04/20/2021 1422    TROPONINI <0 02 05/25/2020 0106    TROPONINI <0 02 05/24/2020 2141    TROPONINI <0 02 05/24/2020 1844    TROPONINI <0 02 07/10/2019 1708     Results from last 7 days   Lab Units 04/22/21  0009   PH ESTHER  7 482*   PCO2 ESTHER mm Hg 39 1*   PO2 ESTHER mm Hg 57 0*   HCO3 ESTHER mmol/L 28 6   O2 CONTENT ESTHER ml/dL 17 7   O2 HGB, VENOUS % 88 0*     Results from last 7 days   Lab Units 04/20/21  1422   ETHANOL LVL mg/dL 22*           Imaging Studies: I have personally reviewed pertinent reports  Counseling / Coordination of Care  Total time spent today 34 minutes  This was a phone consultation

## 2021-04-22 NOTE — DISCHARGE INSTRUCTIONS
1  Please see the post angioplasty discharge instructions  No heavy lifting, greater than 10 lbs  or strenuous activity for 1 week  Follow angioplasty discharge instructions  2 Remove band aid tomorrow  Shower and wash area- wrist gently with soap and water- beginning tomorrow  Rinse and pat dry  Apply new water seal band aid  Repeat this process for 5 days  No powders, creams lotions or antibiotic ointments  for 5 days  No tub baths, hot tubs or swimming for 5 days  3  Call [facility]iology Office ([phone]) if you develop a fever, redness or drainage at your wrist access site  4  No driving for 2 days    5  Do not stop aspirin or Plavix (clopiogrel) any reason without a cardiologists consent, or the stent could block up and cause a heart attack  6  Stent card and book  Coronary Intravascular Stent Placement   WHAT YOU SHOULD KNOW:   Coronary intravascular stent placement is a procedure to place a stent in an artery of your heart that has plaque buildup  Plaque is a mixture of fat and cholesterol  A stent is a small mesh tube made of metal that helps keep your artery open  Your caregiver may place a bare metal stent or a drug-eluting stent (EDMUND) in your artery  A EDMUND is coated with medicine that is slowly released and helps prevent more plaque buildup in the area where the stent is placed  The stent remains in your artery for life  You may need more than one stent  AFTER YOU LEAVE:   Medicines: You will be given any of the following:  · Antiplatelets  prevent blood clots from forming  You will need to take aspirin and another type of platelet medicine  Take this medicine daily as directed  Do not stop taking aspirin or other type of antiplatelet medicine  · Nitrates , such as nitroglycerin, relax the arteries of your heart so it gets more oxygen  This medicine helps to relieve chest pain      · Cholesterol medicine  helps decrease the amount of cholesterol in your blood     · Blood pressure medicine  lowers your blood pressure  · Take your medicine as directed  Call your healthcare provider if you think your medicine is not helping or if you have side effects  Tell him if you are allergic to any medicine  Keep a list of the medicines, vitamins, and herbs you take  Include the amounts, and when and why you take them  Bring the list or the pill bottles to follow-up visits  Carry your medicine list with you in case of an emergency  Follow up with your cardiologist as directed:  Write down your questions so you remember to ask them during your visits  Activity:   · Avoid unnecessary stair climbing for 48 hours, if a catheter was put in your groin  · Do not place pressure on your arm, hand, or wrist, if the catheter was placed in your wrist  Avoid pushing, pulling, or heavy lifting with that arm  · If you need to cough, support the area where the catheter was inserted with your hand  · Ask your cardiologist how long you need to limit movement and avoid certain activities  · You may feel like resting more after your procedure  Slowly start to do more each day  Rest when you feel it is needed  Wound care:  Ask your cardiologist about how to care for your incision wound  Ask when you can get into a tub, shower, or pool  Do not smoke: If you smoke, it is never too late to quit  Smoking increases your risk for heart disease and stroke  Ask your cardiologist for information if you need help quitting  Cardiac rehab:  Your cardiologist may recommend that you attend cardiac rehabilitation (rehab)  This is a program run by specialists who will help you safely strengthen your heart and reduce the risk of more heart disease  The plan includes exercise, relaxation, stress management, and heart-healthy nutrition  Caregivers will also check to make sure any medicines you are taking are working  Contact your cardiologist if:   · You have a fever or chills       · You have questions or concerns about your condition or care  Seek care immediately or call 911 if:   · Your leg or arm, used for the procedure, becomes numb or turns white or blue  · The area where the catheter was placed is swollen, red, or has pus or foul-smelling fluid coming from it  · Your arm or leg feels warm, tender, and painful  It may look swollen and red  · You start to bleed from your catheter site again  · You have any of the following signs of a heart attack:     ¨ Squeezing, pressure, fullness, or pain in your chest that lasts longer than a few minutes or returns     ¨ Discomfort or pain in your back, neck, jaw, stomach, or arm    ¨ Shortness of breath or breathing problems    ¨ A sudden cold sweat, lightheadedness, dizziness, or nausea, especially with chest pain or trouble breathing    · You have any of the following signs of a stroke:     ¨ Part of your face droops or is numb    ¨ Weakness in an arm or leg    ¨ Confusion or difficulty speaking    ¨ Dizziness, a severe headache, or vision loss  © 2014 [employer] is for End User's use only and may not be sold, redistributed or otherwise used for commercial purposes  All illustrations and images included in CareNotes® are the copyrighted property of A [employer] M [employer] Inc  or [employer]  The above information is an  only  It is not intended as medical advice for individual conditions or treatments  Talk to your doctor, nurse or pharmacist before following any medical regimen to see if it is safe and effective for you

## 2021-04-22 NOTE — PROGRESS NOTES
INTERNAL MEDICINE RESIDENCY PROGRESS NOTE     PATIENT INFORMATION     Name: Kallie Senior   Age & Sex: 63 y o  female   MRN: [id number]  Hospital Stay Days: 2  Unit/Bed#: [facility] 519-01   Encounter: [id number]  Team: SOD Team A    ASSESSMENT/PLAN     Principal Problem:    ST elevation myocardial infarction involving right coronary artery ([facility] )  Active Problems:    Type 2 diabetes mellitus with complication, with long-term current use of insulin ([facility])    Polysubstance abuse ([place] [address] )    Alcohol abuse    QT prolongation    QT prolongation  Assessment & Plan  Qtc 518    Plan:  -patient endorses intermittent nausea  Likely multifactorial- 2/2 MI, anesthesia s/p cath, and withdrawal from EtOH   -AVOID QTc prolonging agents- Zofran, Reglan     -patient was spoken to regarding discontinuation of methadone and switching to Suboxone due to QT prolongation, however the patient adamantly refused  See quick note for details   -Treat patient per CIWA protocol    Alcohol abuse  Assessment & Plan  Patient drinks daily, last drink was 2-3 days prior to admission  History of multiple ED visits for withdrawal  History of past withdrawal seizure    Blood alcohol level 22 on arrival    Plan:  -CIWA protocol with p r n  Ativan  -100 mg thiamine daily        History of pulmonary embolism  Assessment & Plan  -    Subtle subsegmental PE in 2019 was previously on Pradaxa    Plan:  -  DVT prophylaxis- lovenox    Polysubstance abuse ([place] [address] )  Assessment & Plan  Denies recent usage of cocaine or meth  On Methadone at home    Plan:  -continue methadone 80 mg daily- dose confirmed by [facility]   -patient was spoken to regarding discontinuation of methadone and switching to Suboxone due to QT prolongation, however the patient adamantly refused  See quick note for details      Type 2 diabetes mellitus with complication, with long-term current use of insulin [facility]  Assessment & Plan  Lab Results   Component Value Date    HGBA1C 5 8 (H) 2021       Recent Labs     21  1552 21  2119 21  0638 21  1059   POCGLU 98 93 84 102       Blood Sugar Average: Last 72 hrs:  (P) 116     Plan:  -sugars are well controlled  -SSI    * ST elevation myocardial infarction involving right coronary artery ([address] )  Assessment & Plan  Presented with SOB, generalized body aches, n/v x3 days  Troponin 18 4    ECG: ST elevations in leads II, III, aVF, and ST depression in I, aVL, V3-V6  T waves were inverted in II, III, and aVF  Plan:  -inferior wall STEMI 2/2 distal RCA acute plaque rupture  Now s/p thrombectomy and EDMUND placement    -continue ASA 81 qd  -cont Brilinta 90 mg bid  -cont lipitor 80 hs  -cont metoprolol 25 mg bid  -maintain K >4, Mag >2  -ISAIAH showed an EF of 45%        Disposition: Inpatient     SUBJECTIVE     Patient seen and examined  Overnight the patient's oxygen saturation desatted to the 70s and the patient's oxygen requirement was increased from 2 L to 15 L  A chest x-ray was ordered which was normal, and a BNP was ordered which was 8,477  The patient was examined by night team and her lung exam was unremarkable  Her oxygen requirement was slowly weaned down until early in the morning when she was back to 2 L and was satting in the mid 90s  On examination today the patient seemed much more alert and talkative than the days prior  She was resting comfortably in bed and denied any acute complaints aside from a mild cough      OBJECTIVE     Vitals:    21 0517 21 0700 21 0800 21 1127   BP:  117/85  (!) 89/64   Pulse:  (!) 107  95   Resp:  (!) 23  16   Temp:  98 °F (36 7 °C)  (!) 97 °F (36 1 °C)   TempSrc:  Oral  Oral   SpO2: 94% 95% 95% 94%   Weight:       Height:          Temperature:   Temp (24hrs), Av 6 °F (36 4 °C), Min:96 9 °F (36 1 °C), Max:98 4 °F (36 9 °C)    Temperature: (!) 97 °F (36 1 °C)  Intake & Output:  I/O        0700    P  O  0  460    I V  (mL/kg) 1000 (25 4) 1011 7 (25 7)     IV Piggyback  500     Total Intake(mL/kg) 1000 (25 4) 1511 7 (38 4) 460 (11 7)    Urine (mL/kg/hr) 700 370 (0 4)     Total Output 700 370     Net +300 +1141 7 +460           Unmeasured Urine Occurrence 1 x 1 x           Intake/Output Summary (Last 24 hours) at 4/22/2021 1409  Last data filed at 4/22/2021 1127  Gross per 24 hour   Intake 1971 66 ml   Output 370 ml   Net 1601 66 ml     I/O this shift: In: 26 [P O :460]  Out: -   Weights:   IBW (Ideal Body Weight): 38 6 kg    Body mass index is 18 8 kg/m²  Weight (last 2 days)     Date/Time   Weight    04/22/21 0600   --    Weight:  charted on wrong patient at 04/22/21 0600    04/20/21 1828   39 4 (86 9)            Physical Exam  Constitutional:       Appearance: She is well-developed  HENT:      Head: Normocephalic and atraumatic  Nose: Nose normal    Eyes:      Conjunctiva/sclera: Conjunctivae normal       Pupils: Pupils are equal, round, and reactive to light  Neck:      Vascular: No JVD  Cardiovascular:      Rate and Rhythm: Normal rate and regular rhythm  Heart sounds: Normal heart sounds  No murmur  No friction rub  No gallop  Pulmonary:      Effort: Pulmonary effort is normal  No respiratory distress  Breath sounds: Normal breath sounds  No stridor  No wheezing or rales  Comments: Occasional cough  Chest:      Chest wall: No tenderness  Abdominal:      General: Bowel sounds are normal  There is no distension  Palpations: Abdomen is soft  There is no mass  Tenderness: There is no abdominal tenderness  There is no guarding or rebound  Hernia: No hernia is present  Musculoskeletal:         General: No tenderness or deformity  Right lower leg: No edema  Left lower leg: No edema  Skin:     General: Skin is warm and dry  Neurological:      Mental Status: She is alert and oriented to person, place, and time     Psychiatric:         Mood and Affect: Mood normal          Behavior: Behavior normal          Thought Content: Thought content normal          Judgment: Judgment normal         LABORATORY DATA     Labs: I have personally reviewed pertinent reports  Results from last 7 days   Lab Units 04/22/21  0619 04/21/21 0442 04/20/21 1754 04/20/21  1422   WBC Thousand/uL 18 46* 13 43*  --  9 52   HEMOGLOBIN g/dL 13 2 15 4  --  15 9*   HEMATOCRIT % 40 1 46 4*  --  48 4*   PLATELETS Thousands/uL 474* 496* 523* 479*   NEUTROS PCT %  --   --   --  80*   MONOS PCT %  --   --   --  9      Results from last 7 days   Lab Units 04/22/21  0619 04/21/21 0442 04/20/21 2059 04/20/21  1422   POTASSIUM mmol/L 3 9 4 0 3 4* 2 9*   CHLORIDE mmol/L 101 97* 93* 89*   CO2 mmol/L 29 30 31 30   BUN mg/dL 13 9 5 5   CREATININE mg/dL 0 50* 0 55* 0 50* 0 48*   CALCIUM mg/dL 8 4 9 1 9 3 8 9   ALK PHOS U/L  --   --   --  179*   ALT U/L  --   --   --  33   AST U/L  --   --   --  225*     Serum creatinine: 0 5 mg/dL (L) 04/22/21 9237  Estimated creatinine clearance: 75 4 mL/min (A)   Results from last 7 days   Lab Units 04/21/21  0442 04/20/21 2059 04/20/21  1422   MAGNESIUM mg/dL 2 4 2 4 1 3*     Results from last 7 days   Lab Units 04/21/21  0631   PHOSPHORUS mg/dL 2 7      Results from last 7 days   Lab Units 04/20/21  1422   INR  1 00   PTT seconds 24         Results from last 7 days   Lab Units 04/20/21  1422   TROPONIN I ng/mL 18 40*       IMAGING & DIAGNOSTIC TESTING     Radiology Results: I have personally reviewed pertinent reports  Xr Chest Portable    Result Date: 4/22/2021  Impression: No acute cardiopulmonary disease  Workstation performed: [id number]     Xr Chest 1 View Portable    Result Date: 4/20/2021  Impression: No acute cardiopulmonary disease  Workstation performed: [id number]     Other Diagnostic Testing: I have personally reviewed pertinent reports        ACTIVE MEDICATIONS     Current Facility-Administered Medications   Medication Dose Route Frequency    acetaminophen (TYLENOL) tablet 650 mg  650 mg Oral Q4H PRN    albuterol (PROVENTIL HFA,VENTOLIN HFA) inhaler 2 puff  2 puff Inhalation Q4H PRN    aspirin chewable tablet 81 mg  81 mg Oral Daily    atorvastatin (LIPITOR) tablet 80 mg  80 mg Oral QPM    clopidogrel (PLAVIX) tablet 75 mg  75 mg Oral Daily    enoxaparin (LOVENOX) subcutaneous injection 40 mg  40 mg Subcutaneous Daily    insulin lispro (HumaLOG) 100 units/mL subcutaneous injection 1-5 Units  1-5 Units Subcutaneous TID AC    methadone (DOLOPHINE) tablet 80 mg  80 mg Oral Daily    [START ON 4/23/2021] metoprolol succinate (TOPROL-XL) 24 hr tablet 100 mg  100 mg Oral Daily    metoprolol tartrate (LOPRESSOR) tablet 50 mg  50 mg Oral Once    nicotine (NICODERM CQ) 21 mg/24 hr TD 24 hr patch 1 patch  1 patch Transdermal Daily    potassium chloride (K-DUR,KLOR-CON) CR tablet 40 mEq  40 mEq Oral Once    thiamine tablet 100 mg  100 mg Oral Daily     VTE Pharmacologic Prophylaxis: Enoxaparin (Lovenox)  VTE Mechanical Prophylaxis: sequential compression device    ==  Cash Payton MD  IM Resident, PGY-1  [facility]'s Internal Medicine Residency

## 2021-04-22 NOTE — UTILIZATION REVIEW
Continued Stay Review     Date: 4/22/21 Thursday                          Current Patient Class: Inpatient  Current Level of Care: Level 1 Stepdown     HPI:   63year old female with PMHx DM, polysubstance opiate use on methadone, etoh with prior withdrawal, active smoker  Teri Singh presented to [address] on 4/20/21 2nd 3 day history of precordial chest pain, SOB and generalized body aches with nausea vomiting diarrhea   EKG showed acute Inferior wall ST elevation with troponin 18 4    Ttx with IVF, IV Heparin, NTG sl, ASA+ Brilinta, IV Zofran + Ativan  and  transferred to [facility] for emergent cardiac catheterization   Cardiac catheterization showed acute plaque rupture MI in distal RCA status post EDMUND placement       4/22  Coronary artery disease, new diagnosis of inferior STEMI-status post PCI of the RCA and now with ischemic cardiomyopathy  ECG shows improvement in ST elevations but does show persistent QT prolongation around 600 milliseconds of corrected QT interval  Continue dual antiplatelet therapy-now on Plavix, statin and beta-blocker  Change beta-blocker to metoprolol  mg from tomorrow   Lisinopril resumed today  Encephalopathy:   mental status is much better, more awake and alert although still falls asleep while  answering questions  Hypoxia:  Suspected to be from sleep apnea based on documentation from last night      Cardiology Plan:  -Continue DAPT with aspirin 81 mg daily, and clopidogrel 75 mg daily  -Continue atorvastatin 40 mg daily  -Will up titrate metoprolol tartrate to 50 mg b i d  -- switch to Toprol  mg daily starting tomorrow morning   -Start lisinopril 5 mg daily  -Would like to discuss w/pain management service if an alternative to methadone is possible given prolonged QT interval     Vital Signs:   04/22/21 1127  97 °F (36 1 °C)Abnormal   95  16  89/64Abnormal   --  94 %  --  --  --  --   04/22/21 0800  --  --  --  --  --  95 %  28  2 L/min  Nasal cannula  --   04/22/21 0700  98 °F (36 7 °C)  107Abnormal   23Abnormal   117/85  --  95 %  --  --  --  --   04/22/21 0517  --  --  --  --  --  94 %  28  2 L/min  Nasal cannula  --   04/22/21 0400  --  110  --  --  --  --  32  3 L/min  --  --   04/22/21 0351  --  --  --  --  --  94 %  36  4 L/min  Nasal cannula  --   04/22/21 0110  --  112  --  --  --  91 %  36  4 L/min  Nasal cannula  --   04/22/21 0000  --  --  --  115/84  --  93 %  32  3 L/min  Nasal cannula       I/O 04/20 0701 04/21 0700 04/21 0701 04/22 0700 04/22 0701      P  O  0  460   I V  (mL/kg) 1000 (25 4) 1011 7 (25 7)    IV Piggyback  500    Total Intake(mL/kg) 1000 (25 4) 1511 7 (38 4) 460 (11 7)   Urine (mL/kg/hr) 700 370 (0 4)    Total Output 700 370    Net +300 +1141 7 +460         Unmeasured Urine Occurrence 1 x 1 x      Pertinent Labs/Diagnostic Results:     Results from last 7 days   Lab Units 04/22/21  0619 04/21/21 0442 04/20/21  1754 04/20/21  1422   WBC Thousand/uL 18 46* 13 43*  --  9 52   HEMOGLOBIN g/dL 13 2 15 4  --  15 9*   HEMATOCRIT % 40 1 46 4*  --  48 4*   PLATELETS Thousands/uL 474* 496* 523* 479*   NEUTROS ABS Thousands/µL  --   --   --  7 68*       Results from last 7 days   Lab Units 04/22/21  0619 04/21/21  0631 04/21/21  0442 04/20/21  2059 04/20/21  1422   SODIUM mmol/L 135*  --  134* 131* 132*   POTASSIUM mmol/L 3 9  --  4 0 3 4* 2 9*   CHLORIDE mmol/L 101  --  97* 93* 89*   CO2 mmol/L 29  --  30 31 30   ANION GAP mmol/L 5  --  7 7 13   BUN mg/dL 13  --  9 5 5   CREATININE mg/dL 0 50*  --  0 55* 0 50* 0 48*   EGFR ml/min/1 73sq m 123  --  119 123 124   CALCIUM mg/dL 8 4  --  9 1 9 3 8 9   MAGNESIUM mg/dL  --   --  2 4 2 4 1 3*   PHOSPHORUS mg/dL  --  2 7  --   --   --      Results from last 7 days   Lab Units 04/20/21  1422   AST U/L 225*   ALT U/L 33   ALK PHOS U/L 179*   TOTAL PROTEIN g/dL 8 4*   ALBUMIN g/dL 2 8*   TOTAL BILIRUBIN mg/dL 0 34     Results from last 7 days   Lab Units 04/22/21  1059 04/22/21  0337 04/21/21  2119 04/21/21  1552 04/21/21  1108 04/21/21  0614 04/20/21  2108 04/20/21  1705   POC GLUCOSE mg/dl 102 84 93 98 115 166* 130 140     Results from last 7 days   Lab Units 04/22/21  0619 04/21/21  0442 04/20/21  2059 04/20/21  1422   GLUCOSE RANDOM mg/dL 84 121 130 147*       Results from last 7 days   Lab Units 04/20/21  1754   HEMOGLOBIN A1C % 5 8*   EAG mg/dl 120     Results from last 7 days   Lab Units 04/22/21  0009   PH ESTHER  7 482*   PCO2 ESTHER mm Hg 39 1*   PO2 ESTHER mm Hg 57 0*   HCO3 ESTHER mmol/L 28 6   BASE EXC ESTHER mmol/L 4 9   O2 CONTENT ESTHER ml/dL 17 7   O2 HGB, VENOUS % 88 0*     Results from last 7 days   Lab Units 04/20/21  1422   TROPONIN I ng/mL 18 40*     Results from last 7 days   Lab Units 04/20/21  1422   PROTIME seconds 13 1   INR  1 00   PTT seconds 24     Results from last 7 days   Lab Units 04/21/21  0442   TSH 3RD GENERATON uIU/mL 0 331*     Results from last 7 days   Lab Units 04/22/21  0014   NT-PRO BNP pg/mL 8,477*     Results from last 7 days   Lab Units 04/20/21  1422   LIPASE u/L 162     Results from last 7 days   Lab Units 04/20/21  1422   ETHANOL LVL mg/dL 22*      Diet Cardiovascular; Cardiac    Scheduled Medications:  aspirin, 81 mg, Oral, Daily  atorvastatin, 80 mg, Oral, QPM  clopidogrel, 75 mg, Oral, Daily  enoxaparin, 40 mg, Subcutaneous, Daily  insulin lispro, 1-5 Units, Subcutaneous, TID AC  Lisinopril 5mg, Oral, Daily  methadone, 80 mg, Oral, Daily  [START ON 4/23/2021] metoprolol succinate, 100 mg, Oral, Daily  metoprolol tartrate, 50 mg, Oral, Once  nicotine, 1 patch, Transdermal, Daily  potassium chloride, 40 mEq, Oral, Once  thiamine, 100 mg, Oral, Daily      Continuous IV Infusions:  IVF sodium chloride 0 9 % infusion   Ordered Dose: 100 mL/hr Route: Intravenous Frequency: Continuous @ 100 mL/hr   Scheduled Start Date/Time: 04/21/21 1415 End Date/Time: 04/22/21 0305       PRN Meds:  acetaminophen, 650 mg, Oral, Q4H PRN GIVEN X 1/ 24 HRS  albuterol, 2 puff, Inhalation, Q4H PRN    Discharge Plan: To be determined   Inpatient Case Management following for all discharge needs    Network Utilization Review Department  ATTENTION: Please call with any questions or concerns to [phone] and carefully listen to the prompts so that you are directed to the right person  All voicemails are confidential   Sonia Judd all requests for admission clinical reviews, approved or denied determinations and any other requests to dedicated fax number below belonging to the campus where the patient is receiving treatment   List of dedicated fax numbers for the Facilities:  [address] DENIALS (Administrative/Medical Necessity) [phone]   [address] (Maternity/NICU/Pediatrics) [phone]   [address] [facility] [id number] [place] [address] Tosha Aguilar [address] [facility] [place] [address] [phone]

## 2021-04-22 NOTE — PROGRESS NOTES
General Cardiology   Progress Note -  Team One   Mynor Mahmood 63 y o  female MRN: [id number]    Unit/Bed#: [facility] 519-01 Encounter: [id number]    Assessment  1  Acute inferior STEMI  2  CAD s/p PCI/EDMUND x1 to the distal RCA  -Presented to the [address] [place] [address] ED with complaints of dyspnea  -ECG in the ED demonstrated inferior ST elevations  -Troponin elevation 18 40  -Loaded with Brilinta 180 mg and 4000 units of IV heparin  -Emergent LHC 4/20/21; 1 vessel CAD, distal % stenosis -- lesion was associated with a moderate filling defect consistent with thrombus    -On DAPT with aspirin 81 mg daily, and Brilinta 90 mg q 12 hours  -On atorvastatin 80 mg daily, and metoprolol tartrate 25 mg q 12 hours     3  Newly diagnosed ischemic cardiomyopathy LVEF 45%   -On PE -- appears to be euvolemic, lung fields diminished at the bases no crackles, no JVD, and no peripheral edema  On RA, sats ranging in the low 90s  -Denies specific cardiac complaints  -NT proBNP 8477  -VBG 4/22 -- SvO2 88  -Chest x-ray 4/22; mild increased prominence of the interstitial markings -- no change from prior x-ray 4/20   -TTE 4/21/2021; LVEF 40%, severe hypokinesis of the basal-mid inferior septal, and basal-mid inferior wall, RV normal size systolic function, mild MR, mild AI, and trace TR  -Previously on no oral diuretics as an outpatient     4  QTC prolongation -- appears chronic   -12 lead ECG 4/20; ST, rate 109 bpm,  msec  -12 lead ECG 4/21; ST, rate 110 bpm, QTC (corrected 596 msec)  -On methadone (chronically)  -Electrolytes stable on BMP this a m     5  Dyslipidemia  -Lipid profile 4/21/21; cholesterol 196, triglycerides 110, HDL 54,   -On atorvastatin 80 mg daily     6   Hypertension  -Average /81, last recorded 17/85,   -Previously on lisinopril 10 mg daily (on hold)  -Currently on metoprolol tartrate 25 mg b i d      7  History of polysubstance abuse  -Denies recent usage of cocaine or methamphetamines  -On methadone 80 mg daily   8  Alcohol abuse  -Daily alcohol consumption, Medical alcohol level 22 on admission  -[facility] protocol -- issues with alcohol withdrawal in the past  -On thiamine 100 mg daily     9  DM type 2  -HGB A1c 5 8 -- 4/20/2021     Plan  -Continue DAPT with aspirin 81 mg daily, and clopidogrel 75 mg daily  -Continue atorvastatin 40 mg daily  -Will up titrate metoprolol tartrate to 50 mg b i d  -- switch to Toprol  mg daily starting tomorrow morning   -Start lisinopril 5 mg daily  -Would like to discuss w/pain management service if an alternative to methadone is possible given prolonged QT interval   -Strict I&Os, daily, 2 g NA +diet 2 L FR  -Monitor renal function electrolytes closely -- replete to maintain K + at 4 0, magnesium 2 0  -Continue to monitor closely on telemetry        Subjective  Review of Systems   Constitution: Positive for malaise/fatigue  Cardiovascular: Negative for chest pain, dyspnea on exertion, irregular heartbeat and palpitations  Respiratory: Positive for cough  Negative for shortness of breath  Gastrointestinal: Negative for abdominal pain  Neurological: Negative for dizziness, headaches and light-headedness  Objective:   Physical Exam  Vitals signs and nursing note reviewed  Constitutional:       General: She is not in acute distress  Appearance: She is not ill-appearing  Comments: More alert than yesterday but still drowsy and intermittently falling asleep during conversation  She does follow commands    HENT:      Head: Normocephalic and atraumatic  Mouth/Throat:      Mouth: Mucous membranes are dry  Eyes:      General: No scleral icterus  Neck:      Comments: No JVD  Cardiovascular:      Rate and Rhythm: Normal rate and regular rhythm  Pulses: Normal pulses  Heart sounds: Normal heart sounds  No murmur  Pulmonary:      Effort: Pulmonary effort is normal       Breath sounds: Normal breath sounds  No wheezing or rales  Abdominal:      Palpations: Abdomen is soft  Musculoskeletal:      Right lower leg: No edema  Left lower leg: No edema  Skin:     General: Skin is warm and dry  Capillary Refill: Capillary refill takes less than 2 seconds  Neurological:      General: No focal deficit present  Psychiatric:         Mood and Affect: Mood normal          Behavior: Behavior normal          Vitals: Blood pressure 117/85, pulse (!) 107, temperature 98 °F (36 7 °C), temperature source Oral, resp  rate (!) 23, height 4' 9" (1 448 m), weight 39 4 kg (86 lb 14 4 oz), SpO2 95 %, not currently breastfeeding ,     Body mass index is 18 8 kg/m²  ,   Systolic (65EKN), MWH:843 , Min:112 , OUN:620     Diastolic (73GCK), ZHW:69, Min:60, Max:85      Intake/Output Summary (Last 24 hours) at 4/22/2021 0939  Last data filed at 4/22/2021 0324  Gross per 24 hour   Intake 1511 66 ml   Output 370 ml   Net 1141 66 ml     Weight (last 2 days)     Date/Time   Weight    04/22/21 0600   --    Weight:  charted on wrong patient at 04/22/21 0600    04/20/21 1828   39 4 (86 9)              LABORATORY RESULTS  Results from last 7 days   Lab Units 04/20/21  1422   TROPONIN I ng/mL 18 40*     CBC with diff:   Results from last 7 days   Lab Units 04/22/21  0619 04/21/21  0442 04/20/21  1754 04/20/21  1422   WBC Thousand/uL 18 46* 13 43*  --  9 52   HEMOGLOBIN g/dL 13 2 15 4  --  15 9*   HEMATOCRIT % 40 1 46 4*  --  48 4*   MCV fL 89 89  --  88   PLATELETS Thousands/uL 474* 496* 523* 479*   MCH pg 29 3 29 4  --  28 8   MCHC g/dL 32 9 33 2  --  32 9   RDW % 13 9 13 8  --  13 2   MPV fL 9 6 9 5 9 5 9 3   NRBC AUTO /100 WBCs  --   --   --  0       CMP:  Results from last 7 days   Lab Units 04/22/21  0619 04/21/21  0442 04/20/21 2059 04/20/21  1422   POTASSIUM mmol/L 3 9 4 0 3 4* 2 9*   CHLORIDE mmol/L 101 97* 93* 89*   CO2 mmol/L 29 30 31 30   BUN mg/dL 13 9 5 5   CREATININE mg/dL 0 50* 0 55* 0 50* 0 48*   CALCIUM mg/dL 8 4 9 1 9 3 8 9   AST U/L  --   -- --  225*   ALT U/L  --   --   --  33   ALK PHOS U/L  --   --   --  179*   EGFR ml/min/1 73sq m 123 119 123 124       BMP:  Results from last 7 days   Lab Units 21  0619 21  1422   POTASSIUM mmol/L 3 9 4 0 3 4* 2 9*   CHLORIDE mmol/L 101 97* 93* 89*   CO2 mmol/L 29 30 31 30   BUN mg/dL 13 9 5 5   CREATININE mg/dL 0 50* 0 55* 0 50* 0 48*   CALCIUM mg/dL 8 4 9 1 9 3 8 9       Lab Results   Component Value Date    NTBNP 8,477 (H) 2021        Results from last 7 days   Lab Units 21  1422   MAGNESIUM mg/dL 2 4 2 4 1 3*       Results from last 7 days   Lab Units 21  1754   HEMOGLOBIN A1C % 5 8*       Results from last 7 days   Lab Units 21  0442   TSH 3RD GENERATON uIU/mL 0 331*   FREE T4 ng/dL 1 67*       Results from last 7 days   Lab Units 21  1422   INR  1 00       Lipid Profile:   No results found for: CHOL  Lab Results   Component Value Date    HDL 54 2021     2020    HDL 87 (H) 2019     Lab Results   Component Value Date    LDLCALC 120 (H) 2021    LDLCALC 110 (H) 2020    LDLCALC 84 2019     Lab Results   Component Value Date    TRIG 110 2021    TRIG 27 2020    TRIG 103 2019       Cardiac testing:   Results for orders placed during the hospital encounter of 21   Echo Complete with Contrast if Indicated    Narrative [address]  [address]  [phone]    Transthoracic Echocardiogram  2D, M-mode, Doppler, and Color Doppler    Study date:  2021    Patient: Johnson Baumgarten  MR number: [id number]  Account number: [de-identified]  : 1960  Age: 63 years  Gender: Female  Status: Inpatient  Location: Bedside  Height: 57 in  Weight: 86 lb  BP: 113/ 85 mmHg    Indications: Acute MI    Diagnoses: I21 3 - ST elevation (STEMI) myocardial infarction of unspecified site    Sonographer: CLARISSA Whitmore [facility]  Primary Physician:  Isabelle Gregory MD  Referring Physician:  Nilay Pruett DO  Group:  [facility]  Cardiology Fellow: Chris Mandujano MD  Interpreting Physician:  Paul Ruiz MD    SUMMARY    PROCEDURE INFORMATION:  Intravenous contrast ( 0 4mL/min Definity in NSS) was administered to opacify the left ventricle  LEFT VENTRICLE:  Systolic function was mildly reduced  Ejection fraction was estimated to be 45 %  There was severe hypokinesis of the basal-mid inferoseptal and basal-mid inferior wall(s)  The changes were consistent with concentric remodeling (increased wall thickness with normal wall mass)  Due to tachycardia, there was fusion of early and atrial contributions to ventricular filling  RIGHT VENTRICLE:  The size was normal   Systolic function was normal     MITRAL VALVE:  There was mild regurgitation  AORTIC VALVE:  There was mild regurgitation  TRICUSPID VALVE:  Pulmonary artery systolic pressure was within the normal range  Estimated peak PA pressure was 27 mmHg  HISTORY: PRIOR HISTORY: STEMI; Polysubstance abuse; Hx of pulmonary embolism; Hypertension; Dyslipidemia; DM2; Asthma    PROCEDURE: The procedure was performed at the bedside  This was a routine study  The transthoracic approach was used  The study included complete 2D imaging, M-mode, complete spectral Doppler, and color Doppler  The heart rate was 108 bpm,  at the start of the study  Images were obtained from the parasternal, apical, subcostal, and suprasternal notch acoustic windows  Intravenous contrast ( 0 4mL/min Definity in NSS) was administered to opacify the left ventricle  Image  quality was adequate  LEFT VENTRICLE: Size was normal  Systolic function was mildly reduced  Ejection fraction was estimated to be 45 %  There was severe hypokinesis of the basal-mid inferoseptal and basal-mid inferior wall(s)   Wall thickness was at the upper  limits of normal  The changes were consistent with concentric remodeling (increased wall thickness with normal wall mass)  DOPPLER: Due to tachycardia, there was fusion of early and atrial contributions to ventricular filling  The study  was not technically sufficient to allow evaluation of LV diastolic function  RIGHT VENTRICLE: The size was normal  Systolic function was normal     LEFT ATRIUM: Size was at the upper limits of normal     RIGHT ATRIUM: Size was normal     MITRAL VALVE: Valve structure was normal  There was normal leaflet separation  DOPPLER: The transmitral velocity was within the normal range  There was no evidence for stenosis  There was mild regurgitation  Regurgitation grade was 1+ on a  scale of 0 to 4+  AORTIC VALVE: The valve was probably trileaflet  Leaflets exhibited mildly increased thickness and normal cuspal separation  DOPPLER: Transaortic velocity was within the normal range  There was no evidence for stenosis  There was mild  regurgitation  Regurgitation grade was 1+ on a scale of 0 to 4+  The regurgitant jet was directed centrally  TRICUSPID VALVE: The valve structure was normal  There was normal leaflet separation  DOPPLER: The transtricuspid velocity was within the normal range  There was no evidence for stenosis  There was trace regurgitation  Pulmonary artery  systolic pressure was within the normal range  Estimated peak PA pressure was 27 mmHg  PULMONIC VALVE: Leaflets exhibited normal thickness, no calcification, and normal cuspal separation  DOPPLER: The transpulmonic velocity was within the normal range  There was no evidence for stenosis  There was no significant  regurgitation  PERICARDIUM: There was no pericardial effusion  The pericardium was normal in appearance  AORTA: The root exhibited normal size  SYSTEMIC VEINS: IVC: The inferior vena cava was normal in size and course   Respirophasic changes were normal     SYSTEM MEASUREMENT TABLES    2D  %FS: 10 84 %  Ao Diam: 2 5 cm  EDV(Teich): 83 58 ml  EF(Teich): 23 82 %  ESV(Teich): 63 67 ml  IVSd: 1 02 cm  LA Area: 14 2 cm2  LA Diam: 3 24 cm  LVEDV MOD A4C: 68 42 ml  LVEF MOD A4C: 34 23 %  LVESV MOD A4C: 45 ml  LVIDd: 4 31 cm  LVIDs: 3 84 cm  LVLd A4C: 7 05 cm  LVLs A4C: 6 26 cm  LVPWd: 0 94 cm  RA Area: 6 49 cm2  RVIDd: 2 62 cm  SV MOD A4C: 23 42 ml  SV(Teich): 19 91 ml    CW  AR Dec [place]: 2 78 m/s2  AR Dec Time: 1414 87 ms  AR PHT: 410 31 ms  AR Vmax: 3 86 m/s  AR maxP 7 mmHg  TR Vmax: 2 46 m/s  TR maxP 16 mmHg    MM  TAPSE: 1 05 cm    [facility]    Prepared and electronically signed by    Grady Mercado MD  Signed 2021 09:21:23       No results found for this or any previous visit  No results found for this or any previous visit  No procedure found  No results found for this or any previous visit      Meds/Allergies   all current active meds have been reviewed and current meds:   Current Facility-Administered Medications   Medication Dose Route Frequency    acetaminophen (TYLENOL) tablet 650 mg  650 mg Oral Q4H PRN    albuterol (PROVENTIL HFA,VENTOLIN HFA) inhaler 2 puff  2 puff Inhalation Q4H PRN    aspirin chewable tablet 81 mg  81 mg Oral Daily    atorvastatin (LIPITOR) tablet 80 mg  80 mg Oral QPM    clopidogrel (PLAVIX) tablet 75 mg  75 mg Oral Daily    enoxaparin (LOVENOX) subcutaneous injection 40 mg  40 mg Subcutaneous Daily    insulin lispro (HumaLOG) 100 units/mL subcutaneous injection 1-5 Units  1-5 Units Subcutaneous TID AC    methadone (DOLOPHINE) tablet 80 mg  80 mg Oral Daily    metoprolol tartrate (LOPRESSOR) tablet 50 mg  50 mg Oral Q12H [facility]    nicotine (NICODERM CQ) 21 mg/24 hr TD 24 hr patch 1 patch  1 patch Transdermal Daily    potassium chloride (K-DUR,KLOR-CON) CR tablet 40 mEq  40 mEq Oral Once    thiamine tablet 100 mg  100 mg Oral Daily          EKG personally reviewed by Timothy Chavez, CRNP    Assessment:  Principal Problem:    ST elevation myocardial infarction involving right coronary artery (HCC)  Active Problems:    Type 2 diabetes mellitus with complication, with long-term current use of insulin (HCC)    Polysubstance abuse (HCC)    Alcohol abuse    QT prolongation    Counseling / Coordination of Care  Total floor / unit time spent today 20 minutes  Greater than 50% of total time was spent with the patient and / or family counseling and / or coordination of care  ** Please Note: Dragon 360 Dictation voice to text software may have been used in the creation of this document   **

## 2021-04-22 NOTE — QUICK NOTE
Notified by nursing at [facility] that patient was having increased work of breathing, and desating to the high 70s while on 2L  Patient was not responding adequately to increased oxygen, and was still saturating in the low 80s while on 6L  Ultimately patient was placed on midflow 13L due to slow response of oxygen saturation to being awoken, seated upright, and increased O2  Given patient's recent acute inferior MI and EDMUND placement and EF of 40%, CXR was ordered to ensure patient wasn't acutely volume overloaded after significant cardiac event  CXR showed no changes from prior  BNP and VBG also checked as no prior on file and to r/o HF and COPD exacerbations  After about 10 minutes on midflow, patient's saturation recovered and she was titrated back down to  3L  Goal SpO2 for this patient with suspected COPD should be 89-94%  On exam: unchanged breath sounds from yesterday  Wet cough still present, but unchanged from yesterday and at baseline, per patient  Suspect this is primarily MARK, as patient was asleep when she de-sated, and per 1:1, patient was waking up with choking/coughing sounds while laying flat  However, given the clinical presentation of improved oxygenation when seated upright and subjective worsening SOB when supine, wanted to r/o HF  If patient desaturates again overnight will start NPPV  Believe patient would benefit from daily COPD inhaler regimen  She does not appear in an acute exacerbation at this time  She states she used to be on home inhalers, but can't remember which ones or how long ago this was

## 2021-04-22 NOTE — CASE MANAGEMENT
CM left message for [facility], introduced myself, left my contact information and requested a call back

## 2021-04-22 NOTE — PLAN OF CARE
Problem: PHYSICAL THERAPY ADULT  Goal: Performs mobility at highest level of function for planned discharge setting  See evaluation for individualized goals  Description: Treatment/Interventions: Functional transfer training, LE strengthening/ROM, Elevations, Therapeutic exercise, Endurance training, Patient/family training, Cognitive reorientation, Equipment eval/education, Bed mobility, Gait training, Spoke to nursing  Equipment Recommended: Paige Soria       See flowsheet documentation for full assessment, interventions and recommendations  Note: Prognosis: Good  Problem List: Decreased strength, Decreased range of motion, Decreased endurance, Impaired balance, Decreased mobility, Decreased cognition, Impaired judgement, Decreased safety awareness  Assessment: Pt seen for PT evaluation today due to decrease mobility compared to baseline  Pt is a 63 y o  female who was admitted to [facility] for STEMI on 4/20/2021  Active orders for PT eval/treat and up with assistance at this time  Pt  has a past medical history of Alcohol abuse, Arthritis, Asthma, Diabetes mellitus ([facility] [address] ), and Opiate abuse, continuous ([address] )  Pt lives alone  in a 1  with 0STE  Pt presents with decreased strength, endurance, balance  resulting in decreased bed mobility, transfers, functional mobility upon evaluation  Pt currently requires min A x 1 for transfers and ambulation  Pt would benefit from skilled physical therapy to address these functional limitations  PT to follow pt and recommending rehab  Pt left upright in beside chair with 1:1 present and with all needs in place at end of therapy session  The patient's AM-PAC Basic Mobility Inpatient Short Form Raw Score is 17, Standardized Score is 39 67  A standardized score less than 42 9 suggests the patient may benefit from discharge to post-acute rehabilitation services  Please also refer to the recommendation of the Physical Therapist for safe discharge planning       Barriers to Discharge: Decreased caregiver support        PT Discharge Recommendation: Post acute rehabilitation services     PT - OK to Discharge: Yes(to rehab once medically cleared)    See flowsheet documentation for full assessment

## 2021-04-23 VITALS
OXYGEN SATURATION: 95 % | BODY MASS INDEX: 20.21 KG/M2 | SYSTOLIC BLOOD PRESSURE: 110 MMHG | RESPIRATION RATE: 18 BRPM | TEMPERATURE: 98.7 F | DIASTOLIC BLOOD PRESSURE: 65 MMHG | WEIGHT: 93.7 LBS | HEIGHT: 57 IN | HEART RATE: 87 BPM

## 2021-04-23 LAB
ANION GAP SERPL CALCULATED.3IONS-SCNC: 9 MMOL/L (ref 4–13)
ATRIAL RATE: 86 BPM
BUN SERPL-MCNC: 12 MG/DL (ref 5–25)
CALCIUM SERPL-MCNC: 8.4 MG/DL (ref 8.3–10.1)
CHLORIDE SERPL-SCNC: 96 MMOL/L (ref 100–108)
CO2 SERPL-SCNC: 25 MMOL/L (ref 21–32)
CREAT SERPL-MCNC: 0.62 MG/DL (ref 0.6–1.3)
ERYTHROCYTE [DISTWIDTH] IN BLOOD BY AUTOMATED COUNT: 13.9 % (ref 11.6–15.1)
GFR SERPL CREATININE-BSD FRML MDRD: 114 ML/MIN/1.73SQ M
GLUCOSE SERPL-MCNC: 151 MG/DL (ref 65–140)
GLUCOSE SERPL-MCNC: 70 MG/DL (ref 65–140)
GLUCOSE SERPL-MCNC: 86 MG/DL (ref 65–140)
HCT VFR BLD AUTO: 39.7 % (ref 34.8–46.1)
HGB BLD-MCNC: 12.8 G/DL (ref 11.5–15.4)
MCH RBC QN AUTO: 29.2 PG (ref 26.8–34.3)
MCHC RBC AUTO-ENTMCNC: 32.2 G/DL (ref 31.4–37.4)
MCV RBC AUTO: 90 FL (ref 82–98)
P AXIS: 74 DEGREES
PLATELET # BLD AUTO: 482 THOUSANDS/UL (ref 149–390)
PMV BLD AUTO: 9.7 FL (ref 8.9–12.7)
POTASSIUM SERPL-SCNC: 3.8 MMOL/L (ref 3.5–5.3)
PR INTERVAL: 198 MS
QRS AXIS: 79 DEGREES
QRSD INTERVAL: 86 MS
QT INTERVAL: 396 MS
QTC INTERVAL: 473 MS
RBC # BLD AUTO: 4.39 MILLION/UL (ref 3.81–5.12)
SODIUM SERPL-SCNC: 130 MMOL/L (ref 136–145)
T WAVE AXIS: -27 DEGREES
VENTRICULAR RATE: 86 BPM
WBC # BLD AUTO: 15.58 THOUSAND/UL (ref 4.31–10.16)

## 2021-04-23 PROCEDURE — 85027 COMPLETE CBC AUTOMATED: CPT | Performed by: STUDENT IN AN ORGANIZED HEALTH CARE EDUCATION/TRAINING PROGRAM

## 2021-04-23 PROCEDURE — 82948 REAGENT STRIP/BLOOD GLUCOSE: CPT

## 2021-04-23 PROCEDURE — 80048 BASIC METABOLIC PNL TOTAL CA: CPT | Performed by: STUDENT IN AN ORGANIZED HEALTH CARE EDUCATION/TRAINING PROGRAM

## 2021-04-23 PROCEDURE — 93010 ELECTROCARDIOGRAM REPORT: CPT | Performed by: INTERNAL MEDICINE

## 2021-04-23 PROCEDURE — 93005 ELECTROCARDIOGRAM TRACING: CPT

## 2021-04-23 PROCEDURE — 99239 HOSP IP/OBS DSCHRG MGMT >30: CPT | Performed by: INTERNAL MEDICINE

## 2021-04-23 PROCEDURE — 99232 SBSQ HOSP IP/OBS MODERATE 35: CPT | Performed by: INTERNAL MEDICINE

## 2021-04-23 RX ORDER — KETOROLAC TROMETHAMINE 30 MG/ML
15 INJECTION, SOLUTION INTRAMUSCULAR; INTRAVENOUS ONCE
Status: COMPLETED | OUTPATIENT
Start: 2021-04-23 | End: 2021-04-23

## 2021-04-23 RX ORDER — ATORVASTATIN CALCIUM 80 MG/1
80 TABLET, FILM COATED ORAL EVERY EVENING
Qty: 30 TABLET | Refills: 0 | Status: SHIPPED | OUTPATIENT
Start: 2021-04-23 | End: 2021-11-17 | Stop reason: HOSPADM

## 2021-04-23 RX ORDER — LISINOPRIL 5 MG/1
5 TABLET ORAL DAILY
Qty: 30 TABLET | Refills: 0 | Status: SHIPPED | OUTPATIENT
Start: 2021-04-24 | End: 2021-11-17 | Stop reason: HOSPADM

## 2021-04-23 RX ORDER — CLOPIDOGREL BISULFATE 75 MG/1
75 TABLET ORAL DAILY
Qty: 90 TABLET | Refills: 3 | Status: ON HOLD | OUTPATIENT
Start: 2021-04-24 | End: 2021-11-17 | Stop reason: SDUPTHER

## 2021-04-23 RX ORDER — LISINOPRIL 5 MG/1
5 TABLET ORAL DAILY
Status: DISCONTINUED | OUTPATIENT
Start: 2021-04-23 | End: 2021-04-23 | Stop reason: HOSPADM

## 2021-04-23 RX ORDER — METOPROLOL SUCCINATE 100 MG/1
100 TABLET, EXTENDED RELEASE ORAL DAILY
Qty: 90 TABLET | Refills: 3 | Status: ON HOLD | OUTPATIENT
Start: 2021-04-24 | End: 2021-11-17 | Stop reason: SDUPTHER

## 2021-04-23 RX ORDER — ASPIRIN 81 MG/1
81 TABLET, CHEWABLE ORAL DAILY
Qty: 90 TABLET | Refills: 3 | Status: SHIPPED | OUTPATIENT
Start: 2021-04-24 | End: 2021-11-17 | Stop reason: HOSPADM

## 2021-04-23 RX ADMIN — LISINOPRIL 5 MG: 5 TABLET ORAL at 12:31

## 2021-04-23 RX ADMIN — INSULIN LISPRO 1 UNITS: 100 INJECTION, SOLUTION INTRAVENOUS; SUBCUTANEOUS at 08:34

## 2021-04-23 RX ADMIN — CLOPIDOGREL BISULFATE 75 MG: 75 TABLET ORAL at 08:34

## 2021-04-23 RX ADMIN — ENOXAPARIN SODIUM 40 MG: 40 INJECTION SUBCUTANEOUS at 08:33

## 2021-04-23 RX ADMIN — METHADONE HYDROCHLORIDE 80 MG: 10 TABLET ORAL at 08:37

## 2021-04-23 RX ADMIN — LIDOCAINE HYDROCHLORIDE 10 ML: 20 SOLUTION ORAL; TOPICAL at 05:53

## 2021-04-23 RX ADMIN — KETOROLAC TROMETHAMINE 15 MG: 30 INJECTION, SOLUTION INTRAMUSCULAR at 04:09

## 2021-04-23 RX ADMIN — ASPIRIN 81 MG: 81 TABLET, CHEWABLE ORAL at 08:34

## 2021-04-23 RX ADMIN — THIAMINE HCL TAB 100 MG 100 MG: 100 TAB at 08:33

## 2021-04-23 NOTE — PROGRESS NOTES
04/23/21 1300   Clinical Encounter Type   Visited With Patient   Zoroastrianism Encounters   Zoroastrianism Needs Prayer   Sacramental Encounters   Sacrament of Sick-Anointing Anointed

## 2021-04-23 NOTE — CASE MANAGEMENT
PT IS NOT A 30 DAYS READMISSION  PT IS NOT A BUNDLE  CM met with pt to discuss DCP and cm role  Pt lives alone in 1st  floor apt with 4ste  Prior to admission pt was independent with ambulation and with ADLS  Pt has a hx of ETOH, denies any rehab stays or drug/alcohol use  Pt explains she uses [facility] as transport to the methadone clinic in [place]  Pt is refusing HOST referral  No prior hx of VNA  Pt's preference for pharmacy is [facility] in [place], [place]  Pt will need a ride at d/c, lyft waiver signed  CM reviewed d/c planning process including the following: identifying help at home, patient preference for d/c planning needs, Discharge Lounge, [facility] Meds to Bed program, availability of treatment team to discuss questions or concerns patient and/or family may have regarding understanding medications and recognizing signs and symptoms once discharged  CM also encouraged patient to follow up with all recommended appointments after discharge  Patient advised of importance for patient and family to participate in managing patients medical well being  Lyft transport scheduled with [facility] for 1400   Pick-up at Entrance A  RN aware

## 2021-04-23 NOTE — NURSING NOTE
During hourly rounds, pt stated she had 9/10 chest pain  Pt described chest pain as sharp and pressure  VSS  SOD provider Bryan Nguyen notified  EKG obtained  Pt ordered IV Toradol and magic mouthwash  Will continue to monitor

## 2021-04-23 NOTE — DISCHARGE SUMMARY
INTERNAL MEDICINE RESIDENCY DISCHARGE SUMMARY     Erik Lee   63 y o  female  MRN: [id number]  Room/Bed: [place] [address]/[facility] [place] 521-01     [address] [place] [address] MED SURG/SD   Encounter: [id number]    DISCHARGE INFORMATION     PCP at Discharge: Estrellita Aggarwal MD    Admitting Provider: Danyell Tubbs MD  Admission Date: 4/20/2021    Discharge Provider: Danyell Tubbs MD  Discharge Date: 4/23/2021    Discharge Disposition: [address]  Discharge Condition: good  Discharge with Lines: no    Discharge Diet: cardiac diet  Activity Restrictions: none  Test Results Pending at Discharge: None    Discharge Diagnoses:  Principal Problem:    ST elevation myocardial infarction involving right coronary artery [facility]  Active Problems:    Type 2 diabetes mellitus with complication, with long-term current use of insulin ([facility])    Polysubstance abuse ([facility] )    Alcohol abuse    QT prolongation  Resolved Problems:    * No resolved hospital problems  *    QT prolongation  Assessment & Plan  Qtc 518    Plan:  -patient endorses intermittent nausea  Likely multifactorial- 2/2 MI, anesthesia s/p cath, and withdrawal from EtOH   -AVOID QTc prolonging agents- Zofran, Reglan     -patient was spoken to regarding discontinuation of methadone and switching to Suboxone due to QT prolongation, however the patient adamantly refused  See quick note for details   -Treat patient per CIWA protocol    Alcohol abuse  Assessment & Plan  Patient drinks daily, last drink was 2-3 days prior to admission  History of multiple ED visits for withdrawal  History of past withdrawal seizure    Blood alcohol level 22 on arrival    Plan:  -CIWA protocol with p r n   Ativan  -100 mg thiamine daily        History of pulmonary embolism  Assessment & Plan  -    Subtle subsegmental PE in 2019 was previously on Pradaxa    Plan:  -  DVT prophylaxis- lovenox    Polysubstance abuse ([facility] )  Assessment & Plan  Denies recent usage of cocaine or meth  On Methadone at home    Plan:  -continue methadone 80 mg daily- dose confirmed by [facility] [place]   -patient was spoken to regarding discontinuation of methadone and switching to Suboxone due to QT prolongation, however the patient adamantly refused  See quick note for details  Type 2 diabetes mellitus with complication, with long-term current use of insulin [facility])  Assessment & Plan  Lab Results   Component Value Date    HGBA1C 5 8 (H) 04/20/2021       Recent Labs     04/21/21  1552 04/21/21  2119 04/22/21  0638 04/22/21  1059   POCGLU 98 93 84 102       Blood Sugar Average: Last 72 hrs:  (P) 116     Plan:  -sugars are well controlled  -SSI    * ST elevation myocardial infarction involving right coronary artery ([address] )  Assessment & Plan  Presented with SOB, generalized body aches, n/v x3 days  Troponin 18 4    ECG: ST elevations in leads II, III, aVF, and ST depression in I, aVL, V3-V6  T waves were inverted in II, III, and aVF  Plan:  -inferior wall STEMI 2/2 distal RCA acute plaque rupture  Now s/p thrombectomy and EDMUND placement 4/20   -continue ASA 81 qd  -cont Brilinta 90 mg bid  -cont lipitor 80 hs  -cont metoprolol 25 mg bid  -maintain K >4, Mag >2  -ISAIAH showed an EF of 45%        Consulting Providers:      Diagnostic & Therapeutic Procedures Performed:  Xr Chest Portable    Result Date: 4/22/2021  Impression: No acute cardiopulmonary disease  Workstation performed: [id number]     Xr Chest 1 View Portable    Result Date: 4/20/2021  Impression: No acute cardiopulmonary disease   Workstation performed: [id number]       Code Status: Level 1 - Full Code  Advance Directive & Living Will: <no information>  Power of :    POLST:      Medications:  Current Discharge Medication List      STOP taking these medications       METHADONE HCL PO Comments:   Reason for Stopping:             Current Discharge Medication List      START taking these medications    Details   aspirin 81 mg chewable tablet Chew 1 tablet (81 mg total) daily  Qty: 90 tablet, Refills: 3    Associated Diagnoses: ST elevation myocardial infarction involving right coronary artery ([facility])      clopidogrel (PLAVIX) 75 mg tablet Take 1 tablet (75 mg total) by mouth daily  Qty: 90 tablet, Refills: 3    Associated Diagnoses: ST elevation myocardial infarction involving right coronary artery ([facility])      metoprolol succinate (TOPROL-XL) 100 mg 24 hr tablet Take 1 tablet (100 mg total) by mouth daily  Qty: 90 tablet, Refills: 3    Associated Diagnoses: ST elevation myocardial infarction involving right coronary artery ([address] )           Current Discharge Medication List      CONTINUE these medications which have NOT CHANGED    Details   albuterol (VENTOLIN HFA) 90 mcg/act inhaler Inhale 2 puffs every 6 (six) hours as needed for wheezing  Qty: 18 g, Refills: 1    Associated Diagnoses: Asthma, unspecified asthma severity, unspecified whether complicated, unspecified whether persistent      Insulin Pen Needle 29G X 12MM MISC by Does not apply route daily at bedtime  Qty: 30 each, Refills: 3    Associated Diagnoses: Type 2 diabetes mellitus with complication, with long-term current use of insulin ([facility])      nicotine (NICODERM CQ) 21 mg/24 hr TD 24 hr patch Place 1 patch on the skin daily  Qty: 28 patch, Refills: 0    Associated Diagnoses: Nicotine abuse           Allergies:  No Known Allergies  FOLLOW-UP     PCP Outpatient Follow-up:  Patient was instructed to follow up with her primary care provider within 1 week of discharge    Consulting Providers Follow-up:  none required     Active Issues Requiring Follow-up:   none    Physical Exam  Constitutional:       Appearance: She is well-developed  HENT:      Head: Normocephalic and atraumatic  Nose: Nose normal    Eyes:      Conjunctiva/sclera: Conjunctivae normal       Pupils: Pupils are equal, round, and reactive to light  Neck:      Vascular: No JVD     Cardiovascular:      Rate and Rhythm: Normal rate and regular rhythm  Heart sounds: Normal heart sounds  No murmur  No friction rub  No gallop  Pulmonary:      Effort: Pulmonary effort is normal  No respiratory distress  Breath sounds: Normal breath sounds  No stridor  No wheezing or rales  Chest:      Chest wall: No tenderness  Abdominal:      General: Bowel sounds are normal  There is no distension  Palpations: Abdomen is soft  There is no mass  Tenderness: There is no abdominal tenderness  There is no guarding or rebound  Hernia: No hernia is present  Musculoskeletal:         General: No tenderness or deformity  Right lower leg: No edema  Left lower leg: No edema  Skin:     General: Skin is warm and dry  Neurological:      Mental Status: She is alert and oriented to person, place, and time  Psychiatric:         Mood and Affect: Mood normal          Behavior: Behavior normal          Thought Content: Thought content normal          Judgment: Judgment normal          DETAILS OF HOSPITAL COURSE     HPI as per Dr Dc España: "63year old female with polysubstance, tobacco, and alcohol abuse, presented to [facility] with complaint of shortness of breath and was found to have an inferior ST-elevation MI, and transferred to [address] [place] [address] for emergent cardiac catheterization  Admission HPI was given by EMS, as patient was disoriented and unable to provide history since she had been given Ativan  Per EMS, patient had been having shortness of breath, generalized body aches, nausea and vomiting for 3 days  Patient's last drink was about 2 days ago  She denied recent cocaine or meth use      Patient appeared in acute distress and was tachycardic (126) and hypertensive (172/118)  ECG in [facility] ED showed ST elevations in leads II, III, aVF, and ST depression in I, aVL, V3-V6  T waves were inverted in II, III, and aVF  Troponin 18 4  CMP with multiple electrolyte abnormalities   Patient was aspirin and Brilinta loaded prior to transfer to [facility] for emergent cardiac catheterization      Patient underwent cardiac catheterization which showed acute plaque rupture MI in the distal RCA  She had a thrombectomy and EDMUND placed  Per cardiology she is to continue with 81 mg aspirin daily, Brilinta 90 mg b i d , and Lipitor 80 q d "    The patient began to show signs and symptoms of alcohol withdrawal and was started on CIWA protocol  It was later discovered that the patient is currently being followed by [address] [place] [address] in [facility] and is currently receiving 80 mg of methadone daily at home  For the remainder of the patient's hospitalization she was continued on her home methadone regimen of 80 mg daily  Given the history of QT prolongation and current methadone regimen it was recommended that the patient be transferred to [facility] detoxification center for weaning of the methadone and initiating Suboxone therapy  The patient was counseled on the importance of stopping her current methadone therapy and switching to Suboxone due to the risk of developing life-threatening arrhythmia  The patient adamantly refused discontinuation of her methadone therapy and did not wish to be transferred or start Suboxone  The patient was continued on her home methadone 80 mg daily for the remainder of her hospitalization  [facility] treatment center in [facility] was contacted and made aware of the patient's prolonged QT interval and it was recommended that they discontinue and change her current methadone therapy  The patient was also instructed to attempt to stop her methadone therapy as soon as possible  Throughout the course of the patient's hospitalization her respiratory symptoms improved and she was deemed medically stable for discharge on 04/23/2021 on the appropriate cardiovascular medications    She was scheduled for a follow-up visit with [facility] and was given [facility]ink for primary care follow-up  Discharge Statement:   I spent 1 hour minutes discharging the patient  This time was spent on the day of discharge  I had direct contact with the patient on the day of discharge   Additional documentation is required if more than 30 minutes were spent on discharge     ==  Vikki Walton MD  IM Resident, PGY-1  [facility]

## 2021-04-23 NOTE — QUICK NOTE
Notified by nursing that patient awoke with sharp, non-radiating sternal chest pain  Patient denied nausea, SOB, or other associated symptoms  Her blood pressure is 96/60, pulse in 80s, O2 sat stable on 2 L nasal cannula  EKG was obtained and compared to prior  Per Cardiology note with instructions in event of chest pain, fellow on-call was notified  Patient was ordered a dose of Toradol for possible pericarditis     Patient's symptoms resolved 1 hour after receiving Toradol  Magic mouth wash was also ordered

## 2021-04-23 NOTE — QUICK NOTE
Spoke with [facility] in [place], [place] ([phone]), where the patient is currently being prescribed her methadone 80 mg daily  I updated them regarding the patient's prolonged QT interval and her adamant refusal to stop methadone therapy and switch to Suboxone  It was recommended that the patient be taken off this medication due to chances of developing a life-threatening arrhythmia if she continues taking it

## 2021-04-23 NOTE — PLAN OF CARE
Problem: PAIN - ADULT  Goal: Verbalizes/displays adequate comfort level or baseline comfort level  Description: Interventions:  - Encourage patient to monitor pain and request assistance  - Assess pain using appropriate pain scale  - Administer analgesics based on type and severity of pain and evaluate response  - Implement non-pharmacological measures as appropriate and evaluate response  - Consider cultural and social influences on pain and pain management  - Notify physician/advanced practitioner if interventions unsuccessful or patient reports new pain  Outcome: Progressing     Problem: INFECTION - ADULT  Goal: Absence or prevention of progression during hospitalization  Description: INTERVENTIONS:  - Assess and monitor for signs and symptoms of infection  - Monitor lab/diagnostic results  - Monitor all insertion sites, i e  indwelling lines, tubes, and drains  - Monitor endotracheal if appropriate and nasal secretions for changes in amount and color  - [place] appropriate cooling/warming therapies per order  - Administer medications as ordered  - Instruct and encourage patient and family to use good hand hygiene technique  - Identify and instruct in appropriate isolation precautions for identified infection/condition  Outcome: Progressing  Goal: Absence of fever/infection during neutropenic period  Description: INTERVENTIONS:  - Monitor WBC    Outcome: Progressing     Problem: SAFETY ADULT  Goal: Patient will remain free of falls  Description: INTERVENTIONS:  - Assess patient frequently for physical needs  -  Identify cognitive and physical deficits and behaviors that affect risk of falls    -  [place] fall precautions as indicated by assessment   - Educate patient/family on patient safety including physical limitations  - Instruct patient to call for assistance with activity based on assessment  - Modify environment to reduce risk of injury  - Consider OT/PT consult to assist with strengthening/mobility  Outcome: Progressing  Goal: Maintain or return to baseline ADL function  Description: INTERVENTIONS:  -  Assess patient's ability to carry out ADLs; assess patient's baseline for ADL function and identify physical deficits which impact ability to perform ADLs (bathing, care of mouth/teeth, toileting, grooming, dressing, etc )  - Assess/evaluate cause of self-care deficits   - Assess range of motion  - Assess patient's mobility; develop plan if impaired  - Assess patient's need for assistive devices and provide as appropriate  - Encourage maximum independence but intervene and supervise when necessary  - Involve family in performance of ADLs  - Assess for home care needs following discharge   - Consider OT consult to assist with ADL evaluation and planning for discharge  - Provide patient education as appropriate  Outcome: Progressing  Goal: Maintain or return mobility status to optimal level  Description: INTERVENTIONS:  - Assess patient's baseline mobility status (ambulation, transfers, stairs, etc )    - Identify cognitive and physical deficits and behaviors that affect mobility  - Identify mobility aids required to assist with transfers and/or ambulation (gait belt, sit-to-stand, lift, walker, cane, etc )  - [place] fall precautions as indicated by assessment  - Record patient progress and toleration of activity level on Mobility SBAR; progress patient to next Phase/Stage  - Instruct patient to call for assistance with activity based on assessment  - Consider rehabilitation consult to assist with strengthening/weightbearing, etc   Outcome: Progressing     Problem: DISCHARGE PLANNING  Goal: Discharge to home or other facility with appropriate resources  Description: INTERVENTIONS:  - Identify barriers to discharge w/patient and caregiver  - Arrange for needed discharge resources and transportation as appropriate  - Identify discharge learning needs (meds, wound care, etc )  - Refer to Case Management Department for coordinating discharge planning if the patient needs post-hospital services based on physician/advanced practitioner order or complex needs related to functional status, cognitive ability, or social support system  Outcome: Progressing     Problem: Knowledge Deficit  Goal: Patient/family/caregiver demonstrates understanding of disease process, treatment plan, medications, and discharge instructions  Description: Complete learning assessment and assess knowledge base    Interventions:  - Provide teaching at level of understanding  - Provide teaching via preferred learning methods  Outcome: Progressing     Problem: Prexisting or High Potential for Compromised Skin Integrity  Goal: Skin integrity is maintained or improved  Description: INTERVENTIONS:  - Identify patients at risk for skin breakdown  - Assess and monitor skin integrity  - Assess and monitor nutrition and hydration status  - Monitor labs   - Assess for incontinence   - Turn and reposition patient  - Assist with mobility/ambulation  - Relieve pressure over bony prominences  - Avoid friction and shearing  - Provide appropriate hygiene as needed including keeping skin clean and dry  - Collaborate with interdisciplinary team   - Patient/family teaching  Outcome: Progressing

## 2021-04-23 NOTE — PROGRESS NOTES
Progress Note - Cardiology   Vee Headley 63 y o  female MRN: [id number]  Unit/Bed#: [facility] 521-01 Encounter: [id number]  04/23/21  12:02 PM    Impression and Plan:       Impression:     21-year-old female with a history of alcohol abuse, opiate use-currently on methadone, presented with shortness of breath   History could not be obtained because of her disorientation from [address] been having generalized body ache, nausea and vomiting   Found to have inferior ST elevations, severe hypokalemia and hypomagnesemia-subsequently repleted, transferred to [facility] and underwent PCI of the 100% distal RCA   A single troponin was around 18  Renal function is preserved but CBC shows hemoconcentration      She has had persistent inferior ST elevations as well as persistent QT prolongation  No heart failure during this hospitalization  No arrhythmias on telemetry      Her mental status/drowsiness has progressively improved, today is normal        Plan:     Coronary artery disease, new diagnosis of inferior STEMI-status post PCI of the RCA and now with  ischemic cardiomyopathy  Continue dual antiplatelet therapy-now on Plavix, statin and beta-blocker  Continue metoprolol  mg daily  Will start lisinopril 5 mg daily, she demonstrates compliance, should price check and change to [facility] as an outpatient  ECG shows persistent ST elevations but without new or worsened ischemic changes  Had other episode of chest pain last night, given Toradol and improved, no further chest pain  Completely asymptomatic at the time of my exam   Hold off on treating long-term for pericarditis at the current time      Sinus tachycardia:  Over the 1st 24-48 hours of admission, alcohol withdrawal versus     Cardiomyopathy:  Ischemic, Echo with an ejection fraction of 45% with predominantly inferior wall motion abnormalities corresponding to her MI       She also has a prior history of PE,  normal right ventricular dimensions on ECHO and is not hypoxic at baseline  Encephalopathy:   mental status has improved to normal,   Admits to having used crack cocaine and Dope in the past but not currently        Hypoxia: Resolved,  Suspected to be from sleep apnea based on documentation from last night  Lungs are clear to auscultation today  Overall examines euvolemic  This is not acute CHF      QT prolongation:  Likely secondary to/contributed to by methadone (not sure if she had a baseline QT prolongation), her corrected QT interval has persistently been markedly elevated around 600 milliseconds since 2020  She has a history of being on Methadone at least prior to July 2019 although not sure how consistently she has been on it  Avoid all QT prolonging drugs such as Zofran, macrolide and quinolone antibiotics  Discussed with primary team and toxicology was consulted, recommended Suboxone but patient refused and would like to remain on methadone  Can be re-attempted as an outpatient  No arrhythmias on telemetry at all    Okay for discharge from a cardiac standpoint with outpatient follow-up      ===================================================================    Chief Complaint: No chief complaint on file          Subjective/Objective     Subjective:  Denies any complaints    Objective:  No distress at the time of exam    Patient Active Problem List   Diagnosis    Type 2 diabetes mellitus with complication, with long-term current use of insulin ([facility] )    Polysubstance abuse ([facility] )    History of pulmonary embolism    Nicotine dependence    Nicotine abuse    History of gastric ulcer    Elevated AST (SGOT)    Essential hypertension    Asthma    Dyslipidemia    ST elevation myocardial infarction involving right coronary artery (HCC)    Alcohol abuse    QT prolongation       Vitals: /65 (BP Location: Right arm)   Pulse 87   Temp 98 7 °F (37 1 °C) (Oral)   Resp 18   Ht 4' 9" (1 448 m)   Wt 42 5 kg (93 lb 11 1 oz)   SpO2 95%   BMI 20 28 kg/m²     I/O this shift:  In: 360 [P O :360]  Out: 0   Wt Readings from Last 3 Encounters:   04/23/21 42 5 kg (93 lb 11 1 oz)   05/24/20 39 5 kg (87 lb)   02/29/20 39 5 kg (87 lb)       Intake/Output Summary (Last 24 hours) at 4/23/2021 1202  Last data filed at 4/23/2021 1044  Gross per 24 hour   Intake 1200 ml   Output 450 ml   Net 750 ml     I/O last 3 completed shifts: In: 2198 3 [P O :1300;  I V :898 3]  Out: 650 [Urine:650]    Invasive Devices     Peripheral Intravenous Line            Peripheral IV 04/20/21 Right Antecubital 2 days    Peripheral IV 04/20/21 Right Forearm 2 days                  Physical Exam:  GEN: Gilmar Taylor appears well, alert and oriented x 3, pleasant and cooperative   HEENT: pupils equal, round, and reactive to light; extraocular muscles intact  NECK: supple, no carotid bruits or JVD  HEART: regular rhythm, normal S1 and S2, no murmur, no clicks, gallops or rubs   LUNGS: clear to auscultation bilaterally; no wheezes or rhonchi, no rales  ABDOMEN/GI: normal bowel sounds, soft, no tenderness, no distention  EXTREMITIES/Musculoskeltal: peripheral pulses normal; no clubbing, cyanosis, no edema  NEURO: no focal motor findings   SKIN: normal without suspicious lesions on exposed skin              Lab Results:   Results from last 7 days   Lab Units 04/20/21  1422   TROPONIN I ng/mL 18 40*     Results from last 7 days   Lab Units 04/23/21  0456 04/22/21  0619 04/21/21  0442   WBC Thousand/uL 15 58* 18 46* 13 43*   HEMOGLOBIN g/dL 12 8 13 2 15 4   HEMATOCRIT % 39 7 40 1 46 4*   PLATELETS Thousands/uL 482* 474* 496*         Results from last 7 days   Lab Units 04/23/21  0456 04/22/21  0619 04/21/21  0442  04/20/21  1422   POTASSIUM mmol/L 3 8 3 9 4 0   < > 2 9*   CHLORIDE mmol/L 96* 101 97*   < > 89*   CO2 mmol/L 25 29 30   < > 30   BUN mg/dL 12 13 9   < > 5   CREATININE mg/dL 0 62 0 50* 0 55*   < > 0 48*   CALCIUM mg/dL 8 4 8 4 9 1   < > 8 9   ALK PHOS U/L --   --   --   --  179*   ALT U/L  --   --   --   --  33   AST U/L  --   --   --   --  225*    < > = values in this interval not displayed  Results from last 7 days   Lab Units 04/20/21  1422   INR  1 00       Imaging: I have personally reviewed pertinent reports  EKG/Telemtry:  No events    Scheduled Meds:  Current Facility-Administered Medications   Medication Dose Route Frequency Provider Last Rate    acetaminophen  650 mg Oral Q4H PRN Cesar Barry MD      al mag oxide-diphenhydramine-lidocaine viscous  10 mL Swish & Swallow Q4H PRN Nicole Ferreira DO      albuterol  2 puff Inhalation Q4H PRN Christina Mo MD      aspirin  81 mg Oral Daily Cesar Barry MD      atorvastatin  80 mg Oral QPM Cesar Barry MD      clopidogrel  75 mg Oral Daily [place] YASMIN Roca      enoxaparin  40 mg Subcutaneous Daily Cesar Barry MD      insulin lispro  1-5 Units Subcutaneous TID AC Cesar Barry MD      methadone  80 mg Oral Daily [place], [place]      metoprolol succinate  100 mg Oral Daily YASMIN Abrams      nicotine  1 patch Transdermal Daily Cesar Barry MD      potassium chloride  40 mEq Oral Once Cesar Barry MD      thiamine  100 mg Oral Daily Cesar Barry MD       Continuous Infusions:       VTE Pharmacologic Prophylaxis: Enoxaparin (Lovenox)  VTE Mechanical Prophylaxis: sequential compression device    This note was completed in part utilizing [employer] fluency direct voice recognition software  Grammatical errors, random word insertion, spelling mistakes, occasional wrong word or "sound-alike" substitutions and incomplete sentences may be an occasional consequence of the system secondary to software limitations, ambient noise and hardware issues  At the time of dictation, efforts were made to edit, clarify and /or correct errors  Please read the chart carefully and recognize, using context, where substitutions have occurred    If you have any questions or concerns about the context, text or information contained within the body of this dictation, please contact myself, the provider, for further clarification

## 2021-04-26 NOTE — UTILIZATION REVIEW
Continued Stay Review     Date: 4/22/21 Thursday                          Current Patient Class: Inpatient  Current Level of Care: Level 1 Stepdown     HPI:   63year old female with PMHx DM, polysubstance opiate use on methadone, etoh with prior withdrawal, active smoker  Jonas Hdez presented to [address] [place] [address] Road on 4/20/21 2nd 3 day history of precordial chest pain, SOB and generalized body aches with nausea vomiting diarrhea   EKG showed acute Inferior wall ST elevation with troponin 18 4    Ttx with IVF, IV Heparin, NTG sl, ASA+ Brilinta, IV Zofran + Ativan  and  transferred to [facility] for emergent cardiac catheterization   Cardiac catheterization showed acute plaque rupture MI in distal RCA status post EDMUND placement       4/22  Coronary artery disease, new diagnosis of inferior STEMI-status post PCI of the RCA and now with ischemic cardiomyopathy  ECG shows improvement in ST elevations but does show persistent QT prolongation around 600 milliseconds of corrected QT interval  Continue dual antiplatelet therapy-now on Plavix, statin and beta-blocker  Change beta-blocker to metoprolol  mg from tomorrow   Lisinopril resumed today  Encephalopathy:   mental status is much better, more awake and alert although still falls asleep while  answering questions  Hypoxia:  Suspected to be from sleep apnea based on documentation from last night      Cardiology Plan:  -Continue DAPT with aspirin 81 mg daily, and clopidogrel 75 mg daily  -Continue atorvastatin 40 mg daily  -Will up titrate metoprolol tartrate to 50 mg b i d  -- switch to Toprol  mg daily starting tomorrow morning   -Start lisinopril 5 mg daily  -Would like to discuss w/pain management service if an alternative to methadone is possible given prolonged QT interval     Vital Signs:   04/22/21 1127  97 °F (36 1 °C)Abnormal   95  16  89/64Abnormal   --  94 %  --  --  --  --   04/22/21 0800  --  --  --  --  --  95 %  28  2 L/min  Nasal cannula  --   04/22/21 0700  98 °F (36 7 °C)  107Abnormal   23Abnormal   117/85  --  95 %  --  --  --  --   04/22/21 0517  --  --  --  --  --  94 %  28  2 L/min  Nasal cannula  --   04/22/21 0400  --  110  --  --  --  --  32  3 L/min  --  --   04/22/21 0351  --  --  --  --  --  94 %  36  4 L/min  Nasal cannula  --   04/22/21 0110  --  112  --  --  --  91 %  36  4 L/min  Nasal cannula  --   04/22/21 0000  --  --  --  115/84  --  93 %  32  3 L/min  Nasal cannula       I/O 04/20 0701 04/21 0700 04/21 0701 04/22 0700 04/22 0701      P  O  0  460   I V  (mL/kg) 1000 (25 4) 1011 7 (25 7)    IV Piggyback  500    Total Intake(mL/kg) 1000 (25 4) 1511 7 (38 4) 460 (11 7)   Urine (mL/kg/hr) 700 370 (0 4)    Total Output 700 370    Net +300 +1141 7 +460         Unmeasured Urine Occurrence 1 x 1 x      Pertinent Labs/Diagnostic Results:     Results from last 7 days   Lab Units 04/23/21 0456 04/22/21  0619 04/21/21 0442 04/20/21  1422   WBC Thousand/uL 15 58* 18 46* 13 43*  --  9 52   HEMOGLOBIN g/dL 12 8 13 2 15 4  --  15 9*   HEMATOCRIT % 39 7 40 1 46 4*  --  48 4*   PLATELETS Thousands/uL 482* 474* 496*   < > 479*   NEUTROS ABS Thousands/µL  --   --   --   --  7 68*    < > = values in this interval not displayed         Results from last 7 days   Lab Units 04/23/21 0456 04/22/21  5789 04/21/21  0631 04/21/21  0442 04/20/21 2059 04/20/21  1422   SODIUM mmol/L 130* 135*  --  134* 131* 132*   POTASSIUM mmol/L 3 8 3 9  --  4 0 3 4* 2 9*   CHLORIDE mmol/L 96* 101  --  97* 93* 89*   CO2 mmol/L 25 29  --  30 31 30   ANION GAP mmol/L 9 5  --  7 7 13   BUN mg/dL 12 13  --  9 5 5   CREATININE mg/dL 0 62 0 50*  --  0 55* 0 50* 0 48*   EGFR ml/min/1 73sq m 114 123  --  119 123 124   CALCIUM mg/dL 8 4 8 4  --  9 1 9 3 8 9   MAGNESIUM mg/dL  --   --   --  2 4 2 4 1 3*   PHOSPHORUS mg/dL  --   --  2 7  --   --   --      Results from last 7 days   Lab Units 04/20/21  1422   AST U/L 225*   ALT U/L 33   ALK PHOS U/L 179*   TOTAL PROTEIN g/dL 8 4*   ALBUMIN g/dL 2 8*   TOTAL BILIRUBIN mg/dL 0 34     Results from last 7 days   Lab Units 04/23/21  1051 04/23/21  0627 04/22/21  2110 04/22/21  1552 04/22/21  1059 04/22/21  1227 04/21/21  2119 04/21/21  1552 04/21/21  1108 04/21/21  0614 04/20/21  2108 04/20/21  1705   POC GLUCOSE mg/dl 70 151* 144* 107 102 84 93 98 115 166* 130 140     Results from last 7 days   Lab Units 04/23/21  0456 04/22/21  0619 04/21/21  0442 04/20/21 2059 04/20/21  1422   GLUCOSE RANDOM mg/dL 86 84 121 130 147*       Results from last 7 days   Lab Units 04/20/21  1754   HEMOGLOBIN A1C % 5 8*   EAG mg/dl 120     Results from last 7 days   Lab Units 04/22/21  0009   PH ESTHER  7 482*   PCO2 ESTHER mm Hg 39 1*   PO2 ESTHER mm Hg 57 0*   HCO3 ESTHER mmol/L 28 6   BASE EXC ESTHER mmol/L 4 9   O2 CONTENT ESTHER ml/dL 17 7   O2 HGB, VENOUS % 88 0*     Results from last 7 days   Lab Units 04/20/21  1422   TROPONIN I ng/mL 18 40*     Results from last 7 days   Lab Units 04/20/21  1422   PROTIME seconds 13 1   INR  1 00   PTT seconds 24     Results from last 7 days   Lab Units 04/21/21  0442   TSH 3RD GENERATON uIU/mL 0 331*     Results from last 7 days   Lab Units 04/22/21  0014   NT-PRO BNP pg/mL 8,477*     Results from last 7 days   Lab Units 04/20/21  1422   LIPASE u/L 162     Results from last 7 days   Lab Units 04/20/21  1422   ETHANOL LVL mg/dL 22*      Diet Cardiovascular; Cardiac    Scheduled Medications:  aspirin, 81 mg, Oral, Daily  atorvastatin, 80 mg, Oral, QPM  clopidogrel, 75 mg, Oral, Daily  enoxaparin, 40 mg, Subcutaneous, Daily  insulin lispro, 1-5 Units, Subcutaneous, TID AC  Lisinopril 5mg, Oral, Daily  methadone, 80 mg, Oral, Daily  [START ON 4/23/2021] metoprolol succinate, 100 mg, Oral, Daily  metoprolol tartrate, 50 mg, Oral, Once  nicotine, 1 patch, Transdermal, Daily  potassium chloride, 40 mEq, Oral, Once  thiamine, 100 mg, Oral, Daily  No current facility-administered medications for this encounter        Continuous IV Infusions:  IVF sodium chloride 0 9 % infusion   Ordered Dose: 100 mL/hr Route: Intravenous Frequency: Continuous @ 100 mL/hr   Scheduled Start Date/Time: 04/21/21 1415 End Date/Time: 04/22/21 0305       PRN Meds:  acetaminophen, 650 mg, Oral, Q4H PRN GIVEN X 1/ 24 HRS  albuterol, 2 puff, Inhalation, Q4H PRN    Discharge Plan: To be determined   Inpatient Case Management following for all discharge needs    Network Utilization Review Department  ATTENTION: Please call with any questions or concerns to [phone] and carefully listen to the prompts so that you are directed to the right person  All voicemails are confidential   Alexandrea Skipper all requests for admission clinical reviews, approved or denied determinations and any other requests to dedicated fax number below belonging to the campus where the patient is receiving treatment  List of dedicated fax numbers for the Facilities:  [address] DENIALS (Administrative/Medical Necessity) [phone]   [address] (Maternity/NICU/Pediatrics) [phone]   [address] [place] [address] [address] [place] [address] [address] [place] [address] Tosha Aguilar [address] [place] [address] [phone]     Notification of Discharge   This is a Notification of Discharge from our facility [address]  Please be advised that this patient has been discharge from our facility  Below you will find the admission and discharge date and time including the patients disposition     UTILIZATION REVIEW CONTACT:  Ike Tijerina  Utilization   Network Utilization Review Department  Phone: [phone] x carefully listen to the prompts  All voicemails are confidential   Email: [email or web address]     PHYSICIAN ADVISORY SERVICES:  FOR [id number] REVIEW - MEDICAL NECESSITY DENIAL  Phone: [phone]  Fax: [phone]  Email: [email or web address]     PRESENTATION DATE: 4/20/2021  6:27 PM  OBERVATION ADMISSION DATE:  INPATIENT ADMISSION DATE: 4/20/21 1827   DISCHARGE DATE: 4/23/2021  5:13 PM  DISPOSITION: Left Without Being Seen Left Without Being Seen      IMPORTANT INFORMATION:  Send all requests for admission clinical reviews, approved or denied determinations and any other requests to dedicated fax number below belonging to the campus where the patient is receiving treatment   List of dedicated fax numbers:  [address] DENIALS (Administrative/Medical Necessity) [phone]   [address] (Maternity/NICU/Pediatrics) [phone]   Shari Palencia [phone]   Daniela Jennings [phone]   Cullen Opitz [phone]   [address] [phone]   [facility]  [phone]   [address] [facility] [address] [facility]  [address] [facility] [address] [facility] [phone]

## 2021-05-01 LAB
AMPHETAMINES UR QL SCN: NEGATIVE NG/ML
BARBITURATES UR QL SCN: NEGATIVE NG/ML
BENZODIAZ UR QL: NEGATIVE
BZE UR QL: NEGATIVE NG/ML
CANNABINOIDS UR QL SCN: POSITIVE
METHADONE UR QL SCN: POSITIVE
OPIATES UR QL: NEGATIVE NG/ML
PCP UR QL: NEGATIVE NG/ML
PROPOXYPH UR QL SCN: NEGATIVE NG/ML

## 2021-05-20 ENCOUNTER — TELEPHONE (OUTPATIENT)
Dept: INTERNAL MEDICINE CLINIC | Facility: CLINIC | Age: 61
End: 2021-05-20

## 2021-05-20 NOTE — TELEPHONE ENCOUNTER
Patients son called in stating mom was recently in the hospital and was advised to discontinue her Methadone and start Suboxone but doesn't understand why a script was not called in  I reviewed her chart and determined she is not a patient at our office and she has [employer] so we cannot establish care  I advised him that per hospital note she was made aware to contact the Methadone clinic to follow up  He stated that is not the info he was given over the phone with the doctor when she was being discharged  I advised him I do not know what that conversation was  I made him aware that he needs to contact her primary care doctor listed in her chart or contact the Methadone clinic that she goes to [facility] in [place]  As I continued to talk he hung up on me  I did not give him any info from her hospital note as he did not give his name and I do not see a consent   I also made Dr Elsie Goodell aware of this as he was the provider that saw her in the hospital

## 2021-09-18 ENCOUNTER — HOSPITAL ENCOUNTER (EMERGENCY)
Facility: HOSPITAL | Age: 61
Discharge: HOME | End: 2021-09-18
Attending: EMERGENCY MEDICINE
Payer: COMMERCIAL

## 2021-09-18 ENCOUNTER — APPOINTMENT (EMERGENCY)
Dept: RADIOLOGY | Facility: HOSPITAL | Age: 61
End: 2021-09-18
Payer: COMMERCIAL

## 2021-09-18 VITALS
HEART RATE: 86 BPM | DIASTOLIC BLOOD PRESSURE: 87 MMHG | RESPIRATION RATE: 16 BRPM | TEMPERATURE: 98.1 F | SYSTOLIC BLOOD PRESSURE: 128 MMHG | OXYGEN SATURATION: 97 %

## 2021-09-18 DIAGNOSIS — S02.85XA ORBITAL FRACTURE (HCC): Primary | ICD-10-CM

## 2021-09-18 DIAGNOSIS — F11.23 OPIATE WITHDRAWAL (HCC): ICD-10-CM

## 2021-09-18 PROCEDURE — G1004 CDSM NDSC: HCPCS

## 2021-09-18 PROCEDURE — 99284 EMERGENCY DEPT VISIT MOD MDM: CPT | Performed by: EMERGENCY MEDICINE

## 2021-09-18 PROCEDURE — 70450 CT HEAD/BRAIN W/O DYE: CPT

## 2021-09-18 PROCEDURE — 99284 EMERGENCY DEPT VISIT MOD MDM: CPT

## 2021-09-18 PROCEDURE — 70486 CT MAXILLOFACIAL W/O DYE: CPT

## 2021-09-18 RX ORDER — OXYCODONE HYDROCHLORIDE AND ACETAMINOPHEN 5; 325 MG/1; MG/1
2 TABLET ORAL ONCE
Status: COMPLETED | OUTPATIENT
Start: 2021-09-18 | End: 2021-09-18

## 2021-09-18 RX ORDER — OXYCODONE HYDROCHLORIDE AND ACETAMINOPHEN 5; 325 MG/1; MG/1
1 TABLET ORAL EVERY 8 HOURS PRN
Qty: 3 TABLET | Refills: 0 | Status: SHIPPED | OUTPATIENT
Start: 2021-09-18 | End: 2021-09-19

## 2021-09-18 RX ADMIN — OXYCODONE HYDROCHLORIDE AND ACETAMINOPHEN 2 TABLET: 5; 325 TABLET ORAL at 19:56
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
## 2021-10-02 PROCEDURE — 99284 EMERGENCY DEPT VISIT MOD MDM: CPT

## 2021-10-03 ENCOUNTER — APPOINTMENT (EMERGENCY)
Dept: RADIOLOGY | Facility: HOSPITAL | Age: 61
End: 2021-10-03
Attending: EMERGENCY MEDICINE
Payer: COMMERCIAL

## 2021-10-03 ENCOUNTER — HOSPITAL ENCOUNTER (EMERGENCY)
Facility: HOSPITAL | Age: 61
Discharge: HOME | End: 2021-10-03
Attending: EMERGENCY MEDICINE
Payer: COMMERCIAL

## 2021-10-03 VITALS
SYSTOLIC BLOOD PRESSURE: 108 MMHG | DIASTOLIC BLOOD PRESSURE: 65 MMHG | TEMPERATURE: 97 F | OXYGEN SATURATION: 95 % | RESPIRATION RATE: 16 BRPM | HEART RATE: 88 BPM

## 2021-10-03 DIAGNOSIS — G89.29 CHRONIC PAIN OF LEFT KNEE: Primary | ICD-10-CM

## 2021-10-03 DIAGNOSIS — M25.562 CHRONIC PAIN OF LEFT KNEE: Primary | ICD-10-CM

## 2021-10-03 PROCEDURE — 99284 EMERGENCY DEPT VISIT MOD MDM: CPT | Performed by: EMERGENCY MEDICINE

## 2021-10-03 PROCEDURE — 73562 X-RAY EXAM OF KNEE 3: CPT

## 2021-10-03 RX ORDER — IBUPROFEN 600 MG/1
600 TABLET ORAL ONCE
Status: COMPLETED | OUTPATIENT
Start: 2021-10-03 | End: 2021-10-03

## 2021-10-03 RX ADMIN — IBUPROFEN 600 MG: 600 TABLET, FILM COATED ORAL at 02:15

## 2021-11-09 ENCOUNTER — APPOINTMENT (OUTPATIENT)
Dept: RADIOLOGY | Facility: HOSPITAL | Age: 61
DRG: 182 | End: 2021-11-09
Payer: COMMERCIAL

## 2021-11-09 ENCOUNTER — APPOINTMENT (EMERGENCY)
Dept: RADIOLOGY | Facility: HOSPITAL | Age: 61
DRG: 182 | End: 2021-11-09
Payer: COMMERCIAL

## 2021-11-09 ENCOUNTER — HOSPITAL ENCOUNTER (INPATIENT)
Facility: HOSPITAL | Age: 61
LOS: 7 days | Discharge: HOME HEALTH | DRG: 182 | End: 2021-11-17
Attending: GENERAL PRACTICE | Admitting: INTERNAL MEDICINE
Payer: COMMERCIAL

## 2021-11-09 DIAGNOSIS — I21.11 ST ELEVATION MYOCARDIAL INFARCTION INVOLVING RIGHT CORONARY ARTERY (HCC): ICD-10-CM

## 2021-11-09 DIAGNOSIS — R74.01 TRANSAMINITIS: ICD-10-CM

## 2021-11-09 DIAGNOSIS — R11.2 NAUSEA AND VOMITING, INTRACTABILITY OF VOMITING NOT SPECIFIED, UNSPECIFIED VOMITING TYPE: Primary | ICD-10-CM

## 2021-11-09 DIAGNOSIS — E11.8 TYPE 2 DIABETES MELLITUS WITH COMPLICATION, WITH LONG-TERM CURRENT USE OF INSULIN (HCC): ICD-10-CM

## 2021-11-09 DIAGNOSIS — F10.10 ALCOHOL ABUSE: ICD-10-CM

## 2021-11-09 DIAGNOSIS — J18.9 PNEUMONIA: ICD-10-CM

## 2021-11-09 DIAGNOSIS — R07.9 CHEST PAIN: ICD-10-CM

## 2021-11-09 DIAGNOSIS — J45.909 ASTHMA, UNSPECIFIED ASTHMA SEVERITY, UNSPECIFIED WHETHER COMPLICATED, UNSPECIFIED WHETHER PERSISTENT: ICD-10-CM

## 2021-11-09 DIAGNOSIS — F31.9 BIPOLAR DISORDER (HCC): ICD-10-CM

## 2021-11-09 DIAGNOSIS — R77.8 ELEVATED TROPONIN: ICD-10-CM

## 2021-11-09 DIAGNOSIS — Z79.4 TYPE 2 DIABETES MELLITUS WITH COMPLICATION, WITH LONG-TERM CURRENT USE OF INSULIN (HCC): ICD-10-CM

## 2021-11-09 DIAGNOSIS — I25.2 HISTORY OF ST ELEVATION MYOCARDIAL INFARCTION (STEMI): ICD-10-CM

## 2021-11-09 DIAGNOSIS — Z72.0 NICOTINE ABUSE: ICD-10-CM

## 2021-11-09 DIAGNOSIS — I25.5 ISCHEMIC CARDIOMYOPATHY: ICD-10-CM

## 2021-11-09 LAB
ALBUMIN SERPL BCP-MCNC: 2.9 G/DL (ref 3.5–5)
ALP SERPL-CCNC: 204 U/L (ref 46–116)
ALT SERPL W P-5'-P-CCNC: 26 U/L (ref 12–78)
ANION GAP SERPL CALCULATED.3IONS-SCNC: 7 MMOL/L (ref 4–13)
AST SERPL W P-5'-P-CCNC: 32 U/L (ref 5–45)
BASOPHILS # BLD AUTO: 0.03 THOUSANDS/ΜL (ref 0–0.1)
BASOPHILS NFR BLD AUTO: 0 % (ref 0–1)
BILIRUB SERPL-MCNC: 0.27 MG/DL (ref 0.2–1)
BUN SERPL-MCNC: 12 MG/DL (ref 5–25)
CALCIUM ALBUM COR SERPL-MCNC: 9 MG/DL (ref 8.3–10.1)
CALCIUM SERPL-MCNC: 8.1 MG/DL (ref 8.3–10.1)
CHLORIDE SERPL-SCNC: 103 MMOL/L (ref 100–108)
CO2 SERPL-SCNC: 30 MMOL/L (ref 21–32)
CREAT SERPL-MCNC: 0.78 MG/DL (ref 0.6–1.3)
EOSINOPHIL # BLD AUTO: 0.01 THOUSAND/ΜL (ref 0–0.61)
EOSINOPHIL NFR BLD AUTO: 0 % (ref 0–6)
ERYTHROCYTE [DISTWIDTH] IN BLOOD BY AUTOMATED COUNT: 16.6 % (ref 11.6–15.1)
FLUAV RNA RESP QL NAA+PROBE: NEGATIVE
FLUBV RNA RESP QL NAA+PROBE: NEGATIVE
GFR SERPL CREATININE-BSD FRML MDRD: 96 ML/MIN/1.73SQ M
GLUCOSE SERPL-MCNC: 121 MG/DL (ref 65–140)
HCT VFR BLD AUTO: 37.6 % (ref 34.8–46.1)
HGB BLD-MCNC: 12 G/DL (ref 11.5–15.4)
IMM GRANULOCYTES # BLD AUTO: 0.06 THOUSAND/UL (ref 0–0.2)
IMM GRANULOCYTES NFR BLD AUTO: 1 % (ref 0–2)
LACTATE SERPL-SCNC: 1.5 MMOL/L (ref 0.5–2)
LYMPHOCYTES # BLD AUTO: 0.7 THOUSANDS/ΜL (ref 0.6–4.47)
LYMPHOCYTES NFR BLD AUTO: 6 % (ref 14–44)
MCH RBC QN AUTO: 29.7 PG (ref 26.8–34.3)
MCHC RBC AUTO-ENTMCNC: 31.9 G/DL (ref 31.4–37.4)
MCV RBC AUTO: 93 FL (ref 82–98)
MONOCYTES # BLD AUTO: 0.98 THOUSAND/ΜL (ref 0.17–1.22)
MONOCYTES NFR BLD AUTO: 8 % (ref 4–12)
NEUTROPHILS # BLD AUTO: 10.06 THOUSANDS/ΜL (ref 1.85–7.62)
NEUTS SEG NFR BLD AUTO: 85 % (ref 43–75)
NRBC BLD AUTO-RTO: 0 /100 WBCS
PLATELET # BLD AUTO: 286 THOUSANDS/UL (ref 149–390)
PMV BLD AUTO: 9.4 FL (ref 8.9–12.7)
POTASSIUM SERPL-SCNC: 3.2 MMOL/L (ref 3.5–5.3)
PROT SERPL-MCNC: 6.9 G/DL (ref 6.4–8.2)
RBC # BLD AUTO: 4.04 MILLION/UL (ref 3.81–5.12)
RSV RNA RESP QL NAA+PROBE: NEGATIVE
SARS-COV-2 RNA RESP QL NAA+PROBE: NEGATIVE
SODIUM SERPL-SCNC: 140 MMOL/L (ref 136–145)
WBC # BLD AUTO: 11.84 THOUSAND/UL (ref 4.31–10.16)

## 2021-11-09 PROCEDURE — 96375 TX/PRO/DX INJ NEW DRUG ADDON: CPT

## 2021-11-09 PROCEDURE — 96367 TX/PROPH/DG ADDL SEQ IV INF: CPT

## 2021-11-09 PROCEDURE — 83036 HEMOGLOBIN GLYCOSYLATED A1C: CPT | Performed by: INTERNAL MEDICINE

## 2021-11-09 PROCEDURE — 99285 EMERGENCY DEPT VISIT HI MDM: CPT

## 2021-11-09 PROCEDURE — 87040 BLOOD CULTURE FOR BACTERIA: CPT | Performed by: GENERAL PRACTICE

## 2021-11-09 PROCEDURE — 36415 COLL VENOUS BLD VENIPUNCTURE: CPT | Performed by: GENERAL PRACTICE

## 2021-11-09 PROCEDURE — 82077 ASSAY SPEC XCP UR&BREATH IA: CPT | Performed by: INTERNAL MEDICINE

## 2021-11-09 PROCEDURE — 99219 PR INITIAL OBSERVATION CARE/DAY 50 MINUTES: CPT | Performed by: INTERNAL MEDICINE

## 2021-11-09 PROCEDURE — 85025 COMPLETE CBC W/AUTO DIFF WBC: CPT | Performed by: GENERAL PRACTICE

## 2021-11-09 PROCEDURE — 84484 ASSAY OF TROPONIN QUANT: CPT | Performed by: INTERNAL MEDICINE

## 2021-11-09 PROCEDURE — G1004 CDSM NDSC: HCPCS

## 2021-11-09 PROCEDURE — 0241U HB NFCT DS VIR RESP RNA 4 TRGT: CPT | Performed by: GENERAL PRACTICE

## 2021-11-09 PROCEDURE — 96372 THER/PROPH/DIAG INJ SC/IM: CPT

## 2021-11-09 PROCEDURE — 74177 CT ABD & PELVIS W/CONTRAST: CPT

## 2021-11-09 PROCEDURE — 83605 ASSAY OF LACTIC ACID: CPT | Performed by: GENERAL PRACTICE

## 2021-11-09 PROCEDURE — 83690 ASSAY OF LIPASE: CPT | Performed by: INTERNAL MEDICINE

## 2021-11-09 PROCEDURE — 83880 ASSAY OF NATRIURETIC PEPTIDE: CPT | Performed by: INTERNAL MEDICINE

## 2021-11-09 PROCEDURE — 80053 COMPREHEN METABOLIC PANEL: CPT | Performed by: GENERAL PRACTICE

## 2021-11-09 PROCEDURE — 99284 EMERGENCY DEPT VISIT MOD MDM: CPT | Performed by: GENERAL PRACTICE

## 2021-11-09 PROCEDURE — 71275 CT ANGIOGRAPHY CHEST: CPT

## 2021-11-09 PROCEDURE — 71045 X-RAY EXAM CHEST 1 VIEW: CPT

## 2021-11-09 PROCEDURE — 96365 THER/PROPH/DIAG IV INF INIT: CPT

## 2021-11-09 RX ORDER — ATORVASTATIN CALCIUM 80 MG/1
80 TABLET, FILM COATED ORAL EVERY EVENING
Status: DISCONTINUED | OUTPATIENT
Start: 2021-11-09 | End: 2021-11-10

## 2021-11-09 RX ORDER — METHYLPREDNISOLONE SODIUM SUCCINATE 125 MG/2ML
125 INJECTION, POWDER, LYOPHILIZED, FOR SOLUTION INTRAMUSCULAR; INTRAVENOUS ONCE
Status: COMPLETED | OUTPATIENT
Start: 2021-11-09 | End: 2021-11-09

## 2021-11-09 RX ORDER — METHADONE HYDROCHLORIDE 10 MG/ML
40 CONCENTRATE ORAL DAILY
COMMUNITY

## 2021-11-09 RX ORDER — LANOLIN ALCOHOL/MO/W.PET/CERES
100 CREAM (GRAM) TOPICAL DAILY
Status: DISCONTINUED | OUTPATIENT
Start: 2021-11-10 | End: 2021-11-17 | Stop reason: HOSPADM

## 2021-11-09 RX ORDER — ALBUTEROL SULFATE 90 UG/1
2 AEROSOL, METERED RESPIRATORY (INHALATION) ONCE
Status: COMPLETED | OUTPATIENT
Start: 2021-11-09 | End: 2021-11-09

## 2021-11-09 RX ORDER — INSULIN GLARGINE 100 [IU]/ML
20 INJECTION, SOLUTION SUBCUTANEOUS
Status: DISCONTINUED | OUTPATIENT
Start: 2021-11-09 | End: 2021-11-12

## 2021-11-09 RX ORDER — SODIUM CHLORIDE 9 MG/ML
50 INJECTION, SOLUTION INTRAVENOUS CONTINUOUS
Status: DISCONTINUED | OUTPATIENT
Start: 2021-11-09 | End: 2021-11-12

## 2021-11-09 RX ORDER — CEFTRIAXONE 1 G/50ML
1000 INJECTION, SOLUTION INTRAVENOUS ONCE
Status: COMPLETED | OUTPATIENT
Start: 2021-11-09 | End: 2021-11-09

## 2021-11-09 RX ORDER — POTASSIUM CHLORIDE 14.9 MG/ML
20 INJECTION INTRAVENOUS ONCE
Status: COMPLETED | OUTPATIENT
Start: 2021-11-09 | End: 2021-11-09

## 2021-11-09 RX ORDER — LISINOPRIL 5 MG/1
5 TABLET ORAL DAILY
Status: DISCONTINUED | OUTPATIENT
Start: 2021-11-10 | End: 2021-11-12

## 2021-11-09 RX ORDER — KETOROLAC TROMETHAMINE 30 MG/ML
30 INJECTION, SOLUTION INTRAMUSCULAR; INTRAVENOUS ONCE
Status: COMPLETED | OUTPATIENT
Start: 2021-11-09 | End: 2021-11-09

## 2021-11-09 RX ORDER — ASPIRIN 81 MG/1
81 TABLET, CHEWABLE ORAL DAILY
Status: DISCONTINUED | OUTPATIENT
Start: 2021-11-10 | End: 2021-11-17 | Stop reason: HOSPADM

## 2021-11-09 RX ORDER — KETOROLAC TROMETHAMINE 30 MG/ML
15 INJECTION, SOLUTION INTRAMUSCULAR; INTRAVENOUS EVERY 6 HOURS PRN
Status: DISPENSED | OUTPATIENT
Start: 2021-11-09 | End: 2021-11-10

## 2021-11-09 RX ORDER — FOLIC ACID 1 MG/1
1 TABLET ORAL DAILY
Status: DISCONTINUED | OUTPATIENT
Start: 2021-11-10 | End: 2021-11-17 | Stop reason: HOSPADM

## 2021-11-09 RX ORDER — IPRATROPIUM BROMIDE AND ALBUTEROL SULFATE 2.5; .5 MG/3ML; MG/3ML
3 SOLUTION RESPIRATORY (INHALATION)
Status: DISCONTINUED | OUTPATIENT
Start: 2021-11-10 | End: 2021-11-12

## 2021-11-09 RX ORDER — POTASSIUM CHLORIDE 29.8 MG/ML
40 INJECTION INTRAVENOUS ONCE
Status: DISCONTINUED | OUTPATIENT
Start: 2021-11-09 | End: 2021-11-09

## 2021-11-09 RX ORDER — METOPROLOL SUCCINATE 100 MG/1
100 TABLET, EXTENDED RELEASE ORAL DAILY
Status: DISCONTINUED | OUTPATIENT
Start: 2021-11-10 | End: 2021-11-10

## 2021-11-09 RX ORDER — METHYLPREDNISOLONE SODIUM SUCCINATE 40 MG/ML
40 INJECTION, POWDER, LYOPHILIZED, FOR SOLUTION INTRAMUSCULAR; INTRAVENOUS EVERY 8 HOURS SCHEDULED
Status: DISCONTINUED | OUTPATIENT
Start: 2021-11-09 | End: 2021-11-10

## 2021-11-09 RX ORDER — NICOTINE 21 MG/24HR
1 PATCH, TRANSDERMAL 24 HOURS TRANSDERMAL DAILY
Status: DISCONTINUED | OUTPATIENT
Start: 2021-11-10 | End: 2021-11-17 | Stop reason: HOSPADM

## 2021-11-09 RX ORDER — CLOPIDOGREL BISULFATE 75 MG/1
75 TABLET ORAL DAILY
Status: DISCONTINUED | OUTPATIENT
Start: 2021-11-10 | End: 2021-11-17 | Stop reason: HOSPADM

## 2021-11-09 RX ORDER — HEPARIN SODIUM 5000 [USP'U]/ML
5000 INJECTION, SOLUTION INTRAVENOUS; SUBCUTANEOUS EVERY 8 HOURS SCHEDULED
Status: DISCONTINUED | OUTPATIENT
Start: 2021-11-09 | End: 2021-11-10

## 2021-11-09 RX ADMIN — METHYLPREDNISOLONE SODIUM SUCCINATE 125 MG: 125 INJECTION, POWDER, FOR SOLUTION INTRAMUSCULAR; INTRAVENOUS at 18:03

## 2021-11-09 RX ADMIN — AZITHROMYCIN MONOHYDRATE 500 MG: 500 INJECTION, POWDER, LYOPHILIZED, FOR SOLUTION INTRAVENOUS at 18:35

## 2021-11-09 RX ADMIN — KETOROLAC TROMETHAMINE 30 MG: 30 INJECTION, SOLUTION INTRAMUSCULAR; INTRAVENOUS at 12:24

## 2021-11-09 RX ADMIN — SODIUM CHLORIDE 1000 ML: 0.9 INJECTION, SOLUTION INTRAVENOUS at 17:53

## 2021-11-09 RX ADMIN — CEFTRIAXONE 1000 MG: 1 INJECTION, SOLUTION INTRAVENOUS at 18:02

## 2021-11-09 RX ADMIN — POTASSIUM CHLORIDE 20 MEQ: 14.9 INJECTION, SOLUTION INTRAVENOUS at 19:42

## 2021-11-09 RX ADMIN — IOHEXOL 100 ML: 350 INJECTION, SOLUTION INTRAVENOUS at 22:48

## 2021-11-09 RX ADMIN — POTASSIUM CHLORIDE 20 MEQ: 14.9 INJECTION, SOLUTION INTRAVENOUS at 21:33

## 2021-11-09 RX ADMIN — ALBUTEROL SULFATE 2 PUFF: 90 AEROSOL, METERED RESPIRATORY (INHALATION) at 12:23

## 2021-11-10 ENCOUNTER — APPOINTMENT (OUTPATIENT)
Dept: RADIOLOGY | Facility: HOSPITAL | Age: 61
DRG: 182 | End: 2021-11-10
Payer: COMMERCIAL

## 2021-11-10 ENCOUNTER — APPOINTMENT (OUTPATIENT)
Dept: NON INVASIVE DIAGNOSTICS | Facility: HOSPITAL | Age: 61
DRG: 182 | End: 2021-11-10
Payer: COMMERCIAL

## 2021-11-10 PROBLEM — E87.1 HYPONATREMIA: Status: ACTIVE | Noted: 2021-11-10

## 2021-11-10 PROBLEM — M79.89 PAIN AND SWELLING OF LEFT LOWER EXTREMITY: Status: ACTIVE | Noted: 2021-11-10

## 2021-11-10 PROBLEM — I25.2 HISTORY OF ST ELEVATION MYOCARDIAL INFARCTION (STEMI): Status: ACTIVE | Noted: 2021-04-20

## 2021-11-10 PROBLEM — I25.5 ISCHEMIC CARDIOMYOPATHY: Status: ACTIVE | Noted: 2021-11-10

## 2021-11-10 PROBLEM — M79.605 PAIN AND SWELLING OF LEFT LOWER EXTREMITY: Status: ACTIVE | Noted: 2021-11-10

## 2021-11-10 LAB
2HR DELTA HS TROPONIN: -6 NG/L
4HR DELTA HS TROPONIN: -17 NG/L
ALBUMIN SERPL BCP-MCNC: 2.5 G/DL (ref 3.5–5)
ALP SERPL-CCNC: 233 U/L (ref 46–116)
ALT SERPL W P-5'-P-CCNC: 109 U/L (ref 12–78)
AMPHETAMINES SERPL QL SCN: NEGATIVE
ANION GAP SERPL CALCULATED.3IONS-SCNC: 11 MMOL/L (ref 4–13)
AORTIC ROOT: 2.7 CM
APTT PPP: 30 SECONDS (ref 23–37)
APTT PPP: 37 SECONDS (ref 23–37)
AST SERPL W P-5'-P-CCNC: 178 U/L (ref 5–45)
ATRIAL RATE: 101 BPM
ATRIAL RATE: 93 BPM
ATRIAL RATE: 98 BPM
ATRIAL RATE: 98 BPM
AV REGURGITATION PRESSURE HALF TIME: 0.53 MS
BARBITURATES UR QL: NEGATIVE
BASOPHILS # BLD AUTO: 0 THOUSANDS/ΜL (ref 0–0.1)
BASOPHILS NFR BLD AUTO: 0 % (ref 0–1)
BENZODIAZ UR QL: NEGATIVE
BILIRUB SERPL-MCNC: 1.73 MG/DL (ref 0.2–1)
BUN SERPL-MCNC: 14 MG/DL (ref 5–25)
CALCIUM ALBUM COR SERPL-MCNC: 9.5 MG/DL (ref 8.3–10.1)
CALCIUM SERPL-MCNC: 8.3 MG/DL (ref 8.3–10.1)
CARDIAC TROPONIN I PNL SERPL HS: 50 NG/L
CARDIAC TROPONIN I PNL SERPL HS: 61 NG/L
CARDIAC TROPONIN I PNL SERPL HS: 67 NG/L
CHLORIDE SERPL-SCNC: 102 MMOL/L (ref 100–108)
CHOLEST SERPL-MCNC: 138 MG/DL (ref 50–200)
CO2 SERPL-SCNC: 24 MMOL/L (ref 21–32)
COCAINE UR QL: NEGATIVE
CREAT SERPL-MCNC: 0.75 MG/DL (ref 0.6–1.3)
E WAVE DECELERATION TIME: 212 MS
EOSINOPHIL # BLD AUTO: 0 THOUSAND/ΜL (ref 0–0.61)
EOSINOPHIL NFR BLD AUTO: 0 % (ref 0–6)
ERYTHROCYTE [DISTWIDTH] IN BLOOD BY AUTOMATED COUNT: 17 % (ref 11.6–15.1)
EST. AVERAGE GLUCOSE BLD GHB EST-MCNC: 114 MG/DL
ETHANOL SERPL-MCNC: 3 MG/DL (ref 0–3)
FRACTIONAL SHORTENING: 21 % (ref 28–44)
GFR SERPL CREATININE-BSD FRML MDRD: 101 ML/MIN/1.73SQ M
GLUCOSE SERPL-MCNC: 146 MG/DL (ref 65–140)
GLUCOSE SERPL-MCNC: 148 MG/DL (ref 65–140)
GLUCOSE SERPL-MCNC: 151 MG/DL (ref 65–140)
GLUCOSE SERPL-MCNC: 160 MG/DL (ref 65–140)
GLUCOSE SERPL-MCNC: 205 MG/DL (ref 65–140)
GLUCOSE SERPL-MCNC: 210 MG/DL (ref 65–140)
HBA1C MFR BLD: 5.6 %
HCT VFR BLD AUTO: 39.8 % (ref 34.8–46.1)
HDLC SERPL-MCNC: 90 MG/DL
HGB BLD-MCNC: 12.7 G/DL (ref 11.5–15.4)
IMM GRANULOCYTES # BLD AUTO: 0.05 THOUSAND/UL (ref 0–0.2)
IMM GRANULOCYTES NFR BLD AUTO: 0 % (ref 0–2)
INR PPP: 1.08 (ref 0.84–1.19)
INTERVENTRICULAR SEPTUM IN DIASTOLE (PARASTERNAL SHORT AXIS VIEW): 0.9 CM
LDLC SERPL CALC-MCNC: 40 MG/DL (ref 0–100)
LEFT ATRIUM AREA SYSTOLE SINGLE PLANE A4C: 14.3 CM2
LEFT INTERNAL DIMENSION IN SYSTOLE: 3.8 CM (ref 2.1–4)
LEFT VENTRICULAR INTERNAL DIMENSION IN DIASTOLE: 4.8 CM (ref 3.52–5.24)
LEFT VENTRICULAR POSTERIOR WALL IN END DIASTOLE: 1 CM
LEFT VENTRICULAR STROKE VOLUME: 47 ML
LIPASE SERPL-CCNC: 40 U/L (ref 73–393)
LYMPHOCYTES # BLD AUTO: 0.75 THOUSANDS/ΜL (ref 0.6–4.47)
LYMPHOCYTES NFR BLD AUTO: 6 % (ref 14–44)
MAGNESIUM SERPL-MCNC: 1.8 MG/DL (ref 1.6–2.6)
MCH RBC QN AUTO: 30 PG (ref 26.8–34.3)
MCHC RBC AUTO-ENTMCNC: 31.9 G/DL (ref 31.4–37.4)
MCV RBC AUTO: 94 FL (ref 82–98)
METHADONE UR QL: POSITIVE
MONOCYTES # BLD AUTO: 0.24 THOUSAND/ΜL (ref 0.17–1.22)
MONOCYTES NFR BLD AUTO: 2 % (ref 4–12)
MV E'TISSUE VEL-LAT: 4 CM/S
MV E'TISSUE VEL-SEP: 3 CM/S
MV PEAK A VEL: 0.85 M/S
MV PEAK E VEL: 72 CM/S
MV STENOSIS PRESSURE HALF TIME: 0 MS
NEUTROPHILS # BLD AUTO: 11.4 THOUSANDS/ΜL (ref 1.85–7.62)
NEUTS SEG NFR BLD AUTO: 92 % (ref 43–75)
NRBC BLD AUTO-RTO: 0 /100 WBCS
NT-PROBNP SERPL-MCNC: 2282 PG/ML
OPIATES UR QL SCN: NEGATIVE
OXYCODONE+OXYMORPHONE UR QL SCN: NEGATIVE
P AXIS: 70 DEGREES
P AXIS: 83 DEGREES
PCP UR QL: NEGATIVE
PLATELET # BLD AUTO: 263 THOUSANDS/UL (ref 149–390)
PMV BLD AUTO: 9.6 FL (ref 8.9–12.7)
POTASSIUM SERPL-SCNC: 4 MMOL/L (ref 3.5–5.3)
PR INTERVAL: 116 MS
PR INTERVAL: 120 MS
PR INTERVAL: 128 MS
PR INTERVAL: 130 MS
PROT SERPL-MCNC: 6.5 G/DL (ref 6.4–8.2)
PROTHROMBIN TIME: 13.7 SECONDS (ref 11.6–14.5)
QRS AXIS: 11 DEGREES
QRS AXIS: 14 DEGREES
QRS AXIS: 38 DEGREES
QRS AXIS: 4 DEGREES
QRSD INTERVAL: 74 MS
QRSD INTERVAL: 76 MS
QRSD INTERVAL: 84 MS
QRSD INTERVAL: 84 MS
QT INTERVAL: 376 MS
QT INTERVAL: 382 MS
QT INTERVAL: 386 MS
QT INTERVAL: 396 MS
QTC INTERVAL: 480 MS
QTC INTERVAL: 487 MS
QTC INTERVAL: 492 MS
QTC INTERVAL: 500 MS
RBC # BLD AUTO: 4.23 MILLION/UL (ref 3.81–5.12)
RIGHT ATRIUM AREA SYSTOLE A4C: 18.1 CM2
RIGHT VENTRICLE ID DIMENSION: 2.9 CM
SL CV AV DECELERATION TIME RETROGRADE: 1820 MS
SL CV AV PEAK GRADIENT RETROGRADE: 50 MMHG
SL CV LV EF: 35
SL CV PED ECHO LEFT VENTRICLE DIASTOLIC VOLUME (MOD BIPLANE) 2D: 108 ML
SL CV PED ECHO LEFT VENTRICLE SYSTOLIC VOLUME (MOD BIPLANE) 2D: 62 ML
SODIUM SERPL-SCNC: 137 MMOL/L (ref 136–145)
T WAVE AXIS: -13 DEGREES
T WAVE AXIS: -16 DEGREES
T WAVE AXIS: -6 DEGREES
T WAVE AXIS: -8 DEGREES
THC UR QL: NEGATIVE
TRICUSPID VALVE PEAK REGURGITATION VELOCITY: 2.34 M/S
TRICUSPID VALVE S': 0.7 CM/S
TRIGL SERPL-MCNC: 39 MG/DL
TV PEAK GRADIENT: 22 MMHG
VENTRICULAR RATE: 101 BPM
VENTRICULAR RATE: 93 BPM
VENTRICULAR RATE: 98 BPM
VENTRICULAR RATE: 98 BPM
WBC # BLD AUTO: 12.44 THOUSAND/UL (ref 4.31–10.16)
Z-SCORE OF LEFT VENTRICULAR DIMENSION IN END SYSTOLE: 1.11

## 2021-11-10 PROCEDURE — NC001 PR NO CHARGE: Performed by: SURGERY

## 2021-11-10 PROCEDURE — 93010 ELECTROCARDIOGRAM REPORT: CPT | Performed by: INTERNAL MEDICINE

## 2021-11-10 PROCEDURE — 93970 EXTREMITY STUDY: CPT | Performed by: SURGERY

## 2021-11-10 PROCEDURE — 83735 ASSAY OF MAGNESIUM: CPT | Performed by: INTERNAL MEDICINE

## 2021-11-10 PROCEDURE — 82948 REAGENT STRIP/BLOOD GLUCOSE: CPT

## 2021-11-10 PROCEDURE — 85025 COMPLETE CBC W/AUTO DIFF WBC: CPT | Performed by: INTERNAL MEDICINE

## 2021-11-10 PROCEDURE — 80061 LIPID PANEL: CPT | Performed by: INTERNAL MEDICINE

## 2021-11-10 PROCEDURE — 94760 N-INVAS EAR/PLS OXIMETRY 1: CPT

## 2021-11-10 PROCEDURE — 93970 EXTREMITY STUDY: CPT

## 2021-11-10 PROCEDURE — 73560 X-RAY EXAM OF KNEE 1 OR 2: CPT

## 2021-11-10 PROCEDURE — 93005 ELECTROCARDIOGRAM TRACING: CPT

## 2021-11-10 PROCEDURE — 85730 THROMBOPLASTIN TIME PARTIAL: CPT | Performed by: INTERNAL MEDICINE

## 2021-11-10 PROCEDURE — 0DB68ZX EXCISION OF STOMACH, VIA NATURAL OR ARTIFICIAL OPENING ENDOSCOPIC, DIAGNOSTIC: ICD-10-PCS | Performed by: INTERNAL MEDICINE

## 2021-11-10 PROCEDURE — 94640 AIRWAY INHALATION TREATMENT: CPT

## 2021-11-10 PROCEDURE — 76705 ECHO EXAM OF ABDOMEN: CPT

## 2021-11-10 PROCEDURE — 90682 RIV4 VACC RECOMBINANT DNA IM: CPT | Performed by: INTERNAL MEDICINE

## 2021-11-10 PROCEDURE — 93306 TTE W/DOPPLER COMPLETE: CPT

## 2021-11-10 PROCEDURE — 99254 IP/OBS CNSLTJ NEW/EST MOD 60: CPT | Performed by: SURGERY

## 2021-11-10 PROCEDURE — 99223 1ST HOSP IP/OBS HIGH 75: CPT | Performed by: INTERNAL MEDICINE

## 2021-11-10 PROCEDURE — 99233 SBSQ HOSP IP/OBS HIGH 50: CPT | Performed by: INTERNAL MEDICINE

## 2021-11-10 PROCEDURE — 80307 DRUG TEST PRSMV CHEM ANLYZR: CPT | Performed by: INTERNAL MEDICINE

## 2021-11-10 PROCEDURE — 90471 IMMUNIZATION ADMIN: CPT | Performed by: INTERNAL MEDICINE

## 2021-11-10 PROCEDURE — 0DB98ZX EXCISION OF DUODENUM, VIA NATURAL OR ARTIFICIAL OPENING ENDOSCOPIC, DIAGNOSTIC: ICD-10-PCS | Performed by: INTERNAL MEDICINE

## 2021-11-10 PROCEDURE — 84484 ASSAY OF TROPONIN QUANT: CPT | Performed by: INTERNAL MEDICINE

## 2021-11-10 PROCEDURE — 93306 TTE W/DOPPLER COMPLETE: CPT | Performed by: INTERNAL MEDICINE

## 2021-11-10 PROCEDURE — 85610 PROTHROMBIN TIME: CPT | Performed by: INTERNAL MEDICINE

## 2021-11-10 PROCEDURE — 80053 COMPREHEN METABOLIC PANEL: CPT | Performed by: INTERNAL MEDICINE

## 2021-11-10 PROCEDURE — 0DB48ZX EXCISION OF ESOPHAGOGASTRIC JUNCTION, VIA NATURAL OR ARTIFICIAL OPENING ENDOSCOPIC, DIAGNOSTIC: ICD-10-PCS | Performed by: INTERNAL MEDICINE

## 2021-11-10 RX ORDER — CEFTRIAXONE 1 G/50ML
1000 INJECTION, SOLUTION INTRAVENOUS EVERY 24 HOURS
Status: DISCONTINUED | OUTPATIENT
Start: 2021-11-10 | End: 2021-11-12

## 2021-11-10 RX ORDER — SPIRONOLACTONE 25 MG/1
12.5 TABLET ORAL DAILY
Status: DISCONTINUED | OUTPATIENT
Start: 2021-11-11 | End: 2021-11-17 | Stop reason: HOSPADM

## 2021-11-10 RX ORDER — NITROGLYCERIN 0.4 MG/1
0.4 TABLET SUBLINGUAL
Status: DISCONTINUED | OUTPATIENT
Start: 2021-11-10 | End: 2021-11-17 | Stop reason: HOSPADM

## 2021-11-10 RX ORDER — ATORVASTATIN CALCIUM 80 MG/1
80 TABLET, FILM COATED ORAL EVERY EVENING
Status: DISCONTINUED | OUTPATIENT
Start: 2021-11-10 | End: 2021-11-12

## 2021-11-10 RX ORDER — HEPARIN SODIUM 1000 [USP'U]/ML
2400 INJECTION, SOLUTION INTRAVENOUS; SUBCUTANEOUS
Status: DISCONTINUED | OUTPATIENT
Start: 2021-11-10 | End: 2021-11-10

## 2021-11-10 RX ORDER — CLOPIDOGREL BISULFATE 75 MG/1
300 TABLET ORAL ONCE
Status: COMPLETED | OUTPATIENT
Start: 2021-11-10 | End: 2021-11-10

## 2021-11-10 RX ORDER — METOPROLOL SUCCINATE 100 MG/1
100 TABLET, EXTENDED RELEASE ORAL DAILY
Status: DISCONTINUED | OUTPATIENT
Start: 2021-11-10 | End: 2021-11-17 | Stop reason: HOSPADM

## 2021-11-10 RX ORDER — HEPARIN SODIUM 1000 [USP'U]/ML
1200 INJECTION, SOLUTION INTRAVENOUS; SUBCUTANEOUS
Status: DISCONTINUED | OUTPATIENT
Start: 2021-11-10 | End: 2021-11-10

## 2021-11-10 RX ORDER — METHADONE HYDROCHLORIDE 10 MG/1
40 TABLET ORAL DAILY
Status: DISCONTINUED | OUTPATIENT
Start: 2021-11-10 | End: 2021-11-17 | Stop reason: HOSPADM

## 2021-11-10 RX ORDER — ASPIRIN 325 MG
325 TABLET ORAL ONCE
Status: COMPLETED | OUTPATIENT
Start: 2021-11-10 | End: 2021-11-10

## 2021-11-10 RX ORDER — ONDANSETRON 2 MG/ML
4 INJECTION INTRAMUSCULAR; INTRAVENOUS EVERY 6 HOURS PRN
Status: DISCONTINUED | OUTPATIENT
Start: 2021-11-10 | End: 2021-11-17 | Stop reason: HOSPADM

## 2021-11-10 RX ORDER — HEPARIN SODIUM 10000 [USP'U]/100ML
3-20 INJECTION, SOLUTION INTRAVENOUS
Status: DISCONTINUED | OUTPATIENT
Start: 2021-11-10 | End: 2021-11-10

## 2021-11-10 RX ADMIN — INFLUENZA A VIRUS A/WISCONSIN/588/2019 (H1N1) RECOMBINANT HEMAGGLUTININ ANTIGEN, INFLUENZA A VIRUS A/TASMANIA/503/2020 (H3N2) RECOMBINANT HEMAGGLUTININ ANTIGEN, INFLUENZA B VIRUS B/WASHINGTON/02/2019 RECOMBINANT HEMAGGLUTININ ANTIGEN, AND INFLUENZA B VIRUS B/PHUKET/3073/2013 RECOMBINANT HEMAGGLUTININ ANTIGEN 0.5 ML: 45; 45; 45; 45 INJECTION INTRAMUSCULAR at 06:36

## 2021-11-10 RX ADMIN — METHYLPREDNISOLONE SODIUM SUCCINATE 40 MG: 40 INJECTION, POWDER, FOR SOLUTION INTRAMUSCULAR; INTRAVENOUS at 06:00

## 2021-11-10 RX ADMIN — ASPIRIN 325 MG: 325 TABLET ORAL at 03:25

## 2021-11-10 RX ADMIN — METRONIDAZOLE 500 MG: 500 INJECTION, SOLUTION INTRAVENOUS at 17:23

## 2021-11-10 RX ADMIN — Medication 1 TABLET: at 07:56

## 2021-11-10 RX ADMIN — SODIUM CHLORIDE 50 ML/HR: 0.9 INJECTION, SOLUTION INTRAVENOUS at 00:41

## 2021-11-10 RX ADMIN — NICOTINE 1 PATCH: 21 PATCH, EXTENDED RELEASE TRANSDERMAL at 07:57

## 2021-11-10 RX ADMIN — INSULIN GLARGINE 20 UNITS: 100 INJECTION, SOLUTION SUBCUTANEOUS at 22:24

## 2021-11-10 RX ADMIN — CLOPIDOGREL BISULFATE 300 MG: 75 TABLET ORAL at 03:25

## 2021-11-10 RX ADMIN — METOPROLOL SUCCINATE 100 MG: 100 TABLET, EXTENDED RELEASE ORAL at 03:25

## 2021-11-10 RX ADMIN — ASPIRIN 81 MG CHEWABLE TABLET 81 MG: 81 TABLET CHEWABLE at 07:57

## 2021-11-10 RX ADMIN — ONDANSETRON 4 MG: 2 INJECTION INTRAMUSCULAR; INTRAVENOUS at 23:50

## 2021-11-10 RX ADMIN — LISINOPRIL 5 MG: 5 TABLET ORAL at 08:12

## 2021-11-10 RX ADMIN — ATORVASTATIN CALCIUM 80 MG: 80 TABLET ORAL at 17:23

## 2021-11-10 RX ADMIN — HEPARIN SODIUM 5000 UNITS: 5000 INJECTION INTRAVENOUS; SUBCUTANEOUS at 00:42

## 2021-11-10 RX ADMIN — KETOROLAC TROMETHAMINE 15 MG: 30 INJECTION, SOLUTION INTRAMUSCULAR; INTRAVENOUS at 00:45

## 2021-11-10 RX ADMIN — ENOXAPARIN SODIUM 40 MG: 40 INJECTION SUBCUTANEOUS at 17:22

## 2021-11-10 RX ADMIN — IPRATROPIUM BROMIDE AND ALBUTEROL SULFATE 3 ML: 2.5; .5 SOLUTION RESPIRATORY (INHALATION) at 02:56

## 2021-11-10 RX ADMIN — FOLIC ACID 1 MG: 1 TABLET ORAL at 07:57

## 2021-11-10 RX ADMIN — IPRATROPIUM BROMIDE AND ALBUTEROL SULFATE 3 ML: 2.5; .5 SOLUTION RESPIRATORY (INHALATION) at 14:50

## 2021-11-10 RX ADMIN — THIAMINE HCL TAB 100 MG 100 MG: 100 TAB at 07:57

## 2021-11-10 RX ADMIN — METRONIDAZOLE 500 MG: 500 INJECTION, SOLUTION INTRAVENOUS at 10:31

## 2021-11-10 RX ADMIN — SODIUM CHLORIDE 50 ML/HR: 0.9 INJECTION, SOLUTION INTRAVENOUS at 20:18

## 2021-11-10 RX ADMIN — METHYLPREDNISOLONE SODIUM SUCCINATE 40 MG: 40 INJECTION, POWDER, FOR SOLUTION INTRAMUSCULAR; INTRAVENOUS at 00:41

## 2021-11-10 RX ADMIN — IPRATROPIUM BROMIDE AND ALBUTEROL SULFATE 3 ML: 2.5; .5 SOLUTION RESPIRATORY (INHALATION) at 09:11

## 2021-11-10 RX ADMIN — ATORVASTATIN CALCIUM 80 MG: 80 TABLET ORAL at 03:25

## 2021-11-10 RX ADMIN — CEFTRIAXONE 1000 MG: 1 INJECTION, SOLUTION INTRAVENOUS at 10:22

## 2021-11-10 RX ADMIN — HEPARIN SODIUM 12 UNITS/KG/HR: 10000 INJECTION, SOLUTION INTRAVENOUS at 03:00

## 2021-11-10 RX ADMIN — IPRATROPIUM BROMIDE AND ALBUTEROL SULFATE 3 ML: 2.5; .5 SOLUTION RESPIRATORY (INHALATION) at 20:12

## 2021-11-10 RX ADMIN — INSULIN GLARGINE 20 UNITS: 100 INJECTION, SOLUTION SUBCUTANEOUS at 00:42

## 2021-11-10 RX ADMIN — METHADONE HYDROCHLORIDE 40 MG: 10 TABLET ORAL at 10:27

## 2021-11-10 RX ADMIN — INSULIN LISPRO 1 UNITS: 100 INJECTION, SOLUTION INTRAVENOUS; SUBCUTANEOUS at 07:58

## 2021-11-10 RX ADMIN — CLOPIDOGREL 75 MG: 75 TABLET, FILM COATED ORAL at 10:17

## 2021-11-11 ENCOUNTER — APPOINTMENT (INPATIENT)
Dept: RADIOLOGY | Facility: HOSPITAL | Age: 61
DRG: 182 | End: 2021-11-11
Payer: COMMERCIAL

## 2021-11-11 PROBLEM — E87.1 HYPONATREMIA: Status: RESOLVED | Noted: 2021-11-10 | Resolved: 2021-11-11

## 2021-11-11 PROBLEM — R94.31 QT PROLONGATION: Status: RESOLVED | Noted: 2021-04-21 | Resolved: 2021-11-11

## 2021-11-11 LAB
ALBUMIN SERPL BCP-MCNC: 2.6 G/DL (ref 3.5–5)
ALP SERPL-CCNC: 205 U/L (ref 46–116)
ALT SERPL W P-5'-P-CCNC: 87 U/L (ref 12–78)
ANION GAP SERPL CALCULATED.3IONS-SCNC: 10 MMOL/L (ref 4–13)
AST SERPL W P-5'-P-CCNC: 95 U/L (ref 5–45)
BASOPHILS # BLD AUTO: 0.01 THOUSANDS/ΜL (ref 0–0.1)
BASOPHILS NFR BLD AUTO: 0 % (ref 0–1)
BILIRUB SERPL-MCNC: 0.87 MG/DL (ref 0.2–1)
BUN SERPL-MCNC: 24 MG/DL (ref 5–25)
CALCIUM ALBUM COR SERPL-MCNC: 9.6 MG/DL (ref 8.3–10.1)
CALCIUM SERPL-MCNC: 8.5 MG/DL (ref 8.3–10.1)
CHLORIDE SERPL-SCNC: 101 MMOL/L (ref 100–108)
CO2 SERPL-SCNC: 25 MMOL/L (ref 21–32)
CREAT SERPL-MCNC: 0.83 MG/DL (ref 0.6–1.3)
EOSINOPHIL # BLD AUTO: 0 THOUSAND/ΜL (ref 0–0.61)
EOSINOPHIL NFR BLD AUTO: 0 % (ref 0–6)
ERYTHROCYTE [DISTWIDTH] IN BLOOD BY AUTOMATED COUNT: 16.6 % (ref 11.6–15.1)
GFR SERPL CREATININE-BSD FRML MDRD: 89 ML/MIN/1.73SQ M
GLUCOSE SERPL-MCNC: 102 MG/DL (ref 65–140)
GLUCOSE SERPL-MCNC: 164 MG/DL (ref 65–140)
GLUCOSE SERPL-MCNC: 70 MG/DL (ref 65–140)
GLUCOSE SERPL-MCNC: 96 MG/DL (ref 65–140)
HCT VFR BLD AUTO: 36.9 % (ref 34.8–46.1)
HGB BLD-MCNC: 11.9 G/DL (ref 11.5–15.4)
IMM GRANULOCYTES # BLD AUTO: 0.09 THOUSAND/UL (ref 0–0.2)
IMM GRANULOCYTES NFR BLD AUTO: 1 % (ref 0–2)
LYMPHOCYTES # BLD AUTO: 1.48 THOUSANDS/ΜL (ref 0.6–4.47)
LYMPHOCYTES NFR BLD AUTO: 10 % (ref 14–44)
MCH RBC QN AUTO: 29.9 PG (ref 26.8–34.3)
MCHC RBC AUTO-ENTMCNC: 32.2 G/DL (ref 31.4–37.4)
MCV RBC AUTO: 93 FL (ref 82–98)
MONOCYTES # BLD AUTO: 0.92 THOUSAND/ΜL (ref 0.17–1.22)
MONOCYTES NFR BLD AUTO: 6 % (ref 4–12)
NEUTROPHILS # BLD AUTO: 12.64 THOUSANDS/ΜL (ref 1.85–7.62)
NEUTS SEG NFR BLD AUTO: 83 % (ref 43–75)
NRBC BLD AUTO-RTO: 0 /100 WBCS
PLATELET # BLD AUTO: 289 THOUSANDS/UL (ref 149–390)
PMV BLD AUTO: 10.5 FL (ref 8.9–12.7)
POTASSIUM SERPL-SCNC: 4.6 MMOL/L (ref 3.5–5.3)
PROT SERPL-MCNC: 6.6 G/DL (ref 6.4–8.2)
RBC # BLD AUTO: 3.98 MILLION/UL (ref 3.81–5.12)
SODIUM SERPL-SCNC: 136 MMOL/L (ref 136–145)
WBC # BLD AUTO: 15.14 THOUSAND/UL (ref 4.31–10.16)

## 2021-11-11 PROCEDURE — 85025 COMPLETE CBC W/AUTO DIFF WBC: CPT | Performed by: INTERNAL MEDICINE

## 2021-11-11 PROCEDURE — 99232 SBSQ HOSP IP/OBS MODERATE 35: CPT | Performed by: INTERNAL MEDICINE

## 2021-11-11 PROCEDURE — A9537 TC99M MEBROFENIN: HCPCS

## 2021-11-11 PROCEDURE — 94760 N-INVAS EAR/PLS OXIMETRY 1: CPT

## 2021-11-11 PROCEDURE — 82948 REAGENT STRIP/BLOOD GLUCOSE: CPT

## 2021-11-11 PROCEDURE — G1004 CDSM NDSC: HCPCS

## 2021-11-11 PROCEDURE — 80053 COMPREHEN METABOLIC PANEL: CPT | Performed by: INTERNAL MEDICINE

## 2021-11-11 PROCEDURE — 78227 HEPATOBIL SYST IMAGE W/DRUG: CPT

## 2021-11-11 PROCEDURE — 99232 SBSQ HOSP IP/OBS MODERATE 35: CPT | Performed by: SPECIALIST

## 2021-11-11 PROCEDURE — 94640 AIRWAY INHALATION TREATMENT: CPT

## 2021-11-11 RX ADMIN — THIAMINE HCL TAB 100 MG 100 MG: 100 TAB at 13:02

## 2021-11-11 RX ADMIN — IPRATROPIUM BROMIDE AND ALBUTEROL SULFATE 3 ML: 2.5; .5 SOLUTION RESPIRATORY (INHALATION) at 01:34

## 2021-11-11 RX ADMIN — IPRATROPIUM BROMIDE AND ALBUTEROL SULFATE 3 ML: 2.5; .5 SOLUTION RESPIRATORY (INHALATION) at 07:20

## 2021-11-11 RX ADMIN — METRONIDAZOLE 500 MG: 500 INJECTION, SOLUTION INTRAVENOUS at 13:02

## 2021-11-11 RX ADMIN — IPRATROPIUM BROMIDE AND ALBUTEROL SULFATE 3 ML: 2.5; .5 SOLUTION RESPIRATORY (INHALATION) at 13:05

## 2021-11-11 RX ADMIN — ENOXAPARIN SODIUM 40 MG: 40 INJECTION SUBCUTANEOUS at 13:02

## 2021-11-11 RX ADMIN — CLOPIDOGREL 75 MG: 75 TABLET, FILM COATED ORAL at 13:02

## 2021-11-11 RX ADMIN — METRONIDAZOLE 500 MG: 500 INJECTION, SOLUTION INTRAVENOUS at 02:19

## 2021-11-11 RX ADMIN — INSULIN GLARGINE 20 UNITS: 100 INJECTION, SOLUTION SUBCUTANEOUS at 22:17

## 2021-11-11 RX ADMIN — CEFTRIAXONE 1000 MG: 1 INJECTION, SOLUTION INTRAVENOUS at 13:41

## 2021-11-11 RX ADMIN — SINCALIDE 0.9 MCG: 5 INJECTION, POWDER, LYOPHILIZED, FOR SOLUTION INTRAVENOUS at 11:08

## 2021-11-11 RX ADMIN — IPRATROPIUM BROMIDE AND ALBUTEROL SULFATE 3 ML: 2.5; .5 SOLUTION RESPIRATORY (INHALATION) at 20:53

## 2021-11-11 RX ADMIN — METRONIDAZOLE 500 MG: 500 INJECTION, SOLUTION INTRAVENOUS at 18:54

## 2021-11-11 RX ADMIN — ATORVASTATIN CALCIUM 80 MG: 80 TABLET ORAL at 18:54

## 2021-11-11 RX ADMIN — Medication 1 TABLET: at 13:02

## 2021-11-11 RX ADMIN — NICOTINE 1 PATCH: 21 PATCH, EXTENDED RELEASE TRANSDERMAL at 13:02

## 2021-11-11 RX ADMIN — ASPIRIN 81 MG CHEWABLE TABLET 81 MG: 81 TABLET CHEWABLE at 13:02

## 2021-11-11 RX ADMIN — SODIUM CHLORIDE 50 ML/HR: 0.9 INJECTION, SOLUTION INTRAVENOUS at 22:17

## 2021-11-11 RX ADMIN — METHADONE HYDROCHLORIDE 40 MG: 10 TABLET ORAL at 13:02

## 2021-11-11 RX ADMIN — FOLIC ACID 1 MG: 1 TABLET ORAL at 13:02

## 2021-11-12 ENCOUNTER — APPOINTMENT (INPATIENT)
Dept: RADIOLOGY | Facility: HOSPITAL | Age: 61
DRG: 182 | End: 2021-11-12
Payer: COMMERCIAL

## 2021-11-12 LAB
ALBUMIN SERPL BCP-MCNC: 2.6 G/DL (ref 3.5–5)
ALP SERPL-CCNC: 289 U/L (ref 46–116)
ALT SERPL W P-5'-P-CCNC: 168 U/L (ref 12–78)
ANION GAP SERPL CALCULATED.3IONS-SCNC: 7 MMOL/L (ref 4–13)
AST SERPL W P-5'-P-CCNC: 247 U/L (ref 5–45)
BASOPHILS # BLD AUTO: 0.02 THOUSANDS/ΜL (ref 0–0.1)
BASOPHILS NFR BLD AUTO: 0 % (ref 0–1)
BILIRUB SERPL-MCNC: 0.56 MG/DL (ref 0.2–1)
BUN SERPL-MCNC: 24 MG/DL (ref 5–25)
CALCIUM ALBUM COR SERPL-MCNC: 9.4 MG/DL (ref 8.3–10.1)
CALCIUM SERPL-MCNC: 8.3 MG/DL (ref 8.3–10.1)
CHLORIDE SERPL-SCNC: 101 MMOL/L (ref 100–108)
CO2 SERPL-SCNC: 24 MMOL/L (ref 21–32)
CREAT SERPL-MCNC: 0.97 MG/DL (ref 0.6–1.3)
EOSINOPHIL # BLD AUTO: 0.02 THOUSAND/ΜL (ref 0–0.61)
EOSINOPHIL NFR BLD AUTO: 0 % (ref 0–6)
ERYTHROCYTE [DISTWIDTH] IN BLOOD BY AUTOMATED COUNT: 16.4 % (ref 11.6–15.1)
FLUAV RNA RESP QL NAA+PROBE: NEGATIVE
FLUBV RNA RESP QL NAA+PROBE: NEGATIVE
GFR SERPL CREATININE-BSD FRML MDRD: 74 ML/MIN/1.73SQ M
GLUCOSE SERPL-MCNC: 114 MG/DL (ref 65–140)
GLUCOSE SERPL-MCNC: 134 MG/DL (ref 65–140)
GLUCOSE SERPL-MCNC: 64 MG/DL (ref 65–140)
GLUCOSE SERPL-MCNC: 71 MG/DL (ref 65–140)
GLUCOSE SERPL-MCNC: 93 MG/DL (ref 65–140)
HCT VFR BLD AUTO: 36.1 % (ref 34.8–46.1)
HGB BLD-MCNC: 11.6 G/DL (ref 11.5–15.4)
IMM GRANULOCYTES # BLD AUTO: 0.03 THOUSAND/UL (ref 0–0.2)
IMM GRANULOCYTES NFR BLD AUTO: 0 % (ref 0–2)
LYMPHOCYTES # BLD AUTO: 2.84 THOUSANDS/ΜL (ref 0.6–4.47)
LYMPHOCYTES NFR BLD AUTO: 35 % (ref 14–44)
MCH RBC QN AUTO: 30 PG (ref 26.8–34.3)
MCHC RBC AUTO-ENTMCNC: 32.1 G/DL (ref 31.4–37.4)
MCV RBC AUTO: 93 FL (ref 82–98)
MONOCYTES # BLD AUTO: 0.5 THOUSAND/ΜL (ref 0.17–1.22)
MONOCYTES NFR BLD AUTO: 6 % (ref 4–12)
NEUTROPHILS # BLD AUTO: 4.73 THOUSANDS/ΜL (ref 1.85–7.62)
NEUTS SEG NFR BLD AUTO: 59 % (ref 43–75)
NRBC BLD AUTO-RTO: 0 /100 WBCS
PLATELET # BLD AUTO: 263 THOUSANDS/UL (ref 149–390)
PMV BLD AUTO: 9.5 FL (ref 8.9–12.7)
POTASSIUM SERPL-SCNC: 4.2 MMOL/L (ref 3.5–5.3)
PROT SERPL-MCNC: 6.6 G/DL (ref 6.4–8.2)
RBC # BLD AUTO: 3.87 MILLION/UL (ref 3.81–5.12)
RSV RNA RESP QL NAA+PROBE: NEGATIVE
SARS-COV-2 RNA RESP QL NAA+PROBE: NEGATIVE
SODIUM SERPL-SCNC: 132 MMOL/L (ref 136–145)
WBC # BLD AUTO: 8.14 THOUSAND/UL (ref 4.31–10.16)

## 2021-11-12 PROCEDURE — 94760 N-INVAS EAR/PLS OXIMETRY 1: CPT

## 2021-11-12 PROCEDURE — 80053 COMPREHEN METABOLIC PANEL: CPT | Performed by: INTERNAL MEDICINE

## 2021-11-12 PROCEDURE — 99255 IP/OBS CONSLTJ NEW/EST HI 80: CPT | Performed by: PHYSICIAN ASSISTANT

## 2021-11-12 PROCEDURE — 99232 SBSQ HOSP IP/OBS MODERATE 35: CPT | Performed by: INTERNAL MEDICINE

## 2021-11-12 PROCEDURE — 94640 AIRWAY INHALATION TREATMENT: CPT

## 2021-11-12 PROCEDURE — 99232 SBSQ HOSP IP/OBS MODERATE 35: CPT | Performed by: NURSE PRACTITIONER

## 2021-11-12 PROCEDURE — G1004 CDSM NDSC: HCPCS

## 2021-11-12 PROCEDURE — 0241U HB NFCT DS VIR RESP RNA 4 TRGT: CPT | Performed by: NURSE PRACTITIONER

## 2021-11-12 PROCEDURE — 85025 COMPLETE CBC W/AUTO DIFF WBC: CPT | Performed by: INTERNAL MEDICINE

## 2021-11-12 PROCEDURE — 82948 REAGENT STRIP/BLOOD GLUCOSE: CPT

## 2021-11-12 PROCEDURE — 74181 MRI ABDOMEN W/O CONTRAST: CPT

## 2021-11-12 RX ORDER — IPRATROPIUM BROMIDE AND ALBUTEROL SULFATE 2.5; .5 MG/3ML; MG/3ML
3 SOLUTION RESPIRATORY (INHALATION) EVERY 4 HOURS PRN
Status: DISCONTINUED | OUTPATIENT
Start: 2021-11-12 | End: 2021-11-17 | Stop reason: HOSPADM

## 2021-11-12 RX ORDER — LISINOPRIL 2.5 MG/1
2.5 TABLET ORAL DAILY
Status: DISCONTINUED | OUTPATIENT
Start: 2021-11-13 | End: 2021-11-17 | Stop reason: HOSPADM

## 2021-11-12 RX ORDER — INSULIN GLARGINE 100 [IU]/ML
10 INJECTION, SOLUTION SUBCUTANEOUS
Status: DISCONTINUED | OUTPATIENT
Start: 2021-11-12 | End: 2021-11-13

## 2021-11-12 RX ORDER — ACETAMINOPHEN 325 MG/1
650 TABLET ORAL ONCE
Status: COMPLETED | OUTPATIENT
Start: 2021-11-12 | End: 2021-11-12

## 2021-11-12 RX ADMIN — METRONIDAZOLE 500 MG: 500 INJECTION, SOLUTION INTRAVENOUS at 10:08

## 2021-11-12 RX ADMIN — FOLIC ACID 1 MG: 1 TABLET ORAL at 09:38

## 2021-11-12 RX ADMIN — NICOTINE 1 PATCH: 21 PATCH, EXTENDED RELEASE TRANSDERMAL at 09:41

## 2021-11-12 RX ADMIN — IPRATROPIUM BROMIDE AND ALBUTEROL SULFATE 3 ML: 2.5; .5 SOLUTION RESPIRATORY (INHALATION) at 01:39

## 2021-11-12 RX ADMIN — ENOXAPARIN SODIUM 40 MG: 40 INJECTION SUBCUTANEOUS at 09:37

## 2021-11-12 RX ADMIN — THIAMINE HCL TAB 100 MG 100 MG: 100 TAB at 09:38

## 2021-11-12 RX ADMIN — CLOPIDOGREL 75 MG: 75 TABLET, FILM COATED ORAL at 09:38

## 2021-11-12 RX ADMIN — METOPROLOL SUCCINATE 100 MG: 100 TABLET, EXTENDED RELEASE ORAL at 09:41

## 2021-11-12 RX ADMIN — ASPIRIN 81 MG CHEWABLE TABLET 81 MG: 81 TABLET CHEWABLE at 09:39

## 2021-11-12 RX ADMIN — METRONIDAZOLE 500 MG: 500 INJECTION, SOLUTION INTRAVENOUS at 02:26

## 2021-11-12 RX ADMIN — IPRATROPIUM BROMIDE AND ALBUTEROL SULFATE 3 ML: 2.5; .5 SOLUTION RESPIRATORY (INHALATION) at 07:08

## 2021-11-12 RX ADMIN — INSULIN GLARGINE 10 UNITS: 100 INJECTION, SOLUTION SUBCUTANEOUS at 22:44

## 2021-11-12 RX ADMIN — METHADONE HYDROCHLORIDE 40 MG: 10 TABLET ORAL at 09:37

## 2021-11-12 RX ADMIN — Medication 1 TABLET: at 09:42

## 2021-11-12 RX ADMIN — SPIRONOLACTONE 12.5 MG: 25 TABLET ORAL at 09:38

## 2021-11-12 RX ADMIN — ACETAMINOPHEN 650 MG: 325 TABLET, FILM COATED ORAL at 22:44

## 2021-11-13 LAB
ALBUMIN SERPL BCP-MCNC: 2.7 G/DL (ref 3.5–5)
ALP SERPL-CCNC: 402 U/L (ref 46–116)
ALT SERPL W P-5'-P-CCNC: 184 U/L (ref 12–78)
ANION GAP SERPL CALCULATED.3IONS-SCNC: 9 MMOL/L (ref 4–13)
AST SERPL W P-5'-P-CCNC: 229 U/L (ref 5–45)
BILIRUB SERPL-MCNC: 0.52 MG/DL (ref 0.2–1)
BUN SERPL-MCNC: 19 MG/DL (ref 5–25)
CALCIUM ALBUM COR SERPL-MCNC: 9.7 MG/DL (ref 8.3–10.1)
CALCIUM SERPL-MCNC: 8.7 MG/DL (ref 8.3–10.1)
CHLORIDE SERPL-SCNC: 102 MMOL/L (ref 100–108)
CO2 SERPL-SCNC: 24 MMOL/L (ref 21–32)
CREAT SERPL-MCNC: 0.68 MG/DL (ref 0.6–1.3)
ERYTHROCYTE [DISTWIDTH] IN BLOOD BY AUTOMATED COUNT: 16.4 % (ref 11.6–15.1)
FERRITIN SERPL-MCNC: 130 NG/ML (ref 8–388)
GFR SERPL CREATININE-BSD FRML MDRD: 111 ML/MIN/1.73SQ M
GGT SERPL-CCNC: 500 U/L (ref 5–85)
GLUCOSE SERPL-MCNC: 107 MG/DL (ref 65–140)
GLUCOSE SERPL-MCNC: 61 MG/DL (ref 65–140)
GLUCOSE SERPL-MCNC: 70 MG/DL (ref 65–140)
GLUCOSE SERPL-MCNC: 73 MG/DL (ref 65–140)
GLUCOSE SERPL-MCNC: 85 MG/DL (ref 65–140)
GLUCOSE SERPL-MCNC: 95 MG/DL (ref 65–140)
HAV IGM SER QL: NORMAL
HBV CORE IGM SER QL: NORMAL
HBV SURFACE AG SER QL: NORMAL
HCT VFR BLD AUTO: 37.2 % (ref 34.8–46.1)
HCV AB SER QL: NORMAL
HGB BLD-MCNC: 12 G/DL (ref 11.5–15.4)
IRON SATN MFR SERPL: 7 % (ref 15–50)
IRON SERPL-MCNC: 24 UG/DL (ref 50–170)
MAGNESIUM SERPL-MCNC: 2.1 MG/DL (ref 1.6–2.6)
MCH RBC QN AUTO: 29.8 PG (ref 26.8–34.3)
MCHC RBC AUTO-ENTMCNC: 32.3 G/DL (ref 31.4–37.4)
MCV RBC AUTO: 92 FL (ref 82–98)
PHOSPHATE SERPL-MCNC: 4 MG/DL (ref 2.7–4.5)
PLATELET # BLD AUTO: 273 THOUSANDS/UL (ref 149–390)
PMV BLD AUTO: 9.9 FL (ref 8.9–12.7)
POTASSIUM SERPL-SCNC: 4.5 MMOL/L (ref 3.5–5.3)
PROT SERPL-MCNC: 6.7 G/DL (ref 6.4–8.2)
RBC # BLD AUTO: 4.03 MILLION/UL (ref 3.81–5.12)
SODIUM SERPL-SCNC: 135 MMOL/L (ref 136–145)
TIBC SERPL-MCNC: 362 UG/DL (ref 250–450)
WBC # BLD AUTO: 5.71 THOUSAND/UL (ref 4.31–10.16)

## 2021-11-13 PROCEDURE — 83540 ASSAY OF IRON: CPT | Performed by: PHYSICIAN ASSISTANT

## 2021-11-13 PROCEDURE — 85027 COMPLETE CBC AUTOMATED: CPT | Performed by: NURSE PRACTITIONER

## 2021-11-13 PROCEDURE — 82103 ALPHA-1-ANTITRYPSIN TOTAL: CPT | Performed by: PHYSICIAN ASSISTANT

## 2021-11-13 PROCEDURE — 83735 ASSAY OF MAGNESIUM: CPT | Performed by: NURSE PRACTITIONER

## 2021-11-13 PROCEDURE — 82390 ASSAY OF CERULOPLASMIN: CPT | Performed by: PHYSICIAN ASSISTANT

## 2021-11-13 PROCEDURE — 80074 ACUTE HEPATITIS PANEL: CPT | Performed by: PHYSICIAN ASSISTANT

## 2021-11-13 PROCEDURE — 86256 FLUORESCENT ANTIBODY TITER: CPT | Performed by: PHYSICIAN ASSISTANT

## 2021-11-13 PROCEDURE — 82948 REAGENT STRIP/BLOOD GLUCOSE: CPT

## 2021-11-13 PROCEDURE — 82728 ASSAY OF FERRITIN: CPT | Performed by: PHYSICIAN ASSISTANT

## 2021-11-13 PROCEDURE — 82977 ASSAY OF GGT: CPT | Performed by: PHYSICIAN ASSISTANT

## 2021-11-13 PROCEDURE — 86038 ANTINUCLEAR ANTIBODIES: CPT | Performed by: PHYSICIAN ASSISTANT

## 2021-11-13 PROCEDURE — 86235 NUCLEAR ANTIGEN ANTIBODY: CPT | Performed by: PHYSICIAN ASSISTANT

## 2021-11-13 PROCEDURE — 83550 IRON BINDING TEST: CPT | Performed by: PHYSICIAN ASSISTANT

## 2021-11-13 PROCEDURE — 84100 ASSAY OF PHOSPHORUS: CPT | Performed by: NURSE PRACTITIONER

## 2021-11-13 PROCEDURE — 80053 COMPREHEN METABOLIC PANEL: CPT | Performed by: NURSE PRACTITIONER

## 2021-11-13 PROCEDURE — 99232 SBSQ HOSP IP/OBS MODERATE 35: CPT | Performed by: NURSE PRACTITIONER

## 2021-11-13 PROCEDURE — 99232 SBSQ HOSP IP/OBS MODERATE 35: CPT | Performed by: INTERNAL MEDICINE

## 2021-11-13 RX ORDER — KETOROLAC TROMETHAMINE 30 MG/ML
15 INJECTION, SOLUTION INTRAMUSCULAR; INTRAVENOUS EVERY 6 HOURS PRN
Status: DISCONTINUED | OUTPATIENT
Start: 2021-11-13 | End: 2021-11-15

## 2021-11-13 RX ORDER — INSULIN GLARGINE 100 [IU]/ML
8 INJECTION, SOLUTION SUBCUTANEOUS
Status: DISCONTINUED | OUTPATIENT
Start: 2021-11-13 | End: 2021-11-15

## 2021-11-13 RX ADMIN — KETOROLAC TROMETHAMINE 15 MG: 30 INJECTION, SOLUTION INTRAMUSCULAR at 21:18

## 2021-11-13 RX ADMIN — FOLIC ACID 1 MG: 1 TABLET ORAL at 08:41

## 2021-11-13 RX ADMIN — CLOPIDOGREL 75 MG: 75 TABLET, FILM COATED ORAL at 08:41

## 2021-11-13 RX ADMIN — INSULIN GLARGINE 8 UNITS: 100 INJECTION, SOLUTION SUBCUTANEOUS at 21:29

## 2021-11-13 RX ADMIN — ENOXAPARIN SODIUM 40 MG: 40 INJECTION SUBCUTANEOUS at 08:41

## 2021-11-13 RX ADMIN — SPIRONOLACTONE 12.5 MG: 25 TABLET ORAL at 08:41

## 2021-11-13 RX ADMIN — METHADONE HYDROCHLORIDE 40 MG: 10 TABLET ORAL at 08:41

## 2021-11-13 RX ADMIN — NICOTINE 1 PATCH: 21 PATCH, EXTENDED RELEASE TRANSDERMAL at 08:43

## 2021-11-13 RX ADMIN — LISINOPRIL 2.5 MG: 2.5 TABLET ORAL at 08:40

## 2021-11-13 RX ADMIN — ASPIRIN 81 MG CHEWABLE TABLET 81 MG: 81 TABLET CHEWABLE at 08:40

## 2021-11-13 RX ADMIN — METOPROLOL SUCCINATE 100 MG: 100 TABLET, EXTENDED RELEASE ORAL at 08:40

## 2021-11-13 RX ADMIN — THIAMINE HCL TAB 100 MG 100 MG: 100 TAB at 08:40

## 2021-11-13 RX ADMIN — Medication 1 TABLET: at 08:40

## 2021-11-13 RX ADMIN — KETOROLAC TROMETHAMINE 15 MG: 30 INJECTION, SOLUTION INTRAMUSCULAR at 12:46

## 2021-11-14 LAB
A1AT SERPL-MCNC: 203 MG/DL (ref 101–187)
ALBUMIN SERPL BCP-MCNC: 2.7 G/DL (ref 3.5–5)
ALP SERPL-CCNC: 434 U/L (ref 46–116)
ALT SERPL W P-5'-P-CCNC: 171 U/L (ref 12–78)
ANION GAP SERPL CALCULATED.3IONS-SCNC: 8 MMOL/L (ref 4–13)
AST SERPL W P-5'-P-CCNC: 156 U/L (ref 5–45)
BILIRUB SERPL-MCNC: 0.5 MG/DL (ref 0.2–1)
BUN SERPL-MCNC: 24 MG/DL (ref 5–25)
CALCIUM ALBUM COR SERPL-MCNC: 9.9 MG/DL (ref 8.3–10.1)
CALCIUM SERPL-MCNC: 8.9 MG/DL (ref 8.3–10.1)
CERULOPLASMIN SERPL-MCNC: 37.2 MG/DL (ref 19–39)
CHLORIDE SERPL-SCNC: 101 MMOL/L (ref 100–108)
CO2 SERPL-SCNC: 25 MMOL/L (ref 21–32)
CREAT SERPL-MCNC: 0.84 MG/DL (ref 0.6–1.3)
GFR SERPL CREATININE-BSD FRML MDRD: 88 ML/MIN/1.73SQ M
GLUCOSE SERPL-MCNC: 108 MG/DL (ref 65–140)
GLUCOSE SERPL-MCNC: 192 MG/DL (ref 65–140)
GLUCOSE SERPL-MCNC: 75 MG/DL (ref 65–140)
GLUCOSE SERPL-MCNC: 76 MG/DL (ref 65–140)
GLUCOSE SERPL-MCNC: 87 MG/DL (ref 65–140)
GLUCOSE SERPL-MCNC: 92 MG/DL (ref 65–140)
POTASSIUM SERPL-SCNC: 4.6 MMOL/L (ref 3.5–5.3)
PROT SERPL-MCNC: 6.7 G/DL (ref 6.4–8.2)
SODIUM SERPL-SCNC: 134 MMOL/L (ref 136–145)

## 2021-11-14 PROCEDURE — 80053 COMPREHEN METABOLIC PANEL: CPT | Performed by: NURSE PRACTITIONER

## 2021-11-14 PROCEDURE — 82948 REAGENT STRIP/BLOOD GLUCOSE: CPT

## 2021-11-14 PROCEDURE — 99232 SBSQ HOSP IP/OBS MODERATE 35: CPT | Performed by: INTERNAL MEDICINE

## 2021-11-14 PROCEDURE — 99232 SBSQ HOSP IP/OBS MODERATE 35: CPT | Performed by: NURSE PRACTITIONER

## 2021-11-14 RX ORDER — FAMOTIDINE 20 MG/1
20 TABLET, FILM COATED ORAL 2 TIMES DAILY
Status: DISCONTINUED | OUTPATIENT
Start: 2021-11-14 | End: 2021-11-17 | Stop reason: HOSPADM

## 2021-11-14 RX ORDER — FUROSEMIDE 10 MG/ML
20 INJECTION INTRAMUSCULAR; INTRAVENOUS ONCE
Status: COMPLETED | OUTPATIENT
Start: 2021-11-14 | End: 2021-11-14

## 2021-11-14 RX ADMIN — KETOROLAC TROMETHAMINE 15 MG: 30 INJECTION, SOLUTION INTRAMUSCULAR at 15:31

## 2021-11-14 RX ADMIN — Medication 1 TABLET: at 08:39

## 2021-11-14 RX ADMIN — CLOPIDOGREL 75 MG: 75 TABLET, FILM COATED ORAL at 08:39

## 2021-11-14 RX ADMIN — METHADONE HYDROCHLORIDE 40 MG: 10 TABLET ORAL at 08:39

## 2021-11-14 RX ADMIN — ASPIRIN 81 MG CHEWABLE TABLET 81 MG: 81 TABLET CHEWABLE at 08:38

## 2021-11-14 RX ADMIN — METOPROLOL SUCCINATE 100 MG: 100 TABLET, EXTENDED RELEASE ORAL at 08:39

## 2021-11-14 RX ADMIN — KETOROLAC TROMETHAMINE 15 MG: 30 INJECTION, SOLUTION INTRAMUSCULAR at 21:31

## 2021-11-14 RX ADMIN — ENOXAPARIN SODIUM 40 MG: 40 INJECTION SUBCUTANEOUS at 08:39

## 2021-11-14 RX ADMIN — SPIRONOLACTONE 12.5 MG: 25 TABLET ORAL at 08:38

## 2021-11-14 RX ADMIN — INSULIN GLARGINE 8 UNITS: 100 INJECTION, SOLUTION SUBCUTANEOUS at 21:32

## 2021-11-14 RX ADMIN — LISINOPRIL 2.5 MG: 2.5 TABLET ORAL at 08:38

## 2021-11-14 RX ADMIN — FUROSEMIDE 20 MG: 10 INJECTION, SOLUTION INTRAMUSCULAR; INTRAVENOUS at 18:08

## 2021-11-14 RX ADMIN — NICOTINE 1 PATCH: 21 PATCH, EXTENDED RELEASE TRANSDERMAL at 08:39

## 2021-11-14 RX ADMIN — THIAMINE HCL TAB 100 MG 100 MG: 100 TAB at 08:38

## 2021-11-14 RX ADMIN — FOLIC ACID 1 MG: 1 TABLET ORAL at 08:39

## 2021-11-14 RX ADMIN — FAMOTIDINE 20 MG: 20 TABLET ORAL at 17:05

## 2021-11-15 ENCOUNTER — APPOINTMENT (INPATIENT)
Dept: RADIOLOGY | Facility: HOSPITAL | Age: 61
DRG: 182 | End: 2021-11-15
Payer: COMMERCIAL

## 2021-11-15 ENCOUNTER — APPOINTMENT (INPATIENT)
Dept: NON INVASIVE DIAGNOSTICS | Facility: HOSPITAL | Age: 61
DRG: 182 | End: 2021-11-15
Payer: COMMERCIAL

## 2021-11-15 PROBLEM — R10.13 EPIGASTRIC PAIN: Status: ACTIVE | Noted: 2021-11-15

## 2021-11-15 PROBLEM — R74.01 TRANSAMINITIS: Status: ACTIVE | Noted: 2021-11-15

## 2021-11-15 LAB
ACTIN IGG SERPL-ACNC: 9 UNITS (ref 0–19)
ALBUMIN SERPL BCP-MCNC: 2.7 G/DL (ref 3.5–5)
ALP SERPL-CCNC: 407 U/L (ref 46–116)
ALT SERPL W P-5'-P-CCNC: 168 U/L (ref 12–78)
ANION GAP SERPL CALCULATED.3IONS-SCNC: 10 MMOL/L (ref 4–13)
AST SERPL W P-5'-P-CCNC: 161 U/L (ref 5–45)
BACTERIA BLD CULT: NORMAL
BACTERIA BLD CULT: NORMAL
BASELINE ST DEPRESSION: 0 MM
BASOPHILS # BLD AUTO: 0.04 THOUSANDS/ΜL (ref 0–0.1)
BASOPHILS NFR BLD AUTO: 1 % (ref 0–1)
BILIRUB SERPL-MCNC: 0.66 MG/DL (ref 0.2–1)
BUN SERPL-MCNC: 28 MG/DL (ref 5–25)
CALCIUM ALBUM COR SERPL-MCNC: 10.3 MG/DL (ref 8.3–10.1)
CALCIUM SERPL-MCNC: 9.3 MG/DL (ref 8.3–10.1)
CHEST PAIN STATEMENT: NORMAL
CHLORIDE SERPL-SCNC: 99 MMOL/L (ref 100–108)
CO2 SERPL-SCNC: 25 MMOL/L (ref 21–32)
CREAT SERPL-MCNC: 0.82 MG/DL (ref 0.6–1.3)
EOSINOPHIL # BLD AUTO: 0.23 THOUSAND/ΜL (ref 0–0.61)
EOSINOPHIL NFR BLD AUTO: 3 % (ref 0–6)
ERYTHROCYTE [DISTWIDTH] IN BLOOD BY AUTOMATED COUNT: 15.5 % (ref 11.6–15.1)
FLUAV RNA RESP QL NAA+PROBE: NEGATIVE
FLUBV RNA RESP QL NAA+PROBE: NEGATIVE
GFR SERPL CREATININE-BSD FRML MDRD: 91 ML/MIN/1.73SQ M
GLUCOSE SERPL-MCNC: 124 MG/DL (ref 65–140)
GLUCOSE SERPL-MCNC: 145 MG/DL (ref 65–140)
GLUCOSE SERPL-MCNC: 156 MG/DL (ref 65–140)
GLUCOSE SERPL-MCNC: 54 MG/DL (ref 65–140)
GLUCOSE SERPL-MCNC: 62 MG/DL (ref 65–140)
GLUCOSE SERPL-MCNC: 82 MG/DL (ref 65–140)
HCT VFR BLD AUTO: 37.2 % (ref 34.8–46.1)
HGB BLD-MCNC: 12.3 G/DL (ref 11.5–15.4)
IMM GRANULOCYTES # BLD AUTO: 0.03 THOUSAND/UL (ref 0–0.2)
IMM GRANULOCYTES NFR BLD AUTO: 0 % (ref 0–2)
LYMPHOCYTES # BLD AUTO: 2.53 THOUSANDS/ΜL (ref 0.6–4.47)
LYMPHOCYTES NFR BLD AUTO: 37 % (ref 14–44)
MAGNESIUM SERPL-MCNC: 1.9 MG/DL (ref 1.6–2.6)
MAX DIASTOLIC BP: 79 MMHG
MAX HEART RATE: 67 BPM
MAX HR PERCENT: 41 %
MAX HR: 161 BPM
MAX PREDICTED HEART RATE: 161 BPM
MAX. SYSTOLIC BP: 136 MMHG
MCH RBC QN AUTO: 29.8 PG (ref 26.8–34.3)
MCHC RBC AUTO-ENTMCNC: 33.1 G/DL (ref 31.4–37.4)
MCV RBC AUTO: 90 FL (ref 82–98)
MITOCHONDRIA M2 IGG SER-ACNC: <20 UNITS (ref 0–20)
MONOCYTES # BLD AUTO: 0.7 THOUSAND/ΜL (ref 0.17–1.22)
MONOCYTES NFR BLD AUTO: 10 % (ref 4–12)
NEUTROPHILS # BLD AUTO: 3.34 THOUSANDS/ΜL (ref 1.85–7.62)
NEUTS SEG NFR BLD AUTO: 49 % (ref 43–75)
NRBC BLD AUTO-RTO: 0 /100 WBCS
PLATELET # BLD AUTO: 321 THOUSANDS/UL (ref 149–390)
PMV BLD AUTO: 10 FL (ref 8.9–12.7)
POTASSIUM SERPL-SCNC: 4.2 MMOL/L (ref 3.5–5.3)
PROT SERPL-MCNC: 6.8 G/DL (ref 6.4–8.2)
PROTOCOL NAME: NORMAL
RATE PRESSURE PRODUCT: 8509
RBC # BLD AUTO: 4.13 MILLION/UL (ref 3.81–5.12)
REASON FOR TERMINATION: NORMAL
RSV RNA RESP QL NAA+PROBE: NEGATIVE
RYE IGE QN: NEGATIVE
SARS-COV-2 RNA RESP QL NAA+PROBE: NEGATIVE
SL CV REST NUCLEAR ISOTOPE DOSE: 10.82 MCI
SL CV STRESS NUCLEAR ISOTOPE DOSE: 30.9 MCI
SL CV STRESS RECOVERY BP: NORMAL MMHG
SL CV STRESS RECOVERY HR: 64 BPM
SL CV STRESS RECOVERY O2 SAT: 97 %
SODIUM SERPL-SCNC: 134 MMOL/L (ref 136–145)
STRESS ANGINA INDEX: 0
STRESS BASELINE BP: NORMAL MMHG
STRESS BASELINE HR: 60 BPM
STRESS DUKE TREADMILL SCORE: 1
STRESS O2 SAT REST: 98 %
STRESS PEAK HR: 67 BPM
STRESS PERCENT HR: 41 %
STRESS POST ESTIMATED WORKLOAD: 1 METS
STRESS POST EXERCISE DUR MIN: 1 MIN
STRESS POST O2 SAT PEAK: 100 %
STRESS POST PEAK BP: 127 MMHG
STRESS ST DEPRESSION: 0 MM
STRESS TARGET HR: 67 BPM
TARGET HR FORMULA: NORMAL
TEST INDICATION: NORMAL
TIME IN EXERCISE PHASE: NORMAL
WBC # BLD AUTO: 6.87 THOUSAND/UL (ref 4.31–10.16)

## 2021-11-15 PROCEDURE — 85025 COMPLETE CBC W/AUTO DIFF WBC: CPT | Performed by: NURSE PRACTITIONER

## 2021-11-15 PROCEDURE — 99232 SBSQ HOSP IP/OBS MODERATE 35: CPT | Performed by: NURSE PRACTITIONER

## 2021-11-15 PROCEDURE — 93017 CV STRESS TEST TRACING ONLY: CPT

## 2021-11-15 PROCEDURE — G1004 CDSM NDSC: HCPCS

## 2021-11-15 PROCEDURE — 99233 SBSQ HOSP IP/OBS HIGH 50: CPT | Performed by: INTERNAL MEDICINE

## 2021-11-15 PROCEDURE — 93018 CV STRESS TEST I&R ONLY: CPT | Performed by: INTERNAL MEDICINE

## 2021-11-15 PROCEDURE — 0241U HB NFCT DS VIR RESP RNA 4 TRGT: CPT | Performed by: INTERNAL MEDICINE

## 2021-11-15 PROCEDURE — C9113 INJ PANTOPRAZOLE SODIUM, VIA: HCPCS | Performed by: NURSE PRACTITIONER

## 2021-11-15 PROCEDURE — 82948 REAGENT STRIP/BLOOD GLUCOSE: CPT

## 2021-11-15 PROCEDURE — 93016 CV STRESS TEST SUPVJ ONLY: CPT | Performed by: INTERNAL MEDICINE

## 2021-11-15 PROCEDURE — 80053 COMPREHEN METABOLIC PANEL: CPT | Performed by: NURSE PRACTITIONER

## 2021-11-15 PROCEDURE — 78452 HT MUSCLE IMAGE SPECT MULT: CPT

## 2021-11-15 PROCEDURE — A9502 TC99M TETROFOSMIN: HCPCS

## 2021-11-15 PROCEDURE — 71045 X-RAY EXAM CHEST 1 VIEW: CPT

## 2021-11-15 PROCEDURE — 78452 HT MUSCLE IMAGE SPECT MULT: CPT | Performed by: INTERNAL MEDICINE

## 2021-11-15 PROCEDURE — 83735 ASSAY OF MAGNESIUM: CPT | Performed by: NURSE PRACTITIONER

## 2021-11-15 RX ORDER — METHYLPREDNISOLONE 4 MG/1
32 TABLET ORAL ONCE
Status: DISCONTINUED | OUTPATIENT
Start: 2021-11-15 | End: 2021-11-15

## 2021-11-15 RX ORDER — INSULIN GLARGINE 100 [IU]/ML
4 INJECTION, SOLUTION SUBCUTANEOUS
Status: DISCONTINUED | OUTPATIENT
Start: 2021-11-15 | End: 2021-11-15

## 2021-11-15 RX ORDER — GUAIFENESIN 600 MG
600 TABLET, EXTENDED RELEASE 12 HR ORAL EVERY 12 HOURS SCHEDULED
Status: DISCONTINUED | OUTPATIENT
Start: 2021-11-15 | End: 2021-11-17 | Stop reason: HOSPADM

## 2021-11-15 RX ORDER — PANTOPRAZOLE SODIUM 40 MG/1
40 INJECTION, POWDER, FOR SOLUTION INTRAVENOUS
Status: DISCONTINUED | OUTPATIENT
Start: 2021-11-15 | End: 2021-11-16

## 2021-11-15 RX ORDER — KETOROLAC TROMETHAMINE 30 MG/ML
15 INJECTION, SOLUTION INTRAMUSCULAR; INTRAVENOUS ONCE
Status: COMPLETED | OUTPATIENT
Start: 2021-11-15 | End: 2021-11-15

## 2021-11-15 RX ORDER — DIPHENHYDRAMINE HCL 25 MG
50 TABLET ORAL ONCE
Status: DISCONTINUED | OUTPATIENT
Start: 2021-11-15 | End: 2021-11-15

## 2021-11-15 RX ADMIN — FAMOTIDINE 20 MG: 20 TABLET ORAL at 08:08

## 2021-11-15 RX ADMIN — GUAIFENESIN 600 MG: 600 TABLET, EXTENDED RELEASE ORAL at 21:22

## 2021-11-15 RX ADMIN — REGADENOSON 0.4 MG: 0.08 INJECTION, SOLUTION INTRAVENOUS at 12:19

## 2021-11-15 RX ADMIN — ENOXAPARIN SODIUM 40 MG: 40 INJECTION SUBCUTANEOUS at 08:07

## 2021-11-15 RX ADMIN — SPIRONOLACTONE 12.5 MG: 25 TABLET ORAL at 08:07

## 2021-11-15 RX ADMIN — FOLIC ACID 1 MG: 1 TABLET ORAL at 08:08

## 2021-11-15 RX ADMIN — LISINOPRIL 2.5 MG: 2.5 TABLET ORAL at 08:07

## 2021-11-15 RX ADMIN — Medication 1 TABLET: at 08:07

## 2021-11-15 RX ADMIN — NICOTINE 1 PATCH: 21 PATCH, EXTENDED RELEASE TRANSDERMAL at 08:07

## 2021-11-15 RX ADMIN — KETOROLAC TROMETHAMINE 15 MG: 30 INJECTION, SOLUTION INTRAMUSCULAR; INTRAVENOUS at 23:44

## 2021-11-15 RX ADMIN — METOPROLOL SUCCINATE 100 MG: 100 TABLET, EXTENDED RELEASE ORAL at 08:08

## 2021-11-15 RX ADMIN — CLOPIDOGREL 75 MG: 75 TABLET, FILM COATED ORAL at 08:08

## 2021-11-15 RX ADMIN — THIAMINE HCL TAB 100 MG 100 MG: 100 TAB at 08:08

## 2021-11-15 RX ADMIN — METHADONE HYDROCHLORIDE 40 MG: 10 TABLET ORAL at 08:08

## 2021-11-15 RX ADMIN — GUAIFENESIN 600 MG: 600 TABLET, EXTENDED RELEASE ORAL at 11:19

## 2021-11-15 RX ADMIN — PANTOPRAZOLE SODIUM 40 MG: 40 INJECTION, POWDER, FOR SOLUTION INTRAVENOUS at 09:39

## 2021-11-15 RX ADMIN — ASPIRIN 81 MG CHEWABLE TABLET 81 MG: 81 TABLET CHEWABLE at 08:08

## 2021-11-15 RX ADMIN — FAMOTIDINE 20 MG: 20 TABLET ORAL at 17:34

## 2021-11-16 ENCOUNTER — APPOINTMENT (INPATIENT)
Dept: PERIOP | Facility: HOSPITAL | Age: 61
DRG: 182 | End: 2021-11-16
Payer: COMMERCIAL

## 2021-11-16 ENCOUNTER — ANESTHESIA (INPATIENT)
Dept: PERIOP | Facility: HOSPITAL | Age: 61
DRG: 182 | End: 2021-11-16
Payer: COMMERCIAL

## 2021-11-16 ENCOUNTER — ANESTHESIA EVENT (INPATIENT)
Dept: PERIOP | Facility: HOSPITAL | Age: 61
DRG: 182 | End: 2021-11-16
Payer: COMMERCIAL

## 2021-11-16 PROBLEM — M19.90 ARTHRITIS: Status: ACTIVE | Noted: 2021-11-16

## 2021-11-16 LAB
ALBUMIN SERPL BCP-MCNC: 2.4 G/DL (ref 3.5–5)
ALP SERPL-CCNC: 331 U/L (ref 46–116)
ALT SERPL W P-5'-P-CCNC: 154 U/L (ref 12–78)
ANION GAP SERPL CALCULATED.3IONS-SCNC: 7 MMOL/L (ref 4–13)
AST SERPL W P-5'-P-CCNC: 132 U/L (ref 5–45)
BASOPHILS # BLD AUTO: 0.02 THOUSANDS/ΜL (ref 0–0.1)
BASOPHILS NFR BLD AUTO: 0 % (ref 0–1)
BILIRUB SERPL-MCNC: 0.46 MG/DL (ref 0.2–1)
BUN SERPL-MCNC: 30 MG/DL (ref 5–25)
CALCIUM ALBUM COR SERPL-MCNC: 10.1 MG/DL (ref 8.3–10.1)
CALCIUM SERPL-MCNC: 8.8 MG/DL (ref 8.3–10.1)
CHLORIDE SERPL-SCNC: 101 MMOL/L (ref 100–108)
CO2 SERPL-SCNC: 26 MMOL/L (ref 21–32)
CREAT SERPL-MCNC: 0.91 MG/DL (ref 0.6–1.3)
EOSINOPHIL # BLD AUTO: 0.25 THOUSAND/ΜL (ref 0–0.61)
EOSINOPHIL NFR BLD AUTO: 4 % (ref 0–6)
ERYTHROCYTE [DISTWIDTH] IN BLOOD BY AUTOMATED COUNT: 16 % (ref 11.6–15.1)
GFR SERPL CREATININE-BSD FRML MDRD: 80 ML/MIN/1.73SQ M
GLUCOSE SERPL-MCNC: 104 MG/DL (ref 65–140)
GLUCOSE SERPL-MCNC: 108 MG/DL (ref 65–140)
GLUCOSE SERPL-MCNC: 120 MG/DL (ref 65–140)
GLUCOSE SERPL-MCNC: 135 MG/DL (ref 65–140)
GLUCOSE SERPL-MCNC: 87 MG/DL (ref 65–140)
HCT VFR BLD AUTO: 35.8 % (ref 34.8–46.1)
HGB BLD-MCNC: 11.7 G/DL (ref 11.5–15.4)
IMM GRANULOCYTES # BLD AUTO: 0.06 THOUSAND/UL (ref 0–0.2)
IMM GRANULOCYTES NFR BLD AUTO: 1 % (ref 0–2)
LYMPHOCYTES # BLD AUTO: 2.74 THOUSANDS/ΜL (ref 0.6–4.47)
LYMPHOCYTES NFR BLD AUTO: 42 % (ref 14–44)
MAGNESIUM SERPL-MCNC: 1.9 MG/DL (ref 1.6–2.6)
MCH RBC QN AUTO: 30 PG (ref 26.8–34.3)
MCHC RBC AUTO-ENTMCNC: 32.7 G/DL (ref 31.4–37.4)
MCV RBC AUTO: 92 FL (ref 82–98)
MONOCYTES # BLD AUTO: 0.7 THOUSAND/ΜL (ref 0.17–1.22)
MONOCYTES NFR BLD AUTO: 11 % (ref 4–12)
NEUTROPHILS # BLD AUTO: 2.73 THOUSANDS/ΜL (ref 1.85–7.62)
NEUTS SEG NFR BLD AUTO: 42 % (ref 43–75)
NRBC BLD AUTO-RTO: 0 /100 WBCS
PHOSPHATE SERPL-MCNC: 4.7 MG/DL (ref 2.7–4.5)
PLATELET # BLD AUTO: 308 THOUSANDS/UL (ref 149–390)
PMV BLD AUTO: 10.2 FL (ref 8.9–12.7)
POTASSIUM SERPL-SCNC: 4.9 MMOL/L (ref 3.5–5.3)
PROT SERPL-MCNC: 6.1 G/DL (ref 6.4–8.2)
RBC # BLD AUTO: 3.9 MILLION/UL (ref 3.81–5.12)
SODIUM SERPL-SCNC: 134 MMOL/L (ref 136–145)
WBC # BLD AUTO: 6.5 THOUSAND/UL (ref 4.31–10.16)

## 2021-11-16 PROCEDURE — C9113 INJ PANTOPRAZOLE SODIUM, VIA: HCPCS | Performed by: NURSE PRACTITIONER

## 2021-11-16 PROCEDURE — 85025 COMPLETE CBC W/AUTO DIFF WBC: CPT | Performed by: NURSE PRACTITIONER

## 2021-11-16 PROCEDURE — 88313 SPECIAL STAINS GROUP 2: CPT | Performed by: PATHOLOGY

## 2021-11-16 PROCEDURE — 82948 REAGENT STRIP/BLOOD GLUCOSE: CPT

## 2021-11-16 PROCEDURE — 88305 TISSUE EXAM BY PATHOLOGIST: CPT | Performed by: PATHOLOGY

## 2021-11-16 PROCEDURE — 88342 IMHCHEM/IMCYTCHM 1ST ANTB: CPT | Performed by: PATHOLOGY

## 2021-11-16 PROCEDURE — 43239 EGD BIOPSY SINGLE/MULTIPLE: CPT | Performed by: INTERNAL MEDICINE

## 2021-11-16 PROCEDURE — 83735 ASSAY OF MAGNESIUM: CPT | Performed by: NURSE PRACTITIONER

## 2021-11-16 PROCEDURE — 99232 SBSQ HOSP IP/OBS MODERATE 35: CPT | Performed by: NURSE PRACTITIONER

## 2021-11-16 PROCEDURE — 84100 ASSAY OF PHOSPHORUS: CPT | Performed by: NURSE PRACTITIONER

## 2021-11-16 PROCEDURE — 80053 COMPREHEN METABOLIC PANEL: CPT | Performed by: NURSE PRACTITIONER

## 2021-11-16 RX ORDER — PROPOFOL 10 MG/ML
INJECTION, EMULSION INTRAVENOUS AS NEEDED
Status: DISCONTINUED | OUTPATIENT
Start: 2021-11-16 | End: 2021-11-16

## 2021-11-16 RX ORDER — SODIUM CHLORIDE, SODIUM LACTATE, POTASSIUM CHLORIDE, CALCIUM CHLORIDE 600; 310; 30; 20 MG/100ML; MG/100ML; MG/100ML; MG/100ML
75 INJECTION, SOLUTION INTRAVENOUS CONTINUOUS
Status: CANCELLED | OUTPATIENT
Start: 2021-11-16

## 2021-11-16 RX ORDER — SODIUM CHLORIDE, SODIUM LACTATE, POTASSIUM CHLORIDE, CALCIUM CHLORIDE 600; 310; 30; 20 MG/100ML; MG/100ML; MG/100ML; MG/100ML
INJECTION, SOLUTION INTRAVENOUS CONTINUOUS PRN
Status: DISCONTINUED | OUTPATIENT
Start: 2021-11-16 | End: 2021-11-16

## 2021-11-16 RX ORDER — LIDOCAINE HYDROCHLORIDE 10 MG/ML
INJECTION, SOLUTION EPIDURAL; INFILTRATION; INTRACAUDAL; PERINEURAL AS NEEDED
Status: DISCONTINUED | OUTPATIENT
Start: 2021-11-16 | End: 2021-11-16

## 2021-11-16 RX ADMIN — CLOPIDOGREL 75 MG: 75 TABLET, FILM COATED ORAL at 08:19

## 2021-11-16 RX ADMIN — PANTOPRAZOLE SODIUM 40 MG: 40 INJECTION, POWDER, FOR SOLUTION INTRAVENOUS at 08:18

## 2021-11-16 RX ADMIN — METHADONE HYDROCHLORIDE 40 MG: 10 TABLET ORAL at 08:20

## 2021-11-16 RX ADMIN — PROPOFOL 100 MG: 10 INJECTION, EMULSION INTRAVENOUS at 14:18

## 2021-11-16 RX ADMIN — GUAIFENESIN 600 MG: 600 TABLET, EXTENDED RELEASE ORAL at 22:20

## 2021-11-16 RX ADMIN — SODIUM CHLORIDE, SODIUM LACTATE, POTASSIUM CHLORIDE, AND CALCIUM CHLORIDE: .6; .31; .03; .02 INJECTION, SOLUTION INTRAVENOUS at 14:10

## 2021-11-16 RX ADMIN — FAMOTIDINE 20 MG: 20 TABLET ORAL at 08:19

## 2021-11-16 RX ADMIN — THIAMINE HCL TAB 100 MG 100 MG: 100 TAB at 08:20

## 2021-11-16 RX ADMIN — LIDOCAINE HYDROCHLORIDE 100 MG: 10 INJECTION, SOLUTION EPIDURAL; INFILTRATION; INTRACAUDAL; PERINEURAL at 14:18

## 2021-11-16 RX ADMIN — ASPIRIN 81 MG CHEWABLE TABLET 81 MG: 81 TABLET CHEWABLE at 08:19

## 2021-11-16 RX ADMIN — NICOTINE 1 PATCH: 21 PATCH, EXTENDED RELEASE TRANSDERMAL at 08:31

## 2021-11-16 RX ADMIN — GUAIFENESIN 600 MG: 600 TABLET, EXTENDED RELEASE ORAL at 08:19

## 2021-11-16 RX ADMIN — FAMOTIDINE 20 MG: 20 TABLET ORAL at 17:48

## 2021-11-16 RX ADMIN — SPIRONOLACTONE 12.5 MG: 25 TABLET ORAL at 08:19

## 2021-11-16 RX ADMIN — FOLIC ACID 1 MG: 1 TABLET ORAL at 08:19

## 2021-11-16 RX ADMIN — Medication 1 TABLET: at 08:19

## 2021-11-17 VITALS
DIASTOLIC BLOOD PRESSURE: 74 MMHG | OXYGEN SATURATION: 98 % | RESPIRATION RATE: 18 BRPM | WEIGHT: 117.5 LBS | TEMPERATURE: 98.3 F | SYSTOLIC BLOOD PRESSURE: 114 MMHG | HEART RATE: 74 BPM | BODY MASS INDEX: 24.67 KG/M2 | HEIGHT: 58 IN

## 2021-11-17 LAB
ALBUMIN SERPL BCP-MCNC: 2.3 G/DL (ref 3.5–5)
ALP SERPL-CCNC: 335 U/L (ref 46–116)
ALT SERPL W P-5'-P-CCNC: 127 U/L (ref 12–78)
ANION GAP SERPL CALCULATED.3IONS-SCNC: 5 MMOL/L (ref 4–13)
AST SERPL W P-5'-P-CCNC: 73 U/L (ref 5–45)
BILIRUB SERPL-MCNC: 0.47 MG/DL (ref 0.2–1)
BUN SERPL-MCNC: 22 MG/DL (ref 5–25)
CALCIUM ALBUM COR SERPL-MCNC: 9.7 MG/DL (ref 8.3–10.1)
CALCIUM SERPL-MCNC: 8.3 MG/DL (ref 8.3–10.1)
CHLORIDE SERPL-SCNC: 101 MMOL/L (ref 100–108)
CO2 SERPL-SCNC: 27 MMOL/L (ref 21–32)
CREAT SERPL-MCNC: 0.79 MG/DL (ref 0.6–1.3)
GFR SERPL CREATININE-BSD FRML MDRD: 95 ML/MIN/1.73SQ M
GLUCOSE SERPL-MCNC: 119 MG/DL (ref 65–140)
GLUCOSE SERPL-MCNC: 125 MG/DL (ref 65–140)
GLUCOSE SERPL-MCNC: 183 MG/DL (ref 65–140)
POTASSIUM SERPL-SCNC: 4.7 MMOL/L (ref 3.5–5.3)
PROT SERPL-MCNC: 5.9 G/DL (ref 6.4–8.2)
SODIUM SERPL-SCNC: 133 MMOL/L (ref 136–145)

## 2021-11-17 PROCEDURE — 80053 COMPREHEN METABOLIC PANEL: CPT | Performed by: NURSE PRACTITIONER

## 2021-11-17 PROCEDURE — 99239 HOSP IP/OBS DSCHRG MGMT >30: CPT | Performed by: NURSE PRACTITIONER

## 2021-11-17 PROCEDURE — 82948 REAGENT STRIP/BLOOD GLUCOSE: CPT

## 2021-11-17 RX ORDER — LISINOPRIL 2.5 MG/1
2.5 TABLET ORAL DAILY
Qty: 30 TABLET | Refills: 0 | Status: SHIPPED | OUTPATIENT
Start: 2021-11-18 | End: 2021-12-18

## 2021-11-17 RX ORDER — FOLIC ACID 1 MG/1
1 TABLET ORAL DAILY
Qty: 30 TABLET | Refills: 0 | Status: SHIPPED | OUTPATIENT
Start: 2021-11-18 | End: 2021-12-18

## 2021-11-17 RX ORDER — ONDANSETRON 4 MG/1
4 TABLET, ORALLY DISINTEGRATING ORAL EVERY 8 HOURS PRN
Qty: 20 TABLET | Refills: 0 | Status: SHIPPED | OUTPATIENT
Start: 2021-11-17

## 2021-11-17 RX ORDER — ASPIRIN 81 MG/1
81 TABLET ORAL DAILY
Refills: 0
Start: 2021-11-17

## 2021-11-17 RX ORDER — ATORVASTATIN CALCIUM 20 MG/1
20 TABLET, FILM COATED ORAL
Qty: 30 TABLET | Refills: 0 | Status: SHIPPED | OUTPATIENT
Start: 2021-11-17 | End: 2021-12-17

## 2021-11-17 RX ORDER — INSULIN GLARGINE 100 [IU]/ML
6 INJECTION, SOLUTION SUBCUTANEOUS
Qty: 10 ML | Refills: 0
Start: 2021-11-17

## 2021-11-17 RX ORDER — FAMOTIDINE 20 MG/1
20 TABLET, FILM COATED ORAL 2 TIMES DAILY
Qty: 60 TABLET | Refills: 0 | Status: SHIPPED | OUTPATIENT
Start: 2021-11-17 | End: 2021-12-17

## 2021-11-17 RX ORDER — ALBUTEROL SULFATE 90 UG/1
2 AEROSOL, METERED RESPIRATORY (INHALATION) EVERY 6 HOURS PRN
Qty: 18 G | Refills: 0 | Status: SHIPPED | OUTPATIENT
Start: 2021-11-17

## 2021-11-17 RX ORDER — SPIRONOLACTONE 25 MG/1
12.5 TABLET ORAL DAILY
Qty: 15 TABLET | Refills: 0 | Status: SHIPPED | OUTPATIENT
Start: 2021-11-18 | End: 2021-12-18

## 2021-11-17 RX ORDER — LANOLIN ALCOHOL/MO/W.PET/CERES
100 CREAM (GRAM) TOPICAL DAILY
Qty: 30 TABLET | Refills: 0 | Status: SHIPPED | OUTPATIENT
Start: 2021-11-18 | End: 2021-12-18

## 2021-11-17 RX ORDER — NICOTINE 21 MG/24HR
1 PATCH, TRANSDERMAL 24 HOURS TRANSDERMAL DAILY
Qty: 28 PATCH | Refills: 0 | Status: SHIPPED | OUTPATIENT
Start: 2021-11-18

## 2021-11-17 RX ORDER — METOPROLOL SUCCINATE 100 MG/1
100 TABLET, EXTENDED RELEASE ORAL DAILY
Qty: 30 TABLET | Refills: 0 | Status: SHIPPED | OUTPATIENT
Start: 2021-11-17 | End: 2021-12-17

## 2021-11-17 RX ORDER — CLOPIDOGREL BISULFATE 75 MG/1
75 TABLET ORAL DAILY
Qty: 90 TABLET | Refills: 0 | Status: SHIPPED | OUTPATIENT
Start: 2021-11-17 | End: 2022-02-15

## 2021-11-17 RX ORDER — DIPHENOXYLATE HYDROCHLORIDE AND ATROPINE SULFATE 2.5; .025 MG/1; MG/1
1 TABLET ORAL DAILY
Qty: 30 TABLET | Refills: 0 | Status: SHIPPED | OUTPATIENT
Start: 2021-11-17 | End: 2021-12-17

## 2021-11-17 RX ADMIN — METOPROLOL SUCCINATE 100 MG: 100 TABLET, EXTENDED RELEASE ORAL at 08:15

## 2021-11-17 RX ADMIN — FOLIC ACID 1 MG: 1 TABLET ORAL at 08:15

## 2021-11-17 RX ADMIN — ASPIRIN 81 MG CHEWABLE TABLET 81 MG: 81 TABLET CHEWABLE at 08:15

## 2021-11-17 RX ADMIN — Medication 1 TABLET: at 08:15

## 2021-11-17 RX ADMIN — CLOPIDOGREL 75 MG: 75 TABLET, FILM COATED ORAL at 08:15

## 2021-11-17 RX ADMIN — SPIRONOLACTONE 12.5 MG: 25 TABLET ORAL at 08:15

## 2021-11-17 RX ADMIN — NICOTINE 1 PATCH: 21 PATCH, EXTENDED RELEASE TRANSDERMAL at 08:15

## 2021-11-17 RX ADMIN — ENOXAPARIN SODIUM 40 MG: 40 INJECTION SUBCUTANEOUS at 08:15

## 2021-11-17 RX ADMIN — LISINOPRIL 2.5 MG: 2.5 TABLET ORAL at 08:15

## 2021-11-17 RX ADMIN — GUAIFENESIN 600 MG: 600 TABLET, EXTENDED RELEASE ORAL at 08:15

## 2021-11-17 RX ADMIN — FAMOTIDINE 20 MG: 20 TABLET ORAL at 08:15

## 2021-11-17 RX ADMIN — THIAMINE HCL TAB 100 MG 100 MG: 100 TAB at 08:15

## 2021-11-17 RX ADMIN — METHADONE HYDROCHLORIDE 40 MG: 10 TABLET ORAL at 08:15

## 2021-11-29 ENCOUNTER — TELEPHONE (OUTPATIENT)
Dept: FAMILY MEDICINE CLINIC | Facility: CLINIC | Age: 61
End: 2021-11-29

## 2022-01-24 ENCOUNTER — HOSPITAL ENCOUNTER (EMERGENCY)
Facility: HOSPITAL | Age: 62
Discharge: HOME | End: 2022-01-24
Attending: EMERGENCY MEDICINE
Payer: COMMERCIAL

## 2022-01-24 VITALS
SYSTOLIC BLOOD PRESSURE: 165 MMHG | RESPIRATION RATE: 24 BRPM | HEIGHT: 59 IN | HEART RATE: 107 BPM | OXYGEN SATURATION: 100 % | DIASTOLIC BLOOD PRESSURE: 85 MMHG | WEIGHT: 85 LBS | BODY MASS INDEX: 17.14 KG/M2 | TEMPERATURE: 97.7 F

## 2022-01-24 DIAGNOSIS — F11.23 METHADONE WITHDRAWAL (HCC): Primary | ICD-10-CM

## 2022-01-24 LAB
ATRIAL RATE: 111 BPM
P AXIS: 67 DEGREES
PR INTERVAL: 132 MS
QRS AXIS: 10 DEGREES
QRSD INTERVAL: 92 MS
QT INTERVAL: 356 MS
QTC INTERVAL: 484 MS
T WAVE AXIS: 37 DEGREES
VENTRICULAR RATE: 111 BPM

## 2022-01-24 PROCEDURE — 99284 EMERGENCY DEPT VISIT MOD MDM: CPT

## 2022-01-24 PROCEDURE — 93005 ELECTROCARDIOGRAM TRACING: CPT

## 2022-01-24 PROCEDURE — 99284 EMERGENCY DEPT VISIT MOD MDM: CPT | Performed by: EMERGENCY MEDICINE

## 2022-01-24 PROCEDURE — 93010 ELECTROCARDIOGRAM REPORT: CPT | Performed by: INTERNAL MEDICINE

## 2022-01-24 RX ORDER — DICYCLOMINE HCL 20 MG
20 TABLET ORAL 2 TIMES DAILY
Qty: 20 TABLET | Refills: 0 | Status: SHIPPED | OUTPATIENT
Start: 2022-01-24

## 2022-01-24 RX ORDER — ONDANSETRON 4 MG/1
4 TABLET, ORALLY DISINTEGRATING ORAL EVERY 6 HOURS PRN
Qty: 20 TABLET | Refills: 0 | Status: SHIPPED | OUTPATIENT
Start: 2022-01-24

## 2022-01-24 NOTE — DISCHARGE INSTRUCTIONS
Continue Bentyl as needed for diarrhea, abdominal cramping, Zofran for nausea, good oral hydration, follow up with methadone clinic

## 2022-01-26 NOTE — ED PROVIDER NOTES
History  Chief Complaint   Patient presents with    Medication Problem     Pt has not had her methadone in 1 week  Pt reports unable to get to clinic  Pt reports having high heart rate and diarrhea since bnot having mthadone  Ambulance reports pt with heart rate 107's that decreased to 110's after vagal menuvers     HPI  Patient is a 63year old female presenting for evaluation of nausea, vomiting, diarrhea, myalgia, general malaise  Patient goes to methadone clinic and states that she has not gone for the past week due to being busy and having transportation problems  Patient denies fevers, chills, chest pain, shortness of breath, recent travel or sick contacts  Patient stating she primarily wants to get methadone  Per EMS patient in SVT rate of 170 prehospital which resolved with vagal maneuvers  Patient denied any symptoms during this and no rhythm strip or EKG was captured  Prior to Admission Medications   Prescriptions Last Dose Informant Patient Reported? Taking?    Insulin Pen Needle 29G X 12MM MISC   No No   Sig: by Does not apply route daily at bedtime   albuterol (Ventolin HFA) 90 mcg/act inhaler   No No   Sig: Inhale 2 puffs every 6 (six) hours as needed for wheezing or shortness of breath   aspirin (ECOTRIN LOW STRENGTH) 81 mg EC tablet   No No   Sig: Take 1 tablet (81 mg total) by mouth daily   atorvastatin (LIPITOR) 20 mg tablet   No No   Sig: Take 1 tablet (20 mg total) by mouth daily with dinner   clopidogrel (PLAVIX) 75 mg tablet   No No   Sig: Take 1 tablet (75 mg total) by mouth daily   famotidine (PEPCID) 20 mg tablet   No No   Sig: Take 1 tablet (20 mg total) by mouth 2 (two) times a day   folic acid (FOLVITE) 1 mg tablet   No No   Sig: Take 1 tablet (1 mg total) by mouth daily   insulin glargine (Lantus) 100 units/mL subcutaneous injection   No No   Sig: Inject 6 Units under the skin daily at bedtime   lisinopril (ZESTRIL) 2 5 mg tablet   No No   Sig: Take 1 tablet (2 5 mg total) by mouth daily   methadone (DOLOPHINE) 10 mg/mL oral concentrated solution   Yes No   Sig: Take 40 mg by mouth daily   metoprolol succinate (TOPROL-XL) 100 mg 24 hr tablet   No No   Sig: Take 1 tablet (100 mg total) by mouth daily   multivitamin (THERAGRAN) TABS   No No   Sig: Take 1 tablet by mouth daily   nicotine (NICODERM CQ) 21 mg/24 hr TD 24 hr patch   No No   Sig: Place 1 patch on the skin daily   ondansetron (Zofran ODT) 4 mg disintegrating tablet   No No   Sig: Take 1 tablet (4 mg total) by mouth every 8 (eight) hours as needed for nausea or vomiting   spironolactone (ALDACTONE) 25 mg tablet   No No   Sig: Take 0 5 tablets (12 5 mg total) by mouth daily   thiamine 100 MG tablet   No No   Sig: Take 1 tablet (100 mg total) by mouth daily      Facility-Administered Medications: None       Past Medical History:   Diagnosis Date    Alcohol abuse     Arthritis     Asthma     Bipolar disorder ([facility] )     Diabetes mellitus ([facility] )     Opiate abuse, continuous ([facility] )        Past Surgical History:   Procedure Laterality Date    ABDOMINAL SURGERY      stomach uler with perforation       History reviewed  No pertinent family history  I have reviewed and agree with the history as documented  E-Cigarette/Vaping    E-Cigarette Use Never User      E-Cigarette/Vaping Substances     Social History     Tobacco Use    Smoking status: Current Every Day Smoker     Packs/day: 2 00     Types: Cigarettes    Smokeless tobacco: Never Used   Vaping Use    Vaping Use: Never used   Substance Use Topics    Alcohol use: Not Currently     Comment: Drinking  1 pint of vodka daily up to 2 month ago for 2 years,started drinking again yesterday    Drug use: Yes     Comment: on methadone       Review of Systems   Constitutional: Negative for chills, fatigue and fever  HENT: Negative for sore throat  Eyes: Negative for visual disturbance  Respiratory: Negative for cough, chest tightness and shortness of breath      Cardiovascular: Negative for chest pain  Gastrointestinal: Positive for diarrhea, nausea and vomiting  Negative for abdominal distention, abdominal pain and constipation  Endocrine: Negative for polydipsia and polyuria  Genitourinary: Negative for dysuria and hematuria  Skin: Negative for color change and rash  Neurological: Negative for dizziness and headaches  Psychiatric/Behavioral: Negative for confusion  All other systems reviewed and are negative  Physical Exam  Physical Exam  Vitals reviewed  Constitutional:       General: She is not in acute distress  Appearance: She is well-developed  She is not diaphoretic  Comments: Well-appearing, non-distressed    HENT:      Head: Normocephalic and atraumatic  Comments: Moist mucous membranes      Right Ear: External ear normal       Left Ear: External ear normal       Nose: Nose normal       Mouth/Throat:      Pharynx: No oropharyngeal exudate  Eyes:      Pupils: Pupils are equal, round, and reactive to light  Cardiovascular:      Rate and Rhythm: Normal rate and regular rhythm  Heart sounds: Normal heart sounds  No murmur heard  No friction rub  No gallop  Pulmonary:      Effort: Pulmonary effort is normal  No respiratory distress  Breath sounds: Normal breath sounds  No wheezing  Chest:      Chest wall: No tenderness  Abdominal:      General: Bowel sounds are normal  There is no distension  Palpations: Abdomen is soft  There is no mass  Tenderness: There is no abdominal tenderness  There is no guarding  Comments: Abdomen soft, non-tender, non-distended    Musculoskeletal:         General: No deformity  Normal range of motion  Cervical back: Normal range of motion  Lymphadenopathy:      Cervical: No cervical adenopathy  Skin:     General: Skin is warm and dry  Capillary Refill: Capillary refill takes less than 2 seconds     Neurological:      Mental Status: She is alert and oriented to person, place, and time  Comments: AAOx4          Vital Signs  ED Triage Vitals   Temperature Pulse Respirations Blood Pressure SpO2   01/24/22 1016 01/24/22 1016 01/24/22 1016 01/24/22 1016 01/24/22 1016   97 7 °F (36 5 °C) (!) 107 (!) 24 165/85 100 %      Temp Source Heart Rate Source Patient Position - Orthostatic VS BP Location FiO2 (%)   01/24/22 1016 01/24/22 1016 01/24/22 1016 01/24/22 1016 --   Tympanic Monitor Lying Left arm       Pain Score       01/24/22 1029       No Pain           Vitals:    01/24/22 1016 01/24/22 1029   BP: 165/85    Pulse: (!) 107    Patient Position - Orthostatic VS: Lying Lying         Visual Acuity      ED Medications  Medications - No data to display    Diagnostic Studies  Results Reviewed     None                 No orders to display              Procedures  Procedures         ED Course                                             MDM  Number of Diagnoses or Management Options  Methadone withdrawal ([facility] )  Diagnosis management comments: 61F with GI symptoms, malaise in setting of likely methadone withdrawal, reported SVT prehospital  Patient well-appearing but mildly tachycardic in ED  Immediately requesting methadone  Patient informed that she would receive symptomatic treatment however methadone typically not offered in emergency department and that she should follow up with methadone clinic  Patient became agitated, stating she wanted to leave immediately  Informed that she should have a medical workup including labs, EKG  Patient refusing this and wanting to leave  Patient discharged following initial evaluation but prior to medical workup         Disposition  Final diagnoses:   Methadone withdrawal ([facility] )     Time reflects when diagnosis was documented in both MDM as applicable and the Disposition within this note     Time User Action Codes Description Comment    1/24/2022 10:40 AM Telma Up [F11 23] Methadone withdrawal [facility])       ED Disposition     ED Disposition Condition Date/Time Comment    Discharge Stable Mon Jan 24, 2022 10:40 AM Zina Chiang discharge to home/self care  Follow-up Information     Follow up With Specialties Details Why Contact Info Additional Information    [facility] Emergency Department Emergency Medicine  If symptoms worsen [address] [address] Emergency Department, [place], [place], [address]          Discharge Medication List as of 1/24/2022 10:42 AM      START taking these medications    Details   dicyclomine (BENTYL) 20 mg tablet Take 1 tablet (20 mg total) by mouth 2 (two) times a day, Starting Mon 1/24/2022, Normal      !! ondansetron (Zofran ODT) 4 mg disintegrating tablet Take 1 tablet (4 mg total) by mouth every 6 (six) hours as needed for nausea or vomiting, Starting Mon 1/24/2022, Normal       !! - Potential duplicate medications found  Please discuss with provider        CONTINUE these medications which have NOT CHANGED    Details   albuterol (Ventolin HFA) 90 mcg/act inhaler Inhale 2 puffs every 6 (six) hours as needed for wheezing or shortness of breath, Starting Wed 11/17/2021, Normal      aspirin (ECOTRIN LOW STRENGTH) 81 mg EC tablet Take 1 tablet (81 mg total) by mouth daily, Starting Wed 11/17/2021, No Print      atorvastatin (LIPITOR) 20 mg tablet Take 1 tablet (20 mg total) by mouth daily with dinner, Starting Wed 11/17/2021, Until Fri 12/17/2021, Normal      clopidogrel (PLAVIX) 75 mg tablet Take 1 tablet (75 mg total) by mouth daily, Starting Wed 11/17/2021, Until Tue 2/15/2022, Normal      famotidine (PEPCID) 20 mg tablet Take 1 tablet (20 mg total) by mouth 2 (two) times a day, Starting Wed 11/17/2021, Until Fri 88/09/4175, Normal      folic acid (FOLVITE) 1 mg tablet Take 1 tablet (1 mg total) by mouth daily, Starting Thu 11/18/2021, Until Sat 12/18/2021, Normal      insulin glargine (Lantus) 100 units/mL subcutaneous injection Inject 6 Units under the skin daily at bedtime, Starting Wed 11/17/2021, No Print      Insulin Pen Needle 29G X 12MM MISC by Does not apply route daily at bedtime, Starting Fri 3/22/2019, Normal      lisinopril (ZESTRIL) 2 5 mg tablet Take 1 tablet (2 5 mg total) by mouth daily, Starting Thu 11/18/2021, Until Sat 12/18/2021, Normal      methadone (DOLOPHINE) 10 mg/mL oral concentrated solution Take 40 mg by mouth daily, Historical Med      metoprolol succinate (TOPROL-XL) 100 mg 24 hr tablet Take 1 tablet (100 mg total) by mouth daily, Starting Wed 11/17/2021, Until Fri 12/17/2021, Normal      multivitamin (THERAGRAN) TABS Take 1 tablet by mouth daily, Starting Wed 11/17/2021, Until Fri 12/17/2021, Normal      nicotine (NICODERM CQ) 21 mg/24 hr TD 24 hr patch Place 1 patch on the skin daily, Starting Thu 11/18/2021, Normal      !! ondansetron (Zofran ODT) 4 mg disintegrating tablet Take 1 tablet (4 mg total) by mouth every 8 (eight) hours as needed for nausea or vomiting, Starting Wed 11/17/2021, Normal      spironolactone (ALDACTONE) 25 mg tablet Take 0 5 tablets (12 5 mg total) by mouth daily, Starting Thu 11/18/2021, Until Sat 12/18/2021, Normal      thiamine 100 MG tablet Take 1 tablet (100 mg total) by mouth daily, Starting Thu 11/18/2021, Until Sat 12/18/2021, Normal       !! - Potential duplicate medications found  Please discuss with provider  No discharge procedures on file      PDMP Review     None          ED Provider  Electronically Signed by           Rosa Maria Card MD  01/26/22 [phone]

## 2022-03-23 ENCOUNTER — APPOINTMENT (EMERGENCY)
Dept: RADIOLOGY | Facility: HOSPITAL | Age: 62
End: 2022-03-23
Payer: COMMERCIAL

## 2022-03-23 ENCOUNTER — HOSPITAL ENCOUNTER (EMERGENCY)
Facility: HOSPITAL | Age: 62
Discharge: HOME | End: 2022-03-23
Attending: EMERGENCY MEDICINE
Payer: COMMERCIAL

## 2022-03-23 VITALS
DIASTOLIC BLOOD PRESSURE: 84 MMHG | WEIGHT: 102.95 LBS | HEART RATE: 94 BPM | BODY MASS INDEX: 20.76 KG/M2 | OXYGEN SATURATION: 90 % | SYSTOLIC BLOOD PRESSURE: 127 MMHG | HEIGHT: 59 IN | TEMPERATURE: 97.7 F | RESPIRATION RATE: 12 BRPM

## 2022-03-23 DIAGNOSIS — K59.00 CONSTIPATION: ICD-10-CM

## 2022-03-23 DIAGNOSIS — R07.9 CHEST PAIN: Primary | ICD-10-CM

## 2022-03-23 DIAGNOSIS — F10.929 ALCOHOL INTOXICATION (HCC): ICD-10-CM

## 2022-03-23 LAB
2HR DELTA HS TROPONIN: 1 NG/L
ALBUMIN SERPL BCP-MCNC: 3.2 G/DL (ref 3.5–5)
ALP SERPL-CCNC: 163 U/L (ref 46–116)
ALT SERPL W P-5'-P-CCNC: 25 U/L (ref 12–78)
ANION GAP SERPL CALCULATED.3IONS-SCNC: 6 MMOL/L (ref 4–13)
AST SERPL W P-5'-P-CCNC: 44 U/L (ref 5–45)
BASOPHILS # BLD AUTO: 0.06 THOUSANDS/ΜL (ref 0–0.1)
BASOPHILS NFR BLD AUTO: 1 % (ref 0–1)
BILIRUB SERPL-MCNC: 0.25 MG/DL (ref 0.2–1)
BUN SERPL-MCNC: 19 MG/DL (ref 5–25)
CALCIUM ALBUM COR SERPL-MCNC: 9.1 MG/DL (ref 8.3–10.1)
CALCIUM SERPL-MCNC: 8.5 MG/DL (ref 8.3–10.1)
CARDIAC TROPONIN I PNL SERPL HS: 8 NG/L
CARDIAC TROPONIN I PNL SERPL HS: 9 NG/L
CHLORIDE SERPL-SCNC: 103 MMOL/L (ref 100–108)
CO2 SERPL-SCNC: 29 MMOL/L (ref 21–32)
CREAT SERPL-MCNC: 0.7 MG/DL (ref 0.6–1.3)
EOSINOPHIL # BLD AUTO: 0.2 THOUSAND/ΜL (ref 0–0.61)
EOSINOPHIL NFR BLD AUTO: 4 % (ref 0–6)
ERYTHROCYTE [DISTWIDTH] IN BLOOD BY AUTOMATED COUNT: 17.8 % (ref 11.6–15.1)
ETHANOL SERPL-MCNC: 241 MG/DL (ref 0–3)
GFR SERPL CREATININE-BSD FRML MDRD: 95 ML/MIN/1.73SQ M
GLUCOSE SERPL-MCNC: 106 MG/DL (ref 65–140)
HCT VFR BLD AUTO: 39.5 % (ref 34.8–46.1)
HGB BLD-MCNC: 12.6 G/DL (ref 11.5–15.4)
IMM GRANULOCYTES # BLD AUTO: 0.01 THOUSAND/UL (ref 0–0.2)
IMM GRANULOCYTES NFR BLD AUTO: 0 % (ref 0–2)
LYMPHOCYTES # BLD AUTO: 3.14 THOUSANDS/ΜL (ref 0.6–4.47)
LYMPHOCYTES NFR BLD AUTO: 57 % (ref 14–44)
MCH RBC QN AUTO: 29.4 PG (ref 26.8–34.3)
MCHC RBC AUTO-ENTMCNC: 31.9 G/DL (ref 31.4–37.4)
MCV RBC AUTO: 92 FL (ref 82–98)
MONOCYTES # BLD AUTO: 0.62 THOUSAND/ΜL (ref 0.17–1.22)
MONOCYTES NFR BLD AUTO: 12 % (ref 4–12)
NEUTROPHILS # BLD AUTO: 1.38 THOUSANDS/ΜL (ref 1.85–7.62)
NEUTS SEG NFR BLD AUTO: 26 % (ref 43–75)
NRBC BLD AUTO-RTO: 0 /100 WBCS
PLATELET # BLD AUTO: 207 THOUSANDS/UL (ref 149–390)
PMV BLD AUTO: 10 FL (ref 8.9–12.7)
POTASSIUM SERPL-SCNC: 3.2 MMOL/L (ref 3.5–5.3)
PROT SERPL-MCNC: 7.1 G/DL (ref 6.4–8.2)
RBC # BLD AUTO: 4.29 MILLION/UL (ref 3.81–5.12)
SODIUM SERPL-SCNC: 138 MMOL/L (ref 136–145)
WBC # BLD AUTO: 5.41 THOUSAND/UL (ref 4.31–10.16)

## 2022-03-23 PROCEDURE — 85025 COMPLETE CBC W/AUTO DIFF WBC: CPT | Performed by: EMERGENCY MEDICINE

## 2022-03-23 PROCEDURE — 71275 CT ANGIOGRAPHY CHEST: CPT

## 2022-03-23 PROCEDURE — 96366 THER/PROPH/DIAG IV INF ADDON: CPT

## 2022-03-23 PROCEDURE — 84484 ASSAY OF TROPONIN QUANT: CPT | Performed by: EMERGENCY MEDICINE

## 2022-03-23 PROCEDURE — 99284 EMERGENCY DEPT VISIT MOD MDM: CPT | Performed by: EMERGENCY MEDICINE

## 2022-03-23 PROCEDURE — 93005 ELECTROCARDIOGRAM TRACING: CPT

## 2022-03-23 PROCEDURE — 82077 ASSAY SPEC XCP UR&BREATH IA: CPT | Performed by: EMERGENCY MEDICINE

## 2022-03-23 PROCEDURE — 80053 COMPREHEN METABOLIC PANEL: CPT | Performed by: EMERGENCY MEDICINE

## 2022-03-23 PROCEDURE — 96365 THER/PROPH/DIAG IV INF INIT: CPT

## 2022-03-23 PROCEDURE — 99285 EMERGENCY DEPT VISIT HI MDM: CPT

## 2022-03-23 PROCEDURE — 36415 COLL VENOUS BLD VENIPUNCTURE: CPT | Performed by: EMERGENCY MEDICINE

## 2022-03-23 PROCEDURE — G1004 CDSM NDSC: HCPCS

## 2022-03-23 RX ORDER — POTASSIUM CHLORIDE 14.9 MG/ML
20 INJECTION INTRAVENOUS ONCE
Status: COMPLETED | OUTPATIENT
Start: 2022-03-23 | End: 2022-03-23

## 2022-03-23 RX ORDER — AMOXICILLIN 250 MG
1 CAPSULE ORAL DAILY
Qty: 20 TABLET | Refills: 0 | Status: SHIPPED | OUTPATIENT
Start: 2022-03-23

## 2022-03-23 RX ORDER — POLYETHYLENE GLYCOL 3350 17 G/17G
17 POWDER, FOR SOLUTION ORAL DAILY
Qty: 10 EACH | Refills: 0 | Status: SHIPPED | OUTPATIENT
Start: 2022-03-23

## 2022-03-23 RX ADMIN — IOHEXOL 70 ML: 350 INJECTION, SOLUTION INTRAVENOUS at 04:34

## 2022-03-23 RX ADMIN — POTASSIUM CHLORIDE 20 MEQ: 14.9 INJECTION, SOLUTION INTRAVENOUS at 05:20

## 2022-03-23 NOTE — DISCHARGE INSTRUCTIONS
Please follow-up with your primary care provider later in the week for further evaluation  If chest pain returns and worsens, if you have significant shortness of breath, fevers, chills, return to the emergency department  We have given you a prescription for MiraLax and Senokot as stool softeners for your constipation

## 2022-03-23 NOTE — ED NOTES
Pt reports drinking "1 liter of vodka today"   MD aware     Jose Alejandro Harrison, RN  03/23/22 8644

## 2022-03-24 NOTE — ED PROVIDER NOTES
History  Chief Complaint   Patient presents with    Chest Pain     Patient presents via ems for complaint of mid sternal stabbing chest pain x3 days  States pain is unbearable, further endorses inability to have BM x3 days and generalized body aches  Drinks 1L vodka x day  HPI  Patient is a 63year old female pmh alcohol abuse, opiate abuse, asthma, bipolar disorder, ischemic cardiomyopathy presenting for evaluation of multiple complaints including alcohol intoxication, chest pain, shortness of breath, and constipation  Patient states that she drinks approximately 1 pint of vodka per day and drank same amount today  Patient states that for the past 3 days she has had intermittent substernal chest pain, particularly when coughing, exacerbated by taking a deep breath, cough productive of yellow sputum, shortness of breath, general malaise  Patient denies fevers, chills, rigors, headache, states some sore throat, denies myalgia  Patient denies recent travel or sick contacts  Patient is prescribed pradaxa and states non-compliance with this  Patient additionally stating that she has not had a bowel movement in 2 days  Patient denies abdominal pain, abdominal distension, nausea, vomiting, blood per rectum  Prior to Admission Medications   Prescriptions Last Dose Informant Patient Reported? Taking?    Insulin Pen Needle 29G X 12MM MISC   No No   Sig: by Does not apply route daily at bedtime   albuterol (Ventolin HFA) 90 mcg/act inhaler   No No   Sig: Inhale 2 puffs every 6 (six) hours as needed for wheezing or shortness of breath   aspirin (ECOTRIN LOW STRENGTH) 81 mg EC tablet   No No   Sig: Take 1 tablet (81 mg total) by mouth daily   atorvastatin (LIPITOR) 20 mg tablet   No No   Sig: Take 1 tablet (20 mg total) by mouth daily with dinner   clopidogrel (PLAVIX) 75 mg tablet   No No   Sig: Take 1 tablet (75 mg total) by mouth daily   dicyclomine (BENTYL) 20 mg tablet   No No   Sig: Take 1 tablet (20 mg total) by mouth 2 (two) times a day   famotidine (PEPCID) 20 mg tablet   No No   Sig: Take 1 tablet (20 mg total) by mouth 2 (two) times a day   folic acid (FOLVITE) 1 mg tablet   No No   Sig: Take 1 tablet (1 mg total) by mouth daily   insulin glargine (Lantus) 100 units/mL subcutaneous injection   No No   Sig: Inject 6 Units under the skin daily at bedtime   lisinopril (ZESTRIL) 2 5 mg tablet   No No   Sig: Take 1 tablet (2 5 mg total) by mouth daily   methadone (DOLOPHINE) 10 mg/mL oral concentrated solution   Yes No   Sig: Take 40 mg by mouth daily   metoprolol succinate (TOPROL-XL) 100 mg 24 hr tablet   No No   Sig: Take 1 tablet (100 mg total) by mouth daily   multivitamin (THERAGRAN) TABS   No No   Sig: Take 1 tablet by mouth daily   nicotine (NICODERM CQ) 21 mg/24 hr TD 24 hr patch   No No   Sig: Place 1 patch on the skin daily   ondansetron (Zofran ODT) 4 mg disintegrating tablet   No No   Sig: Take 1 tablet (4 mg total) by mouth every 8 (eight) hours as needed for nausea or vomiting   ondansetron (Zofran ODT) 4 mg disintegrating tablet   No No   Sig: Take 1 tablet (4 mg total) by mouth every 6 (six) hours as needed for nausea or vomiting   spironolactone (ALDACTONE) 25 mg tablet   No No   Sig: Take 0 5 tablets (12 5 mg total) by mouth daily   thiamine 100 MG tablet   No No   Sig: Take 1 tablet (100 mg total) by mouth daily      Facility-Administered Medications: None       Past Medical History:   Diagnosis Date    Alcohol abuse     Arthritis     Asthma     [facility]     Diabetes mellitus ([facility]     Opiate abuse, continuous ([facility] )        Past Surgical History:   Procedure Laterality Date    ABDOMINAL SURGERY      stomach uler with perforation       History reviewed  No pertinent family history  I have reviewed and agree with the history as documented      E-Cigarette/Vaping    E-Cigarette Use Never User      E-Cigarette/Vaping Substances     Social History     Tobacco Use    Smoking status: Current Every Day Smoker     Packs/day: 2 00     Types: Cigarettes    Smokeless tobacco: Never Used   Vaping Use    Vaping Use: Never used   Substance Use Topics    Alcohol use: Not Currently     Comment: Drinking  1 pint of vodka daily up to 2 month ago for 2 years,started drinking again yesterday    Drug use: Yes     Comment: on methadone       Review of Systems   Constitutional: Negative for chills, fatigue and fever  HENT: Negative for congestion and sore throat  Eyes: Negative for visual disturbance  Respiratory: Positive for cough and shortness of breath  Negative for chest tightness  Cardiovascular: Positive for chest pain  Gastrointestinal: Positive for constipation  Negative for abdominal distention, abdominal pain, diarrhea, nausea and vomiting  Endocrine: Negative for polydipsia and polyuria  Genitourinary: Negative for dysuria and hematuria  Musculoskeletal: Negative for arthralgias and myalgias  Skin: Negative for color change and rash  Neurological: Negative for dizziness and headaches  Psychiatric/Behavioral: Negative for confusion  All other systems reviewed and are negative  Physical Exam  Physical Exam  Vitals reviewed  Constitutional:       General: She is not in acute distress  Appearance: She is well-developed  She is not diaphoretic  Comments: Chronically ill-appearing but non-distressed    HENT:      Head: Normocephalic and atraumatic  Comments: Moist mucous membranes      Right Ear: External ear normal       Left Ear: External ear normal       Nose: Nose normal       Mouth/Throat:      Pharynx: No oropharyngeal exudate  Eyes:      Pupils: Pupils are equal, round, and reactive to light  Cardiovascular:      Rate and Rhythm: Normal rate and regular rhythm  Heart sounds: Normal heart sounds  No murmur heard  No friction rub  No gallop  Pulmonary:      Effort: Pulmonary effort is normal  No respiratory distress        Breath sounds: Normal breath sounds  No wheezing  Comments: Sat'ing in the low 90s on room air  No increased WOB  Speaking in complete sentences  Lungs CTAB  Chest:      Chest wall: No tenderness  Abdominal:      General: Bowel sounds are normal  There is no distension  Palpations: Abdomen is soft  There is no mass  Tenderness: There is no abdominal tenderness  There is no guarding  Comments: Abdomen soft, non-tender, non-distended    Musculoskeletal:         General: No deformity  Normal range of motion  Cervical back: Normal range of motion  Lymphadenopathy:      Cervical: No cervical adenopathy  Skin:     General: Skin is warm and dry  Capillary Refill: Capillary refill takes less than 2 seconds  Neurological:      Mental Status: She is alert and oriented to person, place, and time        Comments: Mildly intoxicated but fully oriented and without ataxia          Vital Signs  ED Triage Vitals   Temperature Pulse Respirations Blood Pressure SpO2   03/23/22 0250 03/23/22 0250 03/23/22 0250 03/23/22 0254 03/23/22 0250   97 7 °F (36 5 °C) 96 16 127/77 92 %      Temp Source Heart Rate Source Patient Position - Orthostatic VS BP Location FiO2 (%)   03/23/22 0250 03/23/22 0250 03/23/22 0250 03/23/22 0254 --   Oral Monitor Lying Right arm       Pain Score       03/23/22 0250       10 - Worst Possible Pain           Vitals:    03/23/22 0600 03/23/22 0615 03/23/22 0630 03/23/22 0645   BP:  104/67  127/84   Pulse: 100 98 98 94   Patient Position - Orthostatic VS: Lying Lying  Lying         Visual Acuity      ED Medications  Medications   iohexol (OMNIPAQUE) 350 MG/ML injection (SINGLE-DOSE) 70 mL (70 mL Intravenous Given 3/23/22 0434)   potassium chloride 20 mEq IVPB (premix) (0 mEq Intravenous Stopped 3/23/22 0733)       Diagnostic Studies  Results Reviewed     Procedure Component Value Units Date/Time    HS Troponin I 2hr [[id number]]  (Normal) Collected: 03/23/22 1409    Lab Status: Final result Specimen: Blood from Arm, Right Updated: 03/23/22 0530     hs TnI 2hr 9 ng/L      Delta 2hr hsTnI 1 ng/L     HS Troponin 0hr (reflex protocol) [[id number]]  (Normal) Collected: 03/23/22 0309    Lab Status: Final result Specimen: Blood from Arm, Left Updated: 03/23/22 0338     hs TnI 0hr 8 ng/L     Comprehensive metabolic panel [[id number]]  (Abnormal) Collected: 03/23/22 0309    Lab Status: Final result Specimen: Blood from Arm, Left Updated: 03/23/22 0335     Sodium 138 mmol/L      Potassium 3 2 mmol/L      Chloride 103 mmol/L      CO2 29 mmol/L      ANION GAP 6 mmol/L      BUN 19 mg/dL      Creatinine 0 70 mg/dL      Glucose 106 mg/dL      Calcium 8 5 mg/dL      Corrected Calcium 9 1 mg/dL      AST 44 U/L      ALT 25 U/L      Alkaline Phosphatase 163 U/L      Total Protein 7 1 g/dL      Albumin 3 2 g/dL      Total Bilirubin 0 25 mg/dL      eGFR 95 ml/min/1 73sq m     Narrative:      [place] guidelines for Chronic Kidney Disease (CKD):     Stage 1 with normal or high GFR (GFR > 90 mL/min/1 73 square meters)    Stage 2 Mild CKD (GFR = 60-89 mL/min/1 73 square meters)    Stage 3A Moderate CKD (GFR = 45-59 mL/min/1 73 square meters)    Stage 3B Moderate CKD (GFR = 30-44 mL/min/1 73 square meters)    Stage 4 Severe CKD (GFR = 15-29 mL/min/1 73 square meters)    Stage 5 End Stage CKD (GFR <15 mL/min/1 73 square meters)  Note: GFR calculation is accurate only with a steady state creatinine    Ethanol [[id number]]  (Abnormal) Collected: 03/23/22 0309    Lab Status: Final result Specimen: Blood from Arm, Left Updated: 03/23/22 0329     Ethanol Lvl 241 mg/dL     CBC and differential [[id number]]  (Abnormal) Collected: 03/23/22 0309    Lab Status: Final result Specimen: Blood from Arm, Left Updated: 03/23/22 0315     WBC 5 41 Thousand/uL      RBC 4 29 Million/uL      Hemoglobin 12 6 g/dL      Hematocrit 39 5 %      MCV 92 fL      MCH 29 4 pg      MCHC 31 9 g/dL      RDW 17 8 %      MPV 10 0 fL Platelets 552 Thousands/uL      nRBC 0 /100 WBCs      Neutrophils Relative 26 %      Immat GRANS % 0 %      Lymphocytes Relative 57 %      Monocytes Relative 12 %      Eosinophils Relative 4 %      Basophils Relative 1 %      Neutrophils Absolute 1 38 Thousands/µL      Immature Grans Absolute 0 01 Thousand/uL      Lymphocytes Absolute 3 14 Thousands/µL      Monocytes Absolute 0 62 Thousand/µL      Eosinophils Absolute 0 20 Thousand/µL      Basophils Absolute 0 06 Thousands/µL                  CTA ED chest PE Study   Final Result by Josselin Riojas MD (03/23 0507)      No evidence of acute pulmonary embolus  No consolidation or effusion  Cardiomegaly similar in appearance to prior study  Workstation performed: [id number]                    Procedures  Procedures         ED Course                                   Wells' Criteria for PE      Most Recent Value   Wells' Criteria for PE    Clinical signs and symptoms of DVT 0 Filed at: 03/23/2022 0500   PE is primary diagnosis or equally likely 3 Filed at: 03/23/2022 0500   HR >100 0 Filed at: 03/23/2022 0500   Immobilization at least 3 days or Surgery in the previous 4 weeks 0 Filed at: 03/23/2022 0500   Previous, objectively diagnosed PE or DVT 0 Filed at: 03/23/2022 0500   Hemoptysis 0 Filed at: 03/23/2022 0500   Malignancy with treatment within 6 months or palliative 0 Filed at: 03/23/2022 2145   Wells' Criteria Total 3 Filed at: 03/23/2022 0500                MDM  Number of Diagnoses or Management Options  Alcohol intoxication ([facility] )  Chest pain  Constipation  Diagnosis management comments: Plan for cardiac workup including CBC, CMP, troponin, EKG  Given patient's pleuritic chest pain, sats in the low 90s, and pradaxa non-compliance, CTA PE study performed  Patient's medical alcohol greater than 200  EKG without ischemic changes  Troponin and delta troponin non-elevated   CTA PE study demonstrating emphysematous changes without evidence of PE or pneumonia  Patient's brother at bedside, patient and brother updated on lab and imaging findings  Patient offered OORP services but not desiring  Discharged into care of brother with verbal and written return precautions  Disposition  Final diagnoses:   Chest pain   Constipation   Alcohol intoxication ([address] )     Time reflects when diagnosis was documented in both MDM as applicable and the Disposition within this note     Time User Action Codes Description Comment    3/23/2022  6:30 AM El Schirmer Add [R07 9] Chest pain     3/23/2022  6:30 AM El Schirmer Add [K59 00] Constipation     3/23/2022  6:30 AM El Schirmer Add [F10 929] Alcohol intoxication [facility])       ED Disposition     ED Disposition Condition Date/Time Comment    Discharge Stable Wed Mar 23, 2022  6:30 AM Teddy Aragon discharge to home/self care              Follow-up Information     Follow up With Specialties Details Why Contact Info Additional Information    [facility] Emergency Department Emergency Medicine  If symptoms worsen [address] [address] Emergency Department, [place], [place], [address]          Discharge Medication List as of 3/23/2022  6:51 AM      START taking these medications    Details   polyethylene glycol (MIRALAX) 17 g packet Take 17 g by mouth daily, Starting Wed 3/23/2022, Normal      senna-docusate sodium (SENOKOT S) 8 6-50 mg per tablet Take 1 tablet by mouth daily, Starting Wed 3/23/2022, Normal         CONTINUE these medications which have NOT CHANGED    Details   albuterol (Ventolin HFA) 90 mcg/act inhaler Inhale 2 puffs every 6 (six) hours as needed for wheezing or shortness of breath, Starting Wed 11/17/2021, Normal      aspirin (ECOTRIN LOW STRENGTH) 81 mg EC tablet Take 1 tablet (81 mg total) by mouth daily, Starting Wed 11/17/2021, No Print      atorvastatin (LIPITOR) 20 mg tablet Take 1 tablet (20 mg total) by mouth daily with dinner, Starting Wed 11/17/2021, Until Fri 12/17/2021, Normal      clopidogrel (PLAVIX) 75 mg tablet Take 1 tablet (75 mg total) by mouth daily, Starting Wed 11/17/2021, Until Tue 2/15/2022, Normal      dicyclomine (BENTYL) 20 mg tablet Take 1 tablet (20 mg total) by mouth 2 (two) times a day, Starting Mon 1/24/2022, Normal      famotidine (PEPCID) 20 mg tablet Take 1 tablet (20 mg total) by mouth 2 (two) times a day, Starting Wed 11/17/2021, Until Fri [id number]/9158/91/4758, Normal      folic acid (FOLVITE) 1 mg tablet Take 1 tablet (1 mg total) by mouth daily, Starting Thu 11/18/2021, Until Sat 12/18/2021, Normal      insulin glargine (Lantus) 100 units/mL subcutaneous injection Inject 6 Units under the skin daily at bedtime, Starting Wed 11/17/2021, No Print      Insulin Pen Needle 29G X 12MM MISC by Does not apply route daily at bedtime, Starting Fri 3/22/2019, Normal      lisinopril (ZESTRIL) 2 5 mg tablet Take 1 tablet (2 5 mg total) by mouth daily, Starting Thu 11/18/2021, Until Sat 12/18/2021, Normal      methadone (DOLOPHINE) 10 mg/mL oral concentrated solution Take 40 mg by mouth daily, Historical Med      metoprolol succinate (TOPROL-XL) 100 mg 24 hr tablet Take 1 tablet (100 mg total) by mouth daily, Starting Wed 11/17/2021, Until Fri 12/17/2021, Normal      multivitamin (THERAGRAN) TABS Take 1 tablet by mouth daily, Starting Wed 11/17/2021, Until Fri 12/17/2021, Normal      nicotine (NICODERM CQ) 21 mg/24 hr TD 24 hr patch Place 1 patch on the skin daily, Starting Thu 11/18/2021, Normal      !! ondansetron (Zofran ODT) 4 mg disintegrating tablet Take 1 tablet (4 mg total) by mouth every 8 (eight) hours as needed for nausea or vomiting, Starting Wed 11/17/2021, Normal      !! ondansetron (Zofran ODT) 4 mg disintegrating tablet Take 1 tablet (4 mg total) by mouth every 6 (six) hours as needed for nausea or vomiting, Starting Mon 1/24/2022, Normal      spironolactone (ALDACTONE) 25 mg tablet Take 0 5 tablets (12 5 mg total) by mouth daily, Starting Thu 11/18/2021, Until Sat 12/18/2021, Normal      thiamine 100 MG tablet Take 1 tablet (100 mg total) by mouth daily, Starting Thu 11/18/2021, Until Sat 12/18/2021, Normal       !! - Potential duplicate medications found  Please discuss with provider  No discharge procedures on file      PDMP Review     None          ED Provider  Electronically Signed by           Rosa Maria Card MD  03/24/22 4282

## 2022-03-27 LAB
ATRIAL RATE: 96 BPM
P AXIS: 64 DEGREES
PR INTERVAL: 150 MS
QRS AXIS: 15 DEGREES
QRSD INTERVAL: 82 MS
QT INTERVAL: 410 MS
QTC INTERVAL: 517 MS
T WAVE AXIS: 64 DEGREES
VENTRICULAR RATE: 96 BPM

## 2022-03-27 PROCEDURE — 93010 ELECTROCARDIOGRAM REPORT: CPT | Performed by: INTERNAL MEDICINE

## 2022-04-01 ENCOUNTER — APPOINTMENT (EMERGENCY)
Dept: RADIOLOGY | Facility: HOSPITAL | Age: 62
End: 2022-04-01
Payer: COMMERCIAL

## 2022-04-01 ENCOUNTER — HOSPITAL ENCOUNTER (EMERGENCY)
Facility: HOSPITAL | Age: 62
Discharge: HOME | End: 2022-04-01
Attending: EMERGENCY MEDICINE | Admitting: EMERGENCY MEDICINE
Payer: COMMERCIAL

## 2022-04-01 VITALS
SYSTOLIC BLOOD PRESSURE: 114 MMHG | TEMPERATURE: 98.2 F | DIASTOLIC BLOOD PRESSURE: 83 MMHG | WEIGHT: 102.95 LBS | HEIGHT: 59 IN | OXYGEN SATURATION: 90 % | RESPIRATION RATE: 16 BRPM | HEART RATE: 89 BPM | BODY MASS INDEX: 20.76 KG/M2

## 2022-04-01 DIAGNOSIS — S91.114A LACERATION OF LESSER TOE OF RIGHT FOOT WITHOUT FOREIGN BODY PRESENT OR DAMAGE TO NAIL, INITIAL ENCOUNTER: Primary | ICD-10-CM

## 2022-04-01 PROCEDURE — 73660 X-RAY EXAM OF TOE(S): CPT

## 2022-04-01 PROCEDURE — 99282 EMERGENCY DEPT VISIT SF MDM: CPT | Performed by: PHYSICIAN ASSISTANT

## 2022-04-01 PROCEDURE — 99283 EMERGENCY DEPT VISIT LOW MDM: CPT

## 2022-04-01 RX ORDER — ACETAMINOPHEN 325 MG/1
975 TABLET ORAL ONCE
Status: COMPLETED | OUTPATIENT
Start: 2022-04-01 | End: 2022-04-01

## 2022-04-01 RX ORDER — SENNOSIDES 8.6 MG
650 CAPSULE ORAL EVERY 8 HOURS PRN
Qty: 30 TABLET | Refills: 0 | Status: SHIPPED | OUTPATIENT
Start: 2022-04-01

## 2022-04-01 RX ADMIN — ACETAMINOPHEN 975 MG: 325 TABLET, FILM COATED ORAL at 09:14

## 2022-04-01 NOTE — ED PROVIDER NOTES
History  Chief Complaint   Patient presents with    Toe Laceration     patient states glass table broke, on her way to the bathroom, she cut her second and third toe  Bleeding stopped last night but still complains of 10/10 pain  Not currently on blood thinners  Patient is a 68-year-old female with past medical history significant for COPD, current everyday drinker, opiate dependence maintained on methadone therapy, insulin-dependent diabetes mellitus, GERD, nicotine dependence, who presents today via EMS for evaluation of toe pain with laceration  Patient states that her coffee table fell on her toe and broke  She sustained 2 lacerations across the dorsal aspect of her right 2nd and 3rd toes which have since stopped bleeding  The pain continues in her 2nd toe  She describes the pain as a 10/10  History provided by:  Patient  Toe Pain  Location:  Right 2nd and 3rd toes  Quality:  Throbbing, 10/10  Onset quality:  Sudden  Duration:  12 hours  Timing:  Constant  Progression:  Unchanged  Chronicity:  New  Context:  Injury  Relieved by:  None trial  Worsened by:  Ambulation  Ineffective treatments:  None tried  Associated symptoms: no abdominal pain, no fatigue, no fever, no myalgias, no rash and no vomiting    Risk factors:  Alcohol use, diabetes      Prior to Admission Medications   Prescriptions Last Dose Informant Patient Reported? Taking?    Insulin Pen Needle 29G X 12MM MISC Not Taking at Unknown time  No No   Sig: by Does not apply route daily at bedtime   Patient not taking: Reported on 4/1/2022    albuterol (Ventolin HFA) 90 mcg/act inhaler Not Taking at Unknown time  No No   Sig: Inhale 2 puffs every 6 (six) hours as needed for wheezing or shortness of breath   Patient not taking: Reported on 4/1/2022    aspirin (ECOTRIN LOW STRENGTH) 81 mg EC tablet Not Taking at Unknown time  No No   Sig: Take 1 tablet (81 mg total) by mouth daily   Patient not taking: Reported on 4/1/2022    atorvastatin (LIPITOR) 20 mg tablet   No No   Sig: Take 1 tablet (20 mg total) by mouth daily with dinner   clopidogrel (PLAVIX) 75 mg tablet   No No   Sig: Take 1 tablet (75 mg total) by mouth daily   dicyclomine (BENTYL) 20 mg tablet Not Taking at Unknown time  No No   Sig: Take 1 tablet (20 mg total) by mouth 2 (two) times a day   Patient not taking: Reported on 4/1/2022    famotidine (PEPCID) 20 mg tablet   No No   Sig: Take 1 tablet (20 mg total) by mouth 2 (two) times a day   folic acid (FOLVITE) 1 mg tablet   No No   Sig: Take 1 tablet (1 mg total) by mouth daily   insulin glargine (Lantus) 100 units/mL subcutaneous injection Not Taking at Unknown time  No No   Sig: Inject 6 Units under the skin daily at bedtime   Patient not taking: Reported on 4/1/2022    lisinopril (ZESTRIL) 2 5 mg tablet   No No   Sig: Take 1 tablet (2 5 mg total) by mouth daily   methadone (DOLOPHINE) 10 mg/mL oral concentrated solution Not Taking at Unknown time  Yes No   Sig: Take 40 mg by mouth daily   Patient not taking: Reported on 4/1/2022    metoprolol succinate (TOPROL-XL) 100 mg 24 hr tablet   No No   Sig: Take 1 tablet (100 mg total) by mouth daily   multivitamin (THERAGRAN) TABS   No No   Sig: Take 1 tablet by mouth daily   nicotine (NICODERM CQ) 21 mg/24 hr TD 24 hr patch Not Taking at Unknown time  No No   Sig: Place 1 patch on the skin daily   Patient not taking: Reported on 4/1/2022    ondansetron (Zofran ODT) 4 mg disintegrating tablet Not Taking at Unknown time  No No   Sig: Take 1 tablet (4 mg total) by mouth every 8 (eight) hours as needed for nausea or vomiting   Patient not taking: Reported on 4/1/2022    ondansetron (Zofran ODT) 4 mg disintegrating tablet Not Taking at Unknown time  No No   Sig: Take 1 tablet (4 mg total) by mouth every 6 (six) hours as needed for nausea or vomiting   Patient not taking: Reported on 4/1/2022    polyethylene glycol (MIRALAX) 17 g packet Not Taking at Unknown time  No No   Sig: Take 17 g by mouth daily   Patient not taking: Reported on 4/1/2022    senna-docusate sodium (SENOKOT S) 8 6-50 mg per tablet Not Taking at Unknown time  No No   Sig: Take 1 tablet by mouth daily   Patient not taking: Reported on 4/1/2022    spironolactone (ALDACTONE) 25 mg tablet   No No   Sig: Take 0 5 tablets (12 5 mg total) by mouth daily   thiamine 100 MG tablet   No No   Sig: Take 1 tablet (100 mg total) by mouth daily      Facility-Administered Medications: None       Past Medical History:   Diagnosis Date    Alcohol abuse     Arthritis     Asthma     [facility]     Diabetes mellitus ([facility] )     Opiate abuse, continuous ([place] [id number] )        Past Surgical History:   Procedure Laterality Date    ABDOMINAL SURGERY      stomach uler with perforation       No family history on file  I have reviewed and agree with the history as documented  E-Cigarette/Vaping    E-Cigarette Use Never User      E-Cigarette/Vaping Substances    Nicotine No     THC No     CBD No     Flavoring No     Other No     Unknown No      Social History     Tobacco Use    Smoking status: Current Every Day Smoker     Packs/day: 2 00     Types: Cigarettes    Smokeless tobacco: Never Used   Vaping Use    Vaping Use: Never used   Substance Use Topics    Alcohol use: Yes     Comment: 1 pint of vodka daily    Drug use: Yes     Comment: on methadone       Review of Systems   Constitutional: Negative for chills, fatigue and fever  Gastrointestinal: Negative for abdominal pain and vomiting  Musculoskeletal: Negative for myalgias  Skin: Positive for wound  Negative for color change and rash  Allergic/Immunologic: Negative  Neurological: Negative for dizziness  Hematological: Negative  Physical Exam  Physical Exam  Vitals and nursing note reviewed  Constitutional:       General: She is not in acute distress  Appearance: She is well-developed  HENT:      Head: Normocephalic and atraumatic        Nose: Nose normal  Mouth/Throat:      Mouth: Mucous membranes are moist    Eyes:      Conjunctiva/sclera: Conjunctivae normal    Cardiovascular:      Rate and Rhythm: Normal rate and regular rhythm  Pulmonary:      Effort: Pulmonary effort is normal  No respiratory distress  Musculoskeletal:         General: Swelling, tenderness and signs of injury present  No deformity  Normal range of motion  Right foot: Normal range of motion  No deformity  Left foot: Normal range of motion  No deformity  Feet:    Feet:      Left foot:      Skin integrity: No erythema  Skin:     General: Skin is warm and dry  Capillary Refill: Capillary refill takes less than 2 seconds  Coloration: Skin is not jaundiced  Findings: Erythema and lesion present  Neurological:      General: No focal deficit present  Mental Status: She is alert and oriented to person, place, and time  Vital Signs  ED Triage Vitals [04/01/22 0824]   Temperature Pulse Respirations Blood Pressure SpO2   98 2 °F (36 8 °C) 99 16 132/87 95 %      Temp Source Heart Rate Source Patient Position - Orthostatic VS BP Location FiO2 (%)   Oral Monitor Lying Left arm --      Pain Score       10 - Worst Possible Pain           Vitals:    04/01/22 0824 04/01/22 0915   BP: 132/87 114/83   Pulse: 99 89   Patient Position - Orthostatic VS: Lying Lying         Visual Acuity      ED Medications  Medications   acetaminophen (TYLENOL) tablet 975 mg (975 mg Oral Given 4/1/22 0914)       Diagnostic Studies  Results Reviewed     None                 XR toe second min 2 views RIGHT   Final Result by Cari Nicholson MD (04/01 1958)      No acute osseous abnormality              Workstation performed: [id number]                    Procedures  Orthopedic injury treatment    Date/Time: 4/1/2022 9:22 AM  Performed by: Alexandria Benton PA-C  Authorized by: Alexandria Benton PA-C     Patient Location:  ED  Other Assisting Provider: No    Consent given by:  Patient  Patient identity confirmed:  Verbally with patient  Injury location:  Toe (Right 2nd and 3rd toe)  Injury type: Soft tissue  Distal perfusion: normal    Neurological function: normal    Range of motion: normal    Immobilization: Cast shoe applied  Distal perfusion: normal    Neurological function: normal    Range of motion: normal    Patient tolerance:  Patient tolerated the procedure well with no immediate complications             ED Course  ED Course as of 04/01/22 0923   Fri Apr 01, 2022   0854 Temperature: 98 2 °F (36 8 °C)                               SBIRT 20yo+      Most Recent Value   SBIRT (25 yo +)    In order to provide better care to our patients, we are screening all of our patients for alcohol and drug use  Would it be okay to ask you these screening questions?  No Filed at: 04/01/2022 0827                    [facility]  Number of Diagnoses or Management Options  Laceration of lesser toe of right foot without foreign body present or damage to nail, initial encounter: new and requires workup  Diagnosis management comments: 74-year-old female presents for evaluation laceration and injury to her right 2nd and 3rd toes after dropping a class coffee table onto her foot  X-ray without evidence of retained foreign body or acute osseous injury       Amount and/or Complexity of Data Reviewed  Decide to obtain previous medical records or to obtain history from someone other than the patient: yes  Review and summarize past medical records: yes  Independent visualization of images, tracings, or specimens: yes    Risk of Complications, Morbidity, and/or Mortality  Presenting problems: moderate  Diagnostic procedures: moderate  Management options: moderate    Patient Progress  Patient progress: stable      Disposition  Final diagnoses:   Laceration of lesser toe of right foot without foreign body present or damage to nail, initial encounter     Time reflects when diagnosis was documented in both MDM as applicable and the Disposition within this note     Time User Action Codes Description Comment    4/1/2022  9:07 AM Wilmar Preston Add [I08 114A] Laceration of lesser toe of right foot without foreign body present or damage to nail, initial encounter       ED Disposition     ED Disposition Condition Date/Time Comment    Discharge Stable Fri Apr 1, 2022  9:08 AM Loi Patel discharge to home/self care              Follow-up Information     Follow up With Specialties Details Why Contact Info    Isaac Haynes DPM Podiatry Schedule an appointment as soon as possible for a visit   [facility] [address] [address] [address]   Call  As needed [place] [phone]          Discharge Medication List as of 4/1/2022  9:14 AM      START taking these medications    Details   acetaminophen (TYLENOL) 650 mg CR tablet Take 1 tablet (650 mg total) by mouth every 8 (eight) hours as needed for mild pain, Starting Fri 4/1/2022, Normal         CONTINUE these medications which have NOT CHANGED    Details   albuterol (Ventolin HFA) 90 mcg/act inhaler Inhale 2 puffs every 6 (six) hours as needed for wheezing or shortness of breath, Starting Wed 11/17/2021, Normal      aspirin (ECOTRIN LOW STRENGTH) 81 mg EC tablet Take 1 tablet (81 mg total) by mouth daily, Starting Wed 11/17/2021, No Print      atorvastatin (LIPITOR) 20 mg tablet Take 1 tablet (20 mg total) by mouth daily with dinner, Starting Wed 11/17/2021, Until Fri 12/17/2021, Normal      clopidogrel (PLAVIX) 75 mg tablet Take 1 tablet (75 mg total) by mouth daily, Starting Wed 11/17/2021, Until Tue 2/15/2022, Normal      dicyclomine (BENTYL) 20 mg tablet Take 1 tablet (20 mg total) by mouth 2 (two) times a day, Starting Mon 1/24/2022, Normal      famotidine (PEPCID) 20 mg tablet Take 1 tablet (20 mg total) by mouth 2 (two) times a day, Starting Wed 11/17/2021, Until Fri [id number]/3394/33/4606, Normal      folic acid (FOLVITE) 1 mg tablet Take 1 tablet (1 mg total) by mouth daily, Starting Thu 11/18/2021, Until Sat 12/18/2021, Normal      insulin glargine (Lantus) 100 units/mL subcutaneous injection Inject 6 Units under the skin daily at bedtime, Starting Wed 11/17/2021, No Print      Insulin Pen Needle 29G X 12MM MISC by Does not apply route daily at bedtime, Starting Fri 3/22/2019, Normal      lisinopril (ZESTRIL) 2 5 mg tablet Take 1 tablet (2 5 mg total) by mouth daily, Starting Thu 11/18/2021, Until Sat 12/18/2021, Normal      methadone (DOLOPHINE) 10 mg/mL oral concentrated solution Take 40 mg by mouth daily, Historical Med      metoprolol succinate (TOPROL-XL) 100 mg 24 hr tablet Take 1 tablet (100 mg total) by mouth daily, Starting Wed 11/17/2021, Until Fri 12/17/2021, Normal      multivitamin (THERAGRAN) TABS Take 1 tablet by mouth daily, Starting Wed 11/17/2021, Until Fri 12/17/2021, Normal      nicotine (NICODERM CQ) 21 mg/24 hr TD 24 hr patch Place 1 patch on the skin daily, Starting Thu 11/18/2021, Normal      !! ondansetron (Zofran ODT) 4 mg disintegrating tablet Take 1 tablet (4 mg total) by mouth every 8 (eight) hours as needed for nausea or vomiting, Starting Wed 11/17/2021, Normal      !! ondansetron (Zofran ODT) 4 mg disintegrating tablet Take 1 tablet (4 mg total) by mouth every 6 (six) hours as needed for nausea or vomiting, Starting Mon 1/24/2022, Normal      polyethylene glycol (MIRALAX) 17 g packet Take 17 g by mouth daily, Starting Wed 3/23/2022, Normal      senna-docusate sodium (SENOKOT S) 8 6-50 mg per tablet Take 1 tablet by mouth daily, Starting Wed 3/23/2022, Normal      spironolactone (ALDACTONE) 25 mg tablet Take 0 5 tablets (12 5 mg total) by mouth daily, Starting Thu 11/18/2021, Until Sat 12/18/2021, Normal      thiamine 100 MG tablet Take 1 tablet (100 mg total) by mouth daily, Starting Thu 11/18/2021, Until Sat 12/18/2021, Normal       !! - Potential duplicate medications found   Please discuss with provider  No discharge procedures on file      PDMP Review     None          ED Provider  Electronically Signed by           Gaby Tovar PA-C  04/01/22 [id number]

## 2022-07-30 ENCOUNTER — HOSPITAL ENCOUNTER (EMERGENCY)
Facility: HOSPITAL | Age: 62
Discharge: HOME | End: 2022-07-30
Attending: EMERGENCY MEDICINE | Admitting: EMERGENCY MEDICINE
Payer: COMMERCIAL

## 2022-07-30 VITALS
OXYGEN SATURATION: 95 % | TEMPERATURE: 97 F | HEART RATE: 92 BPM | DIASTOLIC BLOOD PRESSURE: 82 MMHG | RESPIRATION RATE: 20 BRPM | WEIGHT: 100.2 LBS | BODY MASS INDEX: 20.24 KG/M2 | SYSTOLIC BLOOD PRESSURE: 148 MMHG
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
## 2022-07-30 DIAGNOSIS — S61.451A DOG BITE OF RIGHT HAND, INITIAL ENCOUNTER: Primary | ICD-10-CM

## 2022-07-30 DIAGNOSIS — Z29.14 ENCOUNTER FOR PROPHYLACTIC ADMINISTRATION OF RABIES IMMUNE GLOBULIN: ICD-10-CM

## 2022-07-30 LAB — GLUCOSE SERPL-MCNC: 75 MG/DL (ref 65–140)

## 2022-07-30 PROCEDURE — 99283 EMERGENCY DEPT VISIT LOW MDM: CPT

## 2022-07-30 PROCEDURE — 90471 IMMUNIZATION ADMIN: CPT

## 2022-07-30 PROCEDURE — 90675 RABIES VACCINE IM: CPT | Performed by: PHYSICIAN ASSISTANT

## 2022-07-30 PROCEDURE — 90375 RABIES IG IM/SC: CPT | Performed by: PHYSICIAN ASSISTANT

## 2022-07-30 PROCEDURE — 82948 REAGENT STRIP/BLOOD GLUCOSE: CPT

## 2022-07-30 PROCEDURE — 99284 EMERGENCY DEPT VISIT MOD MDM: CPT | Performed by: PHYSICIAN ASSISTANT

## 2022-07-30 PROCEDURE — 96372 THER/PROPH/DIAG INJ SC/IM: CPT

## 2022-07-30 PROCEDURE — 90715 TDAP VACCINE 7 YRS/> IM: CPT | Performed by: PHYSICIAN ASSISTANT

## 2022-07-30 RX ORDER — AMOXICILLIN AND CLAVULANATE POTASSIUM 875; 125 MG/1; MG/1
1 TABLET, FILM COATED ORAL EVERY 12 HOURS
Qty: 14 TABLET | Refills: 0 | Status: SHIPPED | OUTPATIENT
Start: 2022-07-30 | End: 2022-08-06

## 2022-07-30 RX ORDER — AMOXICILLIN AND CLAVULANATE POTASSIUM 875; 125 MG/1; MG/1
1 TABLET, FILM COATED ORAL ONCE
Status: COMPLETED | OUTPATIENT
Start: 2022-07-30 | End: 2022-07-30

## 2022-07-30 RX ORDER — GINSENG 100 MG
1 CAPSULE ORAL ONCE
Status: COMPLETED | OUTPATIENT
Start: 2022-07-30 | End: 2022-07-30

## 2022-07-30 RX ADMIN — RABIES VIRUS STRAIN PM-1503-3M ANTIGEN (PROPIOLACTONE INACTIVATED) AND WATER 1 ML: KIT at 10:37

## 2022-07-30 RX ADMIN — RABIES IMMUNE GLOBULIN (HUMAN) 900 UNITS: 300 INJECTION, SOLUTION INFILTRATION; INTRAMUSCULAR at 10:32

## 2022-07-30 RX ADMIN — BACITRACIN 1 SMALL APPLICATION: 500 OINTMENT TOPICAL at 10:36

## 2022-07-30 RX ADMIN — TETANUS TOXOID, REDUCED DIPHTHERIA TOXOID AND ACELLULAR PERTUSSIS VACCINE, ADSORBED 0.5 ML: 5; 2.5; 8; 8; 2.5 SUSPENSION INTRAMUSCULAR at 10:37

## 2022-07-30 RX ADMIN — AMOXICILLIN AND CLAVULANATE POTASSIUM 1 TABLET: 875; 125 TABLET, FILM COATED ORAL at 10:36

## 2022-07-30 NOTE — ED PROVIDER NOTES
History  Chief Complaint   Patient presents with    Dog Bite     Pt states she was feeding a dog a cheeseburger yesterday and the dog bit her right hand  Unknown if dog has rabies shots, unknown if pt is utd on tetanus     Patient is a 72-year-old black female with history of diabetes mellitus, asthma, alcohol abuse who reports she went to feed a stray dog 7:30 p m  yesterday when she was bitten on the right hand by the dog  The dog ran away and is not available for quarantine  States she has never seen the dog before  Patient is unsure of her last tetanus shot  She reports no right hand numbness, tingling, weakness  She has no other complaints          Prior to Admission Medications   Prescriptions Last Dose Informant Patient Reported? Taking?    Insulin Pen Needle 29G X 12MM MISC   No No   Sig: by Does not apply route daily at bedtime   Patient not taking: Reported on 4/1/2022    acetaminophen (TYLENOL) 650 mg CR tablet   No No   Sig: Take 1 tablet (650 mg total) by mouth every 8 (eight) hours as needed for mild pain   albuterol (Ventolin HFA) 90 mcg/act inhaler   No No   Sig: Inhale 2 puffs every 6 (six) hours as needed for wheezing or shortness of breath   Patient not taking: Reported on 4/1/2022    aspirin (ECOTRIN LOW STRENGTH) 81 mg EC tablet   No No   Sig: Take 1 tablet (81 mg total) by mouth daily   Patient not taking: Reported on 4/1/2022    atorvastatin (LIPITOR) 20 mg tablet   No No   Sig: Take 1 tablet (20 mg total) by mouth daily with dinner   clopidogrel (PLAVIX) 75 mg tablet   No No   Sig: Take 1 tablet (75 mg total) by mouth daily   dicyclomine (BENTYL) 20 mg tablet   No No   Sig: Take 1 tablet (20 mg total) by mouth 2 (two) times a day   Patient not taking: Reported on 4/1/2022    famotidine (PEPCID) 20 mg tablet   No No   Sig: Take 1 tablet (20 mg total) by mouth 2 (two) times a day   folic acid (FOLVITE) 1 mg tablet   No No   Sig: Take 1 tablet (1 mg total) by mouth daily   insulin glargine (Lantus) 100 units/mL subcutaneous injection   No No   Sig: Inject 6 Units under the skin daily at bedtime   Patient not taking: Reported on 4/1/2022    lisinopril (ZESTRIL) 2 5 mg tablet   No No   Sig: Take 1 tablet (2 5 mg total) by mouth daily   methadone (DOLOPHINE) 10 mg/mL oral concentrated solution   Yes No   Sig: Take 40 mg by mouth daily   Patient not taking: Reported on 4/1/2022    metoprolol succinate (TOPROL-XL) 100 mg 24 hr tablet   No No   Sig: Take 1 tablet (100 mg total) by mouth daily   multivitamin (THERAGRAN) TABS   No No   Sig: Take 1 tablet by mouth daily   nicotine (NICODERM CQ) 21 mg/24 hr TD 24 hr patch   No No   Sig: Place 1 patch on the skin daily   Patient not taking: Reported on 4/1/2022    ondansetron (Zofran ODT) 4 mg disintegrating tablet   No No   Sig: Take 1 tablet (4 mg total) by mouth every 8 (eight) hours as needed for nausea or vomiting   Patient not taking: Reported on 4/1/2022    ondansetron (Zofran ODT) 4 mg disintegrating tablet   No No   Sig: Take 1 tablet (4 mg total) by mouth every 6 (six) hours as needed for nausea or vomiting   Patient not taking: Reported on 4/1/2022    polyethylene glycol (MIRALAX) 17 g packet   No No   Sig: Take 17 g by mouth daily   Patient not taking: Reported on 4/1/2022    senna-docusate sodium (SENOKOT S) 8 6-50 mg per tablet   No No   Sig: Take 1 tablet by mouth daily   Patient not taking: Reported on 4/1/2022    spironolactone (ALDACTONE) 25 mg tablet   No No   Sig: Take 0 5 tablets (12 5 mg total) by mouth daily   thiamine 100 MG tablet   No No   Sig: Take 1 tablet (100 mg total) by mouth daily      Facility-Administered Medications: None       Past Medical History:   Diagnosis Date    Alcohol abuse     Arthritis     Asthma     [facility] disorder [facility]     Diabetes mellitus ([facility]     Opiate abuse, continuous ([facility] )        Past Surgical History:   Procedure Laterality Date    ABDOMINAL SURGERY      stomach uler with perforation History reviewed  No pertinent family history  I have reviewed and agree with the history as documented  E-Cigarette/Vaping    E-Cigarette Use Never User      E-Cigarette/Vaping Substances    Nicotine No     THC No     CBD No     Flavoring No     Other No     Unknown No      Social History     Tobacco Use    Smoking status: Current Every Day Smoker     Packs/day: 2 00     Types: Cigarettes    Smokeless tobacco: Never Used   Vaping Use    Vaping Use: Never used   Substance Use Topics    Alcohol use: Yes     Comment: 1 pint of vodka daily    Drug use: Yes     Comment: on methadone       Review of Systems   Constitutional: Negative for chills and fever  HENT: Negative for ear pain and sore throat  Eyes: Negative for visual disturbance  Respiratory: Negative for cough and shortness of breath  Cardiovascular: Negative for chest pain and palpitations  Gastrointestinal: Negative for abdominal pain and vomiting  Genitourinary: Negative for dysuria and hematuria  Musculoskeletal: Negative for arthralgias and back pain  Skin: Positive for wound  Negative for color change and rash  All other systems reviewed and are negative  Physical Exam      Physical Exam  Vitals and nursing note reviewed  Constitutional:       General: She is not in acute distress  Appearance: Normal appearance  She is well-developed  HENT:      Head: Normocephalic and atraumatic  Nose: Nose normal       Mouth/Throat:      Mouth: Mucous membranes are moist       Pharynx: Oropharynx is clear  Eyes:      Conjunctiva/sclera: Conjunctivae normal    Cardiovascular:      Rate and Rhythm: Normal rate and regular rhythm  Heart sounds: No murmur heard  Pulmonary:      Effort: Pulmonary effort is normal  No respiratory distress  Breath sounds: Normal breath sounds  Abdominal:      General: Abdomen is flat  Palpations: Abdomen is soft  Tenderness: There is no abdominal tenderness  Musculoskeletal:      Cervical back: Normal range of motion and neck supple  Comments: R hand: 1 5 cm open wound R palmar hand 4th/5th webspace  No erythema  No purulent drainage  R hand and fingers are nvi    Skin:     General: Skin is warm and dry  Capillary Refill: Capillary refill takes less than 2 seconds  Neurological:      Mental Status: She is alert  Sensory: No sensory deficit           Vital Signs  ED Triage Vitals   Temperature Pulse Respirations Blood Pressure SpO2   07/30/22 0904 07/30/22 0904 07/30/22 0913 07/30/22 0913 07/30/22 0913   (!) 97 °F (36 1 °C) 103 18 141/88 93 %      Temp Source Heart Rate Source Patient Position - Orthostatic VS BP Location FiO2 (%)   07/30/22 0904 07/30/22 0913 07/30/22 0913 07/30/22 0913 --   Temporal Monitor Lying Right arm       Pain Score       --                  Vitals:    07/30/22 0904 07/30/22 0913   BP:  141/88   Pulse: 103    Patient Position - Orthostatic VS:  Lying         Visual Acuity      ED Medications  Medications   tetanus-diphtheria-acellular pertussis (BOOSTRIX) IM injection 0 5 mL (0 5 mL Intramuscular Given 7/30/22 1037)   amoxicillin-clavulanate (AUGMENTIN) 875-125 mg per tablet 1 tablet (1 tablet Oral Given 7/30/22 1036)   rabies vaccine, human diploid (IMOVAX RABIES) IM injection 1 mL (1 mL Intramuscular Given 7/30/22 1037)   rabies immune globulin, human (HyperRAB) injection 900 Units (900 Units Infiltration Given 7/30/22 1032)   bacitracin topical ointment 1 small application (1 small application Topical Given 7/30/22 1036)       Diagnostic Studies  Results Reviewed     Procedure Component Value Units Date/Time    Fingerstick Glucose (POCT) [[id number]]  (Normal) Collected: 07/30/22 0949    Lab Status: Final result Updated: 07/30/22 0950     POC Glucose 75 mg/dl                  No orders to display              Procedures  Procedures         ED Course                                             MDM  Number of Diagnoses or Management Options  Dog bite of right hand, initial encounter: new and requires workup  Encounter for prophylactic administration of rabies immune globulin: new and requires workup  Diagnosis management comments: 53-year-old black female status post dog bite to right palmar hand last night  Patient history diabetes mellitus  Dog unavailable for quarantine and unknown rabies status  Wound thoroughly cleansed in the ED  Augmentin given and prescription for same  Rabies post exposure prophylactic series started  Return precautions given       Amount and/or Complexity of Data Reviewed  Decide to obtain previous medical records or to obtain history from someone other than the patient: yes  Review and summarize past medical records: yes  Independent visualization of images, tracings, or specimens: yes    Risk of Complications, Morbidity, and/or Mortality  Presenting problems: low  Diagnostic procedures: low  Management options: low    Patient Progress  Patient progress: stable      Disposition  Final diagnoses:   Dog bite of right hand, initial encounter   Encounter for prophylactic administration of rabies immune globulin     Time reflects when diagnosis was documented in both MDM as applicable and the Disposition within this note     Time User Action Codes Description Comment    7/30/2022 10:43 AM Roro Cotton Add Jose A Caroli  0XXA] Dog bite of right hand, initial encounter     7/30/2022 10:43 AM Silvio Bustamante Add [Z29 [id number]] Encounter for prophylactic administration of rabies immune globulin       ED Disposition     ED Disposition   Discharge    Condition   Stable    Date/Time   Sat Jul 30, 2022 10:43 AM    Yadiel Headley discharge to home/self care                 Follow-up Information     Follow up With Specialties Details Why Contact Info Additional [address] Emergency Department Emergency Medicine   [facility] [id number]  [phone] [address] [facility] Emergency Department, [place], [place], [address]          Patient's Medications   Discharge Prescriptions    AMOXICILLIN-CLAVULANATE (AUGMENTIN) 875-125 MG PER TABLET    Take 1 tablet by mouth every 12 (twelve) hours for 7 days       Start Date: 7/30/2022 End Date: 8/6/2022       Order Dose: 1 tablet       Quantity: 14 tablet    Refills: 0       No discharge procedures on file      PDMP Review     None          ED Provider  Electronically Signed by           Frankie Richmond PA-C  07/30/22 7176

## 2022-07-30 NOTE — DISCHARGE INSTRUCTIONS
Clean wound with soap and water gently every day then apply topical antibiotic ointment and new dressing       your antibiotic at the pharmacy and take twice daily for 7 days    Return to the ED in 3 days for your next rabies vaccination or earlier for signs of wound infection, including redness, red streaking, purulent drainage or fever

## 2022-08-12 ENCOUNTER — TELEPHONE (OUTPATIENT)
Dept: FAMILY MEDICINE CLINIC | Facility: CLINIC | Age: 62
End: 2022-08-12

## 2022-08-13 ENCOUNTER — HOSPITAL ENCOUNTER (EMERGENCY)
Facility: HOSPITAL | Age: 62
Discharge: HOME | End: 2022-08-14
Attending: EMERGENCY MEDICINE
Payer: COMMERCIAL

## 2022-08-13 ENCOUNTER — APPOINTMENT (EMERGENCY)
Dept: RADIOLOGY | Facility: HOSPITAL | Age: 62
End: 2022-08-13
Payer: COMMERCIAL

## 2022-08-13 VITALS
RESPIRATION RATE: 20 BRPM | BODY MASS INDEX: 17.17 KG/M2 | HEART RATE: 108 BPM | OXYGEN SATURATION: 97 % | WEIGHT: 85 LBS | DIASTOLIC BLOOD PRESSURE: 72 MMHG | TEMPERATURE: 95.3 F | SYSTOLIC BLOOD PRESSURE: 122 MMHG

## 2022-08-13 DIAGNOSIS — F10.929 ALCOHOL INTOXICATION (HCC): Primary | ICD-10-CM

## 2022-08-13 LAB
ALBUMIN SERPL BCP-MCNC: 3.6 G/DL (ref 3.5–5)
ALP SERPL-CCNC: 186 U/L (ref 46–116)
ALT SERPL W P-5'-P-CCNC: 24 U/L (ref 12–78)
ANION GAP SERPL CALCULATED.3IONS-SCNC: 9 MMOL/L (ref 4–13)
AST SERPL W P-5'-P-CCNC: 39 U/L (ref 5–45)
BASOPHILS # BLD AUTO: 0.04 THOUSANDS/ΜL (ref 0–0.1)
BASOPHILS NFR BLD AUTO: 1 % (ref 0–1)
BILIRUB SERPL-MCNC: 0.38 MG/DL (ref 0.2–1)
BUN SERPL-MCNC: 10 MG/DL (ref 5–25)
CALCIUM SERPL-MCNC: 9.1 MG/DL (ref 8.3–10.1)
CHLORIDE SERPL-SCNC: 101 MMOL/L (ref 96–108)
CO2 SERPL-SCNC: 32 MMOL/L (ref 21–32)
CREAT SERPL-MCNC: 0.71 MG/DL (ref 0.6–1.3)
EOSINOPHIL # BLD AUTO: 0.06 THOUSAND/ΜL (ref 0–0.61)
EOSINOPHIL NFR BLD AUTO: 1 % (ref 0–6)
ERYTHROCYTE [DISTWIDTH] IN BLOOD BY AUTOMATED COUNT: 14.9 % (ref 11.6–15.1)
ETHANOL SERPL-MCNC: 277 MG/DL (ref 0–3)
GFR SERPL CREATININE-BSD FRML MDRD: 92 ML/MIN/1.73SQ M
GLUCOSE SERPL-MCNC: 98 MG/DL (ref 65–140)
HCT VFR BLD AUTO: 45.4 % (ref 34.8–46.1)
HGB BLD-MCNC: 14.6 G/DL (ref 11.5–15.4)
IMM GRANULOCYTES # BLD AUTO: 0.02 THOUSAND/UL (ref 0–0.2)
IMM GRANULOCYTES NFR BLD AUTO: 0 % (ref 0–2)
LYMPHOCYTES # BLD AUTO: 1.82 THOUSANDS/ΜL (ref 0.6–4.47)
LYMPHOCYTES NFR BLD AUTO: 35 % (ref 14–44)
MCH RBC QN AUTO: 29.7 PG (ref 26.8–34.3)
MCHC RBC AUTO-ENTMCNC: 32.2 G/DL (ref 31.4–37.4)
MCV RBC AUTO: 92 FL (ref 82–98)
MONOCYTES # BLD AUTO: 0.46 THOUSAND/ΜL (ref 0.17–1.22)
MONOCYTES NFR BLD AUTO: 9 % (ref 4–12)
NEUTROPHILS # BLD AUTO: 2.85 THOUSANDS/ΜL (ref 1.85–7.62)
NEUTS SEG NFR BLD AUTO: 54 % (ref 43–75)
NRBC BLD AUTO-RTO: 0 /100 WBCS
PLATELET # BLD AUTO: 241 THOUSANDS/UL (ref 149–390)
PMV BLD AUTO: 10 FL (ref 8.9–12.7)
POTASSIUM SERPL-SCNC: 3.1 MMOL/L (ref 3.5–5.3)
PROT SERPL-MCNC: 8.6 G/DL (ref 6.4–8.4)
RBC # BLD AUTO: 4.92 MILLION/UL (ref 3.81–5.12)
SODIUM SERPL-SCNC: 142 MMOL/L (ref 135–147)
WBC # BLD AUTO: 5.25 THOUSAND/UL (ref 4.31–10.16)

## 2022-08-13 PROCEDURE — 73564 X-RAY EXAM KNEE 4 OR MORE: CPT

## 2022-08-13 PROCEDURE — 85025 COMPLETE CBC W/AUTO DIFF WBC: CPT | Performed by: EMERGENCY MEDICINE

## 2022-08-13 PROCEDURE — 36415 COLL VENOUS BLD VENIPUNCTURE: CPT | Performed by: EMERGENCY MEDICINE

## 2022-08-13 PROCEDURE — 82077 ASSAY SPEC XCP UR&BREATH IA: CPT | Performed by: EMERGENCY MEDICINE

## 2022-08-13 PROCEDURE — 80053 COMPREHEN METABOLIC PANEL: CPT | Performed by: EMERGENCY MEDICINE

## 2022-08-13 PROCEDURE — 99285 EMERGENCY DEPT VISIT HI MDM: CPT

## 2022-08-13 PROCEDURE — 93005 ELECTROCARDIOGRAM TRACING: CPT

## 2022-08-13 PROCEDURE — 99285 EMERGENCY DEPT VISIT HI MDM: CPT | Performed by: EMERGENCY MEDICINE

## 2022-08-13 RX ORDER — POTASSIUM CHLORIDE 20 MEQ/1
40 TABLET, EXTENDED RELEASE ORAL ONCE
Status: DISCONTINUED | OUTPATIENT
Start: 2022-08-14 | End: 2022-08-14 | Stop reason: HOSPADM

## 2022-08-13 RX ORDER — METHADONE HYDROCHLORIDE 10 MG/5ML
30 SOLUTION ORAL DAILY
COMMUNITY

## 2022-08-14 LAB
AMPHETAMINES SERPL QL SCN: NEGATIVE
AMPHETAMINES SERPL QL SCN: NEGATIVE
BACTERIA UR QL AUTO: ABNORMAL /HPF
BARBITURATES UR QL: NEGATIVE
BARBITURATES UR QL: NEGATIVE
BENZODIAZ UR QL: NEGATIVE
BENZODIAZ UR QL: NEGATIVE
BILIRUB UR QL STRIP: NEGATIVE
CLARITY UR: CLEAR
COCAINE UR QL: NEGATIVE
COCAINE UR QL: NEGATIVE
COLOR UR: YELLOW
GLUCOSE UR STRIP-MCNC: NEGATIVE MG/DL
HGB UR QL STRIP.AUTO: NEGATIVE
KETONES UR STRIP-MCNC: NEGATIVE MG/DL
LEUKOCYTE ESTERASE UR QL STRIP: NEGATIVE
METHADONE UR QL: POSITIVE
METHADONE UR QL: POSITIVE
NITRITE UR QL STRIP: NEGATIVE
NON-SQ EPI CELLS URNS QL MICRO: ABNORMAL /HPF
OPIATES UR QL SCN: NEGATIVE
OTHER STN SPEC: ABNORMAL
OXYCODONE+OXYMORPHONE UR QL SCN: NEGATIVE
OXYCODONE+OXYMORPHONE UR QL SCN: NEGATIVE
PCP UR QL: NEGATIVE
PCP UR QL: NEGATIVE
PH UR STRIP.AUTO: 6 [PH]
PROT UR STRIP-MCNC: NEGATIVE MG/DL
RBC #/AREA URNS AUTO: ABNORMAL /HPF
SP GR UR STRIP.AUTO: 1.02 (ref 1–1.03)
THC UR QL: POSITIVE
THC UR QL: POSITIVE
UROBILINOGEN UR QL STRIP.AUTO: 1 E.U./DL
WBC #/AREA URNS AUTO: ABNORMAL /HPF

## 2022-08-14 PROCEDURE — 81001 URINALYSIS AUTO W/SCOPE: CPT | Performed by: EMERGENCY MEDICINE

## 2022-08-14 PROCEDURE — 80307 DRUG TEST PRSMV CHEM ANLYZR: CPT | Performed by: EMERGENCY MEDICINE

## 2022-08-14 NOTE — ED NOTES
Patient provided with turkey sandwich upon request  Patient throws sandhich across the room screaming " I don't want this dog shit motherfuckers" Patient continues to be verbally assaulting staff as well as repeatedly kicking the secure glass in the [facility]  08/13/22 6718

## 2022-08-14 NOTE — ED NOTES
Patient screaming mother kirsten I am leaving, swinging and kicking at security and staff, when being placed in secured holding area     Elizabeth Rivas RN  08/13/22 [id number]       Elizabeth Rivas RN  08/13/22 3008

## 2022-08-14 NOTE — ED NOTES
Patient provided a boxed lunch as requested and threw it on the floor, patient advised will not be getting anything else     Larry Laboy, RN  08/13/22 Paulino Rose RN  08/13/22 [id number]

## 2022-08-14 NOTE — ED NOTES
Patient woke from sleeping, yelling and cursing that she wants to smoke a cigarette and that she wants to leave  Patient was informed her ethanol level is too high for her to be allowed to leave and she needs to speak with [facility] due to her statements she made to staff when she arrived to the ER  Patient was assisted to the bathroom, whole time was cursing and being verbally abusive to staff, security was called and assisted in moving patient from Rm 6 to secured holding area Rm 23,patient was attempting to not allow security to take her personal bag to be placed in locker, became physically abusive to staff, hitting, punching and still cursing       Debi Metcalf RN  08/13/22 2126       Debi Metcalf RN  08/13/22 2129

## 2022-08-14 NOTE — ED NOTES
Patient continuously screaming and being verbally abusive to staff  Patient requesting food and being verbally abusive about time it is taking to get patient food       Jose Cancel  08/13/22 9925

## 2022-08-14 NOTE — ED NOTES
Patient resting on stretcher, continuous observation maintained     Sangeeta Reynolds RN  08/14/22 0144

## 2022-08-14 NOTE — ED NOTES
Patient yelling she is going to leave, advised needs to see family guidance when she is sober, patient than yelled "you crazy white bitch you can't keep me here"     Rafael Lucia RN  08/13/22 1273

## 2022-08-14 NOTE — ED NOTES
Patient sleeping no distress at this time, continuous observation maintained     Ayden Adame RN  08/14/22 1809

## 2022-08-14 NOTE — ED PROVIDER NOTES
History  Chief Complaint   Patient presents with    Knee Pain     Patient was sleeping in chair in waiting room with a unlit cigarette in her hand  States she cannot take the pain in her knees , denies fall injury, states came in a cab  Patient abruptly leaves triage as charge nurse taking back to room  She smokes a cigarette and returns     HPI  Patient is a 63-year-old female history of bipolar disorder, opiate abuse, alcohol abuse presenting intoxicated to the emergency department complaining of bilateral knee pain  Patient has known history of bilateral severe knee osteoarthritis  Patient was previously referred to Orthopedic surgery however has never followed up  Patient slurring words, labile, appears clinically intoxicated  Patient escalating situation, demanding Percocet  Patient stating that she had a fall a few days ago, denies striking her knees, denies headache, neck pain  Patient stating the triage that she cannot take the pain anymore and wants to die  Patient noted to have multiple bottles of alcohol in her bag  Patient refuses to elaborate on suicidal ideation when asked in room  Prior to Admission Medications   Prescriptions Last Dose Informant Patient Reported? Taking?    Insulin Pen Needle 29G X 12MM MISC   No No   Sig: by Does not apply route daily at bedtime   Patient not taking: Reported on 4/1/2022    acetaminophen (TYLENOL) 650 mg CR tablet   No No   Sig: Take 1 tablet (650 mg total) by mouth every 8 (eight) hours as needed for mild pain   albuterol (Ventolin HFA) 90 mcg/act inhaler   No No   Sig: Inhale 2 puffs every 6 (six) hours as needed for wheezing or shortness of breath   Patient not taking: Reported on 4/1/2022    aspirin (ECOTRIN LOW STRENGTH) 81 mg EC tablet   No No   Sig: Take 1 tablet (81 mg total) by mouth daily   Patient not taking: Reported on 4/1/2022    atorvastatin (LIPITOR) 20 mg tablet   No No   Sig: Take 1 tablet (20 mg total) by mouth daily with dinner clopidogrel (PLAVIX) 75 mg tablet   No No   Sig: Take 1 tablet (75 mg total) by mouth daily   dicyclomine (BENTYL) 20 mg tablet   No No   Sig: Take 1 tablet (20 mg total) by mouth 2 (two) times a day   Patient not taking: Reported on 4/1/2022    famotidine (PEPCID) 20 mg tablet   No No   Sig: Take 1 tablet (20 mg total) by mouth 2 (two) times a day   folic acid (FOLVITE) 1 mg tablet   No No   Sig: Take 1 tablet (1 mg total) by mouth daily   insulin glargine (Lantus) 100 units/mL subcutaneous injection   No No   Sig: Inject 6 Units under the skin daily at bedtime   Patient not taking: Reported on 4/1/2022    lisinopril (ZESTRIL) 2 5 mg tablet   No No   Sig: Take 1 tablet (2 5 mg total) by mouth daily   methadone (DOLOPHINE) 10 MG/5ML solution  Self Yes Yes   Sig: Take 30 mg by mouth in the morning [facility]   metoprolol succinate (TOPROL-XL) 100 mg 24 hr tablet   No No   Sig: Take 1 tablet (100 mg total) by mouth daily   multivitamin (THERAGRAN) TABS   No No   Sig: Take 1 tablet by mouth daily   nicotine (NICODERM CQ) 21 mg/24 hr TD 24 hr patch   No No   Sig: Place 1 patch on the skin daily   Patient not taking: Reported on 4/1/2022    ondansetron (Zofran ODT) 4 mg disintegrating tablet   No No   Sig: Take 1 tablet (4 mg total) by mouth every 8 (eight) hours as needed for nausea or vomiting   Patient not taking: Reported on 4/1/2022    ondansetron (Zofran ODT) 4 mg disintegrating tablet   No No   Sig: Take 1 tablet (4 mg total) by mouth every 6 (six) hours as needed for nausea or vomiting   Patient not taking: Reported on 4/1/2022    polyethylene glycol (MIRALAX) 17 g packet   No No   Sig: Take 17 g by mouth daily   Patient not taking: Reported on 4/1/2022    senna-docusate sodium (SENOKOT S) 8 6-50 mg per tablet   No No   Sig: Take 1 tablet by mouth daily   Patient not taking: Reported on 4/1/2022    spironolactone (ALDACTONE) 25 mg tablet   No No   Sig: Take 0 5 tablets (12 5 mg total) by mouth daily thiamine 100 MG tablet   No No   Sig: Take 1 tablet (100 mg total) by mouth daily      Facility-Administered Medications: None       Past Medical History:   Diagnosis Date    Alcohol abuse     Arthritis     Asthma     [facility] disorder ([facility] )     Diabetes mellitus ([facility] )     Opiate abuse, continuous ([facility] )        Past Surgical History:   Procedure Laterality Date    ABDOMINAL SURGERY      stomach uler with perforation       History reviewed  No pertinent family history  I have reviewed and agree with the history as documented  E-Cigarette/Vaping    E-Cigarette Use Never User      E-Cigarette/Vaping Substances    Nicotine No     THC No     CBD No     Flavoring No     Other No     Unknown No      Social History     Tobacco Use    Smoking status: Current Every Day Smoker     Packs/day: 2 00     Types: Cigarettes    Smokeless tobacco: Never Used   Vaping Use    Vaping Use: Never used   Substance Use Topics    Alcohol use: Yes     Comment: 1 pint of vodka daily    Drug use: Yes     Comment: on methadone       Review of Systems   Constitutional: Negative for chills, fatigue and fever  HENT: Negative for sore throat  Eyes: Negative for visual disturbance  Respiratory: Negative for cough, chest tightness and shortness of breath  Cardiovascular: Negative for chest pain  Gastrointestinal: Negative for abdominal distention, abdominal pain, constipation, diarrhea, nausea and vomiting  Endocrine: Negative for polydipsia and polyuria  Genitourinary: Negative for dysuria and hematuria  Musculoskeletal: Positive for arthralgias (Bilateral knees)  Negative for myalgias  Skin: Negative for color change and rash  Neurological: Negative for dizziness and headaches  Psychiatric/Behavioral: Positive for suicidal ideas  Negative for confusion  All other systems reviewed and are negative  Physical Exam  Physical Exam  Vitals reviewed     Constitutional:       General: She is not in acute distress  Appearance: She is well-developed  She is not diaphoretic  HENT:      Head: Normocephalic and atraumatic  Right Ear: External ear normal       Left Ear: External ear normal       Nose: Nose normal       Mouth/Throat:      Pharynx: No oropharyngeal exudate  Eyes:      Pupils: Pupils are equal, round, and reactive to light  Cardiovascular:      Rate and Rhythm: Normal rate and regular rhythm  Heart sounds: Normal heart sounds  No murmur heard  No friction rub  No gallop  Pulmonary:      Effort: Pulmonary effort is normal  No respiratory distress  Breath sounds: Normal breath sounds  No wheezing  Chest:      Chest wall: No tenderness  Abdominal:      General: Bowel sounds are normal  There is no distension  Palpations: Abdomen is soft  There is no mass  Tenderness: There is no abdominal tenderness  There is no guarding  Musculoskeletal:         General: No deformity  Normal range of motion  Cervical back: Normal range of motion  Comments: Patient with diffuse firm swelling of left knee consistent with known history of severe multi compartmental osteoarthritis  Crepitus bilaterally on range of motion  No laxity on anterior drawer, valgus, ferrous  Lymphadenopathy:      Cervical: No cervical adenopathy  Skin:     General: Skin is warm and dry  Capillary Refill: Capillary refill takes less than 2 seconds  Neurological:      Mental Status: She is alert and oriented to person, place, and time        Comments: AAO x4         Vital Signs  ED Triage Vitals [08/13/22 1800]   Temperature Pulse Respirations Blood Pressure SpO2   (!) 95 3 °F (35 2 °C) (!) 108 20 122/72 97 %      Temp Source Heart Rate Source Patient Position - Orthostatic VS BP Location FiO2 (%)   Tympanic Monitor Sitting Left arm --      Pain Score       --           Vitals:    08/13/22 1800   BP: 122/72   Pulse: (!) 108   Patient Position - Orthostatic VS: Sitting         Visual Acuity      ED Medications  Medications - No data to display    Diagnostic Studies  Results Reviewed     Procedure Component Value Units Date/Time    Rapid drug screen, urine [[id number]]  (Abnormal) Collected: 08/14/22 0340    Lab Status: Final result Specimen: Urine Updated: 08/14/22 1333     Amph/Meth UR Negative     Barbiturate Ur Negative     Benzodiazepine Urine Negative     Cocaine Urine Negative     Methadone Urine Positive     Opiate Urine Negative     PCP Ur Negative     THC Urine Positive     Oxycodone Urine Negative    Narrative:      Presumptive report  If requested, specimen will be sent to reference lab for confirmation  FOR MEDICAL PURPOSES ONLY  IF CONFIRMATION NEEDED PLEASE CONTACT THE LAB WITHIN 5 DAYS  Drug Screen Cutoff Levels:  AMPHETAMINE/METHAMPHETAMINES  1000 ng/mL  BARBITURATES     200 ng/mL  BENZODIAZEPINES     200 ng/mL  COCAINE      300 ng/mL  METHADONE      300 ng/mL  OPIATES      300 ng/mL  PHENCYCLIDINE     25 ng/mL  THC       50 ng/mL  OXYCODONE      100 ng/mL    Rapid drug screen, urine [[id number]]  (Abnormal) Collected: 08/14/22 0340    Lab Status: Final result Specimen: Urine, Clean Catch Updated: 08/14/22 0411     Amph/Meth UR Negative     Barbiturate Ur Negative     Benzodiazepine Urine Negative     Cocaine Urine Negative     Methadone Urine Positive     Opiate Urine --     PCP Ur Negative     THC Urine Positive     Oxycodone Urine Negative    Narrative:      Presumptive report  If requested, specimen will be sent to reference lab for confirmation  FOR MEDICAL PURPOSES ONLY  IF CONFIRMATION NEEDED PLEASE CONTACT THE LAB WITHIN 5 DAYS      Drug Screen Cutoff Levels:  AMPHETAMINE/METHAMPHETAMINES  1000 ng/mL  BARBITURATES     200 ng/mL  BENZODIAZEPINES     200 ng/mL  COCAINE      300 ng/mL  METHADONE      300 ng/mL  OPIATES      300 ng/mL  PHENCYCLIDINE     25 ng/mL  THC       50 ng/mL  OXYCODONE      100 ng/mL    Urinalysis with microscopic [[id number]]  (Abnormal) Collected: 08/14/22 0340    Lab Status: Final result Specimen: Urine, Clean Catch Updated: 08/14/22 0410     Color, UA Yellow     Clarity, UA Clear     Specific Gravity, UA 1 025     pH, UA 6 0     Leukocytes, UA Negative     Nitrite, UA Negative     Protein, UA Negative mg/dl      Glucose, UA Negative mg/dl      Ketones, UA Negative mg/dl      Urobilinogen, UA 1 0 E U /dl      Bilirubin, UA Negative     Occult Blood, UA Negative     RBC, UA 0-1 /hpf      WBC, UA 0-1 /hpf      Epithelial Cells Occasional /hpf      Bacteria, UA None Seen /hpf      OTHER OBSERVATIONS Yeast Cells Present    Comprehensive metabolic panel [[id number]]  (Abnormal) Collected: 08/13/22 1916    Lab Status: Final result Specimen: Blood from Arm, Left Updated: 08/13/22 1938     Sodium 142 mmol/L      Potassium 3 1 mmol/L      Chloride 101 mmol/L      CO2 32 mmol/L      ANION GAP 9 mmol/L      BUN 10 mg/dL      Creatinine 0 71 mg/dL      Glucose 98 mg/dL      Calcium 9 1 mg/dL      AST 39 U/L      ALT 24 U/L      Alkaline Phosphatase 186 U/L      Total Protein 8 6 g/dL      Albumin 3 6 g/dL      Total Bilirubin 0 38 mg/dL      eGFR 92 ml/min/1 73sq m     Narrative:      [place] guidelines for Chronic Kidney Disease (CKD):     Stage 1 with normal or high GFR (GFR > 90 mL/min/1 73 square meters)    Stage 2 Mild CKD (GFR = 60-89 mL/min/1 73 square meters)    Stage 3A Moderate CKD (GFR = 45-59 mL/min/1 73 square meters)    Stage 3B Moderate CKD (GFR = 30-44 mL/min/1 73 square meters)    Stage 4 Severe CKD (GFR = 15-29 mL/min/1 73 square meters)    Stage 5 End Stage CKD (GFR <15 mL/min/1 73 square meters)  Note: GFR calculation is accurate only with a steady state creatinine    Ethanol [[id number]]  (Abnormal) Collected: 08/13/22 1916    Lab Status: Final result Specimen: Blood from Arm, Left Updated: 08/13/22 1935     Ethanol Lvl 277 mg/dL     CBC and differential [[id number]] Collected: 08/13/22 1916    Lab Status: Final result Specimen: Blood from Arm, Left Updated: 08/13/22 1921     WBC 5 25 Thousand/uL      RBC 4 92 Million/uL      Hemoglobin 14 6 g/dL      Hematocrit 45 4 %      MCV 92 fL      MCH 29 7 pg      MCHC 32 2 g/dL      RDW 14 9 %      MPV 10 0 fL      Platelets 973 Thousands/uL      nRBC 0 /100 WBCs      Neutrophils Relative 54 %      Immat GRANS % 0 %      Lymphocytes Relative 35 %      Monocytes Relative 9 %      Eosinophils Relative 1 %      Basophils Relative 1 %      Neutrophils Absolute 2 85 Thousands/µL      Immature Grans Absolute 0 02 Thousand/uL      Lymphocytes Absolute 1 82 Thousands/µL      Monocytes Absolute 0 46 Thousand/µL      Eosinophils Absolute 0 06 Thousand/µL      Basophils Absolute 0 04 Thousands/µL                  XR knee 4+ vw right injury   Final Result by Manasa Real MD (08/14 4922)      No acute osseous abnormality  Moderate to severe tricompartmental osteoarthritis with intra-articular loose bodies  Workstation performed: [id number]                    Procedures  Procedures         ED Course                                             MDM  Number of Diagnoses or Management Options  Alcohol intoxication ([facility] )  Diagnosis management comments: Discussed situation with patient's son was agreeable with assisting patient to follow-up with orthopedics as an outpatient for further evaluation of chronic bilateral knee pain  Patient is medically cleared for psychiatric evaluation  Patient remaining clinically intoxicated  Plan to observe until sober and question regarding questionable passive suicidal ideation        Disposition  Final diagnoses:   Alcohol intoxication ([facility] )     Time reflects when diagnosis was documented in both MDM as applicable and the Disposition within this note     Time User Action Codes Description Comment    8/14/2022  5:01 AM Brian Mejia Add [[id number] [id number]] Alcohol intoxication [facility])       ED Disposition     ED Disposition   Discharge    Condition Stable    Date/Time   Sun Aug 14, 2022  5:01 AM    Comment   Ronen Murders should be transferred out to              Follow-up Information     Follow up With Specialties Details Why Contact Info Additional Information    [address] Emergency Department Emergency Medicine  If symptoms worsen [address] [address]  [address] [address] Emergency Department, [place], [place], [address]          Discharge Medication List as of 8/14/2022  5:01 AM      CONTINUE these medications which have NOT CHANGED    Details   methadone (DOLOPHINE) 10 MG/5ML solution Take 30 mg by mouth in the morning [facility]      acetaminophen (TYLENOL) 650 mg CR tablet Take 1 tablet (650 mg total) by mouth every 8 (eight) hours as needed for mild pain, Starting Fri 4/1/2022, Normal      albuterol (Ventolin HFA) 90 mcg/act inhaler Inhale 2 puffs every 6 (six) hours as needed for wheezing or shortness of breath, Starting Wed 11/17/2021, Normal      aspirin (ECOTRIN LOW STRENGTH) 81 mg EC tablet Take 1 tablet (81 mg total) by mouth daily, Starting Wed 11/17/2021, No Print      atorvastatin (LIPITOR) 20 mg tablet Take 1 tablet (20 mg total) by mouth daily with dinner, Starting Wed 11/17/2021, Until Fri 12/17/2021, Normal      clopidogrel (PLAVIX) 75 mg tablet Take 1 tablet (75 mg total) by mouth daily, Starting Wed 11/17/2021, Until Tue 2/15/2022, Normal      dicyclomine (BENTYL) 20 mg tablet Take 1 tablet (20 mg total) by mouth 2 (two) times a day, Starting Mon 1/24/2022, Normal      famotidine (PEPCID) 20 mg tablet Take 1 tablet (20 mg total) by mouth 2 (two) times a day, Starting Wed 11/17/2021, Until Fri 99/75/6651, Normal      folic acid (FOLVITE) 1 mg tablet Take 1 tablet (1 mg total) by mouth daily, Starting Thu 11/18/2021, Until Sat 12/18/2021, Normal      insulin glargine (Lantus) 100 units/mL subcutaneous injection Inject 6 Units under the skin daily at bedtime, Starting Wed 11/17/2021, No Print      Insulin Pen Needle 29G X 12MM MISC by Does not apply route daily at bedtime, Starting Fri 3/22/2019, Normal      lisinopril (ZESTRIL) 2 5 mg tablet Take 1 tablet (2 5 mg total) by mouth daily, Starting Thu 11/18/2021, Until Sat 12/18/2021, Normal      metoprolol succinate (TOPROL-XL) 100 mg 24 hr tablet Take 1 tablet (100 mg total) by mouth daily, Starting Wed 11/17/2021, Until Fri 12/17/2021, Normal      multivitamin (THERAGRAN) TABS Take 1 tablet by mouth daily, Starting Wed 11/17/2021, Until Fri 12/17/2021, Normal      nicotine (NICODERM CQ) 21 mg/24 hr TD 24 hr patch Place 1 patch on the skin daily, Starting Thu 11/18/2021, Normal      !! ondansetron (Zofran ODT) 4 mg disintegrating tablet Take 1 tablet (4 mg total) by mouth every 8 (eight) hours as needed for nausea or vomiting, Starting Wed 11/17/2021, Normal      !! ondansetron (Zofran ODT) 4 mg disintegrating tablet Take 1 tablet (4 mg total) by mouth every 6 (six) hours as needed for nausea or vomiting, Starting Mon 1/24/2022, Normal      polyethylene glycol (MIRALAX) 17 g packet Take 17 g by mouth daily, Starting Wed 3/23/2022, Normal      senna-docusate sodium (SENOKOT S) 8 6-50 mg per tablet Take 1 tablet by mouth daily, Starting Wed 3/23/2022, Normal      spironolactone (ALDACTONE) 25 mg tablet Take 0 5 tablets (12 5 mg total) by mouth daily, Starting Thu 11/18/2021, Until Sat 12/18/2021, Normal      thiamine 100 MG tablet Take 1 tablet (100 mg total) by mouth daily, Starting Thu 11/18/2021, Until Sat 12/18/2021, Normal       !! - Potential duplicate medications found  Please discuss with provider  No discharge procedures on file      PDMP Review     None          ED Provider  Electronically Signed by           Anastasio Baumgarten, MD  08/14/22 7395

## 2022-08-14 NOTE — ED NOTES
Patient on call bell, when asked how I can help her she said "you can kiss my ass" patient constantly ringing call dallas Trimble RN  08/13/22 2128       Hemalatha Trimble RN  08/13/22 [id number]

## 2022-08-16 LAB
ATRIAL RATE: 100 BPM
P AXIS: 65 DEGREES
PR INTERVAL: 156 MS
QRS AXIS: 5 DEGREES
QRSD INTERVAL: 102 MS
QT INTERVAL: 378 MS
QTC INTERVAL: 487 MS
T WAVE AXIS: 90 DEGREES
VENTRICULAR RATE: 100 BPM

## 2022-08-16 PROCEDURE — 93010 ELECTROCARDIOGRAM REPORT: CPT | Performed by: INTERNAL MEDICINE

## 2022-08-19 ENCOUNTER — TELEPHONE (OUTPATIENT)
Dept: FAMILY MEDICINE CLINIC | Facility: CLINIC | Age: 62
End: 2022-08-19

## 2022-09-14 ENCOUNTER — TELEPHONE (OUTPATIENT)
Dept: FAMILY MEDICINE CLINIC | Facility: CLINIC | Age: 62
End: 2022-09-14

## 2022-10-29 ENCOUNTER — HOSPITAL ENCOUNTER (EMERGENCY)
Facility: HOSPITAL | Age: 62
Discharge: LEFT BEFORE DISCHARGE | End: 2022-10-29
Attending: EMERGENCY MEDICINE

## 2022-10-29 VITALS
TEMPERATURE: 97.9 F | DIASTOLIC BLOOD PRESSURE: 100 MMHG | HEART RATE: 63 BPM | SYSTOLIC BLOOD PRESSURE: 159 MMHG | OXYGEN SATURATION: 98 % | RESPIRATION RATE: 20 BRPM

## 2022-10-29 DIAGNOSIS — F11.10 HEROIN ABUSE (HCC): ICD-10-CM

## 2022-10-29 DIAGNOSIS — M79.672 LEFT FOOT PAIN: ICD-10-CM

## 2022-10-29 DIAGNOSIS — F19.939 DRUG WITHDRAWAL (HCC): Primary | ICD-10-CM

## 2022-10-29 NOTE — ED NOTES
[place] paperwork signed by patient, patient instructed to call [facility] for follow up appointment, patient verbalizes understanding        Julianne Huitron RN  10/29/22 7318

## 2022-10-29 NOTE — ED PROVIDER NOTES
History  Chief Complaint   Patient presents with   • Withdrawal - Drug     Pt called ambulance for 'feeling sick' pt states she has not been able to walk to the clinic because of her arthritis and hasnt had her methadone in 10 days      80-year-old female with past history heroin abuse currently on methadone therapy, diabetes, arthritis, asthma, alcohol abuse, bipolar disorder, presents to the ED via EMS for possible withdrawal from methadone  Patient states that she is having left foot pain therefore she is not able to walk to the methadone clinic  She missed her methadone doses for the past 10 days  Patient is not taking any other medications at home  Patient states that she noted that she had to take medicines  Patient in no her primary care doctor  Patient states that she is having pain to the left foot with walking  Patient denies any trauma or injuries  Patient thinks that she is withdrawing from methadone  History provided by:  Patient  Withdrawal - Drug  Associated symptoms: no abdominal pain, no palpitations, no seizures, no shortness of breath and no vomiting        Prior to Admission Medications   Prescriptions Last Dose Informant Patient Reported? Taking?    Insulin Pen Needle 29G X 12MM MISC   No No   Sig: by Does not apply route daily at bedtime   Patient not taking: Reported on 4/1/2022    acetaminophen (TYLENOL) 650 mg CR tablet   No No   Sig: Take 1 tablet (650 mg total) by mouth every 8 (eight) hours as needed for mild pain   albuterol (Ventolin HFA) 90 mcg/act inhaler   No No   Sig: Inhale 2 puffs every 6 (six) hours as needed for wheezing or shortness of breath   Patient not taking: Reported on 4/1/2022    aspirin (ECOTRIN LOW STRENGTH) 81 mg EC tablet   No No   Sig: Take 1 tablet (81 mg total) by mouth daily   Patient not taking: Reported on 4/1/2022    atorvastatin (LIPITOR) 20 mg tablet   No No   Sig: Take 1 tablet (20 mg total) by mouth daily with dinner   clopidogrel (PLAVIX) 75 mg tablet   No No   Sig: Take 1 tablet (75 mg total) by mouth daily   dicyclomine (BENTYL) 20 mg tablet   No No   Sig: Take 1 tablet (20 mg total) by mouth 2 (two) times a day   Patient not taking: Reported on 4/1/2022    famotidine (PEPCID) 20 mg tablet   No No   Sig: Take 1 tablet (20 mg total) by mouth 2 (two) times a day   folic acid (FOLVITE) 1 mg tablet   No No   Sig: Take 1 tablet (1 mg total) by mouth daily   insulin glargine (Lantus) 100 units/mL subcutaneous injection   No No   Sig: Inject 6 Units under the skin daily at bedtime   Patient not taking: Reported on 4/1/2022    lisinopril (ZESTRIL) 2 5 mg tablet   No No   Sig: Take 1 tablet (2 5 mg total) by mouth daily   methadone (DOLOPHINE) 10 MG/5ML solution  Self Yes No   Sig: Take 30 mg by mouth in the morning [facility]   metoprolol succinate (TOPROL-XL) 100 mg 24 hr tablet   No No   Sig: Take 1 tablet (100 mg total) by mouth daily   multivitamin (THERAGRAN) TABS   No No   Sig: Take 1 tablet by mouth daily   nicotine (NICODERM CQ) 21 mg/24 hr TD 24 hr patch   No No   Sig: Place 1 patch on the skin daily   Patient not taking: Reported on 4/1/2022    ondansetron (Zofran ODT) 4 mg disintegrating tablet   No No   Sig: Take 1 tablet (4 mg total) by mouth every 8 (eight) hours as needed for nausea or vomiting   Patient not taking: Reported on 4/1/2022    ondansetron (Zofran ODT) 4 mg disintegrating tablet   No No   Sig: Take 1 tablet (4 mg total) by mouth every 6 (six) hours as needed for nausea or vomiting   Patient not taking: Reported on 4/1/2022    polyethylene glycol (MIRALAX) 17 g packet   No No   Sig: Take 17 g by mouth daily   Patient not taking: Reported on 4/1/2022    senna-docusate sodium (SENOKOT S) 8 6-50 mg per tablet   No No   Sig: Take 1 tablet by mouth daily   Patient not taking: Reported on 4/1/2022    spironolactone (ALDACTONE) 25 mg tablet   No No   Sig: Take 0 5 tablets (12 5 mg total) by mouth daily   thiamine 100 MG tablet   No No Sig: Take 1 tablet (100 mg total) by mouth daily      Facility-Administered Medications: None       Past Medical History:   Diagnosis Date   • Alcohol abuse    • Arthritis    • Asthma    • Bipolar disorder ([facility] )    • Diabetes mellitus ([facility] )    • Opiate abuse, continuous ([facility] )        Past Surgical History:   Procedure Laterality Date   • ABDOMINAL SURGERY      stomach uler with perforation       History reviewed  No pertinent family history  I have reviewed and agree with the history as documented  E-Cigarette/Vaping   • E-Cigarette Use Never User      E-Cigarette/Vaping Substances   • Nicotine No    • THC No    • CBD No    • Flavoring No    • Other No    • Unknown No      Social History     Tobacco Use   • Smoking status: Current Every Day Smoker     Packs/day: 2 00     Types: Cigarettes   • Smokeless tobacco: Never Used   Vaping Use   • Vaping Use: Never used   Substance Use Topics   • Alcohol use: Yes     Comment: 1 pint of vodka daily   • Drug use: Yes     Comment: on methadone       Review of Systems   Constitutional: Negative for chills and fever  HENT: Negative for ear pain and sore throat  Eyes: Negative for pain and visual disturbance  Respiratory: Negative for cough and shortness of breath  Cardiovascular: Negative for chest pain and palpitations  Gastrointestinal: Negative for abdominal pain and vomiting  Genitourinary: Negative for dysuria and hematuria  Musculoskeletal: Positive for arthralgias  Negative for back pain  Skin: Negative for color change and rash  Neurological: Negative for seizures and syncope  All other systems reviewed and are negative  Physical Exam  Physical Exam  Vitals and nursing note reviewed  Constitutional:       Appearance: She is well-developed  HENT:      Head: Normocephalic and atraumatic  Eyes:      Conjunctiva/sclera: Conjunctivae normal    Cardiovascular:      Rate and Rhythm: Normal rate and regular rhythm        Heart sounds: Normal heart sounds  Pulmonary:      Effort: Pulmonary effort is normal       Breath sounds: Normal breath sounds  Abdominal:      General: Bowel sounds are normal  There is no distension  Palpations: Abdomen is soft  Tenderness: There is no abdominal tenderness  Musculoskeletal:         General: Normal range of motion  Cervical back: Normal range of motion and neck supple  Comments: Pulses intact to left lower extremity  Tenderness to palpation noted to the midfoot region  No obvious bony deformity noted  Skin:     General: Skin is warm and dry  Neurological:      Mental Status: She is alert and oriented to person, place, and time  Psychiatric:         Behavior: Behavior normal          Thought Content:  Thought content normal          Judgment: Judgment normal          Vital Signs  ED Triage Vitals [10/29/22 0913]   Temperature Pulse Respirations Blood Pressure SpO2   97 9 °F (36 6 °C) 63 20 159/100 98 %      Temp Source Heart Rate Source Patient Position - Orthostatic VS BP Location FiO2 (%)   Oral Monitor Lying Right arm --      Pain Score       --           Vitals:    10/29/22 0913   BP: 159/100   Pulse: 63   Patient Position - Orthostatic VS: Lying         Visual Acuity      ED Medications  Medications - No data to display    Diagnostic Studies  Results Reviewed     None                 No orders to display              Procedures  Procedures         ED Course                                             MDM  Number of Diagnoses or Management Options  Drug withdrawal [facility]): new and requires workup  Heroin abuse [facility]): new and requires workup  Left foot pain: new and requires workup  Diagnosis management comments: Obtain blood work, EKG, orthostatic vital signs  Give IV fluids and continue to monitor patient for any worsening symptoms       Amount and/or Complexity of Data Reviewed  Review and summarize past medical records: yes  Independent visualization of images, tracings, or specimens: yes    Risk of Complications, Morbidity, and/or Mortality  General comments: Patient presented to the ED with complaint of withdrawal symptoms from methadone  Patient complained of some generalized weakness and left foot pain  I had a discussion with the patient that I was not able to give her methadone however I can do blood work as well as x-ray of left foot to make sure everything was okay  At that time patient refused all treatments and decided to leave against medical advice and walk over to the methadone clinic  I discussed the risks associated with leaving against medical advice, which includes worsening pain and weakness and possible death  Patient verbalizes understanding  Patient signed AMA form  I discussed with patient the importance of following up with her PCP  Patient given name of her PCP and contact information  Patient told to follow up with her PCP as soon as possible to review all of her medications  Close return instructions given to return to the ER for any worsening symptoms  Patient agrees with discharge plan  Patient well appearing at time of discharge  Patient walked out of the emergency department without any difficulty  Please Note: Fluency Direct voice recognition software may have been used in the creation of this document  Wrong words or sound a like substitutions may have occurred due to the inherent limitations of the voice software         Patient Progress  Patient progress: stable      Disposition  Final diagnoses:   Drug withdrawal ([facility] )   Hero[facility] abuse ([facility] )   Left foot pain     Time reflects when diagnosis was documented in both MDM as applicable and the Disposition within this note     Time User Action Codes Description Comment    10/29/2022  9:22 AM Nawaf Miller Add [C70 590] Drug withdrawal ([facility] )     10/29/2022  9:22 AM Nawaf Miller Add [T74 11XA] Current abuse involving hitting, punching, kicking, or slapping     10/29/2022  9:22 AM Julian Landry Remove [T74 11XA] Current abuse involving hitting, punching, kicking, or slapping     10/29/2022  9:22 AM Landry Jordan Add [F11 10] Heroin abuse ([facility] )     10/29/2022  9:24 AM Becki Carver Add [N91 534] Left foot pain       ED Disposition     ED Disposition   AMA    Condition   --    Date/Time   Sat Oct 29, 2022  9:23 AM    Comment   Date: 10/29/2022  Patient: Ansley Martines  Admitted: 10/29/2022  9:07 AM  Attending Provider: Aubree Golden DO    Ansley Martines or her authorized caregiver has made the decision for the patient to leave the emergency department against the advice  of her attending physician  She or her authorized caregiver has been informed and understands the inherent risks, including death, worsening left foot pain  She or her authorized caregiver has decided to accept the responsibility for this decision  Ansley Martines and all necessary parties have been advised that she may return for further evaluation or treatment  Her condition at time of discharge was stable  Ansley Martines had current vital signs as follows:  /100 (BP Location: Right ar m)   Pulse 63   Temp 97 9 °F (36 6 °C) (Oral)   Resp 20            Follow-up Information     Follow up With Specialties Details Why [address]  Family Medicine Schedule an appointment as soon as possible for a visit  Please schedule an appointment with your physician to review all of your medications  Gerard Bejarano DPM Podiatry Schedule an appointment as soon as possible for a visit  For further evaluation of your left foot pain [facility]            Patient's Medications   Discharge Prescriptions    No medications on file       No discharge procedures on file      PDMP Review     None          ED Provider  Electronically Signed by           Aubree Golden DO  10/29/22 7120

## 2022-10-29 NOTE — ED NOTES
Pt asking for water and her methadone  States she hasnt had her methadone in 10 days and if she cant get it here she can go walk to the clinic to get it        Nima Arora RN  10/29/22 5675

## 2022-11-24 ENCOUNTER — HOSPITAL ENCOUNTER (INPATIENT)
Facility: HOSPITAL | Age: 62
LOS: 4 days | Discharge: HOME | End: 2022-11-28
Attending: EMERGENCY MEDICINE | Admitting: INTERNAL MEDICINE

## 2022-11-24 ENCOUNTER — APPOINTMENT (EMERGENCY)
Dept: RADIOLOGY | Facility: HOSPITAL | Age: 62
End: 2022-11-24

## 2022-11-24 DIAGNOSIS — J96.02 ACUTE RESPIRATORY FAILURE WITH HYPERCAPNIA (HCC): ICD-10-CM

## 2022-11-24 DIAGNOSIS — I21.11 ST ELEVATION MYOCARDIAL INFARCTION INVOLVING RIGHT CORONARY ARTERY (HCC): ICD-10-CM

## 2022-11-24 DIAGNOSIS — I25.10 CAD (CORONARY ARTERY DISEASE): ICD-10-CM

## 2022-11-24 DIAGNOSIS — I50.43 ACUTE ON CHRONIC COMBINED SYSTOLIC AND DIASTOLIC HEART FAILURE (HCC): ICD-10-CM

## 2022-11-24 DIAGNOSIS — J44.1 COPD EXACERBATION (HCC): Primary | ICD-10-CM

## 2022-11-24 DIAGNOSIS — I10 ESSENTIAL HYPERTENSION: ICD-10-CM

## 2022-11-24 DIAGNOSIS — Z87.891 SMOKING HX: ICD-10-CM

## 2022-11-24 DIAGNOSIS — I25.2 HISTORY OF ST ELEVATION MYOCARDIAL INFARCTION (STEMI): ICD-10-CM

## 2022-11-24 DIAGNOSIS — I25.5 ISCHEMIC CARDIOMYOPATHY: ICD-10-CM

## 2022-11-24 PROBLEM — R73.9 HYPERGLYCEMIA: Status: ACTIVE | Noted: 2022-11-24

## 2022-11-24 PROBLEM — Z72.0 NICOTINE ABUSE: Status: RESOLVED | Noted: 2020-05-24 | Resolved: 2022-11-24

## 2022-11-24 PROBLEM — J96.01 ACUTE RESPIRATORY FAILURE WITH HYPOXIA AND HYPERCAPNIA (HCC): Status: ACTIVE | Noted: 2022-11-24

## 2022-11-24 PROBLEM — R65.10 SIRS (SYSTEMIC INFLAMMATORY RESPONSE SYNDROME) (HCC): Status: ACTIVE | Noted: 2022-11-24

## 2022-11-24 PROBLEM — Z87.11 HISTORY OF GASTRIC ULCER: Status: RESOLVED | Noted: 2020-05-24 | Resolved: 2022-11-24

## 2022-11-24 PROBLEM — M79.605 PAIN AND SWELLING OF LEFT LOWER EXTREMITY: Status: RESOLVED | Noted: 2021-11-10 | Resolved: 2022-11-24

## 2022-11-24 PROBLEM — M79.89 PAIN AND SWELLING OF LEFT LOWER EXTREMITY: Status: RESOLVED | Noted: 2021-11-10 | Resolved: 2022-11-24

## 2022-11-24 PROBLEM — M19.90 ARTHRITIS: Status: RESOLVED | Noted: 2021-11-16 | Resolved: 2022-11-24

## 2022-11-24 PROBLEM — R11.2 NAUSEA & VOMITING: Status: RESOLVED | Noted: 2020-05-24 | Resolved: 2022-11-24

## 2022-11-24 PROBLEM — R10.13 EPIGASTRIC PAIN: Status: RESOLVED | Noted: 2021-11-15 | Resolved: 2022-11-24

## 2022-11-24 PROBLEM — F10.10 ALCOHOL ABUSE: Status: RESOLVED | Noted: 2021-04-20 | Resolved: 2022-11-24

## 2022-11-24 PROBLEM — R07.9 CHEST PAIN: Status: RESOLVED | Noted: 2020-05-24 | Resolved: 2022-11-24

## 2022-11-24 PROBLEM — F17.200 SMOKING: Status: RESOLVED | Noted: 2019-07-11 | Resolved: 2022-11-24

## 2022-11-24 PROBLEM — E78.5 DYSLIPIDEMIA: Chronic | Status: ACTIVE | Noted: 2020-05-25

## 2022-11-24 LAB
2HR DELTA HS TROPONIN: 19 NG/L
4HR DELTA HS TROPONIN: 28 NG/L
ALBUMIN SERPL BCP-MCNC: 2.7 G/DL (ref 3.5–5)
ALP SERPL-CCNC: 242 U/L (ref 46–116)
ALT SERPL W P-5'-P-CCNC: 36 U/L (ref 12–78)
AMPHETAMINES SERPL QL SCN: NEGATIVE
ANION GAP SERPL CALCULATED.3IONS-SCNC: 14 MMOL/L (ref 4–13)
AST SERPL W P-5'-P-CCNC: 104 U/L (ref 5–45)
BARBITURATES UR QL: NEGATIVE
BASE EX.OXY STD BLDV CALC-SCNC: 35.1 % (ref 60–80)
BASE EX.OXY STD BLDV CALC-SCNC: 62.9 % (ref 60–80)
BASE EXCESS BLDA CALC-SCNC: 7 MMOL/L (ref -2–3)
BASE EXCESS BLDV CALC-SCNC: -7.5 MMOL/L
BASE EXCESS BLDV CALC-SCNC: 4 MMOL/L
BASOPHILS # BLD AUTO: 0.08 THOUSANDS/ÂΜL (ref 0–0.1)
BASOPHILS NFR BLD AUTO: 1 % (ref 0–1)
BENZODIAZ UR QL: NEGATIVE
BILIRUB SERPL-MCNC: 0.6 MG/DL (ref 0.2–1)
BILIRUB UR QL STRIP: NEGATIVE
BUN SERPL-MCNC: 12 MG/DL (ref 5–25)
CA-I BLD-SCNC: 1.05 MMOL/L (ref 1.12–1.32)
CALCIUM ALBUM COR SERPL-MCNC: 8.7 MG/DL (ref 8.3–10.1)
CALCIUM SERPL-MCNC: 7.7 MG/DL (ref 8.3–10.1)
CARDIAC TROPONIN I PNL SERPL HS: 19 NG/L
CARDIAC TROPONIN I PNL SERPL HS: 38 NG/L
CARDIAC TROPONIN I PNL SERPL HS: 47 NG/L
CHLORIDE SERPL-SCNC: 106 MMOL/L (ref 96–108)
CLARITY UR: CLEAR
CO2 SERPL-SCNC: 21 MMOL/L (ref 21–32)
COCAINE UR QL: NEGATIVE
COLOR UR: NORMAL
CREAT SERPL-MCNC: 0.79 MG/DL (ref 0.6–1.3)
EOSINOPHIL # BLD AUTO: 0.27 THOUSAND/ÂΜL (ref 0–0.61)
EOSINOPHIL NFR BLD AUTO: 2 % (ref 0–6)
ERYTHROCYTE [DISTWIDTH] IN BLOOD BY AUTOMATED COUNT: 15.6 % (ref 11.6–15.1)
ETHANOL SERPL-MCNC: <3 MG/DL (ref 0–3)
FIO2 GAS DIL.REBREATH: 40 L
FLUAV RNA RESP QL NAA+PROBE: NEGATIVE
FLUBV RNA RESP QL NAA+PROBE: NEGATIVE
GFR SERPL CREATININE-BSD FRML MDRD: 81 ML/MIN/1.73SQ M
GLUCOSE SERPL-MCNC: 234 MG/DL (ref 65–140)
GLUCOSE SERPL-MCNC: 297 MG/DL (ref 65–140)
GLUCOSE SERPL-MCNC: 85 MG/DL (ref 65–140)
GLUCOSE UR STRIP-MCNC: NEGATIVE MG/DL
HCO3 BLDA-SCNC: 32.6 MMOL/L (ref 22–28)
HCO3 BLDV-SCNC: 26.1 MMOL/L (ref 24–30)
HCO3 BLDV-SCNC: 33.5 MMOL/L (ref 24–30)
HCT VFR BLD AUTO: 46 % (ref 34.8–46.1)
HCT VFR BLD CALC: 40 % (ref 34.8–46.1)
HGB BLD-MCNC: 14.2 G/DL (ref 11.5–15.4)
HGB BLDA-MCNC: 13.6 G/DL (ref 11.5–15.4)
HGB UR QL STRIP.AUTO: NEGATIVE
IMM GRANULOCYTES # BLD AUTO: 0.08 THOUSAND/UL (ref 0–0.2)
IMM GRANULOCYTES NFR BLD AUTO: 1 % (ref 0–2)
KETONES UR STRIP-MCNC: NEGATIVE MG/DL
L PNEUMO1 AG UR QL IA.RAPID: NEGATIVE
LACTATE SERPL-SCNC: 2.4 MMOL/L (ref 0.5–2)
LACTATE SERPL-SCNC: 2.5 MMOL/L (ref 0.5–2)
LACTATE SERPL-SCNC: 2.5 MMOL/L (ref 0.5–2)
LACTATE SERPL-SCNC: 4.7 MMOL/L (ref 0.5–2)
LEUKOCYTE ESTERASE UR QL STRIP: NEGATIVE
LYMPHOCYTES # BLD AUTO: 6.2 THOUSANDS/ÂΜL (ref 0.6–4.47)
LYMPHOCYTES NFR BLD AUTO: 54 % (ref 14–44)
MCH RBC QN AUTO: 29.8 PG (ref 26.8–34.3)
MCHC RBC AUTO-ENTMCNC: 30.9 G/DL (ref 31.4–37.4)
MCV RBC AUTO: 96 FL (ref 82–98)
METHADONE UR QL: POSITIVE
MONOCYTES # BLD AUTO: 0.97 THOUSAND/ÂΜL (ref 0.17–1.22)
MONOCYTES NFR BLD AUTO: 9 % (ref 4–12)
NEUTROPHILS # BLD AUTO: 3.78 THOUSANDS/ÂΜL (ref 1.85–7.62)
NEUTS SEG NFR BLD AUTO: 33 % (ref 43–75)
NITRITE UR QL STRIP: NEGATIVE
NRBC BLD AUTO-RTO: 0 /100 WBCS
NT-PROBNP SERPL-MCNC: 3408 PG/ML
O2 CT BLDV-SCNC: 13.8 ML/DL
O2 CT BLDV-SCNC: 7.1 ML/DL
OPIATES UR QL SCN: NEGATIVE
OXYCODONE+OXYMORPHONE UR QL SCN: NEGATIVE
PCO2 BLD: 34 MMOL/L (ref 21–32)
PCO2 BLD: 40 MM HG
PCO2 BLD: 47.5 MM HG (ref 36–44)
PCO2 BLDA: 46.6 MM HG
PCO2 BLDV: 74.7 MM HG (ref 42–50)
PCO2 BLDV: 99.9 MM HG (ref 42–50)
PCP UR QL: NEGATIVE
PH BLD: 7.45 [PH]
PH BLD: 7.45 [PH] (ref 7.35–7.45)
PH BLDV: 7.04 [PH] (ref 7.3–7.4)
PH BLDV: 7.27 [PH] (ref 7.3–7.4)
PH UR STRIP.AUTO: 6 [PH]
PLATELET # BLD AUTO: 281 THOUSANDS/UL (ref 149–390)
PMV BLD AUTO: 10.8 FL (ref 8.9–12.7)
PO2 BLD: 41 MM HG (ref 75–129)
PO2 BLDV: 24.5 MM HG (ref 35–45)
PO2 BLDV: 50 MM HG (ref 35–45)
POTASSIUM BLD-SCNC: 3.4 MMOL/L (ref 3.5–5.3)
POTASSIUM SERPL-SCNC: 4.3 MMOL/L (ref 3.5–5.3)
PROCALCITONIN SERPL-MCNC: 0.07 NG/ML
PROT SERPL-MCNC: 6.9 G/DL (ref 6.4–8.4)
PROT UR STRIP-MCNC: NEGATIVE MG/DL
RBC # BLD AUTO: 4.77 MILLION/UL (ref 3.81–5.12)
RSV RNA RESP QL NAA+PROBE: NEGATIVE
S PNEUM AG UR QL: NEGATIVE
SAO2 % BLD FROM PO2: 78 % (ref 60–85)
SARS-COV-2 RNA RESP QL NAA+PROBE: NEGATIVE
SODIUM BLD-SCNC: 139 MMOL/L (ref 136–145)
SODIUM SERPL-SCNC: 141 MMOL/L (ref 135–147)
SP GR UR STRIP.AUTO: 1.01 (ref 1–1.03)
SPECIMEN SOURCE: ABNORMAL
THC UR QL: POSITIVE
UROBILINOGEN UR QL STRIP.AUTO: 0.2 E.U./DL
WBC # BLD AUTO: 11.38 THOUSAND/UL (ref 4.31–10.16)

## 2022-11-24 PROCEDURE — 5A09457 ASSISTANCE WITH RESPIRATORY VENTILATION, 24-96 CONSECUTIVE HOURS, CONTINUOUS POSITIVE AIRWAY PRESSURE: ICD-10-PCS | Performed by: INTERNAL MEDICINE

## 2022-11-24 RX ORDER — IPRATROPIUM BROMIDE AND ALBUTEROL SULFATE 2.5; .5 MG/3ML; MG/3ML
3 SOLUTION RESPIRATORY (INHALATION)
Status: DISCONTINUED | OUTPATIENT
Start: 2022-11-24 | End: 2022-11-26

## 2022-11-24 RX ORDER — LORAZEPAM 2 MG/ML
1 INJECTION INTRAMUSCULAR ONCE
Status: COMPLETED | OUTPATIENT
Start: 2022-11-24 | End: 2022-11-24

## 2022-11-24 RX ORDER — LORAZEPAM 2 MG/ML
INJECTION INTRAMUSCULAR
Status: COMPLETED
Start: 2022-11-24 | End: 2022-11-24

## 2022-11-24 RX ORDER — LISINOPRIL 2.5 MG/1
2.5 TABLET ORAL DAILY
Status: DISCONTINUED | OUTPATIENT
Start: 2022-11-25 | End: 2022-11-25

## 2022-11-24 RX ORDER — INSULIN LISPRO 100 [IU]/ML
1-5 INJECTION, SOLUTION INTRAVENOUS; SUBCUTANEOUS
Status: DISCONTINUED | OUTPATIENT
Start: 2022-11-25 | End: 2022-11-28 | Stop reason: HOSPADM

## 2022-11-24 RX ORDER — FOLIC ACID 1 MG/1
1 TABLET ORAL DAILY
Status: DISCONTINUED | OUTPATIENT
Start: 2022-11-24 | End: 2022-11-28 | Stop reason: HOSPADM

## 2022-11-24 RX ORDER — FAMOTIDINE 20 MG/1
20 TABLET, FILM COATED ORAL 2 TIMES DAILY
Status: DISCONTINUED | OUTPATIENT
Start: 2022-11-24 | End: 2022-11-28 | Stop reason: HOSPADM

## 2022-11-24 RX ORDER — INSULIN LISPRO 100 [IU]/ML
1-5 INJECTION, SOLUTION INTRAVENOUS; SUBCUTANEOUS
Status: DISCONTINUED | OUTPATIENT
Start: 2022-11-24 | End: 2022-11-28 | Stop reason: HOSPADM

## 2022-11-24 RX ORDER — ATORVASTATIN CALCIUM 20 MG/1
20 TABLET, FILM COATED ORAL
Status: DISCONTINUED | OUTPATIENT
Start: 2022-11-24 | End: 2022-11-26

## 2022-11-24 RX ORDER — HYDROMORPHONE HCL/PF 1 MG/ML
1 SYRINGE (ML) INJECTION ONCE
Status: DISCONTINUED | OUTPATIENT
Start: 2022-11-24 | End: 2022-11-24

## 2022-11-24 RX ORDER — SODIUM CHLORIDE FOR INHALATION 0.9 %
3 VIAL, NEBULIZER (ML) INHALATION ONCE
Status: COMPLETED | OUTPATIENT
Start: 2022-11-24 | End: 2022-11-24

## 2022-11-24 RX ORDER — ENOXAPARIN SODIUM 100 MG/ML
40 INJECTION SUBCUTANEOUS DAILY
Status: DISCONTINUED | OUTPATIENT
Start: 2022-11-24 | End: 2022-11-28 | Stop reason: HOSPADM

## 2022-11-24 RX ORDER — CLOPIDOGREL BISULFATE 75 MG/1
75 TABLET ORAL DAILY
Status: DISCONTINUED | OUTPATIENT
Start: 2022-11-25 | End: 2022-11-28 | Stop reason: HOSPADM

## 2022-11-24 RX ORDER — ASPIRIN 81 MG/1
81 TABLET ORAL DAILY
Status: DISCONTINUED | OUTPATIENT
Start: 2022-11-24 | End: 2022-11-25

## 2022-11-24 RX ORDER — FUROSEMIDE 10 MG/ML
40 INJECTION INTRAMUSCULAR; INTRAVENOUS ONCE
Status: COMPLETED | OUTPATIENT
Start: 2022-11-24 | End: 2022-11-24

## 2022-11-24 RX ORDER — METHYLPREDNISOLONE SODIUM SUCCINATE 125 MG/2ML
125 INJECTION, POWDER, LYOPHILIZED, FOR SOLUTION INTRAMUSCULAR; INTRAVENOUS ONCE
Status: COMPLETED | OUTPATIENT
Start: 2022-11-24 | End: 2022-11-24

## 2022-11-24 RX ORDER — LANOLIN ALCOHOL/MO/W.PET/CERES
100 CREAM (GRAM) TOPICAL DAILY
Status: DISCONTINUED | OUTPATIENT
Start: 2022-11-24 | End: 2022-11-28 | Stop reason: HOSPADM

## 2022-11-24 RX ORDER — CEFTRIAXONE 1 G/50ML
1000 INJECTION, SOLUTION INTRAVENOUS ONCE
Status: COMPLETED | OUTPATIENT
Start: 2022-11-24 | End: 2022-11-24

## 2022-11-24 RX ORDER — METHYLPREDNISOLONE SODIUM SUCCINATE 40 MG/ML
40 INJECTION, POWDER, LYOPHILIZED, FOR SOLUTION INTRAMUSCULAR; INTRAVENOUS EVERY 8 HOURS SCHEDULED
Status: DISCONTINUED | OUTPATIENT
Start: 2022-11-24 | End: 2022-11-25

## 2022-11-24 RX ORDER — NICOTINE 21 MG/24HR
1 PATCH, TRANSDERMAL 24 HOURS TRANSDERMAL DAILY
Status: DISCONTINUED | OUTPATIENT
Start: 2022-11-24 | End: 2022-11-28 | Stop reason: HOSPADM

## 2022-11-24 RX ADMIN — METHYLPREDNISOLONE SODIUM SUCCINATE 40 MG: 40 INJECTION, POWDER, FOR SOLUTION INTRAMUSCULAR; INTRAVENOUS at 17:19

## 2022-11-24 RX ADMIN — AZITHROMYCIN MONOHYDRATE 500 MG: 500 INJECTION, POWDER, LYOPHILIZED, FOR SOLUTION INTRAVENOUS at 09:09

## 2022-11-24 RX ADMIN — IPRATROPIUM BROMIDE AND ALBUTEROL SULFATE 3 ML: 2.5; .5 SOLUTION RESPIRATORY (INHALATION) at 19:45

## 2022-11-24 RX ADMIN — B-COMPLEX W/ C & FOLIC ACID TAB 1 TABLET: TAB at 15:59

## 2022-11-24 RX ADMIN — FOLIC ACID 1 MG: 1 TABLET ORAL at 15:59

## 2022-11-24 RX ADMIN — LORAZEPAM 1 MG: 2 INJECTION INTRAMUSCULAR; INTRAVENOUS at 08:28

## 2022-11-24 RX ADMIN — ATORVASTATIN CALCIUM 20 MG: 10 TABLET, FILM COATED ORAL at 15:58

## 2022-11-24 RX ADMIN — SODIUM CHLORIDE 500 ML: 0.9 INJECTION, SOLUTION INTRAVENOUS at 10:24

## 2022-11-24 RX ADMIN — INSULIN LISPRO 3 UNITS: 100 INJECTION, SOLUTION INTRAVENOUS; SUBCUTANEOUS at 21:42

## 2022-11-24 RX ADMIN — ISODIUM CHLORIDE 3 ML: 0.03 SOLUTION RESPIRATORY (INHALATION) at 08:27

## 2022-11-24 RX ADMIN — METOPROLOL TARTRATE 25 MG: 25 TABLET, FILM COATED ORAL at 23:29

## 2022-11-24 RX ADMIN — LORAZEPAM 1 MG: 2 INJECTION INTRAMUSCULAR at 08:28

## 2022-11-24 RX ADMIN — ASPIRIN 81 MG: 81 TABLET, COATED ORAL at 15:58

## 2022-11-24 RX ADMIN — METHYLPREDNISOLONE SODIUM SUCCINATE 125 MG: 125 INJECTION, POWDER, FOR SOLUTION INTRAMUSCULAR; INTRAVENOUS at 14:07

## 2022-11-24 RX ADMIN — IPRATROPIUM BROMIDE AND ALBUTEROL SULFATE 3 ML: 2.5; .5 SOLUTION RESPIRATORY (INHALATION) at 15:11

## 2022-11-24 RX ADMIN — CEFTRIAXONE 1000 MG: 1 INJECTION, SOLUTION INTRAVENOUS at 09:06

## 2022-11-24 RX ADMIN — THIAMINE HCL TAB 100 MG 100 MG: 100 TAB at 15:06

## 2022-11-24 RX ADMIN — FAMOTIDINE 20 MG: 20 TABLET ORAL at 17:19

## 2022-11-24 RX ADMIN — SODIUM CHLORIDE 1000 ML: 0.9 INJECTION, SOLUTION INTRAVENOUS at 10:23

## 2022-11-24 RX ADMIN — FUROSEMIDE 40 MG: 10 INJECTION, SOLUTION INTRAMUSCULAR; INTRAVENOUS at 09:38

## 2022-11-24 RX ADMIN — IPRATROPIUM BROMIDE 1 MG: 0.5 SOLUTION RESPIRATORY (INHALATION) at 08:26

## 2022-11-24 RX ADMIN — NICOTINE 1 PATCH: 21 PATCH, EXTENDED RELEASE TRANSDERMAL at 15:14

## 2022-11-24 RX ADMIN — ALBUTEROL SULFATE 10 MG: 2.5 SOLUTION RESPIRATORY (INHALATION) at 08:27

## 2022-11-24 RX ADMIN — ENOXAPARIN SODIUM 40 MG: 40 INJECTION SUBCUTANEOUS at 15:14

## 2022-11-24 NOTE — ASSESSMENT & PLAN NOTE
· CAD with PCI to RCA in 2021 after STEMI  · Continue ASA/statin  · Resume metoprolol when able  · EKG with sinus tach on admission, no acute ST changes  · No longer complaining of chest pain (likely related to resp distress on arrival)  · HS troponin 19 on admission

## 2022-11-24 NOTE — ASSESSMENT & PLAN NOTE
· Hold home lisinopril/metoprolol/spironolactone  · Unclear compliance with home meds  · Resume antihypertensives starting with metoprolol when able   · Avoid hypertension with goal SBP <140

## 2022-11-24 NOTE — ASSESSMENT & PLAN NOTE
· Ischemic cardiomyopathy with most recent EF approx 35%  Likely in heart failure exacerbation given evidence of vascular congestion on CXR and hypoxia, tachypnea on presentation  Patient poor historian   Unclear compliance with home meds or low salt diet   · Echo 11/2021 with EF 35%, grade 1DD, mod-severe TR with PAP 35mmHg  · CXR with possible vascular congestion  · Check BNP  · Hold further fluids at this time  · Given 40mg IV lasix in the ER with robust response  · Close I&Os  · Daily weights  · Low salt diet

## 2022-11-24 NOTE — ASSESSMENT & PLAN NOTE
Lab Results   Component Value Date    HGBA1C 5 6 11/09/2021       No results for input(s): POCGLU in the last 72 hours      Blood Sugar Average: Last 72 hrs:     · SSI   · Unclear compliance with home regimen  · Check HA1C  · Goal   · Anticipate hyperglycemia in the setting of steroids

## 2022-11-24 NOTE — ED NOTES
ICU team at bedside to examine patient, as per DR Mcconnell nurse to stop IVF's at this time   IVF's stopped as requested by MD Marguerite Mcmahon, RN  11/24/22 8133 Angelito Harmon, RN  11/24/22 2577

## 2022-11-24 NOTE — ED NOTES
Pt  Was found at  end of bed standing and urinated all over the floor  Pt  Began swearing at nurse when nurse had to put her back into the bed  Pt   States she can't breathe and Again told her she can not get out of bed  Purwick placed on patient and  explained  how it works and not to get up  Pt  Has multiple family members that keep calling but she states only her son can get information  Will continue to watch patient to maintain airway on BiPAP and will redraw labs to recheck her co2        Celestina Zeng RN  11/24/22 2092

## 2022-11-24 NOTE — PLAN OF CARE
Problem: MOBILITY - ADULT  Goal: Maintain or return to baseline ADL function  Description: INTERVENTIONS:  -  Assess patient's ability to carry out ADLs; assess patient's baseline for ADL function and identify physical deficits which impact ability to perform ADLs (bathing, care of mouth/teeth, toileting, grooming, dressing, etc )  - Assess/evaluate cause of self-care deficits   - Assess range of motion  - Assess patient's mobility; develop plan if impaired  - Assess patient's need for assistive devices and provide as appropriate  - Encourage maximum independence but intervene and supervise when necessary  - Involve family in performance of ADLs  - Assess for home care needs following discharge   - Consider OT consult to assist with ADL evaluation and planning for discharge  - Provide patient education as appropriate  Outcome: Progressing  Goal: Maintains/Returns to pre admission functional level  Description: INTERVENTIONS:  - Perform BMAT or MOVE assessment daily    - Set and communicate daily mobility goal to care team and patient/family/caregiver  - Collaborate with rehabilitation services on mobility goals if consulted  - Perform Range of Motion 3 times a day  - Reposition patient every 3 hours    - Dangle patient 3 times a day  - Stand patient 3 times a day  - Ambulate patient 3 times a day  - Out of bed to chair 3 times a day   - Out of bed for meals 3 times a day  - Out of bed for toileting  - Record patient progress and toleration of activity level   Outcome: Progressing     Problem: PAIN - ADULT  Goal: Verbalizes/displays adequate comfort level or baseline comfort level  Description: Interventions:  - Encourage patient to monitor pain and request assistance  - Assess pain using appropriate pain scale  - Administer analgesics based on type and severity of pain and evaluate response  - Implement non-pharmacological measures as appropriate and evaluate response  - Consider cultural and social influences on pain and pain management  - Notify physician/advanced practitioner if interventions unsuccessful or patient reports new pain  Outcome: Progressing     Problem: INFECTION - ADULT  Goal: Absence or prevention of progression during hospitalization  Description: INTERVENTIONS:  - Assess and monitor for signs and symptoms of infection  - Monitor lab/diagnostic results  - Monitor all insertion sites, i e  indwelling lines, tubes, and drains  - Monitor endotracheal if appropriate and nasal secretions for changes in amount and color  - [place] appropriate cooling/warming therapies per order  - Administer medications as ordered  - Instruct and encourage patient and family to use good hand hygiene technique  - Identify and instruct in appropriate isolation precautions for identified infection/condition  Outcome: Progressing     Problem: SAFETY ADULT  Goal: Maintain or return to baseline ADL function  Description: INTERVENTIONS:  -  Assess patient's ability to carry out ADLs; assess patient's baseline for ADL function and identify physical deficits which impact ability to perform ADLs (bathing, care of mouth/teeth, toileting, grooming, dressing, etc )  - Assess/evaluate cause of self-care deficits   - Assess range of motion  - Assess patient's mobility; develop plan if impaired  - Assess patient's need for assistive devices and provide as appropriate  - Encourage maximum independence but intervene and supervise when necessary  - Involve family in performance of ADLs  - Assess for home care needs following discharge   - Consider OT consult to assist with ADL evaluation and planning for discharge  - Provide patient education as appropriate  Outcome: Progressing  Goal: Maintains/Returns to pre admission functional level  Description: INTERVENTIONS:  - Perform BMAT or MOVE assessment daily    - Set and communicate daily mobility goal to care team and patient/family/caregiver     - Collaborate with rehabilitation services on mobility goals if consulted  - Perform Range of Motion 3 times a day  - Reposition patient every 3 hours    - Dangle patient 3 times a day  - Stand patient 3 times a day  - Ambulate patient 3 times a day  - Out of bed to chair 3 times a day   - Out of bed for meals 3 times a day  - Out of bed for toileting  - Record patient progress and toleration of activity level   Outcome: Progressing  Goal: Patient will remain free of falls  Description: INTERVENTIONS:  - Educate patient/family on patient safety including physical limitations  - Instruct patient to call for assistance with activity   - Consult OT/PT to assist with strengthening/mobility   - Keep Call bell within reach  - Keep bed low and locked with side rails adjusted as appropriate  - Keep care items and personal belongings within reach  - Initiate and maintain comfort rounds  - Make Fall Risk Sign visible to staff  - Offer Toileting every 3 Hours, in advance of need  - Initiate/Maintain bed/chair alarm  - Obtain necessary fall risk management equipment: bed/chair alarm  - Apply yellow socks and bracelet for high fall risk patients  - Consider moving patient to room near nurses station  Outcome: Progressing     Problem: DISCHARGE PLANNING  Goal: Discharge to home or other facility with appropriate resources  Description: INTERVENTIONS:  - Identify barriers to discharge w/patient and caregiver  - Arrange for needed discharge resources and transportation as appropriate  - Identify discharge learning needs (meds, wound care, etc )  - Arrange for interpretive services to assist at discharge as needed  - Refer to Case Management Department for coordinating discharge planning if the patient needs post-hospital services based on physician/advanced practitioner order or complex needs related to functional status, cognitive ability, or social support system  Outcome: Progressing     Problem: Knowledge Deficit  Goal: Patient/family/caregiver demonstrates understanding of disease process, treatment plan, medications, and discharge instructions  Description: Complete learning assessment and assess knowledge base    Interventions:  - Provide teaching at level of understanding  - Provide teaching via preferred learning methods  Outcome: Progressing

## 2022-11-24 NOTE — ED NOTES
Patient with lactic acid of 2 4 at this time  Unable to give IVF's due to pulmonologist not wanting patient to receive any more fluids due to chest xray results and having edema in her heart/lungs  Patient resting in room, with BIPAP in place, no signs of any distress at this time  Awaiting ICU bed       Ulysses Dienes, RN  11/24/22 3433

## 2022-11-24 NOTE — ASSESSMENT & PLAN NOTE
· Tachycardic, tachypneic with elevated lactic acid on admission  · CXR without obvious infiltrate  · Low suspicion for infectious etiology, likely related to respiratory distress from COPD and heart failure exacerbation   · procal negative x 1  · No leukocytosis or fever  · Given ceftriaxone/azithro in the ER  · [facility] pending  · UA with reflex pending  · Strep pneumo/legionella urine antigens pending  · Will monitor off antibiotics  · Recheck procal tomorrow   · Trend WBC/fever curve and procal

## 2022-11-24 NOTE — ED PROVIDER NOTES
History  Chief Complaint   Patient presents with   • Shortness of Breath   • Chest Pain     Pt  Comes in with medics short of breath and stating difuse pain in chest and all over  States she    hasn't  taken methadone   since tuesday     HPI  Patient is a 17-year-old female history of alcohol abuse, methadone use, bipolar disorder, asthma, CAD, CHF presenting for evaluation of shortness of breath  Patient repeatedly states "help me" and "I can't breath" and does not cooperate or elaborate on symptoms  Patient apparently called EMS for shortness of breath and states that she had chest pain and had not been taking her methadone for few days and felt like she was withdrawing  Patient tachypneic, tachycardic, only speaking a few words in the emergency department  Prior to Admission Medications   Prescriptions Last Dose Informant Patient Reported? Taking?    Insulin Pen Needle 29G X 12MM MISC   No No   Sig: by Does not apply route daily at bedtime   Patient not taking: Reported on 4/1/2022    acetaminophen (TYLENOL) 650 mg CR tablet   No No   Sig: Take 1 tablet (650 mg total) by mouth every 8 (eight) hours as needed for mild pain   albuterol (Ventolin HFA) 90 mcg/act inhaler   No No   Sig: Inhale 2 puffs every 6 (six) hours as needed for wheezing or shortness of breath   Patient not taking: Reported on 4/1/2022    aspirin (ECOTRIN LOW STRENGTH) 81 mg EC tablet   No No   Sig: Take 1 tablet (81 mg total) by mouth daily   Patient not taking: Reported on 4/1/2022    atorvastatin (LIPITOR) 20 mg tablet   No No   Sig: Take 1 tablet (20 mg total) by mouth daily with dinner   clopidogrel (PLAVIX) 75 mg tablet   No No   Sig: Take 1 tablet (75 mg total) by mouth daily   dicyclomine (BENTYL) 20 mg tablet   No No   Sig: Take 1 tablet (20 mg total) by mouth 2 (two) times a day   Patient not taking: Reported on 4/1/2022    famotidine (PEPCID) 20 mg tablet   No No   Sig: Take 1 tablet (20 mg total) by mouth 2 (two) times a day folic acid (FOLVITE) 1 mg tablet   No No   Sig: Take 1 tablet (1 mg total) by mouth daily   insulin glargine (Lantus) 100 units/mL subcutaneous injection   No No   Sig: Inject 6 Units under the skin daily at bedtime   Patient not taking: Reported on 4/1/2022    lisinopril (ZESTRIL) 2 5 mg tablet   No No   Sig: Take 1 tablet (2 5 mg total) by mouth daily   methadone (DOLOPHINE) 10 MG/5ML solution   Yes No   Sig: Take 30 mg by mouth in the morning [facility]   metoprolol succinate (TOPROL-XL) 100 mg 24 hr tablet   No No   Sig: Take 1 tablet (100 mg total) by mouth daily   multivitamin (THERAGRAN) TABS   No No   Sig: Take 1 tablet by mouth daily   nicotine (NICODERM CQ) 21 mg/24 hr TD 24 hr patch   No No   Sig: Place 1 patch on the skin daily   Patient not taking: Reported on 4/1/2022    ondansetron (Zofran ODT) 4 mg disintegrating tablet   No No   Sig: Take 1 tablet (4 mg total) by mouth every 8 (eight) hours as needed for nausea or vomiting   Patient not taking: Reported on 4/1/2022    ondansetron (Zofran ODT) 4 mg disintegrating tablet   No No   Sig: Take 1 tablet (4 mg total) by mouth every 6 (six) hours as needed for nausea or vomiting   Patient not taking: Reported on 4/1/2022    polyethylene glycol (MIRALAX) 17 g packet   No No   Sig: Take 17 g by mouth daily   Patient not taking: Reported on 4/1/2022    senna-docusate sodium (SENOKOT S) 8 6-50 mg per tablet   No No   Sig: Take 1 tablet by mouth daily   Patient not taking: Reported on 4/1/2022    spironolactone (ALDACTONE) 25 mg tablet   No No   Sig: Take 0 5 tablets (12 5 mg total) by mouth daily   thiamine 100 MG tablet   No No   Sig: Take 1 tablet (100 mg total) by mouth daily      Facility-Administered Medications: None       Past Medical History:   Diagnosis Date   • Alcohol abuse    • Arthritis    • Asthma    • Bipolar disorder (HCC)    • Diabetes mellitus ([facility] )    • Opiate abuse, continuous ([facility] )        Past Surgical History:   Procedure Laterality Date   • ABDOMINAL SURGERY      stomach uler with perforation       History reviewed  No pertinent family history  I have reviewed and agree with the history as documented  E-Cigarette/Vaping   • E-Cigarette Use Never User      E-Cigarette/Vaping Substances   • Nicotine No    • THC No    • CBD No    • Flavoring No    • Other No    • Unknown No      Social History     Tobacco Use   • Smoking status: Every Day     Packs/day: 2 00     Types: Cigarettes   • Smokeless tobacco: Never   Vaping Use   • Vaping Use: Never used   Substance Use Topics   • Alcohol use: Yes     Comment: 1 pint of vodka daily   • Drug use: Yes     Comment: on methadone       Review of Systems   Unable to perform ROS: Severe respiratory distress       Physical Exam  Physical Exam  Vitals reviewed  Constitutional:       General: She is in acute distress  Appearance: She is well-developed and well-nourished  She is not ill-appearing, toxic-appearing or diaphoretic  Comments: Disheveled, distressed   HENT:      Head: Normocephalic and atraumatic  Comments: Pupils 3 mm bilaterally, reactive to light  No external signs of trauma  Moist mucous membranes  Right Ear: External ear normal       Left Ear: External ear normal       Nose: Nose normal       Mouth/Throat:      Mouth: Oropharynx is clear and moist       Pharynx: No oropharyngeal exudate  Eyes:      Pupils: Pupils are equal, round, and reactive to light  Cardiovascular:      Rate and Rhythm: Normal rate and regular rhythm  Heart sounds: Normal heart sounds  No murmur heard  No friction rub  No gallop  Comments: Initially sinus tachycardia rate of 150  No murmurs rubs or gallops  Extremities warm and well perfused without mottling  Pulmonary:      Effort: Respiratory distress present  Breath sounds: No wheezing        Comments: Patient respiratory distress with significantly increased work of breathing, difficult to obtain oxygen saturations however intermittently registering in the mid to high 90s on BiPAP 12/5 70% FiO2  Diffuse wheezing and poor air movement  Chest:      Chest wall: No tenderness  Abdominal:      General: Bowel sounds are normal  There is no distension  Palpations: Abdomen is soft  There is no mass  Tenderness: There is no abdominal tenderness  There is no guarding  Comments: Abdomen soft, nontender, nondistended without rigidity, rebound, guarding   Musculoskeletal:         General: No deformity or edema  Normal range of motion  Cervical back: Normal range of motion  Comments: No lower extremity edema, erythema, or calf tenderness   Lymphadenopathy:      Cervical: No cervical adenopathy  Skin:     General: Skin is warm and dry  Capillary Refill: Capillary refill takes less than 2 seconds  Neurological:      Mental Status: She is alert and oriented to person, place, and time        Comments: Patient awake, agitated, does not follow commands or elaborate on history, repeatedly stating help me   Psychiatric:         Mood and Affect: Mood and affect normal          Vital Signs  ED Triage Vitals   Temperature Pulse Respirations Blood Pressure SpO2   11/24/22 0819 11/24/22 0819 11/24/22 0819 11/24/22 0819 11/24/22 0819   (!) 97 1 °F (36 2 °C) (!) 143 (!) 27 (!) 211/110 98 %      Temp Source Heart Rate Source Patient Position - Orthostatic VS BP Location FiO2 (%)   11/24/22 0819 11/24/22 1200 11/24/22 1200 11/24/22 1200 11/24/22 0827   Tympanic Monitor Lying Left arm 50      Pain Score       --                  Vitals:    11/24/22 1200 11/24/22 1230 11/24/22 1300 11/24/22 1330   BP: 116/88 99/71 136/76 111/60   Pulse: 91 92 90 87   Patient Position - Orthostatic VS: Lying            Visual Acuity      ED Medications  Medications   HYDROmorphone (DILAUDID) injection 1 mg (0 mg Intravenous Hold 11/24/22 1003)   methylPREDNISolone sodium succinate (Solu-MEDROL) injection 125 mg (has no administration in time range)   albuterol inhalation solution 10 mg (10 mg Nebulization Given 11/24/22 0827)     And   ipratropium (ATROVENT) 0 02 % inhalation solution 1 mg (1 mg Nebulization Given 11/24/22 0826)     And   sodium chloride 0 9 % inhalation solution 3 mL (3 mL Nebulization Given 11/24/22 0827)   cefTRIAXone (ROCEPHIN) IVPB (premix in dextrose) 1,000 mg 50 mL (0 mg Intravenous Stopped 11/24/22 1024)   azithromycin (ZITHROMAX) 500 mg in sodium chloride 0 9% 250mL IVPB 500 mg (0 mg Intravenous Stopped 11/24/22 1024)   LORazepam (ATIVAN) injection 1 mg (1 mg Intravenous Given 11/24/22 0828)   furosemide (LASIX) injection 40 mg (40 mg Intravenous Given 11/24/22 0938)   sodium chloride 0 9 % bolus 1,000 mL (0 mL Intravenous Stopped 11/24/22 1140)   sodium chloride 0 9 % bolus 500 mL (0 mL Intravenous Stopped 11/24/22 1140)       Diagnostic Studies  Results Reviewed     Procedure Component Value Units Date/Time    Lactic acid, plasma [[id number]]  (Abnormal) Collected: 11/24/22 1304    Lab Status: Final result Specimen: Blood from Arm, Right Updated: 11/24/22 1334     LACTIC ACID 2 4 mmol/L     Narrative:      Result may be elevated if tourniquet was used during collection  Lactic acid 2 Hours [[id number]]     Lab Status: No result Specimen: Blood     HS Troponin I 4hr [[id number]]  (Abnormal) Collected: 11/24/22 1304    Lab Status: Final result Specimen: Blood from Arm, Right Updated: 11/24/22 1334     hs TnI 4hr 47 ng/L      Delta 4hr hsTnI 28 ng/L     Blood gas, arterial [[id number]]     Lab Status: No result Specimen: Blood, Arterial     Lactic acid 2 Hours [[id number]]  (Abnormal) Collected: 11/24/22 1105    Lab Status: Final result Specimen: Blood from Arm, Left Updated: 11/24/22 1139     LACTIC ACID 2 5 mmol/L     Narrative:      Result may be elevated if tourniquet was used during collection      HS Troponin I 2hr [[id number]]  (Normal) Collected: 11/24/22 1105    Lab Status: Final result Specimen: Blood from Arm, Left Updated: 11/24/22 1135     hs TnI 2hr 38 ng/L      Delta 2hr hsTnI 19 ng/L     Blood gas, venous [[id number]]  (Abnormal) Collected: 11/24/22 1105    Lab Status: Final result Specimen: Blood from Arm, Left Updated: 11/24/22 1112     pH, Awais 7 270     pCO2, Awais 74 7 mm Hg      pO2, Awais 24 5 mm Hg      HCO3, Awais 33 5 mmol/L      Base Excess, Awais 4 0 mmol/L      O2 Content, Awais 7 1 ml/dL      O2 HGB, VENOUS 35 1 %     Lactic acid, plasma [[id number]]  (Abnormal) Collected: 11/24/22 0832    Lab Status: Final result Specimen: Blood from Arm, Left Updated: 11/24/22 0954     LACTIC ACID 4 7 mmol/L     Narrative:      Result may be elevated if tourniquet was used during collection  Ethanol [[id number]]  (Normal) Collected: 11/24/22 0832    Lab Status: Final result Specimen: Blood from Arm, Left Updated: 11/24/22 0945     Ethanol Lvl <3 mg/dL     COVID/FLU/RSV [[id number]]  (Normal) Collected: 11/24/22 0842    Lab Status: Final result Specimen: Nares from Nose Updated: 11/24/22 0936     SARS-CoV-2 Negative     INFLUENZA A PCR Negative     INFLUENZA B PCR Negative     RSV PCR Negative    Narrative:      FOR PEDIATRIC PATIENTS - copy/paste COVID Guidelines URL to browser: [email or web address]    SARS-CoV-2 assay is a Nucleic Acid Amplification assay intended for the  qualitative detection of nucleic acid from SARS-CoV-2 in nasopharyngeal  swabs  Results are for the presumptive identification of SARS-CoV-2 RNA  Positive results are indicative of infection with SARS-CoV-2, the virus  causing COVID-19, but do not rule out bacterial infection or co-infection  with other viruses  Laboratories within the [place] and its  territories are required to report all positive results to the appropriate  public health authorities   Negative results do not preclude SARS-CoV-2  infection and should not be used as the sole basis for treatment or other  patient management decisions  Negative results must be combined with  clinical observations, patient history, and epidemiological information  This test has not been FDA cleared or approved  This test has been authorized by FDA under an Emergency Use Authorization  (EUA)  This test is only authorized for the duration of time the  declaration that circumstances exist justifying the authorization of the  emergency use of an in vitro diagnostic tests for detection of SARS-CoV-2  virus and/or diagnosis of COVID-19 infection under section 564(b)(1) of  the Act, 21 U  S C  443VOK-5(N)(1), unless the authorization is terminated  or revoked sooner  The test has been validated but independent review by FDA  and CLIA is pending  Test performed using [employer] GeneXpert: This RT-PCR assay targets N2,  a region unique to SARS-CoV-2  A conserved region in the E-gene was chosen  for pan-Sarbecovirus detection which includes SARS-CoV-2  According to CMS-2020-01-R, this platform meets the definition of high-throughput technology      Procalcitonin [[id number]]  (Normal) Collected: 11/24/22 0832    Lab Status: Final result Specimen: Blood from Arm, Left Updated: 11/24/22 0916     Procalcitonin 0 07 ng/ml     HS Troponin 0hr (reflex protocol) [[id number]]  (Normal) Collected: 11/24/22 0832    Lab Status: Final result Specimen: Blood from Arm, Left Updated: 11/24/22 0915     hs TnI 0hr 19 ng/L     Comprehensive metabolic panel [[id number]]  (Abnormal) Collected: 11/24/22 0838    Lab Status: Final result Specimen: Blood from Arm, Right Updated: 11/24/22 0907     Sodium 141 mmol/L      Potassium 4 3 mmol/L      Chloride 106 mmol/L      CO2 21 mmol/L      ANION GAP 14 mmol/L      BUN 12 mg/dL      Creatinine 0 79 mg/dL      Glucose 234 mg/dL      Calcium 7 7 mg/dL      Corrected Calcium 8 7 mg/dL       U/L      ALT 36 U/L      Alkaline Phosphatase 242 U/L      Total Protein 6 9 g/dL      Albumin 2 7 g/dL      Total Bilirubin 0 60 mg/dL eGFR 81 ml/min/1 73sq m     Narrative:      [place] guidelines for Chronic Kidney Disease (CKD):   •  Stage 1 with normal or high GFR (GFR > 90 mL/min/1 73 square meters)  •  Stage 2 Mild CKD (GFR = 60-89 mL/min/1 73 square meters)  •  Stage 3A Moderate CKD (GFR = 45-59 mL/min/1 73 square meters)  •  Stage 3B Moderate CKD (GFR = 30-44 mL/min/1 73 square meters)  •  Stage 4 Severe CKD (GFR = 15-29 mL/min/1 73 square meters)  •  Stage 5 End Stage CKD (GFR <15 mL/min/1 73 square meters)  Note: GFR calculation is accurate only with a steady state creatinine    Blood culture #1 [[id number]] Collected: 11/24/22 0850    Lab Status: In process Specimen: Blood from Arm, Left Updated: 11/24/22 0905    Blood culture #2 [[id number]] Collected: 11/24/22 0850    Lab Status:  In process Specimen: Blood from Arm, Right Updated: 11/24/22 0905    Blood gas, venous [[id number]]  (Abnormal) Collected: 11/24/22 0832    Lab Status: Final result Specimen: Blood from Arm, Left Updated: 11/24/22 0902     pH, Awais 7 035     pCO2, Awais 99 9 mm Hg      pO2, Awais 50 0 mm Hg      HCO3, Awais 26 1 mmol/L      Base Excess, Awais -7 5 mmol/L      O2 Content, Awais 13 8 ml/dL      O2 HGB, VENOUS 62 9 %     CBC and differential [[id number]]  (Abnormal) Collected: 11/24/22 0838    Lab Status: Final result Specimen: Blood from Arm, Right Updated: 11/24/22 0848     WBC 11 38 Thousand/uL      RBC 4 77 Million/uL      Hemoglobin 14 2 g/dL      Hematocrit 46 0 %      MCV 96 fL      MCH 29 8 pg      MCHC 30 9 g/dL      RDW 15 6 %      MPV 10 8 fL      Platelets 028 Thousands/uL      nRBC 0 /100 WBCs      Neutrophils Relative 33 %      Immat GRANS % 1 %      Lymphocytes Relative 54 %      Monocytes Relative 9 %      Eosinophils Relative 2 %      Basophils Relative 1 %      Neutrophils Absolute 3 78 Thousands/µL      Immature Grans Absolute 0 08 Thousand/uL      Lymphocytes Absolute 6 20 Thousands/µL      Monocytes Absolute 0 97 Thousand/µL Eosinophils Absolute 0 27 Thousand/µL      Basophils Absolute 0 08 Thousands/µL                  XR chest 1 view portable   Final Result by Deepika Preston MD (11/24 5046)      Moderate cardiomegaly and moderate interstitial edema  Workstation performed: [id number]                    Procedures  CriticalCare Time  Performed by: Jerry Bond MD  Authorized by: Jerry Bond MD     Critical care provider statement:     Critical care time (minutes):  61    Critical care was necessary to treat or prevent imminent or life-threatening deterioration of the following conditions:  Respiratory failure, sepsis and CNS failure or compromise    Critical care was time spent personally by me on the following activities:  Blood draw for specimens, ordering and performing treatments and interventions, obtaining history from patient or surrogate, development of treatment plan with patient or surrogate, ordering and review of laboratory studies, ordering and review of radiographic studies, discussions with consultants, evaluation of patient's response to treatment, re-evaluation of patient's condition, examination of patient and review of old charts             ED Course                                             MDM  Number of Diagnoses or Management Options  Acute respiratory failure with hypercapnia ([facility] )  [facility]  Diagnosis management comments: Patient initially uncooperative with staff preventing obtaining of IV access, does not elaborate on history  Ultimately IV access obtained and patient treated with 1 mg IV Ativan  Patient at this point able tolerate BiPAP with improvement vital signs  Maintaining normal oxygen saturations, improvement heart rate  Given unclear initial picture, infectious labs including CBC, CMP, blood cultures, procalcitonin, lactate all drawn    EKG demonstrating sinus tachycardia, in this setting most likely secondary to increased work of breathing  Patient with elevated initial lactate, no leukocytosis, normal procalcitonin  Patient treated empirically with azithromycin, Rocephin for possible community-acquired pneumonia  Diffuse wheezing on exam and patient continuing to complain of dyspnea however with some improvement following Heart neb treatment  Patient with initial lactate of 4 7, repeat of 2 5, in this clinical setting likely secondary to increased work of breathing rather than sepsis  Initial CO2 99, repeat 74 after prolonged time on BiPAP  Patient with overall improvement of mental status  Admitted to ICU for further evaluation and management  Disposition  Final diagnoses:   COPD exacerbation ([facility] )   Acute respiratory failure with hypercapnia ([facility] )     Time reflects when diagnosis was documented in both MDM as applicable and the Disposition within this note     Time User Action Codes Description Comment    11/24/2022 11:54 AM Artelia Scale Add [J44 1] [facility] ([facility]     11/24/2022 11:54 AM Artelia Scale Add [J96 02] Acute respiratory failure with hypercapnia [facility])       ED Disposition     ED Disposition   Admit    Condition   Stable    Date/Time   Thu Nov 24, 2022 11:52 AM    Comment   Case was discussed with critical care and the patient's admission status was agreed to be Admission Status: inpatient status to the service of Dr Samantha Hood   Follow-up Information    None         Patient's Medications   Discharge Prescriptions    No medications on file       No discharge procedures on file      PDMP Review     None          ED Provider  Electronically Signed by           Дмитрий Romo MD  11/24/22 [id number]

## 2022-11-24 NOTE — H&P
[address] 47  H&P- Lin Huger 1960, 63 y o  female MRN: [id number]  Unit/Bed#: [facility] Encounter: [id number]  Primary Care Provider: Aren Oglesby MD   Date and time admitted to hospital: 11/24/2022  8:17 AM    Acute respiratory failure with hypoxia and hypercapnia (HCC)  Assessment & Plan  Acute respiratory failure with hypoxia and hypercarbia on admission likely due to heart failure exacerbation/COPD exacerbation  CXR with evidence of volume overload, no discrete infiltrate  Hx of ischemic cardiomyopathy  Significant smoking history and probable COPD  Wheezing present on exam   · Initially presented in respiratory distress, tachyneic   Placed on BiPAP in the ER  · Initial VBG 7 03/99/50/26/-7  · Given 40mg IV lasix and steroids in the ER, treated empirically with ceftriaxone/azithro  · U/o with robust response to lasix    Plan:   · Continue BiPAP for now, goal to wean to off when able  · Repeat ABG   · Continue solumedrol 40mg Q8  · Scheduled duonebs  · Close I&O monitoring, consider repeat diuretic dose  · Pulmonary hygiene    SIRS (systemic inflammatory response syndrome) (HCC)  Assessment & Plan  · Tachycardic, tachypneic with elevated lactic acid on admission  · CXR without obvious infiltrate  · Low suspicion for infectious etiology, likely related to respiratory distress from COPD and heart failure exacerbation   · procal negative x 1  · No leukocytosis or fever  · Given ceftriaxone/azithro in the ER  · [facility] pending  · UA with reflex pending  · Strep pneumo/legionella urine antigens pending  · Will monitor off antibiotics  · Recheck procal tomorrow   · Trend WBC/fever curve and procal     Smoking hx  Assessment & Plan  · Significant smoking history of suspected COPD  · 2PPD smoking history, although patient is a poor historian  · Nicotine patch  · Smoking cessation encouraged  · Patient would benefit from outpatient pulmonary follow up     CAD (coronary artery disease)  Assessment & Plan  · CAD with PCI to RCA in 2021 after STEMI  · Continue ASA/statin  · Resume metoprolol when able  · EKG with sinus tach on admission, no acute ST changes  · No longer complaining of chest pain (likely related to resp distress on arrival)  · HS troponin 19 on admission    Bipolar disorder [facility])  Assessment & Plan  · Not on medications per chart review  · Consider psych consult while in patient  · Supportive care    Ischemic cardiomyopathy  Assessment & Plan  · Ischemic cardiomyopathy with most recent EF approx 35%  Likely in heart failure exacerbation given evidence of vascular congestion on CXR and hypoxia, tachypnea on presentation  Patient poor historian  Unclear compliance with home meds or low salt diet   · Echo 11/2021 with EF 35%, grade 1DD, mod-severe TR with PAP 35mmHg  · CXR with possible vascular congestion  · Check BNP  · Hold further fluids at this time  · Given 40mg IV lasix in the ER with robust response  · Close I&Os  · Daily weights  · Low salt diet    Essential hypertension  Assessment & Plan  · Hold home lisinopril/metoprolol/spironolactone  · Unclear compliance with home meds  · Resume antihypertensives starting with metoprolol when able   · Avoid hypertension with goal SBP <140    Polysubstance abuse ([facility] )  Assessment & Plan  · Hx of polysubstance abuse, now on methadone  · Receives methadone from [facility]  · Unclear administration of last dose  · [place] closed so unable to verify dosing  · Plan to call tomorrow to verify dose and continue home regimen     Type 2 diabetes mellitus with complication, with long-term current use of insulin ([facility] )  Assessment & Plan  Lab Results   Component Value Date    HGBA1C 5 6 11/09/2021       No results for input(s): POCGLU in the last 72 hours      Blood Sugar Average: Last 72 hrs:     · SSI   · Unclear compliance with home regimen  · Check HA1C  · Goal   · Anticipate hyperglycemia in the setting of steroids -------------------------------------------------------------------------------------------------------------  Chief Complaint: SOB    History of Present Illness   HX and PE limited by: agitation/BiPAP     Lucretia Galaviz is a 63 y o  female with past medical history of CAD s/p PCI to RCA in 2021, ischemic cardiomyopathy, smoking history, polysubstance use history currently on methadone, DM2, and hypertension who presents with respiratory distress and chest pain  Patient arrived to the ICU agitated, respiratory distress, uncooperative  Given ativan and placed on BiPAP  CXR concerning for possible vascular congestion, no obvious infiltrate  SIRs criteria met on arrival secondary to tachycardia, tachypnea and elevated lactic acid  Mentation and respiratory status improved on BiPAP but patient remains a poor historian  Reports 2 days of difficulty breathing  Otherwise, unable to obtain ROS  Patient given empiric antibiotics and lasix and steroids in the ER  Plan to admit to Step down 1 for further workup and hemodynamic monitoring  History obtained from chart review and the patient   -------------------------------------------------------------------------------------------------------------  Dispo: Admit to Stepdown Level 1    Code Status: Prior  --------------------------------------------------------------------------------------------------------------  Review of Systems     Only stating "I have to pee, I have to pee"    Review of systems was unable to be performed secondary to BiPAP    Physical Exam  Constitutional:       Appearance: She is ill-appearing  HENT:      Head: Normocephalic and atraumatic  Mouth/Throat:      Mouth: Mucous membranes are dry  Pharynx: Oropharynx is clear  Eyes:      Extraocular Movements: Extraocular movements intact  Pupils: Pupils are equal, round, and reactive to light  Cardiovascular:      Rate and Rhythm: Normal rate and regular rhythm        Pulses: Normal pulses  Heart sounds: Normal heart sounds  Pulmonary:      Effort: No accessory muscle usage or respiratory distress  Breath sounds: Examination of the right-lower field reveals rales  Examination of the left-lower field reveals rales  Decreased breath sounds, wheezing and rales present  Comments: Mild expiratory wheeze bilaterally  Assessed on bipap   Abdominal:      General: Bowel sounds are normal  There is no distension  Palpations: Abdomen is soft  Tenderness: There is no abdominal tenderness  Musculoskeletal:         General: Normal range of motion  Right lower leg: No edema  Left lower leg: No edema  Skin:     General: Skin is warm and dry  Capillary Refill: Capillary refill takes less than 2 seconds  Findings: No lesion  Neurological:      General: No focal deficit present  Mental Status: She is alert  Comments: Did not answer orientation questions  Moving all extremities purposefully and spontaneously   Psychiatric:         Behavior: Behavior is uncooperative        --------------------------------------------------------------------------------------------------------------  Vitals:   Vitals:    11/24/22 1230 11/24/22 1300 11/24/22 1330 11/24/22 1400   BP: 99/71 136/76 111/60 102/65   BP Location:       Pulse: 92 90 87 82   Resp: 14 16 19 20   Temp:       TempSrc:       SpO2: 99% 100% 100% 100%   Weight:       Height:         Temp  Min: 97 1 °F (36 2 °C)  Max: 97 1 °F (36 2 °C)     Height: 4' 11" (149 9 cm)  Body mass index is 17 37 kg/m²      Laboratory and Diagnostics:  Results from last 7 days   Lab Units 11/24/22  0838   WBC Thousand/uL 11 38*   HEMOGLOBIN g/dL 14 2   HEMATOCRIT % 46 0   PLATELETS Thousands/uL 281   NEUTROS PCT % 33*   MONOS PCT % 9     Results from last 7 days   Lab Units 11/24/22  0838   SODIUM mmol/L 141   POTASSIUM mmol/L 4 3   CHLORIDE mmol/L 106   CO2 mmol/L 21   ANION GAP mmol/L 14*   BUN mg/dL 12   CREATININE mg/dL 0 79   CALCIUM mg/dL 7 7*   GLUCOSE RANDOM mg/dL 234*   ALT U/L 36   AST U/L 104*   ALK PHOS U/L 242*   ALBUMIN g/dL 2 7*   TOTAL BILIRUBIN mg/dL 0 60                   Results from last 7 days   Lab Units 11/24/22  1304 11/24/22  1105 11/24/22  0832   LACTIC ACID mmol/L 2 4* 2 5* 4 7*     ABG:    VBG:  Results from last 7 days   Lab Units 11/24/22  1105   PH ESTHER  7 270*   PCO2 ESTHER mm Hg 74 7*   PO2 ESTHER mm Hg 24 5*   HCO3 ESTHER mmol/L 33 5*   BASE EXC ESTHER mmol/L 4 0     Results from last 7 days   Lab Units 11/24/22  0832   PROCALCITONIN ng/ml 0 07       Micro:        EKG: sinus tachycardia   Imaging: I have personally reviewed pertinent reports  and I have personally reviewed pertinent films in PACS      Historical Information   Past Medical History:   Diagnosis Date   • Alcohol abuse    • Arthritis    • Asthma    • Bipolar disorder ([facility] [address] )    • [facility]    • Opiate abuse, continuous ([facility] )      Past Surgical History:   Procedure Laterality Date   • ABDOMINAL SURGERY      stomach uler with perforation     Social History   Social History     Substance and Sexual Activity   Alcohol Use Yes    Comment: 1 pint of vodka daily     Social History     Substance and Sexual Activity   Drug Use Yes    Comment: on methadone     Social History     Tobacco Use   Smoking Status Every Day   • Packs/day: 2 00   • Types: Cigarettes   Smokeless Tobacco Never     Exercise History: na  Family History:   History reviewed  No pertinent family history  I have reviewed this patient's family history and commented on sigificant items within the HPI      Medications:  Current Facility-Administered Medications   Medication Dose Route Frequency   • HYDROmorphone (DILAUDID) injection 1 mg  1 mg Intravenous Once     Home medications:  Prior to Admission Medications   Prescriptions Last Dose Informant Patient Reported? Taking?    Insulin Pen Needle 29G X 12MM MISC   No No   Sig: by Does not apply route daily at bedtime   Patient not taking: Reported on 4/1/2022    acetaminophen (TYLENOL) 650 mg CR tablet   No No   Sig: Take 1 tablet (650 mg total) by mouth every 8 (eight) hours as needed for mild pain   albuterol (Ventolin HFA) 90 mcg/act inhaler   No No   Sig: Inhale 2 puffs every 6 (six) hours as needed for wheezing or shortness of breath   Patient not taking: Reported on 4/1/2022    aspirin (ECOTRIN LOW STRENGTH) 81 mg EC tablet   No No   Sig: Take 1 tablet (81 mg total) by mouth daily   Patient not taking: Reported on 4/1/2022    atorvastatin (LIPITOR) 20 mg tablet   No No   Sig: Take 1 tablet (20 mg total) by mouth daily with dinner   clopidogrel (PLAVIX) 75 mg tablet   No No   Sig: Take 1 tablet (75 mg total) by mouth daily   dicyclomine (BENTYL) 20 mg tablet   No No   Sig: Take 1 tablet (20 mg total) by mouth 2 (two) times a day   Patient not taking: Reported on 4/1/2022    famotidine (PEPCID) 20 mg tablet   No No   Sig: Take 1 tablet (20 mg total) by mouth 2 (two) times a day   folic acid (FOLVITE) 1 mg tablet   No No   Sig: Take 1 tablet (1 mg total) by mouth daily   insulin glargine (Lantus) 100 units/mL subcutaneous injection   No No   Sig: Inject 6 Units under the skin daily at bedtime   Patient not taking: Reported on 4/1/2022    lisinopril (ZESTRIL) 2 5 mg tablet   No No   Sig: Take 1 tablet (2 5 mg total) by mouth daily   methadone (DOLOPHINE) 10 MG/5ML solution   Yes No   Sig: Take 30 mg by mouth in the morning [facility]   metoprolol succinate (TOPROL-XL) 100 mg 24 hr tablet   No No   Sig: Take 1 tablet (100 mg total) by mouth daily   multivitamin (THERAGRAN) TABS   No No   Sig: Take 1 tablet by mouth daily   nicotine (NICODERM CQ) 21 mg/24 hr TD 24 hr patch   No No   Sig: Place 1 patch on the skin daily   Patient not taking: Reported on 4/1/2022    ondansetron (Zofran ODT) 4 mg disintegrating tablet   No No   Sig: Take 1 tablet (4 mg total) by mouth every 8 (eight) hours as needed for nausea or vomiting   Patient not taking: Reported on 4/1/2022    ondansetron (Zofran ODT) 4 mg disintegrating tablet   No No   Sig: Take 1 tablet (4 mg total) by mouth every 6 (six) hours as needed for nausea or vomiting   Patient not taking: Reported on 4/1/2022    polyethylene glycol (MIRALAX) 17 g packet   No No   Sig: Take 17 g by mouth daily   Patient not taking: Reported on 4/1/2022    senna-docusate sodium (SENOKOT S) 8 6-50 mg per tablet   No No   Sig: Take 1 tablet by mouth daily   Patient not taking: Reported on 4/1/2022    spironolactone (ALDACTONE) 25 mg tablet   No No   Sig: Take 0 5 tablets (12 5 mg total) by mouth daily   thiamine 100 MG tablet   No No   Sig: Take 1 tablet (100 mg total) by mouth daily      Facility-Administered Medications: None     Allergies:  No Known Allergies    ------------------------------------------------------------------------------------------------------------  Advance Directive and Living Will:      Power of : Yes  POLST:    ------------------------------------------------------------------------------------------------------------  Anticipated Length of Stay is > 2 midnights    Care Time Delivered:   Upon my evaluation, this patient had a high probability of imminent or life-threatening deterioration due to acute resp failure, which required my direct attention, intervention, and personal management  I have personally provided 40 minutes (1200 to 1300) of critical care time, exclusive of procedures, teaching, family meetings, and any prior time recorded by providers other than myself  YASMIN Wray        Portions of the record may have been created with voice recognition software  Occasional wrong word or "sound a like" substitutions may have occurred due to the inherent limitations of voice recognition software    Read the chart carefully and recognize, using context, where substitutions have occurred

## 2022-11-24 NOTE — ASSESSMENT & PLAN NOTE
Acute respiratory failure with hypoxia and hypercarbia on admission likely due to heart failure exacerbation/COPD exacerbation  CXR with evidence of volume overload, no discrete infiltrate  Hx of ischemic cardiomyopathy  Significant smoking history and probable COPD  Wheezing present on exam   · Initially presented in respiratory distress, tachyneic   Placed on BiPAP in the ER  · Initial VBG 7 03/99/50/26/-7  · Given 40mg IV lasix and steroids in the ER, treated empirically with ceftriaxone/azithro  · U/o with robust response to lasix    Plan:   · Continue BiPAP for now, goal to wean to off when able  · Repeat ABG   · Continue solumedrol 40mg Q8  · Scheduled duonebs  · Close I&O monitoring, consider repeat diuretic dose  · Pulmonary hygiene

## 2022-11-24 NOTE — ASSESSMENT & PLAN NOTE
· Significant smoking history of suspected COPD  · 2PPD smoking history, although patient is a poor historian  · Nicotine patch  · Smoking cessation encouraged  · Patient would benefit from outpatient pulmonary follow up

## 2022-11-24 NOTE — ED NOTES
Patient resting in room,BIPAP in place, no distress notes at this time  Patient has to await bed in ICU once they are available  Side rails are up, call bell in reach       Marshall Joshi RN  11/24/22 [id number]

## 2022-11-24 NOTE — RESPIRATORY THERAPY NOTE
[id number] on Bipap 12/5 , rise time 3, rate 14 ,I time 1 0, fio2 50/%   tv 376 rate 25 leak 14, ve 9 2 pip 13 Bipap  Working properly all alarms on and function    1127 pt on bipap 12/5 40% rise time 3 rate 14 I time 3  tv 456, rate 15 leaK 27, VE 6 7,PIP 19, Bipap working properly all alarms on and functional

## 2022-11-24 NOTE — ASSESSMENT & PLAN NOTE
· Hx of polysubstance abuse, now on methadone  · Receives methadone from [facility]  · Unclear administration of last dose  · [place] closed so unable to verify dosing  · Plan to call tomorrow to verify dose and continue home regimen

## 2022-11-25 ENCOUNTER — APPOINTMENT (INPATIENT)
Dept: NON INVASIVE DIAGNOSTICS | Facility: HOSPITAL | Age: 62
End: 2022-11-25

## 2022-11-25 ENCOUNTER — APPOINTMENT (INPATIENT)
Dept: RADIOLOGY | Facility: HOSPITAL | Age: 62
End: 2022-11-25

## 2022-11-25 LAB
ANION GAP SERPL CALCULATED.3IONS-SCNC: 8 MMOL/L (ref 4–13)
AORTIC ROOT: 2.4 CM
APICAL FOUR CHAMBER EJECTION FRACTION: 22 %
ASCENDING AORTA: 2.5 CM
ATRIAL RATE: 146 BPM
BASOPHILS # BLD AUTO: 0.01 THOUSANDS/ÂΜL (ref 0–0.1)
BASOPHILS NFR BLD AUTO: 0 % (ref 0–1)
BUN SERPL-MCNC: 11 MG/DL (ref 5–25)
CALCIUM SERPL-MCNC: 8.4 MG/DL (ref 8.3–10.1)
CHLORIDE SERPL-SCNC: 97 MMOL/L (ref 96–108)
CO2 SERPL-SCNC: 31 MMOL/L (ref 21–32)
CREAT SERPL-MCNC: 0.67 MG/DL (ref 0.6–1.3)
E WAVE DECELERATION TIME: 89 MS
EOSINOPHIL # BLD AUTO: 0 THOUSAND/ÂΜL (ref 0–0.61)
EOSINOPHIL NFR BLD AUTO: 0 % (ref 0–6)
ERYTHROCYTE [DISTWIDTH] IN BLOOD BY AUTOMATED COUNT: 14.9 % (ref 11.6–15.1)
EST. AVERAGE GLUCOSE BLD GHB EST-MCNC: 120 MG/DL
FRACTIONAL SHORTENING: 10 % (ref 28–44)
GFR SERPL CREATININE-BSD FRML MDRD: 95 ML/MIN/1.73SQ M
GLUCOSE SERPL-MCNC: 110 MG/DL (ref 65–140)
GLUCOSE SERPL-MCNC: 134 MG/DL (ref 65–140)
GLUCOSE SERPL-MCNC: 157 MG/DL (ref 65–140)
GLUCOSE SERPL-MCNC: 158 MG/DL (ref 65–140)
GLUCOSE SERPL-MCNC: 197 MG/DL (ref 65–140)
HBA1C MFR BLD: 5.8 %
HCT VFR BLD AUTO: 41.7 % (ref 34.8–46.1)
HGB BLD-MCNC: 13.8 G/DL (ref 11.5–15.4)
IMM GRANULOCYTES # BLD AUTO: 0.02 THOUSAND/UL (ref 0–0.2)
IMM GRANULOCYTES NFR BLD AUTO: 0 % (ref 0–2)
INTERVENTRICULAR SEPTUM IN DIASTOLE (PARASTERNAL SHORT AXIS VIEW): 0.9 CM
INTERVENTRICULAR SEPTUM: 0.9 CM (ref 0.6–1.1)
LAAS-AP2: 18.1 CM2
LAAS-AP4: 20.5 CM2
LEFT ATRIUM SIZE: 3.4 CM
LEFT INTERNAL DIMENSION IN SYSTOLE: 4.4 CM (ref 2.1–4)
LEFT VENTRICULAR INTERNAL DIMENSION IN DIASTOLE: 4.9 CM (ref 3.5–6)
LEFT VENTRICULAR POSTERIOR WALL IN END DIASTOLE: 1 CM
LEFT VENTRICULAR STROKE VOLUME: 25 ML
LVSV (TEICH): 25 ML
LYMPHOCYTES # BLD AUTO: 0.82 THOUSANDS/ÂΜL (ref 0.6–4.47)
LYMPHOCYTES NFR BLD AUTO: 13 % (ref 14–44)
MAGNESIUM SERPL-MCNC: 1.6 MG/DL (ref 1.6–2.6)
MCH RBC QN AUTO: 29.3 PG (ref 26.8–34.3)
MCHC RBC AUTO-ENTMCNC: 33.1 G/DL (ref 31.4–37.4)
MCV RBC AUTO: 88 FL (ref 82–98)
MONOCYTES # BLD AUTO: 0.26 THOUSAND/ÂΜL (ref 0.17–1.22)
MONOCYTES NFR BLD AUTO: 4 % (ref 4–12)
MV E'TISSUE VEL-SEP: 4 CM/S
MV PEAK A VEL: 1.02 M/S
MV PEAK E VEL: 114 CM/S
MV STENOSIS PRESSURE HALF TIME: 26 MS
MV VALVE AREA P 1/2 METHOD: 8.46 CM2
NEUTROPHILS # BLD AUTO: 5.45 THOUSANDS/ÂΜL (ref 1.85–7.62)
NEUTS SEG NFR BLD AUTO: 83 % (ref 43–75)
NRBC BLD AUTO-RTO: 0 /100 WBCS
P AXIS: 74 DEGREES
PHOSPHATE SERPL-MCNC: 3.4 MG/DL (ref 2.3–4.1)
PLATELET # BLD AUTO: 249 THOUSANDS/UL (ref 149–390)
PMV BLD AUTO: 10.4 FL (ref 8.9–12.7)
POTASSIUM SERPL-SCNC: 3.7 MMOL/L (ref 3.5–5.3)
PR INTERVAL: 140 MS
PROCALCITONIN SERPL-MCNC: 0.67 NG/ML
QRS AXIS: 5 DEGREES
QRSD INTERVAL: 76 MS
QT INTERVAL: 252 MS
QTC INTERVAL: 392 MS
RA PRESSURE ESTIMATED: 8 MMHG
RBC # BLD AUTO: 4.71 MILLION/UL (ref 3.81–5.12)
RIGHT ATRIUM AREA SYSTOLE A4C: 17.4 CM2
RIGHT VENTRICLE ID DIMENSION: 4 CM
RV PSP: 69 MMHG
SL CV LEFT ATRIUM LENGTH A2C: 5.5 CM
SL CV LV EF: 22
SL CV PED ECHO LEFT VENTRICLE DIASTOLIC VOLUME (MOD BIPLANE) 2D: 113 ML
SL CV PED ECHO LEFT VENTRICLE SYSTOLIC VOLUME (MOD BIPLANE) 2D: 89 ML
SODIUM SERPL-SCNC: 136 MMOL/L (ref 135–147)
T WAVE AXIS: 87 DEGREES
TR MAX PG: 61 MMHG
TR PEAK VELOCITY: 3.9 M/S
TRICUSPID VALVE PEAK REGURGITATION VELOCITY: 3.9 M/S
VENTRICULAR RATE: 146 BPM
WBC # BLD AUTO: 6.56 THOUSAND/UL (ref 4.31–10.16)

## 2022-11-25 RX ORDER — LISINOPRIL 5 MG/1
5 TABLET ORAL DAILY
Status: DISCONTINUED | OUTPATIENT
Start: 2022-11-26 | End: 2022-11-28 | Stop reason: HOSPADM

## 2022-11-25 RX ORDER — LORAZEPAM 2 MG/ML
0.5 INJECTION INTRAMUSCULAR ONCE
Status: DISCONTINUED | OUTPATIENT
Start: 2022-11-25 | End: 2022-11-25

## 2022-11-25 RX ORDER — LORAZEPAM 2 MG/ML
1 INJECTION INTRAMUSCULAR ONCE
Status: COMPLETED | OUTPATIENT
Start: 2022-11-25 | End: 2022-11-25

## 2022-11-25 RX ORDER — METOPROLOL TARTRATE 50 MG/1
50 TABLET, FILM COATED ORAL EVERY 12 HOURS SCHEDULED
Status: DISCONTINUED | OUTPATIENT
Start: 2022-11-25 | End: 2022-11-26

## 2022-11-25 RX ORDER — HYDROXYZINE HYDROCHLORIDE 25 MG/1
25 TABLET, FILM COATED ORAL EVERY 6 HOURS PRN
Status: DISCONTINUED | OUTPATIENT
Start: 2022-11-25 | End: 2022-11-28 | Stop reason: HOSPADM

## 2022-11-25 RX ADMIN — THIAMINE HCL TAB 100 MG 100 MG: 100 TAB at 08:19

## 2022-11-25 RX ADMIN — NICOTINE 1 PATCH: 21 PATCH, EXTENDED RELEASE TRANSDERMAL at 08:20

## 2022-11-25 RX ADMIN — ATORVASTATIN CALCIUM 20 MG: 10 TABLET, FILM COATED ORAL at 16:18

## 2022-11-25 RX ADMIN — METOPROLOL TARTRATE 25 MG: 25 TABLET, FILM COATED ORAL at 08:19

## 2022-11-25 RX ADMIN — ASPIRIN 81 MG: 81 TABLET, COATED ORAL at 08:19

## 2022-11-25 RX ADMIN — FAMOTIDINE 20 MG: 20 TABLET ORAL at 17:01

## 2022-11-25 RX ADMIN — IPRATROPIUM BROMIDE AND ALBUTEROL SULFATE 3 ML: 2.5; .5 SOLUTION RESPIRATORY (INHALATION) at 08:55

## 2022-11-25 RX ADMIN — LORAZEPAM 1 MG: 2 INJECTION INTRAMUSCULAR; INTRAVENOUS at 21:50

## 2022-11-25 RX ADMIN — FAMOTIDINE 20 MG: 20 TABLET ORAL at 08:19

## 2022-11-25 RX ADMIN — INSULIN LISPRO 1 UNITS: 100 INJECTION, SOLUTION INTRAVENOUS; SUBCUTANEOUS at 08:21

## 2022-11-25 RX ADMIN — B-COMPLEX W/ C & FOLIC ACID TAB 1 TABLET: TAB at 08:19

## 2022-11-25 RX ADMIN — FOLIC ACID 1 MG: 1 TABLET ORAL at 08:19

## 2022-11-25 RX ADMIN — METHADONE HYDROCHLORIDE 45 MG: 10 TABLET ORAL at 12:57

## 2022-11-25 RX ADMIN — IPRATROPIUM BROMIDE AND ALBUTEROL SULFATE 3 ML: 2.5; .5 SOLUTION RESPIRATORY (INHALATION) at 13:42

## 2022-11-25 RX ADMIN — IPRATROPIUM BROMIDE AND ALBUTEROL SULFATE 3 ML: 2.5; .5 SOLUTION RESPIRATORY (INHALATION) at 01:34

## 2022-11-25 RX ADMIN — METOPROLOL TARTRATE 50 MG: 50 TABLET, FILM COATED ORAL at 22:47

## 2022-11-25 RX ADMIN — ENOXAPARIN SODIUM 40 MG: 40 INJECTION SUBCUTANEOUS at 08:20

## 2022-11-25 RX ADMIN — LISINOPRIL 2.5 MG: 2.5 TABLET ORAL at 08:19

## 2022-11-25 RX ADMIN — LORAZEPAM 1 MG: 2 INJECTION INTRAMUSCULAR; INTRAVENOUS at 20:54

## 2022-11-25 RX ADMIN — CLOPIDOGREL BISULFATE 75 MG: 75 TABLET ORAL at 08:19

## 2022-11-25 RX ADMIN — INSULIN LISPRO 1 UNITS: 100 INJECTION, SOLUTION INTRAVENOUS; SUBCUTANEOUS at 11:43

## 2022-11-25 RX ADMIN — IPRATROPIUM BROMIDE AND ALBUTEROL SULFATE 3 ML: 2.5; .5 SOLUTION RESPIRATORY (INHALATION) at 19:59

## 2022-11-25 RX ADMIN — METHYLPREDNISOLONE SODIUM SUCCINATE 40 MG: 40 INJECTION, POWDER, FOR SOLUTION INTRAMUSCULAR; INTRAVENOUS at 05:47

## 2022-11-25 NOTE — ASSESSMENT & PLAN NOTE
Wt Readings from Last 3 Encounters:   11/24/22 44 3 kg (97 lb 10 6 oz)   08/13/22 38 6 kg (85 lb)   07/30/22 45 5 kg (100 lb 3 2 oz)     · Hx of ischemic cardiomyopathy   · Most recent echo 11/2021: EF 96%, U4UV, RV systolic function low-normal, mod to severe TR  · CXR on presentation-Moderate cardiomegaly and moderate interstitial edema  · Patient poor historian  Unclear compliance with home meds or low salt diet   · NT pro BNP 3408  · Given 40mg IV lasix in the ER with robust response  · Would schedule daily lasix dosing, ordered spironolactone at home  · Strict I&Os  · Daily weights  · Low salt diet  · Resume home beta blocker at reduced dose   Prescribed Toprol XL 100mg daily, will change to Metoprolol tartrate 25mg BID and increase as tolerated   · Resume home lisinopril 2 5mg daily   · Per chart review does not appear to have outpatient f/u with cardiology post MI 4/2021, should be referred for outpatient follow up

## 2022-11-25 NOTE — PLAN OF CARE
Problem: MOBILITY - ADULT  Goal: Maintain or return to baseline ADL function  Description: INTERVENTIONS:  -  Assess patient's ability to carry out ADLs; assess patient's baseline for ADL function and identify physical deficits which impact ability to perform ADLs (bathing, care of mouth/teeth, toileting, grooming, dressing, etc )  - Assess/evaluate cause of self-care deficits   - Assess range of motion  - Assess patient's mobility; develop plan if impaired  - Assess patient's need for assistive devices and provide as appropriate  - Encourage maximum independence but intervene and supervise when necessary  - Involve family in performance of ADLs  - Assess for home care needs following discharge   - Consider OT consult to assist with ADL evaluation and planning for discharge  - Provide patient education as appropriate  Outcome: Progressing  Goal: Maintains/Returns to pre admission functional level  Description: INTERVENTIONS:  - Perform BMAT or MOVE assessment daily    - Set and communicate daily mobility goal to care team and patient/family/caregiver  - Collaborate with rehabilitation services on mobility goals if consulted  - Perform Range of Motion 3 times a day  - Reposition patient every 3 hours    - Dangle patient 3 times a day  - Stand patient 3 times a day  - Ambulate patient 3 times a day  - Out of bed to chair 3 times a day   - Out of bed for meals 3 times a day  - Out of bed for toileting  - Record patient progress and toleration of activity level   Outcome: Progressing     Problem: PAIN - ADULT  Goal: Verbalizes/displays adequate comfort level or baseline comfort level  Description: Interventions:  - Encourage patient to monitor pain and request assistance  - Assess pain using appropriate pain scale  - Administer analgesics based on type and severity of pain and evaluate response  - Implement non-pharmacological measures as appropriate and evaluate response  - Consider cultural and social influences on pain and pain management  - Notify physician/advanced practitioner if interventions unsuccessful or patient reports new pain  Outcome: Progressing     Problem: INFECTION - ADULT  Goal: Absence or prevention of progression during hospitalization  Description: INTERVENTIONS:  - Assess and monitor for signs and symptoms of infection  - Monitor lab/diagnostic results  - Monitor all insertion sites, i e  indwelling lines, tubes, and drains  - Monitor endotracheal if appropriate and nasal secretions for changes in amount and color  - [place] appropriate cooling/warming therapies per order  - Administer medications as ordered  - Instruct and encourage patient and family to use good hand hygiene technique  - Identify and instruct in appropriate isolation precautions for identified infection/condition  Outcome: Progressing     Problem: SAFETY ADULT  Goal: Maintain or return to baseline ADL function  Description: INTERVENTIONS:  -  Assess patient's ability to carry out ADLs; assess patient's baseline for ADL function and identify physical deficits which impact ability to perform ADLs (bathing, care of mouth/teeth, toileting, grooming, dressing, etc )  - Assess/evaluate cause of self-care deficits   - Assess range of motion  - Assess patient's mobility; develop plan if impaired  - Assess patient's need for assistive devices and provide as appropriate  - Encourage maximum independence but intervene and supervise when necessary  - Involve family in performance of ADLs  - Assess for home care needs following discharge   - Consider OT consult to assist with ADL evaluation and planning for discharge  - Provide patient education as appropriate  Outcome: Progressing  Goal: Maintains/Returns to pre admission functional level  Description: INTERVENTIONS:  - Perform BMAT or MOVE assessment daily    - Set and communicate daily mobility goal to care team and patient/family/caregiver     - Collaborate with rehabilitation services on mobility goals if consulted  - Perform Range of Motion 3 times a day  - Reposition patient every 3 hours    - Dangle patient 3 times a day  - Stand patient 3 times a day  - Ambulate patient 3 times a day  - Out of bed to chair 3 times a day   - Out of bed for meals 3 times a day  - Out of bed for toileting  - Record patient progress and toleration of activity level   Outcome: Progressing  Goal: Patient will remain free of falls  Description: INTERVENTIONS:  - Educate patient/family on patient safety including physical limitations  - Instruct patient to call for assistance with activity   - Consult OT/PT to assist with strengthening/mobility   - Keep Call bell within reach  - Keep bed low and locked with side rails adjusted as appropriate  - Keep care items and personal belongings within reach  - Initiate and maintain comfort rounds  - Make Fall Risk Sign visible to staff  - Offer Toileting every 3 Hours, in advance of need  - Initiate/Maintain bed/chair alarm  - Obtain necessary fall risk management equipment: bed/chair alarm  - Apply yellow socks and bracelet for high fall risk patients  - Consider moving patient to room near nurses station  Outcome: Progressing     Problem: DISCHARGE PLANNING  Goal: Discharge to home or other facility with appropriate resources  Description: INTERVENTIONS:  - Identify barriers to discharge w/patient and caregiver  - Arrange for needed discharge resources and transportation as appropriate  - Identify discharge learning needs (meds, wound care, etc )  - Arrange for interpretive services to assist at discharge as needed  - Refer to Case Management Department for coordinating discharge planning if the patient needs post-hospital services based on physician/advanced practitioner order or complex needs related to functional status, cognitive ability, or social support system  Outcome: Progressing     Problem: Knowledge Deficit  Goal: Patient/family/caregiver demonstrates understanding of disease process, treatment plan, medications, and discharge instructions  Description: Complete learning assessment and assess knowledge base    Interventions:  - Provide teaching at level of understanding  - Provide teaching via preferred learning methods  Outcome: Progressing     Problem: Potential for Falls  Goal: Patient will remain free of falls  Description: INTERVENTIONS:  - Educate patient/family on patient safety including physical limitations  - Instruct patient to call for assistance with activity   - Consult OT/PT to assist with strengthening/mobility   - Keep Call bell within reach  - Keep bed low and locked with side rails adjusted as appropriate  - Keep care items and personal belongings within reach  - Initiate and maintain comfort rounds  - Make Fall Risk Sign visible to staff  - Offer Toileting every 3 Hours, in advance of need  - Initiate/Maintain bed/chair alarm  - Obtain necessary fall risk management equipment: bed/chair alarm  - Apply yellow socks and bracelet for high fall risk patients  - Consider moving patient to room near nurses station  Outcome: Progressing     Problem: RESPIRATORY - ADULT  Goal: Achieves optimal ventilation and oxygenation  Description: INTERVENTIONS:  - Assess for changes in respiratory status  - Assess for changes in mentation and behavior  - Position to facilitate oxygenation and minimize respiratory effort  - Oxygen administered by appropriate delivery if ordered  - Initiate smoking cessation education as indicated  - Encourage broncho-pulmonary hygiene including cough, deep breathe, Incentive Spirometry  - Assess the need for suctioning and aspirate as needed  - Assess and instruct to report SOB or any respiratory difficulty  - Respiratory Therapy support as indicated  Outcome: Progressing

## 2022-11-25 NOTE — PROGRESS NOTES
[facility]  Progress Note Connor Medley 1960, 63 y o  female MRN: [id number]  Unit/Bed#: ICU 12 Encounter: [id number]  Primary Care Provider: Lori Alejandre MD   Date and time admitted to hospital: 11/24/2022  8:17 AM    * Acute respiratory failure with hypoxia and hypercapnia [facility])  Assessment & Plan  Acute respiratory failure with hypoxia and hypercarbia on admission 11/24 likely due to heart failure exacerbation/COPD exacerbation  CXR with evidence of volume overload, no discrete infiltrate  Hx of ischemic cardiomyopathy  Significant smoking history and probable COPD  Wheezing present on exam   · Initially presented in respiratory distress, tachyneic  Placed on BiPAP in the ER  · Initial VBG 7 03/99/50/26/-7  · Given 40mg IV lasix and steroids in the ER, treated empirically with ceftriaxone/azithro  · U/o with robust response to lasix    Plan:   · Weaned off Bipap yesterday afternoon, currently on room air  · Attempted Bipap @ HS for recruitment, however patient unable to tolerate   · Continue solumedrol 40mg Q8  · Scheduled duonebs q6h  · Schedule daily lasix dosing   · Pulmonary hygiene    Acute on chronic combined systolic and diastolic heart failure (HCC)  Assessment & Plan  Wt Readings from Last 3 Encounters:   11/24/22 44 3 kg (97 lb 10 6 oz)   08/13/22 38 6 kg (85 lb)   07/30/22 45 5 kg (100 lb 3 2 oz)     · Hx of ischemic cardiomyopathy   · Most recent echo 11/2021: EF 04%, E0RY, RV systolic function low-normal, mod to severe TR  · CXR on presentation-Moderate cardiomegaly and moderate interstitial edema  · Patient poor historian  Unclear compliance with home meds or low salt diet   · NT pro BNP 3408  · Given 40mg IV lasix in the ER with robust response  · Would schedule daily lasix dosing, ordered spironolactone at home  · Strict I&Os  · Daily weights  · Low salt diet  · Resume home beta blocker at reduced dose   Prescribed Toprol XL 100mg daily, will change to Metoprolol tartrate 25mg BID and increase as tolerated   · Resume home lisinopril 2 5mg daily   · Per chart review does not appear to have outpatient f/u with cardiology post MI 4/2021, should be referred for outpatient follow up         Suspected COPD with acute exacerbation ([facility] )  Assessment & Plan  · No documented hx of COPD/no PFTs on file, however with extensive smoking hx and wheezing upon presentation to the ED  · Continue steroids; solumedrol 40mg q8h  · Continue duo-nebs q6h  · Outpatient pulm referral for PFTs  · Encourage smoking cessation     SIRS (systemic inflammatory response syndrome) (HCC)  Assessment & Plan  · Tachycardic, tachypneic with elevated lactic acid on admission  · CXR without obvious infiltrate  · Low suspicion for infectious etiology, likely related to respiratory distress from COPD and heart failure exacerbation   · procal negative x 1, repeat pending this AM   · No leukocytosis or fever  · Given ceftriaxone/azithro in the ER, holding off on continuing abx   · BC pending  · UA bland  · Strep pneumo/legionella urine antigens pending  · Flu/COVID/RSV pending   · Trend WBC/fever curve and procal     Smoking hx  Assessment & Plan  · Significant smoking history   · 2PPD smoking history, although patient is a poor historian  · Nicotine patch  · Encourage smoking cessation    Type 2 diabetes mellitus with complication, with long-term current use of insulin ([facility] )  Assessment & Plan  Lab Results   Component Value Date    HGBA1C 5 6 11/09/2021       Recent Labs     11/24/22  2135   POCGLU 297*       Blood Sugar Average: Last 72 hrs:  (P) 297   · SSI   · Unclear compliance with home regimen of Lantus 6 units @ HS   · Hgb A1C pending this AM   · Goal BG less than 180  · Anticipate hyperglycemia in the setting of steroids     Ischemic cardiomyopathy  Assessment & Plan  · See plan as above      CAD (coronary artery disease)  Assessment & Plan  · CAD with PCI to RCA in 2021 after STEMI  · Continue ASA/statin/plavix  · Resume metoprolol at reduced dose  · EKG with sinus tach on admission, no acute ST changes  · No longer complaining of chest pain (likely related to resp distress on arrival)  ·  troponin 19--38--47    Dyslipidemia  Assessment & Plan  · Continue statin     Bipolar disorder ([facility] )  Assessment & Plan  · Not on medications per chart review  · Supportive care  · Outpatient psych if desired by patient    Essential hypertension  Assessment & Plan  · Resume home lisinopril and metoprolol    Polysubstance abuse ([facility] )  Assessment & Plan  · Hx of polysubstance abuse, now on methadone  · Rece[facility]adone from [facility]  · Unclear administration of last dose  · [facility] closed yesterday so unable to verify dosing  · Plan to call today to verify dose and continue home regimen     ----------------------------------------------------------------------------------------  HPI/24hr events: Patient taken off bipap yesterday afternoon  Initially was put on [facility] and then this was taken off last evening  Has remained on room air overnight  Trialed on bipap @  for recruitment, however patient was unable to tolerate  Patient appropriate for transfer out of the ICU today?: Patient does not meet criteria for ICU Follow-up Clinic; referral has not been made  Disposition: Transfer to Med Surg with Telemetry   Code Status: Level 1 - Full Code  ---------------------------------------------------------------------------------------  SUBJECTIVE    Review of Systems   All other systems reviewed and are negative      Review of systems was reviewed and negative unless stated above in HPI/24-hour events   ---------------------------------------------------------------------------------------  OBJECTIVE    Vitals   Vitals:    11/24/22 2200 11/24/22 2300 11/25/22 0000 11/25/22 0014   BP: 132/92 141/97 163/93    BP Location:       Pulse: 92 94 92    Resp: 15 15 13    Temp:    97 8 °F (36 6 °C)   TempSrc:       SpO2: 98% 96% 97%    Weight:       Height:         Temp (24hrs), Av 7 °F (36 5 °C), Min:97 1 °F (36 2 °C), Max:98 5 °F (36 9 °C)  Current: Temperature: 97 8 °F (36 6 °C)          Respiratory:  SpO2: SpO2: 97 %, SpO2 Activity: SpO2 Activity: At Rest, SpO2 Device: O2 Device: Nasal cannula  Nasal Cannula O2 Flow Rate (L/min): 2 L/min    Invasive/non-invasive ventilation settings   Respiratory    Lab Data (Last 4 hours)    None         O2/Vent Data (Last 4 hours)    None                Physical Exam  Constitutional:       Appearance: She is ill-appearing  HENT:      Head: Normocephalic and atraumatic  Eyes:      General: Lids are normal       Extraocular Movements: Extraocular movements intact  Conjunctiva/sclera: Conjunctivae normal    Neck:      Trachea: Trachea normal    Cardiovascular:      Rate and Rhythm: Normal rate and regular rhythm  Pulses:           Radial pulses are 2+ on the right side and 2+ on the left side  Dorsalis pedis pulses are 2+ on the right side and 2+ on the left side  Heart sounds: Normal heart sounds, S1 normal and S2 normal       Comments: PVCs  Pulmonary:      Effort: Pulmonary effort is normal       Breath sounds: Rales present  Abdominal:      General: Bowel sounds are normal       Palpations: Abdomen is soft  Musculoskeletal:      Cervical back: Full passive range of motion without pain, normal range of motion and neck supple  Comments: Normal ROM   Skin:     General: Skin is warm and dry  Capillary Refill: Capillary refill takes less than 2 seconds  Neurological:      General: No focal deficit present  Mental Status: She is oriented to person, place, and time and easily aroused  She is lethargic  GCS: GCS eye subscore is 4  GCS verbal subscore is 5  GCS motor subscore is 6  Psychiatric:         Mood and Affect: Affect is flat  Behavior: Behavior is withdrawn               Laboratory and Diagnostics:  Results from last 7 days Lab Units 11/24/22  1513 11/24/22  0838   WBC Thousand/uL  --  11 38*   HEMOGLOBIN g/dL  --  14 2   I STAT HEMOGLOBIN g/dl 13 6  --    HEMATOCRIT %  --  46 0   HEMATOCRIT, ISTAT % 40  --    PLATELETS Thousands/uL  --  281   NEUTROS PCT %  --  33*   MONOS PCT %  --  9     Results from last 7 days   Lab Units 11/24/22  1513 11/24/22  0838   SODIUM mmol/L  --  141   POTASSIUM mmol/L  --  4 3   CHLORIDE mmol/L  --  106   CO2 mmol/L  --  21   CO2, I-STAT mmol/L 34*  --    ANION GAP mmol/L  --  14*   BUN mg/dL  --  12   CREATININE mg/dL  --  0 79   CALCIUM mg/dL  --  7 7*   GLUCOSE RANDOM mg/dL  --  234*   ALT U/L  --  36   AST U/L  --  104*   ALK PHOS U/L  --  242*   ALBUMIN g/dL  --  2 7*   TOTAL BILIRUBIN mg/dL  --  0 60                   Results from last 7 days   Lab Units 11/24/22  1501 11/24/22  1304 11/24/22  1105 11/24/22  0832   LACTIC ACID mmol/L 2 5* 2 4* 2 5* 4 7*     ABG:    VBG:  Results from last 7 days   Lab Units 11/24/22  1105   PH ESTHER  7 270*   PCO2 ESTHER mm Hg 74 7*   PO2 ESTHER mm Hg 24 5*   HCO3 ESTHER mmol/L 33 5*   BASE EXC ESTHER mmol/L 4 0     Results from last 7 days   Lab Units 11/24/22  0832   PROCALCITONIN ng/ml 0 07       Micro  Results from last 7 days   Lab Units 11/24/22  1525 11/24/22  0850   BLOOD CULTURE   --  Received in Microbiology Lab  Culture in Progress  Received in Microbiology Lab  Culture in Progress  LEGIONELLA URINARY ANTIGEN  Negative  --    STREP PNEUMONIAE ANTIGEN, URINE  Negative  --        No new imaging     Intake and Output  I/O       11/23 0701  11/24 0700 11/24 0701 11/25 0700    IV Piggyback  800    Total Intake(mL/kg)  800 (18 1)    Urine (mL/kg/hr)  300    Total Output  300    Net  +500          Unmeasured Urine Occurrence  1 x          Height and Weights   Height: 4' 11" (149 9 cm)     Body mass index is 19 73 kg/m²    Weight (last 2 days)     Date/Time Weight    11/24/22 1436 44 3 (97 66)    11/24/22 0819 39 (86)            Nutrition       Diet Orders   (From admission, onward)             Start     Ordered    11/24/22 1523  Diet Cardiovascular; Cardiac; Consistent Carbohydrate Diet Level 2 (5 carb servings/75 grams CHO/meal)  Diet effective now        References:    Nutrtion Support Algorithm Enteral vs  Parenteral   Question Answer Comment   Diet Type Cardiovascular    Cardiac Cardiac    Other Restriction(s): Consistent Carbohydrate Diet Level 2 (5 carb servings/75 grams CHO/meal)    RD to adjust diet per protocol?  Yes        11/24/22 1522    11/24/22 1438  Room Service  Once        Question:  Type of Service  Answer:  Room Service-Appropriate    11/24/22 1438                  Active Medications  Scheduled Meds:  Current Facility-Administered Medications   Medication Dose Route Frequency Provider Last Rate   • aspirin  81 mg Oral Daily Unknown [facility] [place] [facility], [address]     • atorvastatin  20 mg Oral Daily With [address] Maria Victoria Fagan     • clopidogrel  75 mg Oral Daily YASMIN Roldan     • enoxaparin  40 mg Subcutaneous Daily Unknown [facility] [place], [address]     • famotidine  20 mg Oral BID Unknown YASMIN Freeman     • folic acid  1 mg Oral Daily YASMIN Dover     • insulin lispro  1-5 Units Subcutaneous TID AC YASMIN Roldan     • insulin lispro  1-5 Units Subcutaneous HS YASMIN Roldan     • ipratropium-albuterol  3 mL Nebulization Q6H YASMIN [place]     • lisinopril  2 5 mg Oral Daily YASMIN Roldan     • methylPREDNISolone sodium succinate  40 mg Intravenous [facility] [place], [address]     • metoprolol tartrate  25 mg Oral Q12H [facility] YASMIN Roldan     • multivitamin stress formula  1 tablet Oral Daily Unknown YASMIN Freemna     • nicotine  1 patch Transdermal Daily Unknown [facility] [place], [address]     • thiamine  100 mg Oral Daily Maria Eugenia YASMIN Medrano       Continuous Infusions:     PRN Meds:        Invasive Devices Review  Invasive Devices     Peripheral Intravenous Line  Duration           Peripheral IV 11/24/22 Dorsal (posterior); Left Hand <1 day    Peripheral IV 11/24/22 Right Antecubital <1 day                Rationale for remaining devices: N/A  ---------------------------------------------------------------------------------------  Advance Directive and Living Will:      Power of : Yes  POLST:    ---------------------------------------------------------------------------------------  Care Time Delivered:   No Critical Care time spent       YASMIN Pagan      Portions of the record may have been created with voice recognition software  Occasional wrong word or "sound a like" substitutions may have occurred due to the inherent limitations of voice recognition software    Read the chart carefully and recognize, using context, where substitutions have occurred

## 2022-11-25 NOTE — PLAN OF CARE
Problem: MOBILITY - ADULT  Goal: Maintain or return to baseline ADL function  Description: INTERVENTIONS:  -  Assess patient's ability to carry out ADLs; assess patient's baseline for ADL function and identify physical deficits which impact ability to perform ADLs (bathing, care of mouth/teeth, toileting, grooming, dressing, etc )  - Assess/evaluate cause of self-care deficits   - Assess range of motion  - Assess patient's mobility; develop plan if impaired  - Assess patient's need for assistive devices and provide as appropriate  - Encourage maximum independence but intervene and supervise when necessary  - Involve family in performance of ADLs  - Assess for home care needs following discharge   - Consider OT consult to assist with ADL evaluation and planning for discharge  - Provide patient education as appropriate  Outcome: Progressing     Problem: PAIN - ADULT  Goal: Verbalizes/displays adequate comfort level or baseline comfort level  Description: Interventions:  - Encourage patient to monitor pain and request assistance  - Assess pain using appropriate pain scale  - Administer analgesics based on type and severity of pain and evaluate response  - Implement non-pharmacological measures as appropriate and evaluate response  - Consider cultural and social influences on pain and pain management  - Notify physician/advanced practitioner if interventions unsuccessful or patient reports new pain  Outcome: Progressing     Problem: INFECTION - ADULT  Goal: Absence or prevention of progression during hospitalization  Description: INTERVENTIONS:  - Assess and monitor for signs and symptoms of infection  - Monitor lab/diagnostic results  - Monitor all insertion sites, i e  indwelling lines, tubes, and drains  - Monitor endotracheal if appropriate and nasal secretions for changes in amount and color  - [place] appropriate cooling/warming therapies per order  - Administer medications as ordered  - Instruct and encourage patient and family to use good hand hygiene technique  - Identify and instruct in appropriate isolation precautions for identified infection/condition  Outcome: Progressing     Problem: SAFETY ADULT  Goal: Maintain or return to baseline ADL function  Description: INTERVENTIONS:  -  Assess patient's ability to carry out ADLs; assess patient's baseline for ADL function and identify physical deficits which impact ability to perform ADLs (bathing, care of mouth/teeth, toileting, grooming, dressing, etc )  - Assess/evaluate cause of self-care deficits   - Assess range of motion  - Assess patient's mobility; develop plan if impaired  - Assess patient's need for assistive devices and provide as appropriate  - Encourage maximum independence but intervene and supervise when necessary  - Involve family in performance of ADLs  - Assess for home care needs following discharge   - Consider OT consult to assist with ADL evaluation and planning for discharge  - Provide patient education as appropriate  Outcome: Progressing

## 2022-11-25 NOTE — ASSESSMENT & PLAN NOTE
· Hx of polysubstance abuse, now on methadone  · Receives methadone from [facility]  · Unclear administration of last dose  · [facility] closed yesterday so unable to verify dosing  · Plan to call today to verify dose and continue home regimen

## 2022-11-25 NOTE — CONSULTS
Consultation - Cardiology   Octavio Diamond 63 y o  female MRN: [id number]  Unit/Bed#: ICU 12 Encounter: [id number]  11/25/22  11:56 AM          Physician Requesting Consult: Damien Barreto DO  Reason for Consult / Principal Problem: CAD      Assessment:  1  Acute on chronic combined systolic and diastolic CHF - echocardiogram was done today which showed a severe reduction in EF  EF is ~25%  She has not been using any medications but previously she was on metoprolol succinate and lisinopril  - Will restart both medications  - She is currently free of respiratory distress and chest Xray shows improved edema    - Recommend IV lasix 20 mg in AM  - Importance of outpatient follow up discussed with her  Will need EF reassessment in 3 months after restarting medications to determine need for ICD  2  CAD with prior PCI after STEMI  - She has been restarted on ASA and Plavix  Will discontinue ASA and continue Plavix alone  - Increase atorvastatin to 40 mg daily    3  Tobacco abuse - nicotine patch  4  Dyslipidemia - continue statin  5  COPD with exacerbation      Plan:  As above  Discussed with Intensivist        History of Present Illness   HPI: Octavio Diamond is a 63y o  year old female who presents with shortness of breath  She feels symptoms began suddenly 2 nights ago  There was no inciting event  She felt sudden onset of severe dyspnea associated with chest tightness  No recent episodes of similar symptoms  She has not been taking any cardiac medications recently  Previously, was taking ASA, Plavix, lisinopril and metoprolol  Symptoms improved with BIPAP and additional treatment given in ICU  She has a history of CAD with prior inferior STEMI in 4/2021  She underwent emergent RCA EDMUND and thrombectomy  Prior to this, she had a history of polysubstance use, DM and PE  She does not follow with cardiology    She had an echocardiogram and stress test in 11/2021 which showed an EF of 35% along with fixed defects in inferior and inferoseptal wall consistent with infarct  Review of Systems:    Review of Systems   Respiratory: Positive for cough, chest tightness, shortness of breath and wheezing  Cardiovascular: Positive for chest pain  Negative for palpitations and leg swelling  Musculoskeletal: Positive for arthralgias and myalgias  All other systems reviewed and are negative  Historical Information   Past Medical History:   Diagnosis Date   • Alcohol abuse    • Arthritis    • Asthma    • Bipolar disorder ([place] [address] )    • Diabetes mellitus ([place] [address] )    • Opiate abuse, continuous ([place] [address] )      Past Surgical History:   Procedure Laterality Date   • ABDOMINAL SURGERY      stomach uler with perforation     Social History     Substance and Sexual Activity   Alcohol Use Yes    Comment: 1 pint of vodka daily     Social History     Substance and Sexual Activity   Drug Use Yes    Comment: on methadone     Social History     Tobacco Use   Smoking Status Every Day   • Packs/day: 2 00   • Types: Cigarettes   Smokeless Tobacco Never       Family History: History reviewed  No pertinent family history      Meds/Allergies   current meds:   Current Facility-Administered Medications   Medication Dose Route Frequency   • aspirin (ECOTRIN LOW STRENGTH) EC tablet 81 mg  81 mg Oral Daily   • atorvastatin (LIPITOR) tablet 20 mg  20 mg Oral Daily With Dinner   • clopidogrel (PLAVIX) tablet 75 mg  75 mg Oral Daily   • enoxaparin (LOVENOX) subcutaneous injection 40 mg  40 mg Subcutaneous Daily   • famotidine (PEPCID) tablet 20 mg  20 mg Oral BID   • folic acid (FOLVITE) tablet 1 mg  1 mg Oral Daily   • insulin lispro (HumaLOG) 100 units/mL subcutaneous injection 1-5 Units  1-5 Units Subcutaneous TID AC   • insulin lispro (HumaLOG) 100 units/mL subcutaneous injection 1-5 Units  1-5 Units Subcutaneous HS   • ipratropium-albuterol (DUO-NEB) 0 5-2 5 mg/3 mL inhalation solution 3 mL  3 mL Nebulization Q6H   • lisinopril (ZESTRIL) tablet 2 5 mg  2 5 mg Oral Daily   • methadone (DOLOPHINE) tablet 45 mg  45 mg Oral Daily   • metoprolol tartrate (LOPRESSOR) tablet 25 mg  25 mg Oral Q12H [address]   • multivitamin stress formula tablet 1 tablet  1 tablet Oral Daily   • nicotine (NICODERM CQ) 21 mg/24 hr TD 24 hr patch 1 patch  1 patch Transdermal Daily   • thiamine tablet 100 mg  100 mg Oral Daily     No Known Allergies    Objective   Vitals: Blood pressure (!) 139/103, pulse 90, temperature 97 5 °F (36 4 °C), resp  rate 16, height 4' 11" (1 499 m), weight 44 3 kg (97 lb 10 6 oz), SpO2 98 %, not currently breastfeeding , Body mass index is 19 73 kg/m²  Physical Exam   Constitutional: She appears healthy  No distress  Eyes: Pupils are equal, round, and reactive to light  Conjunctivae are normal    Neck: No JVD present  Cardiovascular: Normal rate, regular rhythm and normal heart sounds  Exam reveals no gallop and no friction rub  No murmur heard  Pulmonary/Chest: Effort normal and breath sounds normal  She has no wheezes  She has no rales  Distant breath sounds   Musculoskeletal:         General: No tenderness, deformity or edema  Cervical back: Normal range of motion and neck supple  Neurological: She is alert and oriented to person, place, and time  Skin: Skin is warm and dry         Lab Results:     Troponins:       CBC with diff:   Results from last 7 days   Lab Units 11/25/22 0553 11/24/22  1513 11/24/22  0838   WBC Thousand/uL 6 56  --  11 38*   HEMOGLOBIN g/dL 13 8  --  14 2   I STAT HEMOGLOBIN g/dl  --  13 6  --    HEMATOCRIT % 41 7  --  46 0   HEMATOCRIT, ISTAT %  --  40  --    MCV fL 88  --  96   PLATELETS Thousands/uL 249  --  281   MCH pg 29 3  --  29 8   MCHC g/dL 33 1  --  30 9*   RDW % 14 9  --  15 6*   MPV fL 10 4  --  10 8   NRBC AUTO /100 WBCs 0  --  0       CMP:   Results from last 7 days   Lab Units 11/25/22  0553 11/24/22  1513 11/24/22  0838   POTASSIUM mmol/L 3 7  --  4 3   CHLORIDE mmol/L 97  -- 106   CO2 mmol/L 31  --  21   CO2, I-STAT mmol/L  --  34*  --    BUN mg/dL 11  --  12   CREATININE mg/dL 0 67  --  0 79   GLUCOSE, ISTAT mg/dl  --  85  --    CALCIUM mg/dL 8 4  --  7 7*   AST U/L  --   --  104*   ALT U/L  --   --  36   ALK PHOS U/L  --   --  242*   EGFR ml/min/1 73sq m 95  --  81       Magnesium:   Results from last 7 days   Lab Units 11/25/22  0553   MAGNESIUM mg/dL 1 6       Coags:       Lipid Profile:         Cardiac testing: All previous testing has been reviewed  See HPI for summary        EKG: Personally reviewed: sinus tachycardia with LVH    Imaging: I have personally reviewed pertinent films in PACS

## 2022-11-25 NOTE — QUICK NOTE
[facility] called and methadone dosing confirmed  Patient takes 44mg as confirmed by [facility]  Patient signed release of information request which was faxed to [facility]

## 2022-11-25 NOTE — ASSESSMENT & PLAN NOTE
· Significant smoking history   · 2PPD smoking history, although patient is a poor historian  · Nicotine patch  · Encourage smoking cessation

## 2022-11-25 NOTE — UTILIZATION REVIEW
Initial Clinical Review    Admission: Date/Time/Statement:   Admission Orders (From admission, onward)     Ordered        11/24/22 1157  INPATIENT ADMISSION  Once                      Orders Placed This Encounter   Procedures   • INPATIENT ADMISSION     Standing Status:   Standing     Number of Occurrences:   1     Order Specific Question:   Level of Care     Answer:   Level 1 Stepdown [13]     Order Specific Question:   Estimated length of stay     Answer:   More than 2 Midnights     Order Specific Question:   Certification     Answer:   I certify that inpatient services are medically necessary for this patient for a duration of greater than two midnights  See H&P and MD Progress Notes for additional information about the patient's course of treatment  ED Arrival Information     Expected   -    Arrival   11/24/2022 08:06    Acuity   Emergent            Means of arrival   Ambulance    Escorted by   [address] [place] [address]/ICU    Admission type   Emergency            Arrival complaint   chest pain           Chief Complaint   Patient presents with   • Shortness of Breath   • Chest Pain     Pt  Comes in with medics short of breath and stating difuse pain in chest and all over  States she    hasn't  taken methadone   since tuesday       Initial Presentation: 63 y o  female , presented to the ED @ [facility], from home, via EMS  Admitted as Inpatient due to Acute Respiratory Failure with Hypoxia & Hypercapnia  Date: 11/24/2022   Presents with respiratory distress and chest pain  Patient arrived to the ICU agitated, respiratory distress, uncooperative  Given ativan and placed on BiPAP  CXR concerning for possible vascular congestion, no obvious infiltrate  SIRs criteria met on arrival secondary to tachycardia, tachypnea and elevated lactic acid  Mentation and respiratory status improved on BiPAP but patient remains a poor historian  Reports 2 days of difficulty breathing   Otherwise, unable to obtain ROS  Patient given empiric antibiotics and lasix and steroids in the ER  Continue BiPAP for now, goal to wean to off when able  Repeat ABG  Continue solumedrol 40mg Q8  Scheduled duonebs  Follow up blood cultures  Given 40mg IV lasix in the ER with robust response  Close I&Os  Daily weights  Day 2: 11/25/2022  Weaned off Bipap yesterday afternoon, currently on room air  Attempted Bipap @ HS for recruitment, however patient unable to tolerate  Continue solumedrol 40mg Q8  Scheduled duonebs q6h  Schedule daily lasix dosing  Strict daily weight   I&O        ED Triage Vitals   Temperature Pulse Respirations Blood Pressure SpO2   11/24/22 0819 11/24/22 0819 11/24/22 0819 11/24/22 0819 11/24/22 0819   (!) 97 1 °F (36 2 °C) (!) 143 (!) 27 (!) 211/110 98 %      Temp Source Heart Rate Source Patient Position - Orthostatic VS BP Location FiO2 (%)   11/24/22 0819 11/24/22 1200 11/24/22 1200 11/24/22 1200 11/24/22 0827   Tympanic Monitor Lying Left arm 50      Pain Score       11/24/22 1436       No Pain          Wt Readings from Last 1 Encounters:   11/25/22 44 3 kg (97 lb 10 6 oz)     Additional Vital Signs:   Date/Time Temp Pulse Resp BP MAP (mmHg) SpO2 FiO2 (%) Calculated FIO2 (%) - Nasal Cannula Nasal Cannula O2 Flow Rate (L/min) O2 Device O2 Interface Device Patient Position - Orthostatic VS   11/25/22 1200 97 9 °F (36 6 °C) 93 14 150/105 Abnormal  122 94 % -- -- -- -- -- --   11/25/22 1100 -- 90 16 139/103 Abnormal  117 98 % -- -- -- -- -- --   11/25/22 0927 -- 96 23 Abnormal  137/95 -- 100 % -- -- -- -- -- --   11/25/22 0900 -- 93 14 137/95 113 100 % -- -- -- -- -- --   11/25/22 0800 -- 92 29 Abnormal  135/87 107 96 % -- -- -- -- -- --   11/25/22 0755 97 5 °F (36 4 °C) 92 38 Abnormal  140/99 -- 97 % -- -- -- -- -- --   11/25/22 0700 97 5 °F (36 4 °C) -- -- -- -- -- -- -- -- -- -- --   11/25/22 0400 -- 84 12 121/81 97 90 % -- -- -- -- -- --   11/25/22 0300 -- 89 16 140/106 Abnormal  119 96 % -- -- -- -- -- --   11/25/22 0200 -- 84 12 125/86 102 97 % -- -- -- -- -- --   11/25/22 0135 -- -- -- -- -- 97 % -- 28 2 L/min Nasal cannula -- --   11/25/22 0014 97 8 °F (36 6 °C) -- -- -- -- -- -- -- -- -- -- --   11/25/22 0000 -- 92 13 163/93 121 97 % -- -- -- -- -- --   11/24/22 2300 -- 94 15 141/97 115 96 % -- -- -- -- -- --   11/24/22 2200 -- 92 15 132/92 103 98 % -- -- -- -- -- --   11/24/22 2100 -- 92 13 144/94 111 98 % -- -- -- -- -- --   11/24/22 2000 97 4 °F (36 3 °C) Abnormal  -- -- -- -- -- -- -- -- -- -- --   11/24/22 1949 -- -- -- -- -- 98 % -- 28 2 L/min Nasal cannula -- --   11/24/22 1907 97 2 °F (36 2 °C) Abnormal  92 29 Abnormal  109/81 91 98 % -- -- -- -- -- --   11/24/22 1800 -- 103 21 117/87 97 97 % -- -- -- -- -- --   11/24/22 1700 -- 94 12 125/83 100 95 % -- -- -- -- -- --   11/24/22 1600 -- 94 14 130/99 110 97 % -- -- -- -- -- --   11/24/22 1519 -- -- -- -- -- -- -- 28 2 L/min Nasal cannula -- --   11/24/22 1510 -- 100 33 Abnormal  115/82 95 100 % -- -- -- -- -- --   11/24/22 1502 98 5 °F (36 9 °C) -- -- -- -- -- -- -- -- -- -- --   11/24/22 1436 97 9 °F (36 6 °C) 83 20 129/96 107 100 % 40 -- -- BiPAP -- Lying   11/24/22 1400 -- 82 20 102/65 78 100 % -- -- -- -- -- --   11/24/22 1330 -- 87 19 111/60 81 100 % -- -- -- -- -- --   11/24/22 1300 -- 90 16 136/76 100 100 % -- -- -- -- -- --   11/24/22 1230 -- 92 14 99/71 82 99 % -- -- -- BiPAP -- --   11/24/22 1200 -- 91 23 Abnormal  116/88 99 99 % -- -- -- BiPAP -- Lying   11/24/22 1130 -- 97 18 142/85 108 100 % -- -- -- -- -- --   11/24/22 1127 -- -- -- -- -- 99 % -- -- -- -- Face mask --   11/24/22 1115 -- 96 18 110/82 92 98 % -- -- -- -- -- --   11/24/22 1045 -- 100 16 110/83 -- 100 % -- -- -- -- -- --   11/24/22 1015 -- 111 Abnormal  14 115/82 95 98 % -- -- -- -- -- --   11/24/22 0927 -- -- -- -- -- 97 %  -- -- -- -- -- --   11/24/22 0915 -- 105 14 138/83 -- 97 % -- -- -- -- -- --   11/24/22 0906 -- -- -- -- -- 100 % -- -- -- -- -- --   11/24/22 0840 -- -- -- -- -- -- 50 -- -- BiPAP -- --   11/24/22 0827 -- -- -- -- -- 100 % 50 -- -- BiPAP Face mask --     Date and Time Eye Opening Best Verbal Response Best Motor Response [place] Coma Scale Score   11/25/22 1200 4 5 6 15   11/25/22 0800 4 5 6 15   11/25/22 0400 4 5 6 15   11/24/22 2356 4 5 6 15   11/24/22 1957 4 5 6 15   11/24/22 1600 4 5 6 15   11/24/22 1447 4 5 6 15   11/24/22 0921 4 5 6 15           Pertinent Labs/Diagnostic Test Results:   XR chest portable   Final Result by Valerie Ram MD (11/25 1233)      Mild pulmonary edema, significantly improved compared to 11/24/2022  XR chest 1 view portable   Final Result by Nuvia Cardoso MD (11/24 5327)      Moderate cardiomegaly and moderate interstitial edema          Results from last 7 days   Lab Units 11/24/22  0842   SARS-COV-2  Negative     Results from last 7 days   Lab Units 11/25/22  0553 11/24/22  1513 11/24/22  0838   WBC Thousand/uL 6 56  --  11 38*   HEMOGLOBIN g/dL 13 8  --  14 2   I STAT HEMOGLOBIN g/dl  --  13 6  --    HEMATOCRIT % 41 7  --  46 0   HEMATOCRIT, ISTAT %  --  40  --    PLATELETS Thousands/uL 249  --  281   NEUTROS ABS Thousands/µL 5 45  --  3 78     Results from last 7 days   Lab Units 11/25/22  0553 11/24/22  1513 11/24/22  0838   SODIUM mmol/L 136  --  141   POTASSIUM mmol/L 3 7  --  4 3   CHLORIDE mmol/L 97  --  106   CO2 mmol/L 31  --  21   CO2, I-STAT mmol/L  --  34*  --    ANION GAP mmol/L 8  --  14*   BUN mg/dL 11  --  12   CREATININE mg/dL 0 67  --  0 79   EGFR ml/min/1 73sq m 95  --  81   CALCIUM mg/dL 8 4  --  7 7*   CALCIUM, IONIZED, ISTAT mmol/L  --  1 05*  --    MAGNESIUM mg/dL 1 6  --   --    PHOSPHORUS mg/dL 3 4  --   --      Results from last 7 days   Lab Units 11/24/22  0838   AST U/L 104*   ALT U/L 36   ALK PHOS U/L 242*   TOTAL PROTEIN g/dL 6 9   ALBUMIN g/dL 2 7*   TOTAL BILIRUBIN mg/dL 0 60     Results from last 7 days   Lab Units 11/25/22  1105 11/25/22  0721 11/24/22  2135   POC GLUCOSE mg/dl 197* 158* 297*     Results from last 7 days   Lab Units 11/25/22  0553 11/24/22  0838   GLUCOSE RANDOM mg/dL 157* 234*     Results from last 7 days   Lab Units 11/24/22  0838   HEMOGLOBIN A1C % 5 8*   EAG mg/dl 120     BETA-HYDROXYBUTYRATE   Date Value Ref Range Status   05/25/2020 0 1 <0 6 mmol/L Final   07/10/2019 0 1 <0 6 mmol/L Final      Results from last 7 days   Lab Units 11/24/22  1105 11/24/22  0832   PH ESTHER  7 270* 7 035*   PCO2 ESTHER mm Hg 74 7* 99 9*   PO2 ESTHER mm Hg 24 5* 50 0*   HCO3 ESTHER mmol/L 33 5* 26 1   BASE EXC ESTHER mmol/L 4 0 -7 5   O2 CONTENT ESTHER ml/dL 7 1 13 8   O2 HGB, VENOUS % 35 1* 62 9     Results from last 7 days   Lab Units 11/24/22  1513   I STAT BASE EXC mmol/L 7*   I STAT O2 SAT % 78   ISTAT PH ART  7 445   I STAT ART PCO2 mm HG 47 5*   I STAT ART PO2 mm HG 41 0*   I STAT ART HCO3 mmol/L 32 6*     Results from last 7 days   Lab Units 11/24/22  1304 11/24/22  1105 11/24/22  0832   HS TNI 0HR ng/L  --   --  19   HS TNI 2HR ng/L  --  38  --    HSTNI D2 ng/L  --  19  --    HS TNI 4HR ng/L 47  --   --    HSTNI D4 ng/L 28*  --   --      Results from last 7 days   Lab Units 11/24/22  0832   PROCALCITONIN ng/ml 0 07     Results from last 7 days   Lab Units 11/24/22  1501 11/24/22  1304 11/24/22  1105 11/24/22  0832   LACTIC ACID mmol/L 2 5* 2 4* 2 5* 4 7*     Results from last 7 days   Lab Units 11/24/22  0838   NT-PRO BNP pg/mL 3,408*     Results from last 7 days   Lab Units 11/24/22  1540   CLARITY UA  Clear   COLOR UA  Light Yellow   SPEC GRAV UA  1 010   PH UA  6 0   GLUCOSE UA mg/dl Negative   KETONES UA mg/dl Negative   BLOOD UA  Negative   PROTEIN UA mg/dl Negative   NITRITE UA  Negative   BILIRUBIN UA  Negative   UROBILINOGEN UA E U /dl 0 2   LEUKOCYTES UA  Negative     Results from last 7 days   Lab Units 11/24/22  1525 11/24/22  0842   STREP PNEUMONIAE ANTIGEN, URINE  Negative  --    LEGIONELLA URINARY ANTIGEN  Negative  --    INFLUENZA A PCR   --  Negative   INFLUENZA B PCR   -- Negative   RSV PCR   --  Negative     Results from last 7 days   Lab Units 11/24/22  1525   AMPH/METH  Negative   BARBITURATE UR  Negative   BENZODIAZEPINE UR  Negative   COCAINE UR  Negative   METHADONE URINE  Positive*   OPIATE UR  Negative   PCP UR  Negative   THC UR  Positive*     Results from last 7 days   Lab Units 11/24/22  0832   ETHANOL LVL mg/dL <3     Results from last 7 days   Lab Units 11/24/22  0850   BLOOD CULTURE  Received in Microbiology Lab  Culture in Progress  Received in Microbiology Lab  Culture in Progress       ED Treatment:   Medication Administration from 11/24/2022 0806 to 11/24/2022 1437       Date/Time Order Dose Route Action     11/24/2022 0827 EST albuterol inhalation solution 10 mg 10 mg Nebulization Given     11/24/2022 0826 EST ipratropium (ATROVENT) 0 02 % inhalation solution 1 mg 1 mg Nebulization Given     11/24/2022 0827 EST sodium chloride 0 9 % inhalation solution 3 mL 3 mL Nebulization Given     11/24/2022 0906 EST cefTRIAXone (ROCEPHIN) IVPB (premix in dextrose) 1,000 mg 50 mL 1,000 mg Intravenous New Bag     11/24/2022 0909 EST azithromycin (ZITHROMAX) 500 mg in sodium chloride 0 9% 250mL IVPB 500 mg 500 mg Intravenous New Bag     11/24/2022 0828 EST LORazepam (ATIVAN) injection 1 mg 1 mg Intravenous Given     11/24/2022 4378 EST furosemide (LASIX) injection 40 mg 40 mg Intravenous Given     11/24/2022 1023 EST sodium chloride 0 9 % bolus 1,000 mL 1,000 mL Intravenous New Bag     11/24/2022 1024 EST sodium chloride 0 9 % bolus 500 mL 500 mL Intravenous New Bag     11/24/2022 1407 EST methylPREDNISolone sodium succinate (Solu-MEDROL) injection 125 mg 125 mg Intravenous Given        Past Medical History:   Diagnosis Date   • Alcohol abuse    • Arthritis    • Asthma    • Bipolar disorder ([facility] )    • Diabetes mellitus ([facility] )    • [facility]      Present on Admission:  • SIRS (systemic inflammatory response syndrome) (HCC)  • Ischemic cardiomyopathy  • Bipolar disorder ([facility] )  • CAD (coronary artery disease)  • Essential hypertension  • Acute respiratory failure with hypoxia and hypercapnia (HCC)  • Polysubstance abuse (HCC)  • Suspected COPD with acute exacerbation (HCC)  • Acute on chronic combined systolic and diastolic heart failure (HCC)  • Dyslipidemia      Admitting Diagnosis: Chest pain [R07 9]  SOB (shortness of breath) [R06 02]  COPD exacerbation (HCC) [J44 1]  Acute respiratory failure with hypercapnia (HCC) [J96 02]  Age/Sex: 63 y o  female  Admission Orders:  CV diet  Fall precautions  Up with assistance  Turn [id number]  Daily weight / I&O  Consult PT/OT  Kevin SCDs    Scheduled Medications:  aspirin, 81 mg, Oral, Daily  atorvastatin, 20 mg, Oral, Daily With Dinner  clopidogrel, 75 mg, Oral, Daily  enoxaparin, 40 mg, Subcutaneous, Daily  famotidine, 20 mg, Oral, BID  folic acid, 1 mg, Oral, Daily  insulin lispro, 1-5 Units, Subcutaneous, TID AC  insulin lispro, 1-5 Units, Subcutaneous, HS  ipratropium-albuterol, 3 mL, Nebulization, Q6H  lisinopril, 2 5 mg, Oral, Daily  methadone, 45 mg, Oral, Daily  metoprolol tartrate, 25 mg, Oral, Q12H ALEC  multivitamin stress formula, 1 tablet, Oral, Daily  nicotine, 1 patch, Transdermal, Daily  thiamine, 100 mg, Oral, Daily      Continuous IV Infusions:     PRN Meds:       IP CONSULT TO CASE MANAGEMENT  IP CONSULT TO CARDIOLOGY    Network Utilization Review Department  ATTENTION: Please call with any questions or concerns to [phone] and carefully listen to the prompts so that you are directed to the right person  All voicemails are confidential   Daisha Carballo all requests for admission clinical reviews, approved or denied determinations and any other requests to dedicated fax number below belonging to the campus where the patient is receiving treatment   List of dedicated fax numbers for the Facilities:  [address] DENIALS (Administrative/Medical Necessity) [phone]   [address] [place] [address] (Maternity/NICU/Pediatrics) Cece Cooper [address] Rylie Dillard  [phone]   [address] [place] [address] [facility] [address] [facility] [address] [place] [phone]

## 2022-11-25 NOTE — UTILIZATION REVIEW
Initial Clinical Review    Admission: Date/Time/Statement:   Admission Orders (From admission, onward)     Ordered        11/24/22 1157  INPATIENT ADMISSION  Once                      Orders Placed This Encounter   Procedures   • INPATIENT ADMISSION     Standing Status:   Standing     Number of Occurrences:   1     Order Specific Question:   Level of Care     Answer:   Level 1 Stepdown [13]     Order Specific Question:   Estimated length of stay     Answer:   More than 2 Midnights     Order Specific Question:   Certification     Answer:   I certify that inpatient services are medically necessary for this patient for a duration of greater than two midnights  See H&P and MD Progress Notes for additional information about the patient's course of treatment  ED Arrival Information     Expected   -    Arrival   11/24/2022 08:06    Acuity   Emergent            Means of arrival   Ambulance    Escorted by   [address] [place] [address]/ICU    Admission type   Emergency            Arrival complaint   chest pain           Chief Complaint   Patient presents with   • Shortness of Breath   • Chest Pain     Pt  Comes in with medics short of breath and stating difuse pain in chest and all over  States she    hasn't  taken methadone   since tuesday       Initial Presentation: 63 y o  female with PMH of CAD s/p PCI to RCA in 2021, ischemic cardiomyopathy, DM2, HTN, smoking history, polysubstance use history currently on methadone, who presents to the ED from home with respiratory distress and chest pain  Patient arrived to the ICU agitated, in respiratory distress  Given ativan and placed on BiPAP  CXR concerning for possible vascular congestion, no obvious infiltrate  SIRs criteria met on arrival secondary to tachycardia, tachypnea and elevated lactic acid  Mentation and respiratory status improved on BiPAP but patient remains a poor historian  Reports two days of difficulty breathing   PE:  Alert, did not answer questions  Rales, wheezing, decreased breath sounds  11/24 Plan: Inpatient admission for evaluation and treatment of acute respiratory failure with hypoxia and hypercapnia, likely due to heart failure exacerbation/COPD exacerbation, SIRS, polysubstance abuse:  Continue BiPap, wean as able, repeat ABG, continue Solu-Medrol 40 mg IV Q8H, Duonebs  Monitor off antibiotics, check Strep pneumo and Legionella urine antigens, recheck procalcitonin  Confirm methadone dose and continue home regimen  11/25 Critical Care: Weaned off BiPap yesterday afternoon, currently on room air  Trialed BiPap at HS, patient unable to tolerated  Continue Solu_Medrol and Duonebs  Acute on chronic combined systolic and diastolic heart failure, schedule daily Lasix, I/O, daily weight, low sodium diet  Resume home beta blocker at reduced dose, resume home lisinopril  UA bland, blood cultures pending  Call today to verify methadone dose  PE: AOx3, lethargic, easily aroused  GCS 15  Rales present         ED Triage Vitals   Temperature Pulse Respirations Blood Pressure SpO2   11/24/22 0819 11/24/22 0819 11/24/22 0819 11/24/22 0819 11/24/22 0819   (!) 97 1 °F (36 2 °C) (!) 143 (!) 27 (!) 211/110 98 %      Temp Source Heart Rate Source Patient Position - Orthostatic VS BP Location FiO2 (%)   11/24/22 0819 11/24/22 1200 11/24/22 1200 11/24/22 1200 11/24/22 0827   Tympanic Monitor Lying Left arm 50      Pain Score       11/24/22 1436       No Pain          Wt Readings from Last 1 Encounters:   11/25/22 44 3 kg (97 lb 10 6 oz)     Additional Vital Signs:     Date/Time Temp Pulse Resp BP MAP SpO2 FiO2 (%) Calculated FIO2 (%) - Nasal Cannula Nasal Cannula O2 Flow Rate (L/min) O2 Device   11/25/22 1343 -- -- -- -- -- 95 % -- -- -- --   11/25/22 1200 97 9 °F (36 6 °C) 93 14 150/105 Abnormal  122 94 % -- -- -- --   11/25/22 1100 -- 90 16 139/103 Abnormal  117 98 % -- -- -- --   11/25/22 0927 -- 96 23 Abnormal  137/95 -- 100 % -- -- -- --   11/25/22 0900 -- 93 14 137/95 113 100 % -- -- -- --   11/25/22 0800 -- 92 29 Abnormal  135/87 107 96 % -- -- -- --   11/25/22 0755 97 5 °F (36 4 °C) 92 38 Abnormal  140/99 -- 97 % -- -- -- --   11/25/22 0700 97 5 °F (36 4 °C) -- -- -- -- -- -- -- -- --   11/25/22 0400 -- 84 12 121/81 97 90 % -- -- -- --   11/25/22 0300 -- 89 16 140/106 Abnormal  119 96 % -- -- -- --   11/25/22 0200 -- 84 12 125/86 102 97 % -- -- -- --   11/25/22 0135 -- -- -- -- -- 97 % -- 28 2 L/min Nasal cannula   11/25/22 0014 97 8 °F (36 6 °C) -- -- -- -- -- -- -- -- --   11/25/22 0000 -- 92 13 163/93 121 97 % -- -- -- --   11/24/22 2300 -- 94 15 141/97 115 96 % -- -- -- --   11/24/22 2200 -- 92 15 132/92 103 98 % -- -- -- --   11/24/22 2100 -- 92 13 144/94 111 98 % -- -- -- --   11/24/22 2000 97 4 °F (36 3 °C) Abnormal  -- -- -- -- -- -- -- -- --   11/24/22 1949 -- -- -- -- -- 98 % -- 28 2 L/min Nasal cannula   11/24/22 1907 97 2 °F (36 2 °C) Abnormal  92 29 Abnormal  109/81 91 98 % -- -- -- --   11/24/22 1800 -- 103 21 117/87 97 97 % -- -- -- --   11/24/22 1700 -- 94 12 125/83 100 95 % -- -- -- --   11/24/22 1600 -- 94 14 130/99 110 97 % -- -- -- --   11/24/22 1519 -- -- -- -- -- -- -- 28 2 L/min Nasal cannula   11/24/22 1510 -- 100 33 Abnormal  115/82 95 100 % -- -- -- --   11/24/22 1502 98 5 °F (36 9 °C) -- -- -- -- -- -- -- -- --   11/24/22 1436 97 9 °F (36 6 °C) 83 20 129/96 107 100 % 40 -- -- BiPAP   11/24/22 1400 -- 82 20 102/65 78 100 % -- -- -- --   11/24/22 1330 -- 87 19 111/60 81 100 % -- -- -- --   11/24/22 1300 -- 90 16 136/76 100 100 % -- -- -- --   11/24/22 1230 -- 92 14 99/71 82 99 % -- -- -- BiPAP   11/24/22 1200 -- 91 23 Abnormal  116/88 99 99 % -- -- -- BiPAP   11/24/22 1130 -- 97 18 142/85 108 100 % -- -- -- --   11/24/22 1127 -- -- -- -- -- 99 % -- -- -- --   11/24/22 1115 -- 96 18 110/82 92 98 % -- -- -- --   11/24/22 1045 -- 100 16 110/83 -- 100 % -- -- -- --   11/24/22 1015 -- 111 Abnormal  14 115/82 95 98 % -- -- -- -- 11/24/22 0078 -- -- -- -- -- 97 %  -- -- -- --   11/24/22 0915 -- 105 14 138/83 -- 97 % -- -- -- --   11/24/22 0906 -- -- -- -- -- 100 % -- -- -- --   11/24/22 0840 -- -- -- -- -- -- 50 -- -- BiPAP   11/24/22 0827 -- -- -- -- -- 100 % 50 -- -- BiPAP       Pertinent Labs/Diagnostic Test Results:       XR chest portable   Final Result by Husam Peters MD (11/25 1233)      Mild pulmonary edema, significantly improved compared to 11/24/2022  XR chest 1 view portable   Final Result by Priya Braxton MD (11/24 4579)      Moderate cardiomegaly and moderate interstitial edema  11/25 ECHO:    •  Left Ventricle: Left ventricular cavity size is mildly dilated  Wall thickness is normal  The left ventricular ejection fraction is 22% by biplane measurement  Systolic function is severely reduced  There is severe global hypokinesis  Diastolic function is moderately abnormal, consistent with grade II (pseudonormal) relaxation  Left atrial filling pressure is elevated  •  Right Ventricle: Systolic function is mildly reduced  •  Left Atrium: The atrium is moderately dilated (42-48 mL/m2)  •  Right Atrium: The atrium is mildly dilated  •  Mitral Valve: There is mild thickening  The leaflets are not calcified  There is moderately reduced mobility of the posterior leaflet  There is mild to moderate regurgitation  •  Tricuspid Valve: There is severe regurgitation  The right ventricular systolic pressure is severely elevated  The estimated right ventricular systolic pressure is 13 36 mmHg  •  Pulmonic Valve: There is mild regurgitation  •  Compared to previous exam, LV function remains severely depressed  Filling pressures are increased        11/24 EKG:    Sinus tachycardia  Right atrial enlargement  Minimal voltage criteria for LVH, may be normal variant ( Sokolow-Lu )  Nonspecific ST and T wave abnormality  Abnormal ECG    Results from last 7 days   Lab Units 11/24/22  0842   SARS-COV-2  Negative Results from last 7 days   Lab Units 11/25/22  0553 11/24/22  1513 11/24/22  0838   WBC Thousand/uL 6 56  --  11 38*   HEMOGLOBIN g/dL 13 8  --  14 2   I STAT HEMOGLOBIN g/dl  --  13 6  --    HEMATOCRIT % 41 7  --  46 0   HEMATOCRIT, ISTAT %  --  40  --    PLATELETS Thousands/uL 249  --  281   NEUTROS ABS Thousands/µL 5 45  --  3 78         Results from last 7 days   Lab Units 11/25/22  0553 11/24/22  1513 11/24/22  0838   SODIUM mmol/L 136  --  141   POTASSIUM mmol/L 3 7  --  4 3   CHLORIDE mmol/L 97  --  106   CO2 mmol/L 31  --  21   CO2, I-STAT mmol/L  --  34*  --    ANION GAP mmol/L 8  --  14*   BUN mg/dL 11  --  12   CREATININE mg/dL 0 67  --  0 79   EGFR ml/min/1 73sq m 95  --  81   CALCIUM mg/dL 8 4  --  7 7*   CALCIUM, IONIZED, ISTAT mmol/L  --  1 05*  --    MAGNESIUM mg/dL 1 6  --   --    PHOSPHORUS mg/dL 3 4  --   --      Results from last 7 days   Lab Units 11/24/22  0838   AST U/L 104*   ALT U/L 36   ALK PHOS U/L 242*   TOTAL PROTEIN g/dL 6 9   ALBUMIN g/dL 2 7*   TOTAL BILIRUBIN mg/dL 0 60     Results from last 7 days   Lab Units 11/25/22  1105 11/25/22  0721 11/24/22  2135   POC GLUCOSE mg/dl 197* 158* 297*     Results from last 7 days   Lab Units 11/25/22  0553 11/24/22  0838   GLUCOSE RANDOM mg/dL 157* 234*         Results from last 7 days   Lab Units 11/24/22  0838   HEMOGLOBIN A1C % 5 8*   EAG mg/dl 120     BETA-HYDROXYBUTYRATE   Date Value Ref Range Status   05/25/2020 0 1 <0 6 mmol/L Final   07/10/2019 0 1 <0 6 mmol/L Final          Results from last 7 days   Lab Units 11/24/22  1105 11/24/22  0832   PH ESTHER  7 270* 7 035*   PCO2 ESTHER mm Hg 74 7* 99 9*   PO2 ESTHER mm Hg 24 5* 50 0*   HCO3 ESTHER mmol/L 33 5* 26 1   BASE EXC ESTHER mmol/L 4 0 -7 5   O2 CONTENT ESTHER ml/dL 7 1 13 8   O2 HGB, VENOUS % 35 1* 62 9     Results from last 7 days   Lab Units 11/24/22  1513   I STAT BASE EXC mmol/L 7*   I STAT O2 SAT % 78   ISTAT PH ART  7 445   I STAT ART PCO2 mm HG 47 5*   I STAT ART PO2 mm HG 41 0*   I STAT ART HCO3 mmol/L 32 6*         Results from last 7 days   Lab Units 11/24/22  1304 11/24/22  1105 11/24/22  0832   HS TNI 0HR ng/L  --   --  19   HS TNI 2HR ng/L  --  38  --    HSTNI D2 ng/L  --  19  --    HS TNI 4HR ng/L 47  --   --    HSTNI D4 ng/L 28*  --   --        Results from last 7 days   Lab Units 11/24/22  0832   PROCALCITONIN ng/ml 0 07     Results from last 7 days   Lab Units 11/24/22  1501 11/24/22  1304 11/24/22  1105 11/24/22  0832   LACTIC ACID mmol/L 2 5* 2 4* 2 5* 4 7*             Results from last 7 days   Lab Units 11/24/22  0838   NT-PRO BNP pg/mL 3,408*         Results from last 7 days   Lab Units 11/24/22  1540   CLARITY UA  Clear   COLOR UA  Light Yellow   SPEC GRAV UA  1 010   PH UA  6 0   GLUCOSE UA mg/dl Negative   KETONES UA mg/dl Negative   BLOOD UA  Negative   PROTEIN UA mg/dl Negative   NITRITE UA  Negative   BILIRUBIN UA  Negative   UROBILINOGEN UA E U /dl 0 2   LEUKOCYTES UA  Negative     Results from last 7 days   Lab Units 11/24/22  1525 11/24/22  0842   STREP PNEUMONIAE ANTIGEN, URINE  Negative  --    LEGIONELLA URINARY ANTIGEN  Negative  --    INFLUENZA A PCR   --  Negative   INFLUENZA B PCR   --  Negative   RSV PCR   --  Negative         Results from last 7 days   Lab Units 11/24/22  1525   AMPH/METH  Negative   BARBITURATE UR  Negative   BENZODIAZEPINE UR  Negative   COCAINE UR  Negative   METHADONE URINE  Positive*   OPIATE UR  Negative   PCP UR  Negative   THC UR  Positive*     Results from last 7 days   Lab Units 11/24/22  0832   ETHANOL LVL mg/dL <3                 Results from last 7 days   Lab Units 11/24/22  0850   BLOOD CULTURE  Received in Microbiology Lab  Culture in Progress  Received in Microbiology Lab  Culture in Progress         ED Treatment:   Medication Administration from 11/24/2022 0806 to 11/24/2022 1437       Date/Time Order Dose Route Action     11/24/2022 0827 EST albuterol inhalation solution 10 mg 10 mg Nebulization Given     11/24/2022 0826 EST ipratropium (ATROVENT) 0 02 % inhalation solution 1 mg 1 mg Nebulization Given     11/24/2022 0827 EST sodium chloride 0 9 % inhalation solution 3 mL 3 mL Nebulization Given     11/24/2022 1003 EST HYDROmorphone (DILAUDID) injection 1 mg 0 mg Intravenous Hold     11/24/2022 1024 EST cefTRIAXone (ROCEPHIN) IVPB (premix in dextrose) 1,000 mg 50 mL 0 mg Intravenous Stopped     11/24/2022 0906 EST cefTRIAXone (ROCEPHIN) IVPB (premix in dextrose) 1,000 mg 50 mL 1,000 mg Intravenous New Bag     11/24/2022 1024 EST azithromycin (ZITHROMAX) 500 mg in sodium chloride 0 9% 250mL IVPB 500 mg 0 mg Intravenous Stopped     11/24/2022 0909 EST azithromycin (ZITHROMAX) 500 mg in sodium chloride 0 9% 250mL IVPB 500 mg 500 mg Intravenous New Bag     11/24/2022 0828 EST LORazepam (ATIVAN) injection 1 mg 1 mg Intravenous Given     11/24/2022 7619 EST furosemide (LASIX) injection 40 mg 40 mg Intravenous Given     11/24/2022 1140 EST sodium chloride 0 9 % bolus 1,000 mL 0 mL Intravenous Stopped     11/24/2022 1023 EST sodium chloride 0 9 % bolus 1,000 mL 1,000 mL Intravenous New Bag     11/24/2022 1140 EST sodium chloride 0 9 % bolus 500 mL 0 mL Intravenous Stopped     11/24/2022 1024 EST sodium chloride 0 9 % bolus 500 mL 500 mL Intravenous New Bag     11/24/2022 1407 EST methylPREDNISolone sodium succinate (Solu-MEDROL) injection 125 mg 125 mg Intravenous Given        Past Medical History:   Diagnosis Date   • Alcohol abuse    • Arthritis    • Asthma    • Bipolar disorder ([facility] )    • Diabetes mellitus ([facility] )    • Opiate abuse, continuous ([facility] )      Present on Admission:  • SIRS (systemic inflammatory response syndrome) ([facility] )  • Ischemic cardiomyopathy  • Bipolar disorder ([facility] )  • CAD (coronary artery disease)  • Essential hypertension  • Acute respiratory failure with hypoxia and hypercapnia (HCC)  • Polysubstance abuse ([facility] )  • Suspected COPD with acute exacerbation ([facility] )  • Acute on chronic combined systolic and diastolic heart failure ([facility] )  • Dyslipidemia      Admitting Diagnosis: Chest pain [R07 9]  SOB (shortness of breath) [R06 02]  COPD exacerbation (HCC) [J44 1]  Acute respiratory failure with hypercapnia (HCC) [J96 02]  Age/Sex: 63 y o  female       Admission Orders:  Cardio-Pulmonary monitoring, BiPap, SCD, PT/OT  Scheduled Medications:    aspirin, 81 mg, Oral, Daily  atorvastatin, 20 mg, Oral, Daily With Dinner  clopidogrel, 75 mg, Oral, Daily  enoxaparin, 40 mg, Subcutaneous, Daily  famotidine, 20 mg, Oral, BID  folic acid, 1 mg, Oral, Daily  insulin lispro, 1-5 Units, Subcutaneous, TID AC  insulin lispro, 1-5 Units, Subcutaneous, HS  ipratropium-albuterol, 3 mL, Nebulization, Q6H  lisinopril, 2 5 mg, Oral, Daily  methadone, 45 mg, Oral, Daily  metoprolol tartrate, 25 mg, Oral, Q12H ALEC  multivitamin stress formula, 1 tablet, Oral, Daily  nicotine, 1 patch, Transdermal, Daily  thiamine, 100 mg, Oral, Daily      methylPREDNISolone sodium succinate (Solu-MEDROL) injection 40 mg  Dose: 40 mg  Freq: Every 8 hours scheduled Route: IV  Start: 11/24/22 1800 End: 11/25/22 1137    Continuous IV Infusions:  None  PRN Meds: None  IP CONSULT TO CASE MANAGEMENT  IP CONSULT TO CARDIOLOGY        Network Utilization Review Department  ATTENTION: Please call with any questions or concerns to [phone] and carefully listen to the prompts so that you are directed to the right person  All voicemails are confidential   Yanna Eis all requests for admission clinical reviews, approved or denied determinations and any other requests to dedicated fax number below belonging to the campus where the patient is receiving treatment   List of dedicated fax numbers for the Facilities:  [address] DENIALS (Administrative/Medical Necessity) [phone]   [facility] 16Health system (Maternity/NICU/Pediatrics) Cece Cooper [facility] [phone]   [facility] [phone]   [facility] [address] [address] Medical [place] [address] [place] [address] [place] [id number] [place] [address] 28 [address] [facility] 310 [facility] [address] [phone]

## 2022-11-25 NOTE — PHYSICAL THERAPY NOTE
Attempted PT evaluation however pt declined stating she is not feeling well today  Will re-attempt 11/26     Barbra Bustamante PT  [id number]

## 2022-11-25 NOTE — QUICK NOTE
Patient's son updated via phone  All questions answered to his satisfaction  He is aware she will be downgraded from ICU today

## 2022-11-25 NOTE — CASE MANAGEMENT
Case Management Assessment & Discharge Planning Note    Patient name Pao Melvin  Location ICU 12/ICU 12 MRN [id number]  : 1960 Date 2022       Current Admission Date: 2022  Current Admission Diagnosis:Acute respiratory failure with hypoxia and hypercapnia [facility])   Patient Active Problem List    Diagnosis Date Noted   • SIRS (systemic inflammatory response syndrome) ([facility] ) 2022   • CAD (coronary artery disease) 2022   • Acute respiratory failure with hypoxia and hypercapnia ([facility] ) 2022   • Smoking hx 2022   • Suspected COPD with acute exacerbation ([facility] ) 2022   • Acute on chronic combined systolic and diastolic heart failure ([facility] ) 2022   • Bipolar disorder ([facility] )    • Transaminitis 11/15/2021   • Ischemic cardiomyopathy 11/10/2021   • Dyslipidemia 2020   • Elevated AST (SGOT) 2020   • Essential hypertension 2020   • Asthma    • Polysubstance abuse ([facility] ) 07/10/2019   • History of pulmonary embolism 07/10/2019   • Type 2 diabetes mellitus with complication, with long-term current use of insulin ([facility] ) 2019      LOS (days): 1  Geometric Mean LOS (GMLOS) (days):   Days to GMLOS:     OBJECTIVE:    Risk of Unplanned Readmission Score: 21 1     Current admission status: Inpatient  Referral Reason: Other (Disposition planning)    Preferred Pharmacy:   [address] [facility] ([address] [place] [address])  [address] [address] [place] [address] 70 ([address] [place] [address])  [place] [address]  Phone: [phone] Fax: [phone]    Primary Care Provider: Kipp Severs, MD    Primary Insurance: [address] [place], [place] [address]  Secondary Insurance:     ASSESSMENT:  [address] [facility], [address] [facility]   Primary Phone: [phone] (Mobile)               Advance Directives  Does patient have a [address] [place] [address] Avenue?: Yes  Does patient have Advance Directives?: Yes  Advance Directives: Power of  for health care  Primary Contact: Jaziel Ferrell    Readmission Root Cause  30 Day Readmission: No    Patient Information  Admitted from[de-identified] Home  Mental Status: Alert  During Assessment patient was accompanied by: Not accompanied during assessment  Assessment information provided by[de-identified] Patient  Primary Caregiver: Self  Support Systems: Self, Son, Family members  [place] of Residence: [address] [place] [address] do you live in?: [address] [place] [address] Road entry access options   Select all that apply : Stairs  Number of steps to enter home : 4  Do the steps have railings?: Yes  Type of Current Residence: Apartment  Floor Level: 1  Upon entering residence, is there a bedroom on the main floor (no further steps)?: Yes  Upon entering residence, is there a bathroom on the main floor (no further steps)?: Yes  In the last 12 months, was there a time when you were not able to pay the mortgage or rent on time?: No  In the last 12 months, how many places have you lived?: 1  In the last 12 months, was there a time when you did not have a steady place to sleep or slept in a shelter (including now)?: No  Homeless/housing insecurity resource given?: N/A  Living Arrangements: Lives Alone    Activities of Daily Living Prior to Admission  Functional Status: Independent  Completes ADLs independently?: Yes  Ambulates independently?: Yes  Does patient use assisted devices?: No  Does patient currently own DME?: No  Does patient have a history of Outpatient Therapy (PT/OT)?: No  Does the patient have a history of Short-Term Rehab?: No  Does patient have a history of HHC?: No  Does patient currently have [facility]?: No    Patient Information Continued  Income Source: Unemployed  Does patient have prescription coverage?: Yes ([facility])  Within the past 12 months, you worried that your food would run out before you got the money to buy more : Never true  Within the past 12 months, the food you bought just didn't last and you didn't have money to get more : Never true  Food insecurity resource given?: N/A  Does patient receive dialysis treatments?: No  Does patient have a history of substance abuse?: Yes  Historical substance use preference: "Crack" cocaine, Heroin  Is patient currently in treatment for substance abuse?: Yes (Methadone program at [facility])  Does patient have a history of Mental Health Diagnosis?: Yes (per chart Bipolar Disorder)  Is patient receiving treatment for mental health?: No  Patient declined treatment information  Means of Transportation  Means of Transport to Appts[de-identified] Other (Comment) ([address])  In the past 12 months, has lack of transportation kept you from medical appointments or from getting medications?: No  In the past 12 months, has lack of transportation kept you from meetings, work, or from getting things needed for daily living?: No  Was application for public transport provided?: N/A      DISCHARGE DETAILS:    Discharge planning discussed with[de-identified] Patient  Freedom of Choice: Yes  Comments - Freedom of Choice: SW met with pt to assess needs and discuss plans  Pt's plan is to return home to her apartment when discharged  Pt said she may need a cane due to pain and increasing difficulty walking  PT evaluation requested  Pt is currently using shuttle for transportation but said she finds it unreliable at times so she hesitates when making medical appointments  SW reinforced that [employer] [place] is available to her through her [address] and may be a more reliable transportation source  Pt said she is aware of [employer] option  No other discharge needs expressed at this time  SW offered assistance in future if needed  Will follow  JENAE was able to speak with pt's son to offer assistance if needed  Son requested update from physician about pt's condition and stated that pt will need ride home upon discharge    CM contacted family/caregiver?: Yes  Were Treatment Team discharge recommendations reviewed with patient/caregiver?: Yes  Did patient/caregiver verbalize understanding of patient care needs?: Yes  Were patient/caregiver advised of the risks associated with not following Treatment Team discharge recommendations?: Yes    Contacts  Patient Contacts: Thermon Liner  Relationship to Patient[de-identified] Family (son)  Contact Method: Phone  Phone Number: [phone]  Reason/Outcome: Discharge [address]         Is the patient interested in [facility] at discharge?: No    DME Referral Provided  Referral made for DME?:  (pending PT evaluation)    Other Referral/Resources/Interventions Provided:  Interventions: Transportation  Referral Comments: Will assist with ordering DME if needed after PT evaluation  Pt will also need [employer] ride home when discharged  Treatment Team Recommendation: Home  Discharge Destination Plan[de-identified] Home  Transport at Discharge :  Other (Comment) ([facility])

## 2022-11-25 NOTE — ASSESSMENT & PLAN NOTE
· CAD with PCI to RCA in 2021 after STEMI  · Continue ASA/statin/plavix  · Resume metoprolol at reduced dose  · EKG with sinus tach on admission, no acute ST changes  · No longer complaining of chest pain (likely related to resp distress on arrival)  · HS troponin 19--38--47

## 2022-11-25 NOTE — ASSESSMENT & PLAN NOTE
· No documented hx of COPD/no PFTs on file, however with extensive smoking hx and wheezing upon presentation to the ED  · Continue steroids; solumedrol 40mg q8h  · Continue duo-nebs q6h  · Outpatient pulm referral for PFTs  · Encourage smoking cessation

## 2022-11-25 NOTE — QUICK NOTE
Fernando Weldon updated via phone  We discussed patient's clinical improvement, now off all supplemental oxygen and resting comfortably  All questions have been answered to his satisfaction

## 2022-11-25 NOTE — ASSESSMENT & PLAN NOTE
Lab Results   Component Value Date    HGBA1C 5 6 11/09/2021       Recent Labs     11/24/22 2135   POCGLU 297*       Blood Sugar Average: Last 72 hrs:  (P) 297   · SSI   · Unclear compliance with home regimen of Lantus 6 units @ HS   · Hgb A1C pending this AM   · Goal BG less than 180  · Anticipate hyperglycemia in the setting of steroids

## 2022-11-25 NOTE — ASSESSMENT & PLAN NOTE
· Tachycardic, tachypneic with elevated lactic acid on admission  · CXR without obvious infiltrate  · Low suspicion for infectious etiology, likely related to respiratory distress from COPD and heart failure exacerbation   · procal negative x 1, repeat pending this AM   · No leukocytosis or fever  · Given ceftriaxone/azithro in the ER, holding off on continuing abx   · BC pending  · UA bland  · Strep pneumo/legionella urine antigens pending  · Flu/COVID/RSV pending   · Trend WBC/fever curve and procal

## 2022-11-25 NOTE — ASSESSMENT & PLAN NOTE
Acute respiratory failure with hypoxia and hypercarbia on admission 11/24 likely due to heart failure exacerbation/COPD exacerbation  CXR with evidence of volume overload, no discrete infiltrate  Hx of ischemic cardiomyopathy  Significant smoking history and probable COPD  Wheezing present on exam   · Initially presented in respiratory distress, tachyneic  Placed on BiPAP in the ER  · Initial VBG 7 03/99/50/26/-7  · Given 40mg IV lasix and steroids in the ER, treated empirically with ceftriaxone/azithro  · U/o with robust response to lasix    Plan:   · Weaned off Bipap yesterday afternoon, currently on room air     · Attempted Bipap @ HS for recruitment, however patient unable to tolerate   · Continue solumedrol 40mg Q8  · Scheduled duonebs q6h  · Schedule daily lasix dosing   · Pulmonary hygiene

## 2022-11-26 LAB
AMPHETAMINES SERPL QL SCN: NEGATIVE
ANION GAP SERPL CALCULATED.3IONS-SCNC: 10 MMOL/L (ref 4–13)
BARBITURATES UR QL: NEGATIVE
BENZODIAZ UR QL: NEGATIVE
BUN SERPL-MCNC: 15 MG/DL (ref 5–25)
CALCIUM SERPL-MCNC: 8.6 MG/DL (ref 8.3–10.1)
CHLORIDE SERPL-SCNC: 101 MMOL/L (ref 96–108)
CO2 SERPL-SCNC: 30 MMOL/L (ref 21–32)
COCAINE UR QL: NEGATIVE
CREAT SERPL-MCNC: 0.73 MG/DL (ref 0.6–1.3)
ERYTHROCYTE [DISTWIDTH] IN BLOOD BY AUTOMATED COUNT: 15.2 % (ref 11.6–15.1)
GFR SERPL CREATININE-BSD FRML MDRD: 89 ML/MIN/1.73SQ M
GLUCOSE SERPL-MCNC: 100 MG/DL (ref 65–140)
GLUCOSE SERPL-MCNC: 103 MG/DL (ref 65–140)
GLUCOSE SERPL-MCNC: 108 MG/DL (ref 65–140)
GLUCOSE SERPL-MCNC: 129 MG/DL (ref 65–140)
GLUCOSE SERPL-MCNC: 86 MG/DL (ref 65–140)
HCT VFR BLD AUTO: 41.6 % (ref 34.8–46.1)
HGB BLD-MCNC: 13.6 G/DL (ref 11.5–15.4)
MAGNESIUM SERPL-MCNC: 1.8 MG/DL (ref 1.6–2.6)
MCH RBC QN AUTO: 29.2 PG (ref 26.8–34.3)
MCHC RBC AUTO-ENTMCNC: 32.7 G/DL (ref 31.4–37.4)
MCV RBC AUTO: 89 FL (ref 82–98)
METHADONE UR QL: POSITIVE
MRSA NOSE QL CULT: NORMAL
OPIATES UR QL SCN: NEGATIVE
OXYCODONE+OXYMORPHONE UR QL SCN: NEGATIVE
PCP UR QL: NEGATIVE
PLATELET # BLD AUTO: 243 THOUSANDS/UL (ref 149–390)
PMV BLD AUTO: 10.1 FL (ref 8.9–12.7)
POTASSIUM SERPL-SCNC: 3.6 MMOL/L (ref 3.5–5.3)
RBC # BLD AUTO: 4.66 MILLION/UL (ref 3.81–5.12)
SODIUM SERPL-SCNC: 141 MMOL/L (ref 135–147)
THC UR QL: POSITIVE
WBC # BLD AUTO: 9.57 THOUSAND/UL (ref 4.31–10.16)

## 2022-11-26 RX ORDER — ASPIRIN 81 MG/1
81 TABLET ORAL DAILY
Qty: 30 TABLET | Refills: 0 | Status: SHIPPED | OUTPATIENT
Start: 2022-11-26 | End: 2022-11-28

## 2022-11-26 RX ORDER — METOPROLOL TARTRATE 50 MG/1
50 TABLET, FILM COATED ORAL EVERY 12 HOURS SCHEDULED
Status: DISCONTINUED | OUTPATIENT
Start: 2022-11-26 | End: 2022-11-27

## 2022-11-26 RX ORDER — CLOPIDOGREL BISULFATE 75 MG/1
75 TABLET ORAL DAILY
Qty: 30 TABLET | Refills: 1 | Status: SHIPPED | OUTPATIENT
Start: 2022-11-27

## 2022-11-26 RX ORDER — METOPROLOL SUCCINATE 50 MG/1
50 TABLET, EXTENDED RELEASE ORAL DAILY
Status: DISCONTINUED | OUTPATIENT
Start: 2022-11-27 | End: 2022-11-28 | Stop reason: HOSPADM

## 2022-11-26 RX ORDER — IPRATROPIUM BROMIDE AND ALBUTEROL SULFATE 2.5; .5 MG/3ML; MG/3ML
3 SOLUTION RESPIRATORY (INHALATION) 3 TIMES DAILY
Status: DISCONTINUED | OUTPATIENT
Start: 2022-11-27 | End: 2022-11-28 | Stop reason: HOSPADM

## 2022-11-26 RX ORDER — LISINOPRIL 5 MG/1
5 TABLET ORAL DAILY
Qty: 30 TABLET | Refills: 0 | Status: SHIPPED | OUTPATIENT
Start: 2022-11-27

## 2022-11-26 RX ORDER — METOPROLOL TARTRATE 50 MG/1
50 TABLET, FILM COATED ORAL EVERY 12 HOURS SCHEDULED
Qty: 60 TABLET | Refills: 1 | Status: SHIPPED | OUTPATIENT
Start: 2022-11-26 | End: 2022-11-28

## 2022-11-26 RX ORDER — ATORVASTATIN CALCIUM 40 MG/1
40 TABLET, FILM COATED ORAL
Status: DISCONTINUED | OUTPATIENT
Start: 2022-11-26 | End: 2022-11-28 | Stop reason: HOSPADM

## 2022-11-26 RX ORDER — FUROSEMIDE 10 MG/ML
20 INJECTION INTRAMUSCULAR; INTRAVENOUS ONCE
Status: COMPLETED | OUTPATIENT
Start: 2022-11-26 | End: 2022-11-26

## 2022-11-26 RX ORDER — ATORVASTATIN CALCIUM 20 MG/1
20 TABLET, FILM COATED ORAL
Qty: 30 TABLET | Refills: 0 | Status: SHIPPED | OUTPATIENT
Start: 2022-11-26 | End: 2022-12-26

## 2022-11-26 RX ORDER — NICOTINE 21 MG/24HR
1 PATCH, TRANSDERMAL 24 HOURS TRANSDERMAL DAILY
Qty: 28 PATCH | Refills: 0 | Status: SHIPPED | OUTPATIENT
Start: 2022-11-27

## 2022-11-26 RX ORDER — FUROSEMIDE 20 MG/1
20 TABLET ORAL DAILY
Qty: 30 TABLET | Refills: 0 | Status: SHIPPED | OUTPATIENT
Start: 2022-11-27

## 2022-11-26 RX ADMIN — FOLIC ACID 1 MG: 1 TABLET ORAL at 08:43

## 2022-11-26 RX ADMIN — CLOPIDOGREL BISULFATE 75 MG: 75 TABLET ORAL at 08:43

## 2022-11-26 RX ADMIN — FAMOTIDINE 20 MG: 20 TABLET ORAL at 08:43

## 2022-11-26 RX ADMIN — IPRATROPIUM BROMIDE AND ALBUTEROL SULFATE 3 ML: 2.5; .5 SOLUTION RESPIRATORY (INHALATION) at 07:12

## 2022-11-26 RX ADMIN — METHADONE HYDROCHLORIDE 45 MG: 10 TABLET ORAL at 08:42

## 2022-11-26 RX ADMIN — NICOTINE 1 PATCH: 21 PATCH, EXTENDED RELEASE TRANSDERMAL at 08:44

## 2022-11-26 RX ADMIN — FAMOTIDINE 20 MG: 20 TABLET ORAL at 17:05

## 2022-11-26 RX ADMIN — ATORVASTATIN CALCIUM 40 MG: 40 TABLET, FILM COATED ORAL at 17:05

## 2022-11-26 RX ADMIN — ENOXAPARIN SODIUM 40 MG: 40 INJECTION SUBCUTANEOUS at 08:43

## 2022-11-26 RX ADMIN — FUROSEMIDE 20 MG: 10 INJECTION, SOLUTION INTRAMUSCULAR; INTRAVENOUS at 10:48

## 2022-11-26 RX ADMIN — IPRATROPIUM BROMIDE AND ALBUTEROL SULFATE 3 ML: 2.5; .5 SOLUTION RESPIRATORY (INHALATION) at 13:06

## 2022-11-26 RX ADMIN — B-COMPLEX W/ C & FOLIC ACID TAB 1 TABLET: TAB at 08:43

## 2022-11-26 RX ADMIN — THIAMINE HCL TAB 100 MG 100 MG: 100 TAB at 08:43

## 2022-11-26 RX ADMIN — IPRATROPIUM BROMIDE AND ALBUTEROL SULFATE 3 ML: 2.5; .5 SOLUTION RESPIRATORY (INHALATION) at 20:23

## 2022-11-26 NOTE — PHYSICAL THERAPY NOTE
11/26/22 0915   Note Type   Note type Evaluation   Pain Assessment   Pain Assessment Tool 0-10   Pain Score No Pain   Restrictions/Precautions   Other Precautions Fall Risk;Pain; Chair Alarm; Bed Alarm   Home Living   Type of [address] level  (4 [address]NAYA)   Home Equipment Other (Comment)  (none)   Prior Function   Level of [place] Independent with ADLs; Independent with functional mobility   Lives With Alone   General   Additional Pertinent History Pt admitted with acute respiratory failure with hypoxia  Pt was in ICU and transferred to the telemetry floor yesterday  Pt reports she is feeling better  Cognition   Overall Cognitive Status WFL   Following Commands Follows one step commands without difficulty   Subjective   Subjective "I think I need a cane"   RLE Assessment   RLE Assessment   (ROM WFL, MMT 4/5)   LLE Assessment   LLE Assessment   (ROM WFL, MMT 4/5)   Transfers   Sit to Stand 4  Minimal assistance   Stand to Sit 4  Minimal assistance   Stand pivot 4  Minimal assistance   Ambulation/Elevation   Gait pattern   (unsteady)   Gait Assistance 4  Minimal assist   Assistive Device   (none)   Distance 20 feet   Stair Management Assistance 4  Minimal assist  (for LOB posterior)   Stair Management Technique Two rails   Number of Stairs 3   Balance   Static Sitting Fair   Static Standing Fair   Dynamic Standing Poor   Activity Tolerance   Activity Tolerance Patient limited by fatigue;Patient tolerated treatment well   Assessment   Prognosis Good   Problem List Decreased strength;Decreased endurance; Impaired balance;Decreased mobility   Assessment Patient is an 63y o  year old female seen for Physical Therapy evaluation  Patient admitted with Acute respiratory failure with hypoxia and hypercapnia ([address] )  Comorbidities affecting patient's physical performance include: COPD, bipolar disorder, polysubstance abuse with current methadone use    Personal factors affecting patient at time of initial evaluation include: stairs to enter home, inability to navigate level surfaces without external assistance, decreased cognition, limited insight into impairments and inability to live alone  Prior to admission, patient was independent with functional mobility without assistive device and independent with ADLS  Please find objective findings from Physical Therapy assessment regarding body systems outlined above with impairments and limitations including weakness, impaired balance, decreased endurance, gait deviations, decreased activity tolerance, decreased functional mobility tolerance, fall risk and decreased cognition  The Barthel Index was used as a functional outcome tool presenting with a score of Barthel Index Score: 50 today indicating marked limitations of functional mobility and ADLS  Patient's clinical presentation is currently unstable/unpredictable as seen in patient's presentation of varying levels of cognitive performance, increased fall risk, new onset of impairment of functional mobility, decreased endurance and new onset of weakness  Pt would benefit from continued Physical Therapy treatment to address deficits as defined above and maximize level of functional mobility  As demonstrated by objective findings, the assigned level of complexity for this evaluation is high  The patient's AM-[facility] Basic Mobility Inpatient Short Form Raw Score is 18  A Raw score of greater than 16 suggests the patient may benefit from discharge to home     Goals   Patient Goals "get stronger"   STG Expiration Date 12/03/22   Short Term Goal #1 independent transfers, independent ambulation with a walker indoor level surfaces 50 feet so pt can negotiate her home, supervision up and down 4 steps so pt can enter and exit her home   LTG Expiration Date 12/10/22   Long Term Goal #1 no falls, independent ambulation with least restrictive device 100 feet outdoor surfaces, independent up and down 4 steps, improve standing dynamic balance to good to decrease fall risk   Plan   Treatment/Interventions Elevations;LE strengthening/ROM; Functional transfer training; Therapeutic exercise; Endurance training;Patient/family training;Equipment eval/education; Bed mobility;Gait training   PT Frequency Other (Comment)  (5x/w)   Recommendation   PT Discharge Recommendation Home with home health rehabilitation  (24 hour supervision)   Equipment Recommended [address] [facility] Recommended Wheeled walker   Change/add to ZingCheckout? No   AM-PAC Basic Mobility Inpatient   Turning in Bed Without Bedrails 3   Lying on Back to Sitting on Edge of Flat Bed 3   Moving Bed to Chair 3   Standing Up From Chair 3   Walk in Room 3   Climb 3-5 Stairs 3   Basic Mobility Inpatient Raw Score 18   Basic Mobility Standardized Score 41 05   Highest Level Of Mobility   JH-HLM Goal 6: Walk 10 steps or more   JH-HLM Achieved 7: Walk 25 feet or more   Barthel Index   Feeding 5   Bathing 0   Grooming Score 0   Dressing Score 5   Bladder Score 10   Bowels Score 10   Toilet Use Score 5   Transfers (Bed/Chair) Score 10   Mobility (Level Surface) Score 0   Stairs Score 5   Barthel Index Score 50   Additional Treatment Session   Start Time 2125   End Time 0915   Treatment Assessment S: "should I try to take it easy?" O:  Gait training with a SPC and a rolling walker x 75 feet each with cues for safety and direction  A:  Pt requires min assist with both a cane and a walker but demonstrates a steadier gait with the walker  Recommend 24 hour supervison/assist at least initially upon DC home as pt is at risk to fall  P: Continue PT to improve balance, endurance and function  End of Consult   Patient Position at End of Consult All needs within reach; Seated edge of bed  (OT with pt)   Licensure   [place] License Number  Elsa Yeager PT [id number]

## 2022-11-26 NOTE — ASSESSMENT & PLAN NOTE
Patient presented to step-down unit on 11/24 with some shortness of breath with hypoxia and hypercarbia  Patient was placed on BiPAP in the ED  VBG showed 7 03/99/50/26/-7  Patient received 40 milligrams of IV Lasix and steroids and Rocephin azithromycin in the ED  Patient was later continued on Solu-Medrol which was later discontinued along with antibiotics as there was no evidence of COPD exacerbation or pneumonia  Patient had very good response to IV Lasix and patient was started off nasal cannula and currently stable on room air

## 2022-11-26 NOTE — UTILIZATION REVIEW
Continued Stay Review    Date: 11/26/22                          Current Patient Class: inpatient  Current Level of Care: med surg  HPI:62 y o  female initially admitted on 11/24/22     Assessment/Plan:   Respiratory failure, lungs with diminished breath sounds  O2 requirements @ 2ltr nc  Pt found wandering in hallways, looking to leave  Oriented to person & place  Placed on virtual observation for safety 2nd impulsive behaviors  Med changes per cardio as below  Per cardio: Acute on chronic combined systolic and diastolic CHF - echocardiogram was done today which showed a severe reduction in EF   EF is ~25%  Restart Lisinopril, change Metoprolol tartrate to Metoprolol succinate       Vital Signs:   11/26/22 1048 -- 90 -- 113/80 91 98 % -- -- -- -- -- --   11/26/22 07:52:35 97 9 °F (36 6 °C) 78 17 97/74 82 90 % -- -- -- -- -- --   11/26/22 0723 -- -- -- -- -- 92 % -- -- -- -- -- --   11/26/22 0717 -- -- -- -- -- 90 % -- 28 2 L/min Nasal cannula -- --   11/26/22 06:10:35 98 °F (36 7 °C) 102 18 104/84 91 93 % -- -- -- -- -- --   11/26/22 01:19:03 98 5 °F (36 9 °C) 95 20 136/81 99 96 % -- -- -- -- -- --     Pertinent Labs/Diagnostic Results:   Results from last 7 days   Lab Units 11/24/22  0842   SARS-COV-2  Negative     Results from last 7 days   Lab Units 11/26/22  0549 11/25/22  0553 11/24/22  1513 11/24/22  0838   WBC Thousand/uL 9 57 6 56  --  11 38*   HEMOGLOBIN g/dL 13 6 13 8  --  14 2   I STAT HEMOGLOBIN g/dl  --   --  13 6  --    HEMATOCRIT % 41 6 41 7  --  46 0   HEMATOCRIT, ISTAT %  --   --  40  --    PLATELETS Thousands/uL 243 249  --  281   NEUTROS ABS Thousands/µL  --  5 45  --  3 78     Results from last 7 days   Lab Units 11/26/22  0549 11/25/22  0553 11/24/22  1513 11/24/22  0838   SODIUM mmol/L 141 136  --  141   POTASSIUM mmol/L 3 6 3 7  --  4 3   CHLORIDE mmol/L 101 97  --  106   CO2 mmol/L 30 31  --  21   CO2, I-STAT mmol/L  --   --  34*  --    ANION GAP mmol/L 10 8  --  14*   BUN mg/dL 15 11  -- 12   CREATININE mg/dL 0 73 0 67  --  0 79   EGFR ml/min/1 73sq m 89 95  --  81   CALCIUM mg/dL 8 6 8 4  --  7 7*   CALCIUM, IONIZED, ISTAT mmol/L  --   --  1 05*  --    MAGNESIUM mg/dL 1 8 1 6  --   --    PHOSPHORUS mg/dL  --  3 4  --   --      Results from last 7 days   Lab Units 11/24/22  0838   AST U/L 104*   ALT U/L 36   ALK PHOS U/L 242*   TOTAL PROTEIN g/dL 6 9   ALBUMIN g/dL 2 7*   TOTAL BILIRUBIN mg/dL 0 60     Results from last 7 days   Lab Units 11/26/22  1157 11/26/22  0736 11/25/22  2257 11/25/22  1555 11/25/22  1105 11/25/22  0721 11/24/22  2135   POC GLUCOSE mg/dl 129 108 110 134 197* 158* 297*     Results from last 7 days   Lab Units 11/26/22  0549 11/25/22  0553 11/24/22  0838   GLUCOSE RANDOM mg/dL 103 157* 234*     Results from last 7 days   Lab Units 11/24/22  0838   HEMOGLOBIN A1C % 5 8*   EAG mg/dl 120     BETA-HYDROXYBUTYRATE   Date Value Ref Range Status   05/25/2020 0 1 <0 6 mmol/L Final   07/10/2019 0 1 <0 6 mmol/L Final      Results from last 7 days   Lab Units 11/24/22  1105 11/24/22  0832   PH ESTHER  7 270* 7 035*   PCO2 ESTHER mm Hg 74 7* 99 9*   PO2 ESTHER mm Hg 24 5* 50 0*   HCO3 ESTHER mmol/L 33 5* 26 1   BASE EXC ESTHER mmol/L 4 0 -7 5   O2 CONTENT ESTHER ml/dL 7 1 13 8   O2 HGB, VENOUS % 35 1* 62 9     Results from last 7 days   Lab Units 11/24/22  1513   I STAT BASE EXC mmol/L 7*   I STAT O2 SAT % 78   ISTAT PH ART  7 445   I STAT ART PCO2 mm HG 47 5*   I STAT ART PO2 mm HG 41 0*   I STAT ART HCO3 mmol/L 32 6*     Results from last 7 days   Lab Units 11/24/22  1304 11/24/22  1105 11/24/22  0832   HS TNI 0HR ng/L  --   --  19   HS TNI 2HR ng/L  --  38  --    HSTNI D2 ng/L  --  19  --    HS TNI 4HR ng/L 47  --   --    HSTNI D4 ng/L 28*  --   --      Results from last 7 days   Lab Units 11/25/22  0553 11/24/22  0832   PROCALCITONIN ng/ml 0 67* 0 07     Results from last 7 days   Lab Units 11/24/22  1501 11/24/22  1304 11/24/22  1105 11/24/22  0832   LACTIC ACID mmol/L 2 5* 2 4* 2 5* 4 7*     Results from last 7 days   Lab Units 11/24/22  0838   NT-PRO BNP pg/mL 3,408*     Results from last 7 days   Lab Units 11/24/22  1540   CLARITY UA  Clear   COLOR UA  Light Yellow   SPEC GRAV UA  1 010   PH UA  6 0   GLUCOSE UA mg/dl Negative   KETONES UA mg/dl Negative   BLOOD UA  Negative   PROTEIN UA mg/dl Negative   NITRITE UA  Negative   BILIRUBIN UA  Negative   UROBILINOGEN UA E U /dl 0 2   LEUKOCYTES UA  Negative     Results from last 7 days   Lab Units 11/24/22  1525 11/24/22  0842   STREP PNEUMONIAE ANTIGEN, URINE  Negative  --    LEGIONELLA URINARY ANTIGEN  Negative  --    INFLUENZA A PCR   --  Negative   INFLUENZA B PCR   --  Negative   RSV PCR   --  Negative     Results from last 7 days   Lab Units 11/24/22  1525   AMPH/METH  Negative   BARBITURATE UR  Negative   BENZODIAZEPINE UR  Negative   COCAINE UR  Negative   METHADONE URINE  Positive*   OPIATE UR  Negative   PCP UR  Negative   THC UR  Positive*     Results from last 7 days   Lab Units 11/24/22  0832   ETHANOL LVL mg/dL <3     Results from last 7 days   Lab Units 11/24/22  0850   BLOOD CULTURE  No Growth at 24 hrs  No Growth at 24 hrs         Medications:   Scheduled Medications:  atorvastatin, 20 mg, Oral, Daily With Dinner  clopidogrel, 75 mg, Oral, Daily  enoxaparin, 40 mg, Subcutaneous, Daily  famotidine, 20 mg, Oral, BID  folic acid, 1 mg, Oral, Daily  insulin lispro, 1-5 Units, Subcutaneous, TID AC  insulin lispro, 1-5 Units, Subcutaneous, HS  ipratropium-albuterol, 3 mL, Nebulization, Q6H  lisinopril, 5 mg, Oral, Daily  methadone, 45 mg, Oral, Daily  [START ON 11/27/2022] metoprolol succinate, 50 mg, Oral, Daily  metoprolol tartrate, 50 mg, Oral, Q12H ALEC  multivitamin stress formula, 1 tablet, Oral, Daily  nicotine, 1 patch, Transdermal, Daily  thiamine, 100 mg, Oral, Daily    PRN Meds:  hydrOXYzine HCL, 25 mg, Oral, Q6H PRN    Discharge Plan: tbd    Network Utilization Review Department  ATTENTION: Please call with any questions or concerns to [phone] and carefully listen to the prompts so that you are directed to the right person  All voicemails are confidential   Mansi Mayberry all requests for admission clinical reviews, approved or denied determinations and any other requests to dedicated fax number below belonging to the campus where the patient is receiving treatment   List of dedicated fax numbers for the Facilities:  [address] DENIALS (Administrative/Medical Necessity) [phone]   [address] (Maternity/NICU/Pediatrics) [phone]   [address] Viri Youngblood [phone]   [facility] [address] [phone]   [address] [place] [address] [facility] [phone]   1550 [facility] [place] [facility] 134 815 [facility] [phone]

## 2022-11-26 NOTE — ASSESSMENT & PLAN NOTE
Wt Readings from Last 3 Encounters:   11/26/22 44 2 kg (97 lb 7 1 oz)   08/13/22 38 6 kg (85 lb)   07/30/22 45 5 kg (100 lb 3 2 oz)     History of ischemic cardiomyopathy   Echo from 11/21 showed EF of 35% with grade 1 diastolic dysfunction and low normal RV systolic function  Repeat 2D echo showed EF of 22% with grade 2 diastolic dysfunction with severely depressed LV systolic function  Chest x-ray upon admission showed moderate cardiomegaly with moderate interstitial edema  Repeat chest x-ray showed improved pulmonary vascular congestion  Patient was given IV Lasix with very good response    Patient was given another dose of Lasix 20 milligram today  Patient to be discharged on Lasix 20 milligram every day

## 2022-11-26 NOTE — PROGRESS NOTES
[address]  Progress Note Capri Mccormack 1960, 63 y o  female MRN: [id number]  Unit/Bed#: [address] [place] [address] Encounter: [id number]  Primary Care Provider: Bismark Coe MD   Date and time admitted to hospital: 11/24/2022  8:17 AM    * Acute on chronic combined systolic and diastolic heart failure ([facility] )  Assessment & Plan  Wt Readings from Last 3 Encounters:   11/26/22 44 2 kg (97 lb 7 1 oz)   08/13/22 38 6 kg (85 lb)   07/30/22 45 5 kg (100 lb 3 2 oz)     History of ischemic cardiomyopathy   Echo from 11/21 showed EF of 35% with grade 1 diastolic dysfunction and low normal RV systolic function  Repeat 2D echo showed EF of 22% with grade 2 diastolic dysfunction with severely depressed LV systolic function  Chest x-ray upon admission showed moderate cardiomegaly with moderate interstitial edema  Repeat chest x-ray showed improved pulmonary vascular congestion  Patient was given IV Lasix with very good response  Patient was given another dose of Lasix 20 milligram today  Patient to be discharged on Lasix 20 milligram every day        Acute respiratory failure with hypoxia and hypercapnia ([facility] [address] )  Assessment & Plan  Patient presented to step-down unit on 11/24 with some shortness of breath with hypoxia and hypercarbia  Patient was placed on BiPAP in the ED  VBG showed 7 03/99/50/26/-7  Patient received 40 milligrams of IV Lasix and steroids and Rocephin azithromycin in the ED  Patient was later continued on Solu-Medrol which was later discontinued along with antibiotics as there was no evidence of COPD exacerbation or pneumonia  Patient had very good response to IV Lasix and patient was started off nasal cannula and currently stable on room air      SIRS (systemic inflammatory response syndrome) (HCC)  Assessment & Plan  As evidenced by tachycardia, tachypnea, elevated lactic acid level upon admission  No evidence of infection  Likely in the setting of CHF exacerbation  UA was negative  Flu, COVID/RSV was negative  Procalcitonin level was mildly elevated at 0 67  Urine for Legionella pneumococcal antigens were negative  Blood cultures have been negative  MRSA cultures negative    Suspected COPD with acute exacerbation [facility])  Assessment & Plan  Patient was given Solu-Medrol 125 micrograms in the ED and was later continued on 40 milligram q 8 hours which was later discontinued  Continue nebulizers p r n  CAD (coronary artery disease)  Assessment & Plan  Status post PCI to RCA in 2021 after a STEMI  Continue aspirin statin and Plavix along with beta-blocker    Bipolar disorder [facility])  Assessment & Plan  Not on any treatment    Ischemic cardiomyopathy  Assessment & Plan  Echo from November 21 showed EF of 35% with grade 1 diastolic dysfunction with moderate to severe TR  Repeat 2D echo from this admission showed EF of 22%  Patient restarted back on Toprol-XL and lisinopril  Patient advised compliance with medications  Needs close follow-up with Cardiology for repeat 2D echo    Dyslipidemia  Assessment & Plan  Continue statin    Essential hypertension  Assessment & Plan  Restarted on lisinopril 5 milligram p o  Daily and Toprol-XL 50 milligram p o  Daily  Added on Lasix 20 milligram p o  Daily    Polysubstance abuse ([facility] [address] )  Assessment & Plan  Currently on methadone 44 milligram p o   Daily  ICU verified the dosing with state line    Type 2 diabetes mellitus with complication, with long-term current use of insulin [facility])  Assessment & Plan  Lab Results   Component Value Date    HGBA1C 5 8 (H) 11/24/2022       Recent Labs     11/25/22  2257 11/26/22  0736 11/26/22  1157 11/26/22  1622   POCGLU 110 108 129 86       Blood Sugar Average: Last 72 hrs:  (P) 152 375   Blood sugars currently acceptable  Patient previously on Lantus but will hold off on any medications at the current time          VTE Pharmacologic Prophylaxis:   Moderate Risk (Score 3-4) - Pharmacological DVT Prophylaxis Ordered: enoxaparin (Lovenox)  Patient Centered Rounds: I performed bedside rounds with nursing staff today  Discussions with Specialists or Other Care Team Provider:  Cardiology    Education and Discussions with Family / Patient: yes    Time Spent for Care: 45 minutes  More than 50% of total time spent on counseling and coordination of care as described above  Current Length of Stay: 2 day(s)  Current Patient Status: Inpatient   Certification Statement: The patient will continue to require additional inpatient hospital stay due to Acute CHF  Discharge Plan: Anticipate discharge tomorrow to home  Code Status: Level 1 - Full Code    Subjective:   Patient denies any shortness of breath, chest pain, palpitations  Patient was anxious to go home  Later patient eloped and was found on 3rd floor  In the afternoon patient became very lethargic and sleepy  Objective:     Vitals:   Temp (24hrs), Av °F (36 7 °C), Min:97 4 °F (36 3 °C), Max:98 5 °F (36 9 °C)    Temp:  [97 4 °F (36 3 °C)-98 5 °F (36 9 °C)] 97 9 °F (36 6 °C)  HR:  [] 90  Resp:  [15-20] 17  BP: ()/() 113/80  SpO2:  [85 %-98 %] 98 %  Body mass index is 19 68 kg/m²  Input and Output Summary (last 24 hours): Intake/Output Summary (Last 24 hours) at 2022 1834  Last data filed at 2022 0549  Gross per 24 hour   Intake 220 ml   Output --   Net 220 ml       Physical Exam:   Physical Exam  Constitutional:       Appearance: Normal appearance  HENT:      Head: Normocephalic and atraumatic  Nose: Nose normal       Mouth/Throat:      Mouth: Mucous membranes are moist       Pharynx: Oropharynx is clear  Eyes:      Extraocular Movements: Extraocular movements intact  Pupils: Pupils are equal, round, and reactive to light  Cardiovascular:      Rate and Rhythm: Normal rate and regular rhythm  Pulmonary:      Effort: Pulmonary effort is normal       Breath sounds: Normal breath sounds     Abdominal:      General: Bowel sounds are normal  There is no distension  Palpations: Abdomen is soft  Tenderness: There is no abdominal tenderness  Musculoskeletal:         General: No swelling  Cervical back: Normal range of motion and neck supple  Skin:     General: Skin is warm and dry  Neurological:      General: No focal deficit present  Mental Status: She is alert  Additional Data:     Labs:  Results from last 7 days   Lab Units 11/26/22  0549 11/25/22  0553   WBC Thousand/uL 9 57 6 56   HEMOGLOBIN g/dL 13 6 13 8   HEMATOCRIT % 41 6 41 7   PLATELETS Thousands/uL 243 249   NEUTROS PCT %  --  83*   LYMPHS PCT %  --  13*   MONOS PCT %  --  4   EOS PCT %  --  0     Results from last 7 days   Lab Units 11/26/22  0549 11/24/22  1513 11/24/22  0838   SODIUM mmol/L 141   < > 141   POTASSIUM mmol/L 3 6   < > 4 3   CHLORIDE mmol/L 101   < > 106   CO2 mmol/L 30   < > 21   CO2, I-STAT   --    < >  --    BUN mg/dL 15   < > 12   CREATININE mg/dL 0 73   < > 0 79   ANION GAP mmol/L 10   < > 14*   CALCIUM mg/dL 8 6   < > 7 7*   ALBUMIN g/dL  --   --  2 7*   TOTAL BILIRUBIN mg/dL  --   --  0 60   ALK PHOS U/L  --   --  242*   ALT U/L  --   --  36   AST U/L  --   --  104*   GLUCOSE RANDOM mg/dL 103   < > 234*    < > = values in this interval not displayed  Results from last 7 days   Lab Units 11/26/22  1622 11/26/22  1157 11/26/22  0736 11/25/22  2257 11/25/22  1555 11/25/22  1105 11/25/22  0721 11/24/22  2135   POC GLUCOSE mg/dl 86 129 108 110 134 197* 158* 297*     Results from last 7 days   Lab Units 11/24/22  0838   HEMOGLOBIN A1C % 5 8*     Results from last 7 days   Lab Units 11/25/22  0553 11/24/22  1501 11/24/22  1304 11/24/22  1105 11/24/22  0832   LACTIC ACID mmol/L  --  2 5* 2 4* 2 5* 4 7*   PROCALCITONIN ng/ml 0 67*  --   --   --  0 07       Lines/Drains:  Invasive Devices     Peripheral Intravenous Line  Duration           Peripheral IV 11/24/22 Dorsal (posterior); Left Hand 2 days    Peripheral IV 11/24/22 Right Antecubital 2 days                      Imaging: Reviewed radiology reports from this admission including: chest xray    Recent Cultures (last 7 days):   Results from last 7 days   Lab Units 11/24/22  1525 11/24/22  0850   BLOOD CULTURE   --  No Growth at 48 hrs  No Growth at 48 hrs  LEGIONELLA URINARY ANTIGEN  Negative  --        Last 24 Hours Medication List:   Current Facility-Administered Medications   Medication Dose Route Frequency Provider Last Rate   • atorvastatin  40 mg Oral Daily With Dinner Navtearti Abi, DO     • clopidogrel  75 mg Oral Daily YASMIN Braswell     • enoxaparin  40 mg Subcutaneous Daily Kathylegita Dumonte, [address]     • famotidine  20 mg Oral BID YASMIN Braswell     • folic acid  1 mg Oral Daily Maria Eugenia Thomas [place], [address]     • hydrOXYzine HCL  25 mg Oral Q6H PRN YASMIN Denton     • insulin lispro  1-5 Units Subcutaneous TID AC Maria Eugenia YASMIN Medrano     • insulin lispro  1-5 Units Subcutaneous HS Kathylegita [place], [address]     • ipratropium-albuterol  3 mL Nebulization Q6H YASMIN Braswell     • lisinopril  5 mg Oral Daily YASMIN Denton     • methadone  45 mg Oral Daily Kathyleen Ricky [place], [address]     • [START ON 11/27/2022] metoprolol succinate  [facility]     • metoprolol tartrate  50 mg Oral Q12H [address] Navte Abi, DO     • multivitamin stress formula  1 tablet Oral Daily YASMIN Braswell     • nicotine  1 patch Transdermal Daily Kathylegita [place], [address]     • thiamine  100 mg Oral Daily [address], [address]          Today, Patient Was Seen By: Daphnie Flores MD    **Please Note: This note may have been constructed using a voice recognition system  **

## 2022-11-26 NOTE — PLAN OF CARE
Problem: PHYSICAL THERAPY ADULT  Goal: Performs mobility at highest level of function for planned discharge setting  See evaluation for individualized goals  Description: Treatment/Interventions: Elevations, LE strengthening/ROM, Functional transfer training, Therapeutic exercise, Endurance training, Patient/family training, Equipment eval/education, Bed mobility, Gait training  Equipment Recommended: Kassie Willingham       See flowsheet documentation for full assessment, interventions and recommendations  Note: Prognosis: Good  Problem List: Decreased strength, Decreased endurance, Impaired balance, Decreased mobility  Assessment: Patient is an 63y o  year old female seen for Physical Therapy evaluation  Patient admitted with Acute respiratory failure with hypoxia and hypercapnia ([facility] [address] )  Comorbidities affecting patient's physical performance include: COPD, bipolar disorder, polysubstance abuse with current methadone use  Personal factors affecting patient at time of initial evaluation include: stairs to enter home, inability to navigate level surfaces without external assistance, decreased cognition, limited insight into impairments and inability to live alone  Prior to admission, patient was independent with functional mobility without assistive device and independent with ADLS  Please find objective findings from Physical Therapy assessment regarding body systems outlined above with impairments and limitations including weakness, impaired balance, decreased endurance, gait deviations, decreased activity tolerance, decreased functional mobility tolerance, fall risk and decreased cognition  The Barthel Index was used as a functional outcome tool presenting with a score of Barthel Index Score: 50 today indicating marked limitations of functional mobility and ADLS    Patient's clinical presentation is currently unstable/unpredictable as seen in patient's presentation of varying levels of cognitive performance, increased fall risk, new onset of impairment of functional mobility, decreased endurance and new onset of weakness  Pt would benefit from continued Physical Therapy treatment to address deficits as defined above and maximize level of functional mobility  As demonstrated by objective findings, the assigned level of complexity for this evaluation is high  The patient's AM-PAC Basic Mobility Inpatient Short Form Raw Score is 18  A Raw score of greater than 16 suggests the patient may benefit from discharge to home  PT Discharge Recommendation: Home with home health rehabilitation (24 hour supervision)    See flowsheet documentation for full assessment

## 2022-11-26 NOTE — PLAN OF CARE
Problem: MOBILITY - ADULT  Goal: Maintain or return to baseline ADL function  Description: INTERVENTIONS:  -  Assess patient's ability to carry out ADLs; assess patient's baseline for ADL function and identify physical deficits which impact ability to perform ADLs (bathing, care of mouth/teeth, toileting, grooming, dressing, etc )  - Assess/evaluate cause of self-care deficits   - Assess range of motion  - Assess patient's mobility; develop plan if impaired  - Assess patient's need for assistive devices and provide as appropriate  - Encourage maximum independence but intervene and supervise when necessary  - Involve family in performance of ADLs  - Assess for home care needs following discharge   - Consider OT consult to assist with ADL evaluation and planning for discharge  - Provide patient education as appropriate  Outcome: Progressing  Goal: Maintains/Returns to pre admission functional level  Description: INTERVENTIONS:  - Perform BMAT or MOVE assessment daily    - Set and communicate daily mobility goal to care team and patient/family/caregiver  - Collaborate with rehabilitation services on mobility goals if consulted  - Perform Range of Motion 3 times a day  - Reposition patient every 3 hours    - Dangle patient 3 times a day  - Stand patient 3 times a day  - Ambulate patient 3 times a day  - Out of bed to chair 3 times a day   - Out of bed for meals 3 times a day  - Out of bed for toileting  - Record patient progress and toleration of activity level   Outcome: Progressing     Problem: PAIN - ADULT  Goal: Verbalizes/displays adequate comfort level or baseline comfort level  Description: Interventions:  - Encourage patient to monitor pain and request assistance  - Assess pain using appropriate pain scale  - Administer analgesics based on type and severity of pain and evaluate response  - Implement non-pharmacological measures as appropriate and evaluate response  - Consider cultural and social influences on pain and pain management  - Notify physician/advanced practitioner if interventions unsuccessful or patient reports new pain  Outcome: Progressing     Problem: INFECTION - ADULT  Goal: Absence or prevention of progression during hospitalization  Description: INTERVENTIONS:  - Assess and monitor for signs and symptoms of infection  - Monitor lab/diagnostic results  - Monitor all insertion sites, i e  indwelling lines, tubes, and drains  - Monitor endotracheal if appropriate and nasal secretions for changes in amount and color  - Bitely appropriate cooling/warming therapies per order  - Administer medications as ordered  - Instruct and encourage patient and family to use good hand hygiene technique  - Identify and instruct in appropriate isolation precautions for identified infection/condition  Outcome: Progressing     Problem: SAFETY ADULT  Goal: Maintain or return to baseline ADL function  Description: INTERVENTIONS:  -  Assess patient's ability to carry out ADLs; assess patient's baseline for ADL function and identify physical deficits which impact ability to perform ADLs (bathing, care of mouth/teeth, toileting, grooming, dressing, etc )  - Assess/evaluate cause of self-care deficits   - Assess range of motion  - Assess patient's mobility; develop plan if impaired  - Assess patient's need for assistive devices and provide as appropriate  - Encourage maximum independence but intervene and supervise when necessary  - Involve family in performance of ADLs  - Assess for home care needs following discharge   - Consider OT consult to assist with ADL evaluation and planning for discharge  - Provide patient education as appropriate  Outcome: Progressing  Goal: Maintains/Returns to pre admission functional level  Description: INTERVENTIONS:  - Perform BMAT or MOVE assessment daily    - Set and communicate daily mobility goal to care team and patient/family/caregiver     - Collaborate with rehabilitation services on mobility goals if consulted  - Perform Range of Motion 3 times a day  - Reposition patient every 3 hours  - Dangle patient 3 times a day  - Stand patient 3 times a day  - Ambulate patient 3 times a day  - Out of bed to chair 3 times a day   - Out of bed for meals 3 times a day  - Out of bed for toileting  - Record patient progress and toleration of activity level   Outcome: Progressing  Goal: Patient will remain free of falls  Description: INTERVENTIONS:  - Educate patient/family on patient safety including physical limitations  - Instruct patient to call for assistance with activity   - Consult OT/PT to assist with strengthening/mobility   - Keep Call bell within reach  - Keep bed low and locked with side rails adjusted as appropriate  - Keep care items and personal belongings within reach  - Initiate and maintain comfort rounds  - Make Fall Risk Sign visible to staff  - Offer Toileting every 3 Hours, in advance of need  - Initiate/Maintain bed/chair alarm  - Obtain necessary fall risk management equipment: bed/chair alarm  - Apply yellow socks and bracelet for high fall risk patients  - Consider moving patient to room near nurses station  Outcome: Progressing     Problem: Knowledge Deficit  Goal: Patient/family/caregiver demonstrates understanding of disease process, treatment plan, medications, and discharge instructions  Description: Complete learning assessment and assess knowledge base    Interventions:  - Provide teaching at level of understanding  - Provide teaching via preferred learning methods  Outcome: Progressing

## 2022-11-26 NOTE — PLAN OF CARE
Problem: PAIN - ADULT  Goal: Verbalizes/displays adequate comfort level or baseline comfort level  Description: Interventions:  - Encourage patient to monitor pain and request assistance  - Assess pain using appropriate pain scale  - Administer analgesics based on type and severity of pain and evaluate response  - Implement non-pharmacological measures as appropriate and evaluate response  - Consider cultural and social influences on pain and pain management  - Notify physician/advanced practitioner if interventions unsuccessful or patient reports new pain  Outcome: Progressing     Problem: INFECTION - ADULT  Goal: Absence or prevention of progression during hospitalization  Description: INTERVENTIONS:  - Assess and monitor for signs and symptoms of infection  - Monitor lab/diagnostic results  - Monitor all insertion sites, i e  indwelling lines, tubes, and drains  - Monitor endotracheal if appropriate and nasal secretions for changes in amount and color  - Mobile appropriate cooling/warming therapies per order  - Administer medications as ordered  - Instruct and encourage patient and family to use good hand hygiene technique  - Identify and instruct in appropriate isolation precautions for identified infection/condition  Outcome: Progressing

## 2022-11-26 NOTE — ASSESSMENT & PLAN NOTE
Status post PCI to RCA in 2021 after a STEMI  Continue aspirin statin and Plavix along with beta-blocker

## 2022-11-26 NOTE — ASSESSMENT & PLAN NOTE
Restarted on lisinopril 5 milligram p o  Daily and Toprol-XL 50 milligram p o  Daily  Added on Lasix 20 milligram p o   Daily

## 2022-11-26 NOTE — ASSESSMENT & PLAN NOTE
As evidenced by tachycardia, tachypnea, elevated lactic acid level upon admission  No evidence of infection  Likely in the setting of CHF exacerbation  UA was negative  Flu, COVID/RSV was negative  Procalcitonin level was mildly elevated at 0 67  Urine for Legionella pneumococcal antigens were negative  Blood cultures have been negative    MRSA cultures negative

## 2022-11-26 NOTE — ASSESSMENT & PLAN NOTE
Patient was given Solu-Medrol 125 micrograms in the ED and was later continued on 40 milligram q 8 hours which was later discontinued  Continue nebulizers p r n

## 2022-11-26 NOTE — PROGRESS NOTES
Daily Cardiology Progress Note              LOS: 2 days     Assessment/Plan     Principal Problem:    Acute respiratory failure with hypoxia and hypercapnia (HCC)  Active Problems:    Type 2 diabetes mellitus with complication, with long-term current use of insulin (HCC)    Polysubstance abuse (HCC)    Essential hypertension    Dyslipidemia    Ischemic cardiomyopathy    Bipolar disorder (HCC)    SIRS (systemic inflammatory response syndrome) (HCC)    CAD (coronary artery disease)    Smoking hx    Suspected COPD with acute exacerbation (HCC)    Acute on chronic combined systolic and diastolic heart failure ([facility] [address] )    1  Acute on chronic combined systolic and diastolic CHF - echocardiogram was done today which showed a severe reduction in EF  EF is ~25%  She has not been using any medications but previously she was on metoprolol succinate and lisinopril   - Restarted on metoprolol and lisinopril  Will change metoprolol tartrate to succinate  - She is currently free of respiratory distress and chest Xray shows improved edema    - Recommend PO lasix 20 mg daily upon discharge  - Importance of outpatient follow up discussed with her  Will need EF reassessment in 3 months after restarting medications to determine need for ICD       2  CAD with prior PCI after STEMI  - She has been restarted on ASA and Plavix  Will continue Plavix alone  - Increased atorvastatin to 40 mg daily     3  Tobacco abuse - nicotine patch  4  Dyslipidemia - continue statin  5  COPD with exacerbation      Subjective     Interval History: Patient anxious to leave  She denies any chest pain or shortness of breath  Ambulating hallways without difficulties        Objective     Vital signs in last 24 hours:  Temp:  [97 4 °F (36 3 °C)-98 5 °F (36 9 °C)] 97 9 °F (36 6 °C)  HR:  [] 90  Resp:  [15-35] 17  BP: ()/() 113/80  Weight (last 2 days)     Date/Time Weight    11/26/22 0600 44 2 (97 44)    11/25/22 0927 44 3 (97 66) 11/25/22 0600 44 3 (97 66)    11/24/22 1436 44 3 (97 66)    11/24/22 0819 39 (86)           Intake/Output last 3 shifts:  I/O last 3 completed shifts: In: 880 [P O :860; I V :20]  Out: -   Intake/Output this shift:  No intake/output data recorded  Physical Exam:  Physical Exam   Constitutional: She appears healthy  No distress  Eyes: Pupils are equal, round, and reactive to light  Conjunctivae are normal    Neck: No JVD present  Cardiovascular: Normal rate, regular rhythm and normal heart sounds  Exam reveals no gallop and no friction rub  No murmur heard  Pulmonary/Chest: Effort normal and breath sounds normal  She has no wheezes  She has no rales  Musculoskeletal:         General: No tenderness, deformity or edema  Cervical back: Normal range of motion and neck supple  Neurological: She is alert and oriented to person, place, and time  Skin: Skin is warm and dry  Lab Results: I have personally reviewed pertinent lab results  Imaging: I have personally reviewed pertinent films in PACS  EKG/Tele: Reviewed

## 2022-11-26 NOTE — OCCUPATIONAL THERAPY NOTE
Occupational Therapy Evaluation and Treatment       11/26/22 0945   OT Last Visit   OT Visit Date 11/26/22   Note Type   Note type Evaluation   Pain Assessment   Pain Assessment Tool 0-10   Pain Score No Pain   Restrictions/Precautions   Other Precautions Cognitive; Bed Alarm; Fall Risk   Home Living   Type of [address] One level  (4 ANAYA)   Bathroom Shower/Tub Tub/shower unit   Bathroom Toilet Standard   Bathroom Equipment Grab bars in shower   Bathroom Accessibility Accessible   Prior Function   Level of [place] Independent with ADLs   Lives With Alone   ADL   Eating Assistance 5  Supervision/Setup   Eating Deficit Supervision/safety   Grooming Assistance 5  Supervision/Setup   Grooming Deficit Supervision/safety;Verbal cueing   UB Bathing Assistance 5  Supervision/Setup   UB Bathing Deficit Setup;Verbal cueing;Supervision/safety; Increased time to complete;Use of adaptive equipment;Steadying   LB Bathing Assistance 5  Supervision/Setup   LB Bathing Deficit Setup;Steadying;Verbal cueing;Supervision/safety; Increased time to complete;Use of adaptive equipment   UB Dressing Assistance 5  Supervision/Setup   UB Dressing Deficit Supervision/safety; Increased time to complete   LB Dressing Assistance 5  Supervision/Setup   LB Dressing Deficit Supervision/safety; Increased time to complete   Toileting Assistance  5  Supervision/Setup   Toileting Deficit Verbal cueing;Supervison/safety; Increased time to complete   Bed Mobility   Sit to Supine 7  Independent   Transfers   Sit to Stand 4  Minimal assistance   Additional items Assist x 1   Stand to Sit 4  Minimal assistance   Additional items Assist x 1   Toilet transfer 4  Minimal assistance   Additional items Assist x 1   Additional items Assist x 1;Verbal cues  (+ steadying assistance required)   Additional Comments pt required CGA for functional transfers due to being unsteady   Functional Mobility   Functional Mobility 4  Minimal assistance Additional Comments CGA for safety, +LOB noted during session  pt does not utilize walker well and requires continued teaching for safe use uf rolling walker   Additional items Rolling walker   Balance   Static Sitting Fair +   Dynamic Sitting Fair   Static Standing Fair   Dynamic Standing Poor   Activity Tolerance   Activity Tolerance Patient limited by fatigue   Nurse Made Aware URI Flores   RUSIVAN Assessment   RUE Assessment WFL   LUE Assessment   LUE Assessment WFL   Psychosocial   Psychosocial (WDL) WDL   Cognition   Overall Cognitive Status Impaired   Arousal/Participation Alert; Cooperative   Attention Attends with cues to redirect   Orientation Level Oriented to person;Oriented to place; Disoriented to time;Disoriented to situation   Memory Decreased short term memory;Decreased recall of recent events;Decreased recall of precautions   Following Commands Follows one step commands with increased time or repetition   Comments pt noted with garbled, sometimes challenging to understand speech  Pt states this has been happening since she has been in the hospital, it is getting better but not fully resolved yet  Pt noted with some confusion during assesment  Pt states the date is November 18, 2020  Pt is not fully aware of the current situation that has brought her to the hospital  pt tangential and requires occasional cues back to the conversation  pt required cues to sequence ADL tasks (i e  she washed her hands after toileting without soap and required cues to dry her hands)  Assessment   Limitation Decreased ADL status; Decreased cognition;Decreased endurance;Decreased high-level ADLs   Prognosis Fair   Assessment Patient is a 63 y o  female seen for Occupational Therapy evaluation  Patient admitted to [facility] w/ Acute respiratory failure with hypoxia and hypercapnia ([facility] [address] ), Acute on chronic combined systolic and diastolic heart failure, Suspected COPD with acute exacerbation, SIRS   Comorbidities include a history of Bipolar disorder, CAD, diabetes and hypertension, ischemic cardiomyopathy, dyslipidemia, polysubstance abuse  The patients occupational profile, medical and therapy history includes a expanded review of medical and/or therapy records and additional review of physical, cognitive, or psychosocial history related to current functional performance  Prior to admission, patient was independent with functional mobility without assistive device, independent with ADLS and living alone in single story home with 4 steps to enter  Patient is currently presenting with deficits and limitations in all baseline areas of occupation 2* impaired balance, decreased endurance, increased fall risk, new onset of impairment of functional mobility, decreased ADLS, decreased IADLS, decreased activity tolerance, decreased safety awareness, impaired judgement and decreased cognition  The following Occupational Performance Areas to address include: grooming, bathing/shower, toilet hygiene, dressing, medication management, health maintenance, functional mobility, community mobility, clothing management, meal prep, money management and household maintenance  This evaluation requires clinical decision making of moderate complexity, because the patient may present with comorbidities that affect occupational performance and required minimal or moderate modification of tasks or assistance with the consideration of several treatment options  The Barthel Index was used as a functional outcome tool presenting with a score of 50, indicating moderate limitations of functional mobility and ADLS  The patient's raw score on the -PAC Daily Activity inpatient short form is 19, standardized score is 40 22, greater than 39 4  Patients at this level are likely to benefit from DC to home, however pt with significant cognitive deficits and if she were to dc home, would recommend 24/7 supervision/ assistance   If this is not available and her cognition is an acute change due to medical condition, may benefit from rehab upon dc  Please refer to the recommendation of the Occupational Therapist for safe DC planning  Patient will benefit from skilled Occupational Therapy services to address above deficits and facilitate a safe return to prior level of function  Pt left in bed at end of session with bed alarm on in NAD  Goals   Patient Goals to go home   STG Time Frame 3-5   LTG Time Frame 7-10   Plan   Treatment Interventions ADL retraining;Functional transfer training; Endurance training;Cognitive reorientation;Patient/family training;Equipment evaluation/education; Compensatory technique education;Continued evaluation; Activityengagement; Energy conservation   Goal Expiration Date 12/10/22   OT Treatment Day 1   OT Frequency 3-5x/wk   Recommendation   OT Discharge Recommendation   (home with 24/7 Supervision/assistance due to cognitive deficits   If not available, pt may  benefit from rehab upon dc )   AM-PAC Daily Activity Inpatient   Lower Body Dressing 3   Bathing 3   Toileting 3   Upper Body Dressing 3   Grooming 3   Eating 4   Daily Activity Raw Score 19   Daily Activity Standardized Score (Calc for Raw Score >=11) 40 22   AM-PAC Applied Cognition Inpatient   Following a Speech/Presentation 3   Understanding Ordinary Conversation 3   Taking Medications 2   Remembering Where Things Are Placed or Put Away 2   Remembering List of 4-5 Errands 2   Taking Care of Complicated Tasks 2   Applied Cognition Raw Score 14   Applied Cognition Standardized Score 32 02   Mini-Cog Assessment   Clock Draw (CDT) 0   Word Recall 0   Mini-Cog Score 0   Mini-Cog Results Positive screen for dementia   Barthel Index   Feeding 5   Bathing 0   Grooming Score 0   Dressing Score 5   Bladder Score 10   Bowels Score 10   Toilet Use Score 5   Transfers (Bed/Chair) Score 10   Mobility (Level Surface) Score 0   Stairs Score 5   Barthel Index Score 50   Additional Treatment Session   Start Time 0930   End Time 0945   Treatment Assessment Pt participated in Mini-cog assessment scoring 0/5 indicating a significant cognitive deficit  Additionally, pt asked other cognitive questions, such as emergency response situations, emergency phone numbers and pt unable to appropriately answer these questions either  Pt also noted with pills in her bed and on the floor, RN notified  Recommend further cognitive assessment to determine pt's abiltiy to care for self  Unclear what pt's baseline is like or what social supports she has in the community, but this writer is concerned about pt's ability to care for herself           1) Pt will improve activity tolerance to G for min 30 min txment sessions to increase participation in ADLs  2) Pt will complete ADLs/self care w/ mod I w/ G hyiene/thoroughness w/ min cues fro cog support  3) Pt will complete toileting w/ mod I w/ G hygiene/thoroughness using DME as needed  4) Pt will improve functional transfers on/off all surfaces using DME as needed w/ G balance/safety including toileting w/ mod I  5) Pt will improve functional mobility during ADL/IADL/leisure tasks using DME as needed w/ g balance/safety w/ mod I  6) Pt will engage in ongoing functional cognitive assessment w/ G participation, G safety awareness, and G judgement t/o ADL/IADL tasks       MS Arturo, OTR/L [id number]

## 2022-11-26 NOTE — ASSESSMENT & PLAN NOTE
Echo from November 21 showed EF of 35% with grade 1 diastolic dysfunction with moderate to severe TR  Repeat 2D echo from this admission showed EF of 22%  Patient restarted back on Toprol-XL and lisinopril  Patient advised compliance with medications  Needs close follow-up with Cardiology for repeat 2D echo

## 2022-11-26 NOTE — ASSESSMENT & PLAN NOTE
Lab Results   Component Value Date    HGBA1C 5 8 (H) 11/24/2022       Recent Labs     11/25/22  2257 11/26/22  0736 11/26/22  1157 11/26/22  1622   POCGLU 110 108 129 86       Blood Sugar Average: Last 72 hrs:  (P) 152 375   Blood sugars currently acceptable  Patient previously on Lantus but will hold off on any medications at the current time

## 2022-11-27 LAB
ANION GAP SERPL CALCULATED.3IONS-SCNC: 6 MMOL/L (ref 4–13)
BUN SERPL-MCNC: 23 MG/DL (ref 5–25)
CALCIUM SERPL-MCNC: 8.3 MG/DL (ref 8.3–10.1)
CHLORIDE SERPL-SCNC: 97 MMOL/L (ref 96–108)
CO2 SERPL-SCNC: 34 MMOL/L (ref 21–32)
CREAT SERPL-MCNC: 0.79 MG/DL (ref 0.6–1.3)
GFR SERPL CREATININE-BSD FRML MDRD: 81 ML/MIN/1.73SQ M
GLUCOSE SERPL-MCNC: 101 MG/DL (ref 65–140)
GLUCOSE SERPL-MCNC: 103 MG/DL (ref 65–140)
GLUCOSE SERPL-MCNC: 118 MG/DL (ref 65–140)
GLUCOSE SERPL-MCNC: 127 MG/DL (ref 65–140)
GLUCOSE SERPL-MCNC: 169 MG/DL (ref 65–140)
POTASSIUM SERPL-SCNC: 3.3 MMOL/L (ref 3.5–5.3)
PROCALCITONIN SERPL-MCNC: 0.4 NG/ML
SODIUM SERPL-SCNC: 137 MMOL/L (ref 135–147)

## 2022-11-27 RX ORDER — POTASSIUM CHLORIDE 20 MEQ/1
40 TABLET, EXTENDED RELEASE ORAL ONCE
Status: COMPLETED | OUTPATIENT
Start: 2022-11-27 | End: 2022-11-27

## 2022-11-27 RX ORDER — SPIRONOLACTONE 25 MG/1
12.5 TABLET ORAL DAILY
Status: DISCONTINUED | OUTPATIENT
Start: 2022-11-27 | End: 2022-11-28 | Stop reason: HOSPADM

## 2022-11-27 RX ADMIN — METOPROLOL SUCCINATE 50 MG: 50 TABLET, EXTENDED RELEASE ORAL at 08:39

## 2022-11-27 RX ADMIN — FOLIC ACID 1 MG: 1 TABLET ORAL at 08:39

## 2022-11-27 RX ADMIN — CLOPIDOGREL BISULFATE 75 MG: 75 TABLET ORAL at 08:38

## 2022-11-27 RX ADMIN — ENOXAPARIN SODIUM 40 MG: 40 INJECTION SUBCUTANEOUS at 08:39

## 2022-11-27 RX ADMIN — ATORVASTATIN CALCIUM 40 MG: 40 TABLET, FILM COATED ORAL at 16:38

## 2022-11-27 RX ADMIN — IPRATROPIUM BROMIDE AND ALBUTEROL SULFATE 3 ML: 2.5; .5 SOLUTION RESPIRATORY (INHALATION) at 16:18

## 2022-11-27 RX ADMIN — INSULIN LISPRO 1 UNITS: 100 INJECTION, SOLUTION INTRAVENOUS; SUBCUTANEOUS at 08:00

## 2022-11-27 RX ADMIN — NICOTINE 1 PATCH: 21 PATCH, EXTENDED RELEASE TRANSDERMAL at 08:39

## 2022-11-27 RX ADMIN — METHADONE HYDROCHLORIDE 45 MG: 10 TABLET ORAL at 08:55

## 2022-11-27 RX ADMIN — FAMOTIDINE 20 MG: 20 TABLET ORAL at 08:39

## 2022-11-27 RX ADMIN — FAMOTIDINE 20 MG: 20 TABLET ORAL at 17:08

## 2022-11-27 RX ADMIN — THIAMINE HCL TAB 100 MG 100 MG: 100 TAB at 08:39

## 2022-11-27 RX ADMIN — LISINOPRIL 5 MG: 5 TABLET ORAL at 08:39

## 2022-11-27 RX ADMIN — POTASSIUM CHLORIDE 40 MEQ: 1500 TABLET, EXTENDED RELEASE ORAL at 08:55

## 2022-11-27 RX ADMIN — SPIRONOLACTONE 12.5 MG: 25 TABLET ORAL at 16:38

## 2022-11-27 RX ADMIN — B-COMPLEX W/ C & FOLIC ACID TAB 1 TABLET: TAB at 08:39

## 2022-11-27 RX ADMIN — IPRATROPIUM BROMIDE AND ALBUTEROL SULFATE 3 ML: 2.5; .5 SOLUTION RESPIRATORY (INHALATION) at 20:10

## 2022-11-27 NOTE — PLAN OF CARE
Problem: MOBILITY - ADULT  Goal: Maintain or return to baseline ADL function  Description: INTERVENTIONS:  -  Assess patient's ability to carry out ADLs; assess patient's baseline for ADL function and identify physical deficits which impact ability to perform ADLs (bathing, care of mouth/teeth, toileting, grooming, dressing, etc )  - Assess/evaluate cause of self-care deficits   - Assess range of motion  - Assess patient's mobility; develop plan if impaired  - Assess patient's need for assistive devices and provide as appropriate  - Encourage maximum independence but intervene and supervise when necessary  - Involve family in performance of ADLs  - Assess for home care needs following discharge   - Consider OT consult to assist with ADL evaluation and planning for discharge  - Provide patient education as appropriate  Outcome: Progressing     Problem: PAIN - ADULT  Goal: Verbalizes/displays adequate comfort level or baseline comfort level  Description: Interventions:  - Encourage patient to monitor pain and request assistance  - Assess pain using appropriate pain scale  - Administer analgesics based on type and severity of pain and evaluate response  - Implement non-pharmacological measures as appropriate and evaluate response  - Consider cultural and social influences on pain and pain management  - Notify physician/advanced practitioner if interventions unsuccessful or patient reports new pain  Outcome: Progressing     Problem: SAFETY ADULT  Goal: Patient will remain free of falls  Description: INTERVENTIONS:  - Educate patient/family on patient safety including physical limitations  - Instruct patient to call for assistance with activity   - Consult OT/PT to assist with strengthening/mobility   - Keep Call bell within reach  - Keep bed low and locked with side rails adjusted as appropriate  - Keep care items and personal belongings within reach  - Initiate and maintain comfort rounds  - Make Fall Risk Sign visible to staff  - Offer Toileting every 3 Hours, in advance of need  - Initiate/Maintain bed/chair alarm  - Obtain necessary fall risk management equipment: bed/chair alarm  - Apply yellow socks and bracelet for high fall risk patients  - Consider moving patient to room near nurses station  Outcome: Progressing     Problem: SAFETY ADULT  Goal: Maintain or return to baseline ADL function  Description: INTERVENTIONS:  -  Assess patient's ability to carry out ADLs; assess patient's baseline for ADL function and identify physical deficits which impact ability to perform ADLs (bathing, care of mouth/teeth, toileting, grooming, dressing, etc )  - Assess/evaluate cause of self-care deficits   - Assess range of motion  - Assess patient's mobility; develop plan if impaired  - Assess patient's need for assistive devices and provide as appropriate  - Encourage maximum independence but intervene and supervise when necessary  - Involve family in performance of ADLs  - Assess for home care needs following discharge   - Consider OT consult to assist with ADL evaluation and planning for discharge  - Provide patient education as appropriate  Outcome: Progressing     Problem: RESPIRATORY - ADULT  Goal: Achieves optimal ventilation and oxygenation  Description: INTERVENTIONS:  - Assess for changes in respiratory status  - Assess for changes in mentation and behavior  - Position to facilitate oxygenation and minimize respiratory effort  - Oxygen administered by appropriate delivery if ordered  - Initiate smoking cessation education as indicated  - Encourage broncho-pulmonary hygiene including cough, deep breathe, Incentive Spirometry  - Assess the need for suctioning and aspirate as needed  - Assess and instruct to report SOB or any respiratory difficulty  - Respiratory Therapy support as indicated  Outcome: Progressing

## 2022-11-27 NOTE — PLAN OF CARE
Problem: PAIN - ADULT  Goal: Verbalizes/displays adequate comfort level or baseline comfort level  Description: Interventions:  - Encourage patient to monitor pain and request assistance  - Assess pain using appropriate pain scale  - Administer analgesics based on type and severity of pain and evaluate response  - Implement non-pharmacological measures as appropriate and evaluate response  - Consider cultural and social influences on pain and pain management  - Notify physician/advanced practitioner if interventions unsuccessful or patient reports new pain  Outcome: Progressing     Problem: INFECTION - ADULT  Goal: Absence or prevention of progression during hospitalization  Description: INTERVENTIONS:  - Assess and monitor for signs and symptoms of infection  - Monitor lab/diagnostic results  - Monitor all insertion sites, i e  indwelling lines, tubes, and drains  - Monitor endotracheal if appropriate and nasal secretions for changes in amount and color  - [place] appropriate cooling/warming therapies per order  - Administer medications as ordered  - Instruct and encourage patient and family to use good hand hygiene technique  - Identify and instruct in appropriate isolation precautions for identified infection/condition  Outcome: Progressing

## 2022-11-27 NOTE — PROGRESS NOTES
Daily Cardiology Progress Note              LOS: 3 days     Assessment/Plan     Principal Problem:    Acute on chronic combined systolic and diastolic heart failure (HCC)  Active Problems:    Type 2 diabetes mellitus with complication, with long-term current use of insulin (HCC)    Polysubstance abuse (HCC)    Essential hypertension    Dyslipidemia    Ischemic cardiomyopathy    Bipolar disorder (HCC)    SIRS (systemic inflammatory response syndrome) (HCC)    CAD (coronary artery disease)    Acute respiratory failure with hypoxia and hypercapnia (HCC)    Smoking hx    Suspected COPD with acute exacerbation ([facility] [address] )    1  Acute on chronic combined systolic and diastolic CHF - echocardiogram was done today which showed a severe reduction in EF  EF is ~25%  She has not been using any medications but previously she was on metoprolol succinate and lisinopril   - Restarted on metoprolol and lisinopril  - She is currently free of respiratory distress and chest Xray shows improved edema    - Recommend PO lasix 20 mg daily upon discharge  - Importance of outpatient follow up discussed with her  Will need EF reassessment in 3 months after restarting medications to determine need for ICD       2  CAD with prior PCI after STEMI  - She has been restarted on ASA and Plavix  Will continue Plavix alone  - Increased atorvastatin to 40 mg daily     3  Tobacco abuse - nicotine patch  4  Dyslipidemia - continue statin  5  COPD with exacerbation      Subjective     Interval History:  Patient developed confusion and lethargy yesterday afternoon  She still does not feel back to baseline  She denies any shortness of breath, lower extremity edema, chest pain, orthopnea or paroxysmal nocturnal dyspnea        Objective     Vital signs in last 24 hours:  Temp:  [97 5 °F (36 4 °C)-98 1 °F (36 7 °C)] 97 5 °F (36 4 °C)  HR:  [82-93] 87  Resp:  [17-18] 18  BP: ()/(62-84) 147/84  Weight (last 2 days)     Date/Time Weight 11/27/22 0600 45 3 (99 87)    11/26/22 0600 44 2 (97 44)    11/25/22 0927 44 3 (97 66)    11/25/22 0600 44 3 (97 66)           Intake/Output last 3 shifts:  I/O last 3 completed shifts: In: 220 [P O :200; I V :20]  Out: -   Intake/Output this shift:  No intake/output data recorded  Physical Exam:  Physical Exam   Constitutional: She appears healthy  No distress  Eyes: Pupils are equal, round, and reactive to light  Conjunctivae are normal    Neck: No JVD present  Cardiovascular: Normal rate, regular rhythm and normal heart sounds  Exam reveals no gallop and no friction rub  No murmur heard  Pulmonary/Chest: Effort normal and breath sounds normal  She has no wheezes  She has no rales  Musculoskeletal:         General: No tenderness, deformity or edema  Cervical back: Normal range of motion and neck supple  Neurological: She is alert and oriented to person, place, and time  Skin: Skin is warm and dry  Lab Results: I have personally reviewed pertinent lab results  Imaging: I have personally reviewed pertinent films in PACS  EKG/Tele: Reviewed

## 2022-11-27 NOTE — ASSESSMENT & PLAN NOTE
Lab Results   Component Value Date    HGBA1C 5 8 (H) 11/24/2022       Recent Labs     11/26/22  1622 11/26/22  2102 11/27/22  0805 11/27/22  1142   POCGLU 86 100 169* 118       Blood Sugar Average: Last 72 hrs:  (P) 146   Blood sugars currently acceptable  Patient previously on Lantus but will hold off on any medications at the current time

## 2022-11-27 NOTE — PROGRESS NOTES
[address]  Progress Note Chema Zamarripa 1960, 63 y o  female MRN: [id number]  Unit/Bed#: [address] [place] [address] Encounter: [id number]  Primary Care Provider: Ricki Alvarez MD   Date and time admitted to hospital: 11/24/2022  8:17 AM    * Acute on chronic combined systolic and diastolic heart failure ([address] )  Assessment & Plan  Wt Readings from Last 3 Encounters:   11/27/22 45 3 kg (99 lb 13 9 oz)   08/13/22 38 6 kg (85 lb)   07/30/22 45 5 kg (100 lb 3 2 oz)     History of ischemic cardiomyopathy   Echo from 11/21 showed EF of 35% with grade 1 diastolic dysfunction and low normal RV systolic function  Repeat 2D echo showed EF of 22% with grade 2 diastolic dysfunction with severely depressed LV systolic function  Chest x-ray upon admission showed moderate cardiomegaly with moderate interstitial edema  Repeat chest x-ray showed improved pulmonary vascular congestion  Patient was given IV Lasix with very good response  Patient was given another dose of Lasix 20 milligram today  Continue Lasix 20 milligram every day  Patient also continue on Aldactone 12 5 milligram p o  Daily        Acute respiratory failure with hypoxia and hypercapnia [facility])  Assessment & Plan  Patient presented to step-down unit on 11/24 with some shortness of breath with hypoxia and hypercarbia  Patient was placed on BiPAP in the ED  VBG showed 7 03/99/50/26/-7  Patient received 40 milligrams of IV Lasix and steroids and Rocephin azithromycin in the ED  Patient was later continued on Solu-Medrol which was later discontinued along with antibiotics as there was no evidence of COPD exacerbation or pneumonia  Patient had very good response to IV Lasix and patient was started off nasal cannula and currently stable on room air      Suspected COPD with acute exacerbation [facility])  Assessment & Plan  Patient was given Solu-Medrol 125 micrograms in the ED and was later continued on 40 milligram q 8 hours which was later discontinued  Continue nebulizers p r n  CAD (coronary artery disease)  Assessment & Plan  Status post PCI to RCA in 2021 after a STEMI  Continue aspirin statin and Plavix along with beta-blocker    Bipolar disorder [facility])  Assessment & Plan  Not on any treatment    Ischemic cardiomyopathy  Assessment & Plan  Echo from November 21 showed EF of 35% with grade 1 diastolic dysfunction with moderate to severe TR  Repeat 2D echo from this admission showed EF of 22%  Patient restarted back on Toprol-XL and lisinopril  Patient advised compliance with medications  Needs close follow-up with Cardiology for repeat 2D echo  Patient will be discharged on Lasix 20 milligram p o  Daily and Aldactone 12 5 milligram p o  Daily    Dyslipidemia  Assessment & Plan  Continue statin    Essential hypertension  Assessment & Plan  Restarted on lisinopril 5 milligram p o  Daily and Toprol-XL 50 milligram p o  Daily  Added on Lasix 20 milligram p o  Daily  Patient to continue on Aldactone 12 5 milligram p o  Daily    Polysubstance abuse ([address] )  Assessment & Plan  Currently on methadone 44 milligram p o  Daily  ICU verified the dosing with state line    Type 2 diabetes mellitus with complication, with long-term current use of insulin [facility])  Assessment & Plan  Lab Results   Component Value Date    HGBA1C 5 8 (H) 11/24/2022       Recent Labs     11/26/22  1622 11/26/22  2102 11/27/22  0805 11/27/22  1142   POCGLU 86 100 169* 118       Blood Sugar Average: Last 72 hrs:  (P) 146   Blood sugars currently acceptable  Patient previously on Lantus but will hold off on any medications at the current time    Hypokalemia-secondary diuretic use  Patient restarted back on Aldactone 12 5 milligram p o  Daily  Patient received potassium supplementation      VTE Pharmacologic Prophylaxis:   Moderate Risk (Score 3-4) - Pharmacological DVT Prophylaxis Ordered: enoxaparin (Lovenox)      Patient Centered Rounds: I performed bedside rounds with nursing staff today   Discussions with Specialists or Other Care Team Provider: Yes - Cardiology    Education and Discussions with Family / Patient: yes    Time Spent for Care: 45 minutes  More than 50% of total time spent on counseling and coordination of care as described above  Current Length of Stay: 3 day(s)  Current Patient Status: Inpatient   Certification Statement: The patient will continue to require additional inpatient hospital stay due to Acute CHF  Discharge Plan: Anticipate discharge tomorrow to home  Code Status: Level 1 - Full Code    Subjective:   Patient is feeling better  Improved shortness of breath  Denies any chest pain, abdominal pain  Objective:     Vitals:   Temp (24hrs), Av 8 °F (36 6 °C), Min:97 5 °F (36 4 °C), Max:98 1 °F (36 7 °C)    Temp:  [97 5 °F (36 4 °C)-98 1 °F (36 7 °C)] 97 5 °F (36 4 °C)  HR:  [82-93] 87  Resp:  [17-18] 18  BP: ()/(62-84) 147/84  SpO2:  [91 %-98 %] 97 %  Body mass index is 20 17 kg/m²  Input and Output Summary (last 24 hours):   No intake or output data in the 24 hours ending 22 1428    Physical Exam:   Physical Exam  Constitutional:       Appearance: Normal appearance  HENT:      Head: Normocephalic and atraumatic  Nose: Nose normal       Mouth/Throat:      Mouth: Mucous membranes are moist       Pharynx: Oropharynx is clear  Eyes:      Extraocular Movements: Extraocular movements intact  Pupils: Pupils are equal, round, and reactive to light  Cardiovascular:      Rate and Rhythm: Normal rate and regular rhythm  Pulmonary:      Effort: Pulmonary effort is normal       Breath sounds: Normal breath sounds  Abdominal:      General: Bowel sounds are normal  There is no distension  Palpations: Abdomen is soft  Tenderness: There is no abdominal tenderness  Musculoskeletal:         General: No swelling  Cervical back: Normal range of motion and neck supple  Skin:     General: Skin is warm and dry     Neurological: General: No focal deficit present  Mental Status: She is alert  Additional Data:     Labs:  Results from last 7 days   Lab Units 11/26/22  0549 11/25/22  0553   WBC Thousand/uL 9 57 6 56   HEMOGLOBIN g/dL 13 6 13 8   HEMATOCRIT % 41 6 41 7   PLATELETS Thousands/uL 243 249   NEUTROS PCT %  --  83*   LYMPHS PCT %  --  13*   MONOS PCT %  --  4   EOS PCT %  --  0     Results from last 7 days   Lab Units 11/27/22  0538 11/24/22  1513 11/24/22  0838   SODIUM mmol/L 137   < > 141   POTASSIUM mmol/L 3 3*   < > 4 3   CHLORIDE mmol/L 97   < > 106   CO2 mmol/L 34*   < > 21   CO2, I-STAT   --    < >  --    BUN mg/dL 23   < > 12   CREATININE mg/dL 0 79   < > 0 79   ANION GAP mmol/L 6   < > 14*   CALCIUM mg/dL 8 3   < > 7 7*   ALBUMIN g/dL  --   --  2 7*   TOTAL BILIRUBIN mg/dL  --   --  0 60   ALK PHOS U/L  --   --  242*   ALT U/L  --   --  36   AST U/L  --   --  104*   GLUCOSE RANDOM mg/dL 101   < > 234*    < > = values in this interval not displayed  Results from last 7 days   Lab Units 11/27/22  1142 11/27/22  0805 11/26/22  2102 11/26/22  1622 11/26/22  1157 11/26/22  0736 11/25/22  2257 11/25/22  1555 11/25/22  1105 11/25/22  0721 11/24/22  2135   POC GLUCOSE mg/dl 118 169* 100 86 129 108 110 134 197* 158* 297*     Results from last 7 days   Lab Units 11/24/22  0838   HEMOGLOBIN A1C % 5 8*     Results from last 7 days   Lab Units 11/27/22  0538 11/25/22  0553 11/24/22  1501 11/24/22  1304 11/24/22  1105 11/24/22  0832   LACTIC ACID mmol/L  --   --  2 5* 2 4* 2 5* 4 7*   PROCALCITONIN ng/ml 0 40* 0 67*  --   --   --  0 07       Lines/Drains:  Invasive Devices     Peripheral Intravenous Line  Duration           Peripheral IV 11/24/22 Right Antecubital 3 days                      Imaging: Reviewed radiology reports from this admission including: chest xray    Recent Cultures (last 7 days):   Results from last 7 days   Lab Units 11/24/22  1525 11/24/22  0850   BLOOD CULTURE   --  No Growth at 48 hrs  No Growth at 48 hrs  LEGIONELLA URINARY ANTIGEN  Negative  --        Last 24 Hours Medication List:   Current Facility-Administered Medications   Medication Dose Route Frequency Provider Last Rate   • atorvastatin  40 mg Oral Daily With Dinner Solo Alex DO     • clopidogrel  75 mg Oral Daily YASMIN Chapman     • enoxaparin  40 mg Subcutaneous Daily [place], [place]     • famotidine  20 mg Oral BID YASMIN Chapman     • folic acid  1 mg Oral Daily [facility] [place]     • hydrOXYzine HCL  25 mg Oral Q6H PRN YASMIN [place]     • insulin lispro  1-5 Units Subcutaneous TID AC [place] YASMIN Medrano     • insulin lispro  1-5 Units Subcutaneous HS Maria Eugenia YASMIN Medrano     • ipratropium-albuterol  3 mL Nebulization TID Danita Leahy MD     • lisinopril  5 mg Oral Daily YASMIN [place]     • methadone  45 mg Oral Daily YASMIN Chapman     • metoprolol succinate  50 mg Oral Daily Solo Alex DO     • multivitamin stress formula  1 tablet Oral Daily YASMIN Chapman     • nicotine  1 patch Transdermal Daily YASMIN Chapman     • spironolactone  12 5 mg Oral Daily Danita Leahy MD     • thiamine  100 mg Oral Daily YASMIN Al          Today, Patient Was Seen By: Danita Leahy MD    **Please Note: This note may have been constructed using a voice recognition system  **

## 2022-11-27 NOTE — ASSESSMENT & PLAN NOTE
Wt Readings from Last 3 Encounters:   11/27/22 45 3 kg (99 lb 13 9 oz)   08/13/22 38 6 kg (85 lb)   07/30/22 45 5 kg (100 lb 3 2 oz)     History of ischemic cardiomyopathy   Echo from 11/21 showed EF of 35% with grade 1 diastolic dysfunction and low normal RV systolic function  Repeat 2D echo showed EF of 22% with grade 2 diastolic dysfunction with severely depressed LV systolic function  Chest x-ray upon admission showed moderate cardiomegaly with moderate interstitial edema  Repeat chest x-ray showed improved pulmonary vascular congestion  Patient was given IV Lasix with very good response  Patient was given another dose of Lasix 20 milligram today  Continue Lasix 20 milligram every day  Patient also continue on Aldactone 12 5 milligram p o   Daily

## 2022-11-27 NOTE — UTILIZATION REVIEW
Continued Stay Review    Date: 11/27/22                          Current Patient Class: inpatient  Current Level of Care: med surg  HPI:62 y o  female initially admitted on 11/24/22     Assessment/Plan:   Acute on chronic combined CHF POA  SOB improving, lungs with diminished breath sounds, currently on RA  Restarted back on aldactone daily  Hypokalemia 2nd diuretic use, K drop to 3 3, repletion given       Vital Signs:   11/27/22 0800 97 5 °F (36 4 °C) 87 18 147/84 105 97 % -- -- None (Room air) Lying   11/27/22 07:58:03 97 5 °F (36 4 °C) 90 -- 147/84 105 98 % -- -- -- --     Pertinent Labs/Diagnostic Results:   Results from last 7 days   Lab Units 11/24/22  0842   SARS-COV-2  Negative     Results from last 7 days   Lab Units 11/26/22  0549 11/25/22  0553 11/24/22  1513 11/24/22  0838   WBC Thousand/uL 9 57 6 56  --  11 38*   HEMOGLOBIN g/dL 13 6 13 8  --  14 2   I STAT HEMOGLOBIN g/dl  --   --  13 6  --    HEMATOCRIT % 41 6 41 7  --  46 0   HEMATOCRIT, ISTAT %  --   --  40  --    PLATELETS Thousands/uL 243 249  --  281   NEUTROS ABS Thousands/µL  --  5 45  --  3 78       Results from last 7 days   Lab Units 11/27/22  0538 11/26/22  0549 11/25/22  0553 11/24/22  1513 11/24/22  0838   SODIUM mmol/L 137 141 136  --  141   POTASSIUM mmol/L 3 3* 3 6 3 7  --  4 3   CHLORIDE mmol/L 97 101 97  --  106   CO2 mmol/L 34* 30 31  --  21   CO2, I-STAT mmol/L  --   --   --  34*  --    ANION GAP mmol/L 6 10 8  --  14*   BUN mg/dL 23 15 11  --  12   CREATININE mg/dL 0 79 0 73 0 67  --  0 79   EGFR ml/min/1 73sq m 81 89 95  --  81   CALCIUM mg/dL 8 3 8 6 8 4  --  7 7*   CALCIUM, IONIZED, ISTAT mmol/L  --   --   --  1 05*  --    MAGNESIUM mg/dL  --  1 8 1 6  --   --    PHOSPHORUS mg/dL  --   --  3 4  --   --      Results from last 7 days   Lab Units 11/24/22  0838   AST U/L 104*   ALT U/L 36   ALK PHOS U/L 242*   TOTAL PROTEIN g/dL 6 9   ALBUMIN g/dL 2 7*   TOTAL BILIRUBIN mg/dL 0 60     Results from last 7 days   Lab Units 11/27/22  0805 11/26/22  2102 11/26/22  1622 11/26/22  1157 11/26/22  0736 11/25/22  2257 11/25/22  1555 11/25/22  1105 11/25/22  0721 11/24/22  2135   POC GLUCOSE mg/dl 169* 100 86 129 108 110 134 197* 158* 297*     Results from last 7 days   Lab Units 11/27/22  0538 11/26/22  0549 11/25/22  0553 11/24/22  0838   GLUCOSE RANDOM mg/dL 101 103 157* 234*       Results from last 7 days   Lab Units 11/24/22  0838   HEMOGLOBIN A1C % 5 8*   EAG mg/dl 120     BETA-HYDROXYBUTYRATE   Date Value Ref Range Status   05/25/2020 0 1 <0 6 mmol/L Final   07/10/2019 0 1 <0 6 mmol/L Final        Results from last 7 days   Lab Units 11/24/22  1105 11/24/22  0832   PH ESTHER  7 270* 7 035*   PCO2 ESTHER mm Hg 74 7* 99 9*   PO2 ESTHER mm Hg 24 5* 50 0*   HCO3 ESTHER mmol/L 33 5* 26 1   BASE EXC ESTHER mmol/L 4 0 -7 5   O2 CONTENT ESTHER ml/dL 7 1 13 8   O2 HGB, VENOUS % 35 1* 62 9     Results from last 7 days   Lab Units 11/24/22  1513   I STAT BASE EXC mmol/L 7*   I STAT O2 SAT % 78   ISTAT PH ART  7 445   I STAT ART PCO2 mm HG 47 5*   I STAT ART PO2 mm HG 41 0*   I STAT ART HCO3 mmol/L 32 6*       Results from last 7 days   Lab Units 11/24/22  1304 11/24/22  1105 11/24/22  0832   HS TNI 0HR ng/L  --   --  19   HS TNI 2HR ng/L  --  38  --    HSTNI D2 ng/L  --  19  --    HS TNI 4HR ng/L 47  --   --    HSTNI D4 ng/L 28*  --   --        Results from last 7 days   Lab Units 11/27/22  0538 11/25/22  0553 11/24/22  0832   PROCALCITONIN ng/ml 0 40* 0 67* 0 07     Results from last 7 days   Lab Units 11/24/22  1501 11/24/22  1304 11/24/22  1105 11/24/22  0832   LACTIC ACID mmol/L 2 5* 2 4* 2 5* 4 7*       Results from last 7 days   Lab Units 11/24/22  0838   NT-PRO BNP pg/mL 3,408*       Results from last 7 days   Lab Units 11/24/22  1540   CLARITY UA  Clear   COLOR UA  Light Yellow   SPEC GRAV UA  1 010   PH UA  6 0   GLUCOSE UA mg/dl Negative   KETONES UA mg/dl Negative   BLOOD UA  Negative   PROTEIN UA mg/dl Negative   NITRITE UA  Negative   BILIRUBIN UA Negative   UROBILINOGEN UA E U /dl 0 2   LEUKOCYTES UA  Negative     Results from last 7 days   Lab Units 11/24/22  1525 11/24/22  0842   STREP PNEUMONIAE ANTIGEN, URINE  Negative  --    LEGIONELLA URINARY ANTIGEN  Negative  --    INFLUENZA A PCR   --  Negative   INFLUENZA B PCR   --  Negative   RSV PCR   --  Negative     Results from last 7 days   Lab Units 11/26/22  1639   AMPH/METH  Negative   BARBITURATE UR  Negative   BENZODIAZEPINE UR  Negative   COCAINE UR  Negative   METHADONE URINE  Positive*   OPIATE UR  Negative   PCP UR  Negative   THC UR  Positive*     Results from last 7 days   Lab Units 11/24/22  0832   ETHANOL LVL mg/dL <3       Results from last 7 days   Lab Units 11/24/22  0850   BLOOD CULTURE  No Growth at 48 hrs  No Growth at 48 hrs  Medications:   Scheduled Medications:  atorvastatin, 40 mg, Oral, Daily With Dinner  clopidogrel, 75 mg, Oral, Daily  enoxaparin, 40 mg, Subcutaneous, Daily  famotidine, 20 mg, Oral, BID  folic acid, 1 mg, Oral, Daily  insulin lispro, 1-5 Units, Subcutaneous, TID AC  insulin lispro, 1-5 Units, Subcutaneous, HS  ipratropium-albuterol, 3 mL, Nebulization, TID  lisinopril, 5 mg, Oral, Daily  methadone, 45 mg, Oral, Daily  metoprolol succinate, 50 mg, Oral, Daily  multivitamin stress formula, 1 tablet, Oral, Daily  nicotine, 1 patch, Transdermal, Daily  potassium chloride (K-DUR,KLOR-CON) CR tablet 40 mEq, Once  spironolactone (ALDACTONE) tablet 12 5 mg, Daily  thiamine, 100 mg, Oral, Daily    PRN Meds:  hydrOXYzine HCL, 25 mg, Oral, Q6H PRN    Discharge Plan: d    Network Utilization Review Department  ATTENTION: Please call with any questions or concerns to [phone] and carefully listen to the prompts so that you are directed to the right person   All voicemails are confidential   [place] all requests for admission clinical reviews, approved or denied determinations and any other requests to dedicated fax number below belonging to the campus where the patient is receiving treatment   List of dedicated fax numbers for the Facilities:  [address] [place] [address] DENIALS (Administrative/Medical Necessity) [phone]   [address]  (Maternity/NICU/Pediatrics) [phone]   [address] [phone]   [address] [phone]   [address] [address] [facility] [phone]   [address] [facility] [place] [facility] 134 815 [facility] [phone]

## 2022-11-27 NOTE — ASSESSMENT & PLAN NOTE
Restarted on lisinopril 5 milligram p o  Daily and Toprol-XL 50 milligram p o  Daily  Added on Lasix 20 milligram p o  Daily  Patient to continue on Aldactone 12 5 milligram p o   Daily

## 2022-11-27 NOTE — ASSESSMENT & PLAN NOTE
Echo from November 21 showed EF of 35% with grade 1 diastolic dysfunction with moderate to severe TR  Repeat 2D echo from this admission showed EF of 22%  Patient restarted back on Toprol-XL and lisinopril  Patient advised compliance with medications  Needs close follow-up with Cardiology for repeat 2D echo  Patient will be discharged on Lasix 20 milligram p o  Daily and Aldactone 12 5 milligram p o   Daily

## 2022-11-28 VITALS
TEMPERATURE: 97.7 F | RESPIRATION RATE: 17 BRPM | OXYGEN SATURATION: 95 % | DIASTOLIC BLOOD PRESSURE: 71 MMHG | SYSTOLIC BLOOD PRESSURE: 116 MMHG | HEART RATE: 73 BPM | HEIGHT: 59 IN | WEIGHT: 99.65 LBS | BODY MASS INDEX: 20.09 KG/M2

## 2022-11-28 PROBLEM — E87.6 HYPOKALEMIA: Status: ACTIVE | Noted: 2022-11-28

## 2022-11-28 LAB
GLUCOSE SERPL-MCNC: 103 MG/DL (ref 65–140)
GLUCOSE SERPL-MCNC: 123 MG/DL (ref 65–140)

## 2022-11-28 RX ORDER — ATORVASTATIN CALCIUM 40 MG/1
40 TABLET, FILM COATED ORAL
Qty: 30 TABLET | Refills: 0 | Status: SHIPPED | OUTPATIENT
Start: 2022-11-28

## 2022-11-28 RX ORDER — IPRATROPIUM BROMIDE AND ALBUTEROL SULFATE 2.5; .5 MG/3ML; MG/3ML
3 SOLUTION RESPIRATORY (INHALATION) 3 TIMES DAILY
Qty: 240 ML | Refills: 0 | Status: SHIPPED | OUTPATIENT
Start: 2022-11-28

## 2022-11-28 RX ORDER — FUROSEMIDE 20 MG/1
20 TABLET ORAL DAILY
Qty: 90 TABLET | Refills: 1 | Status: SHIPPED | OUTPATIENT
Start: 2022-11-28

## 2022-11-28 RX ORDER — SPIRONOLACTONE 25 MG/1
12.5 TABLET ORAL DAILY
Qty: 15 TABLET | Refills: 0 | Status: SHIPPED | OUTPATIENT
Start: 2022-11-29 | End: 2022-12-29

## 2022-11-28 RX ORDER — METOPROLOL SUCCINATE 50 MG/1
50 TABLET, EXTENDED RELEASE ORAL DAILY
Qty: 30 TABLET | Refills: 0 | Status: SHIPPED | OUTPATIENT
Start: 2022-11-29

## 2022-11-28 RX ORDER — FUROSEMIDE 20 MG/1
20 TABLET ORAL DAILY
Status: DISCONTINUED | OUTPATIENT
Start: 2022-11-28 | End: 2022-11-28 | Stop reason: HOSPADM

## 2022-11-28 RX ORDER — POTASSIUM CHLORIDE 20 MEQ/1
40 TABLET, EXTENDED RELEASE ORAL ONCE
Status: COMPLETED | OUTPATIENT
Start: 2022-11-28 | End: 2022-11-28

## 2022-11-28 RX ADMIN — THIAMINE HCL TAB 100 MG 100 MG: 100 TAB at 08:55

## 2022-11-28 RX ADMIN — LISINOPRIL 5 MG: 5 TABLET ORAL at 08:55

## 2022-11-28 RX ADMIN — FAMOTIDINE 20 MG: 20 TABLET ORAL at 08:55

## 2022-11-28 RX ADMIN — FOLIC ACID 1 MG: 1 TABLET ORAL at 08:54

## 2022-11-28 RX ADMIN — POTASSIUM CHLORIDE 40 MEQ: 1500 TABLET, EXTENDED RELEASE ORAL at 12:42

## 2022-11-28 RX ADMIN — SPIRONOLACTONE 12.5 MG: 25 TABLET ORAL at 08:55

## 2022-11-28 RX ADMIN — METOPROLOL SUCCINATE 50 MG: 50 TABLET, EXTENDED RELEASE ORAL at 08:55

## 2022-11-28 RX ADMIN — CLOPIDOGREL BISULFATE 75 MG: 75 TABLET ORAL at 08:55

## 2022-11-28 RX ADMIN — METHADONE HYDROCHLORIDE 45 MG: 10 TABLET ORAL at 08:54

## 2022-11-28 RX ADMIN — FUROSEMIDE 20 MG: 20 TABLET ORAL at 12:42

## 2022-11-28 RX ADMIN — B-COMPLEX W/ C & FOLIC ACID TAB 1 TABLET: TAB at 08:55

## 2022-11-28 RX ADMIN — NICOTINE 1 PATCH: 21 PATCH, EXTENDED RELEASE TRANSDERMAL at 08:55

## 2022-11-28 NOTE — PROGRESS NOTES
Progress Note - Cardiology   [facility]     Chinedu Emerson 63 y o  female MRN: [id number]  : 1960  Unit/Bed#: [address] [place] [address] Encounter: [id number]    Assessment and Plan:   1  Acute on chronic combined systolic and diastolic heart failure:  Echocardiogram performed on 2022 demonstrates EF of approximately 25%    -  patient was not taking any of her medications at home    -  ACE and beta-blocker restarted    -  continue Zestril 5 mg once a day    -  continue Toprol XL 50 mg daily    -  continue Aldactone 12 5 mg daily    -  continue Lasix 20 mg p o  Once a day    -  low-sodium diet    -  recommending repeat echocardiogram in approximately 3 months to re-evaluate EF, if EF remains less than 35 will need defibrillator    2  Coronary artery disease with history of PCI:  Intervention to RCA during acute MI in 2021    -  aspirin discontinued during this admission and would continue Plavix 75 mg daily    -  continue statin therapy and ACE-inhibitor    3  Dyslipidemia:  Continue Lipitor 40 mg daily    4  COPD/tobacco abuse:  Encourage smoking cessation    Subjective / Objective:   Patient seen and examined  She states that she is feeling much better and is requesting discharge  Apparently she was written for discharge yesterday but declined leaving could she was not feeling well  She denies any chest pain, pressure or palpitations  Vitals: Blood pressure 116/71, pulse 73, temperature 97 7 °F (36 5 °C), temperature source Oral, resp  rate 17, height 4' 11" (1 499 m), weight 45 2 kg (99 lb 10 4 oz), SpO2 95 %, not currently breastfeeding  Vitals:    22 0600 22 0557   Weight: 45 3 kg (99 lb 13 9 oz) 45 2 kg (99 lb 10 4 oz)     Body mass index is 20 13 kg/m²    BP Readings from Last 3 Encounters:   22 116/71   10/29/22 159/100   22 122/72     Orthostatic Blood Pressures    Flowsheet Row Most Recent Value   Blood Pressure 116/71 filed at 2022 0523   Patient Position - Orthostatic VS Lying filed at 11/27/2022 0800        I/O       11/26 0701  11/27 0700 11/27 0701  11/28 0700 11/28 0701 11/29 0700    P  O        I V  (mL/kg)       Total Intake(mL/kg)       Net                  Invasive Devices     Peripheral Intravenous Line  Duration           Peripheral IV 11/24/22 Right Antecubital 4 days                No intake or output data in the 24 hours ending 11/28/22 1214      Physical Exam:   Physical Exam  Vitals and nursing note reviewed  Constitutional:       General: She is not in acute distress  Appearance: Normal appearance  She is normal weight  HENT:      Right Ear: External ear normal       Left Ear: External ear normal       Nose: Nose normal    Eyes:      General: No scleral icterus  Right eye: No discharge  Left eye: No discharge  Cardiovascular:      Rate and Rhythm: Normal rate and regular rhythm  Pulses: Normal pulses  Heart sounds: Normal heart sounds  Pulmonary:      Effort: Pulmonary effort is normal  No accessory muscle usage or respiratory distress  Breath sounds: Examination of the right-lower field reveals decreased breath sounds  Examination of the left-lower field reveals decreased breath sounds  Decreased breath sounds present  Abdominal:      General: Bowel sounds are normal  There is no distension  Palpations: Abdomen is soft  Musculoskeletal:      Right lower leg: No edema  Left lower leg: No edema  Skin:     General: Skin is warm and dry  Capillary Refill: Capillary refill takes less than 2 seconds  Neurological:      General: No focal deficit present  Mental Status: She is alert and oriented to person, place, and time  Mental status is at baseline     Psychiatric:         Mood and Affect: Mood normal             Medications/ Allergies:     Current Facility-Administered Medications   Medication Dose Route Frequency Provider Last Rate   • atorvastatin  40 mg Oral Daily With Juanpablo Lemon DO     • clopidogrel  75 mg Oral Daily Sp[place]er [place], [address]     • enoxaparin  40 mg Subcutaneous Daily Spenser Alf [place], [address]     • famotidine  20 mg Oral BID Spensercarol [place], [address]     • folic acid  1 mg Oral Daily Maria Eugenia [place], [address]     • hydrOXYzine HCL  25 mg Oral Q6H PRN YASMIN Denton     • insulin lispro  1-5 Units Subcutaneous TID AC Maria Eugenia [place] YASMIN Valle     • insulin lispro  1-5 Units Subcutaneous HS [facility] YASMIN Valle     • ipratropium-albuterol  3 mL Nebulization TID Leslee Chang MD     • lisinopril  5 mg Oral Daily YASMIN Denton     • methadone  45 mg Oral Daily Spensercarol Milner YASMIN Valle     • metoprolol succinate  50 mg Oral Daily Solo Alex DO     • multivitamin stress formula  1 tablet Oral Daily Spensercarol Milner LuceYASMIN stokes     • nicotine  1 patch Transdermal Daily Spensercarol Milner YASMIN Valle     • spironolactone  12 5 mg Oral Daily Leslee Chang MD     • thiamine  100 mg Oral Daily YASMIN Yi       hydrOXYzine HCL, 25 mg, Q6H PRN      No Known Allergies    VTE Pharmacologic Prophylaxis:   Sequential compression device (Venodyne)     Labs:   Troponins:  Results from last 7 days   Lab Units 11/24/22  1304 11/24/22  1105   HSTNI D2 ng/L  --  19   HSTNI D4 ng/L 28*  --      CBC with diff:  Results from last 7 days   Lab Units 11/26/22  0549 11/25/22  0553 11/24/22  1513 11/24/22  0838   WBC Thousand/uL 9 57 6 56  --  11 38*   HEMOGLOBIN g/dL 13 6 13 8  --  14 2   I STAT HEMOGLOBIN g/dl  --   --  13 6  --    HEMATOCRIT % 41 6 41 7  --  46 0   HEMATOCRIT, ISTAT %  --   --  40  --    MCV fL 89 88  --  96   PLATELETS Thousands/uL 243 249  --  281   MCH pg 29 2 29 3  --  29 8   MCHC g/dL 32 7 33 1  --  30 9*   RDW % 15 2* 14 9  --  15 6*   MPV fL 10 1 10 4  --  10 8   NRBC AUTO /100 WBCs  --  0  --  0     CMP:  Results from last 7 days   Lab Units 11/27/22  0538 11/26/22  0549 11/25/22  0553 11/24/22  1513 11/24/22  0838   SODIUM mmol/L 137 141 136  --  141   POTASSIUM mmol/L 3 3* 3 6 3 7  --  4 3   CHLORIDE mmol/L 97 101 97  --  106   CO2 mmol/L 34* 30 31  --  21   CO2, I-STAT mmol/L  --   --   --  34*  --    ANION GAP mmol/L 6 10 8  --  14*   BUN mg/dL 23 15 11  --  12   CREATININE mg/dL 0 79 0 73 0 67  --  0 79   GLUCOSE, ISTAT mg/dl  --   --   --  85  --    CALCIUM mg/dL 8 3 8 6 8 4  --  7 7*   AST U/L  --   --   --   --  104*   ALT U/L  --   --   --   --  36   ALK PHOS U/L  --   --   --   --  242*   TOTAL PROTEIN g/dL  --   --   --   --  6 9   ALBUMIN g/dL  --   --   --   --  2 7*   TOTAL BILIRUBIN mg/dL  --   --   --   --  0 60   EGFR ml/min/1 73sq m 81 89 95  --  81     Magnesium:  Results from last 7 days   Lab Units 11/26/22  0549 11/25/22  0553   MAGNESIUM mg/dL 1 8 1 6     Hgb A1c:  Results from last 7 days   Lab Units 11/24/22  0838   HEMOGLOBIN A1C % 5 8*       Imaging & Testing   I have personally reviewed pertinent reports  XR chest portable    Result Date: 11/25/2022  Narrative: CHEST INDICATION:   pulm edema  COMPARISON:  Chest x-ray from 11/24/2022  EXAM PERFORMED/VIEWS:  XR CHEST PORTABLE FINDINGS: Heart shadow is enlarged but unchanged from prior exam  Mild pulmonary edema, significantly improved compared to 11/24/2022  No pneumothorax  No pleural effusion  Osseous structures appear within normal limits for patient age  Impression: Mild pulmonary edema, significantly improved compared to 11/24/2022  Workstation performed: [id number]     XR chest 1 view portable    Result Date: 11/24/2022  Narrative: CHEST INDICATION:   shortness of breath  COMPARISON:  CXR 11/15/2021 and 11/9/2021, chest CT 3/23/2022  EXAM PERFORMED/VIEWS:  XR CHEST PORTABLE FINDINGS: Moderate cardiomegaly  Moderate interstitial edema  No effusion or pneumothorax  Osseous structures appear within normal limits for patient age       Impression: Moderate cardiomegaly and moderate interstitial edema  Workstation performed: [id number]     Echo complete w/ contrast if indicated    Result Date: 11/25/2022  Narrative: •  Left Ventricle: Left ventricular cavity size is mildly dilated  Wall thickness is normal  The left ventricular ejection fraction is 22% by biplane measurement  Systolic function is severely reduced  There is severe global hypokinesis  Diastolic function is moderately abnormal, consistent with grade II (pseudonormal) relaxation  Left atrial filling pressure is elevated  •  Right Ventricle: Systolic function is mildly reduced  •  Left Atrium: The atrium is moderately dilated (42-48 mL/m2)  •  Right Atrium: The atrium is mildly dilated  •  Mitral Valve: There is mild thickening  The leaflets are not calcified  There is moderately reduced mobility of the posterior leaflet  There is mild to moderate regurgitation  •  Tricuspid Valve: There is severe regurgitation  The right ventricular systolic pressure is severely elevated  The estimated right ventricular systolic pressure is 68 09 mmHg  •  Pulmonic Valve: There is mild regurgitation  •  Compared to previous exam, LV function remains severely depressed  Filling pressures are increased  [address]  Cardiology      "This note was completed in part utilizing m-[employer] direct voice recognition software  Grammatical errors, random word insertion, spelling mistakes, and incomplete sentences may be an occasional consequence of the system secondary to software limitations, ambient noise and hardware issues  Please read the chart carefully and recognize, using context, where substitutions have occurred    If you have any questions or concerns about the context, text or information contained within the body of this dictation, please contact myself, the provider, for further clarification "

## 2022-11-28 NOTE — PLAN OF CARE
Problem: MOBILITY - ADULT  Goal: Maintain or return to baseline ADL function  Description: INTERVENTIONS:  -  Assess patient's ability to carry out ADLs; assess patient's baseline for ADL function and identify physical deficits which impact ability to perform ADLs (bathing, care of mouth/teeth, toileting, grooming, dressing, etc )  - Assess/evaluate cause of self-care deficits   - Assess range of motion  - Assess patient's mobility; develop plan if impaired  - Assess patient's need for assistive devices and provide as appropriate  - Encourage maximum independence but intervene and supervise when necessary  - Involve family in performance of ADLs  - Assess for home care needs following discharge   - Consider OT consult to assist with ADL evaluation and planning for discharge  - Provide patient education as appropriate  Outcome: Progressing     Problem: PAIN - ADULT  Goal: Verbalizes/displays adequate comfort level or baseline comfort level  Description: Interventions:  - Encourage patient to monitor pain and request assistance  - Assess pain using appropriate pain scale  - Administer analgesics based on type and severity of pain and evaluate response  - Implement non-pharmacological measures as appropriate and evaluate response  - Consider cultural and social influences on pain and pain management  - Notify physician/advanced practitioner if interventions unsuccessful or patient reports new pain  Outcome: Progressing     Problem: SAFETY ADULT  Goal: Maintain or return to baseline ADL function  Description: INTERVENTIONS:  -  Assess patient's ability to carry out ADLs; assess patient's baseline for ADL function and identify physical deficits which impact ability to perform ADLs (bathing, care of mouth/teeth, toileting, grooming, dressing, etc )  - Assess/evaluate cause of self-care deficits   - Assess range of motion  - Assess patient's mobility; develop plan if impaired  - Assess patient's need for assistive devices and provide as appropriate  - Encourage maximum independence but intervene and supervise when necessary  - Involve family in performance of ADLs  - Assess for home care needs following discharge   - Consider OT consult to assist with ADL evaluation and planning for discharge  - Provide patient education as appropriate  Outcome: Progressing     Problem: RESPIRATORY - ADULT  Goal: Achieves optimal ventilation and oxygenation  Description: INTERVENTIONS:  - Assess for changes in respiratory status  - Assess for changes in mentation and behavior  - Position to facilitate oxygenation and minimize respiratory effort  - Oxygen administered by appropriate delivery if ordered  - Initiate smoking cessation education as indicated  - Encourage broncho-pulmonary hygiene including cough, deep breathe, Incentive Spirometry  - Assess the need for suctioning and aspirate as needed  - Assess and instruct to report SOB or any respiratory difficulty  - Respiratory Therapy support as indicated  Outcome: Progressing

## 2022-11-28 NOTE — CASE MANAGEMENT
Case Management Discharge Planning Note    Patient name Chelsea Ambriz  [facility] [address] [facility] [id number]  : 1960 Date 2022       Current Admission Date: 2022  Current Admission Diagnosis:Acute on chronic combined systolic and diastolic heart failure [facility])   Patient Active Problem List    Diagnosis Date Noted   • SIRS (systemic inflammatory response syndrome) ([facility] ) 2022   • CAD (coronary artery disease) 2022   • Acute respiratory failure with hypoxia and hypercapnia ([facility] ) 2022   • Smoking hx 2022   • Suspected COPD with acute exacerbation ([facility] ) 2022   • Acute on chronic combined systolic and diastolic heart failure ([facility] ) 2022   • Bipolar disorder ([facility] )    • Transaminitis 11/15/2021   • Ischemic cardiomyopathy 11/10/2021   • Dyslipidemia 2020   • Elevated AST (SGOT) 2020   • Essential hypertension 2020   • Asthma    • Polysubstance abuse ([facility] ) 07/10/2019   • History of pulmonary embolism 07/10/2019   • Type 2 diabetes mellitus with complication, with long-term current use of insulin ([facility] ) 2019      LOS (days): 4  Geometric Mean LOS (GMLOS) (days):   Days to GMLOS:     OBJECTIVE:  Risk of Unplanned Readmission Score: 20 72      Current admission status: Inpatient   Preferred Pharmacy:   [address] [place] [address] #[id number] ([address] [place] [address])  [id number] [place] [address] ([address] [place] [address])  [place] [address]  Phone: [phone] Fax: [phone] - [place], [address]  Phone: [phone] Fax: [phone]    Primary Care Provider: Shania Guerrero MD    Primary Insurance: [facility] [place] [address] [facility]  Secondary Insurance:     DISCHARGE DETAILS:    Discharge planning discussed with[de-identified] Patient  Freedom of Choice: Yes  Comments - Freedom of Choice: Choice for [facility] is [address] [place] [address]         Is the patient interested in [facility] at discharge?: No    DME Referral Provided  Referral made for DME?: Yes (RW approved with $0 OOP cost )  DME referral completed for the following items[de-identified] Rosa Watson  DME Supplier Name[de-identified] [employer]    Other Referral/Resources/Interventions Provided:  Interventions: Outpatient PT  Referral Comments: Patient aware that there are no accepting [facility] agencies available at this time for home therapy  SW offered information for OP PT  Pt states she would like to go to [facility] OP therapy instead  Attending advised who will place ambulatory referral  Information placed on AVS  RW delivered bedside  Ticket signed and scanned back into [place]      Would you like to participate in our [facility] service program?  : No - Declined    Treatment Team Recommendation: Home with [address] [facility]  Discharge Destination Plan[de-identified] Home  Transport at Discharge : [facility])

## 2022-11-29 LAB
BACTERIA BLD CULT: NORMAL
BACTERIA BLD CULT: NORMAL

## 2022-11-29 NOTE — DISCHARGE SUMMARY
Dagoberto 47  Discharge- Lord Em 1960, 63 y o  female MRN: [id number]  Unit/Bed#: [address] [place] [address] Encounter: [id number]  Primary Care Provider: Sagrario Masters MD   Date and time admitted to hospital: 11/24/2022  8:17 AM    * Acute on chronic combined systolic and diastolic heart failure ([address] )  Assessment & Plan  Wt Readings from Last 3 Encounters:   11/28/22 45 2 kg (99 lb 10 4 oz)   08/13/22 38 6 kg (85 lb)   07/30/22 45 5 kg (100 lb 3 2 oz)     History of ischemic cardiomyopathy   Echo from 11/21 showed EF of 35% with grade 1 diastolic dysfunction and low normal RV systolic function  Repeat 2D echo showed EF of 22% with grade 2 diastolic dysfunction with severely depressed LV systolic function  Chest x-ray upon admission showed moderate cardiomegaly with moderate interstitial edema  Repeat chest x-ray showed improved pulmonary vascular congestion  Patient was given IV Lasix with very good response upon admission and was continued on Lasix 20 milligram IV daily  Continue Lasix 20 milligram daily along with Aldactone 12 5 milligram p o  Daily        Acute respiratory failure with hypoxia and hypercapnia [facility])  Assessment & Plan  Patient presented to step-down unit on 11/24 with some shortness of breath with hypoxia and hypercarbia  Patient was placed on BiPAP in the ED  VBG showed 7 03/99/50/26/-7  Patient received 40 milligrams of IV Lasix and steroids and Rocephin azithromycin in the ED  Patient was later continued on Solu-Medrol which was later discontinued along with antibiotics as there was no evidence of COPD exacerbation or pneumonia  Patient had very good response to IV Lasix and patient was started off nasal cannula and currently stable on room air      SIRS (systemic inflammatory response syndrome) (HCC)  Assessment & Plan  As evidenced by tachycardia, tachypnea, elevated lactic acid level upon admission  No evidence of infection  Likely in the setting of CHF exacerbation  UA was negative  Flu, COVID/RSV was negative  Procalcitonin level was mildly elevated at 0 67-0 40  Urine for Legionella pneumococcal antigens were negative  Blood cultures have been negative  MRSA cultures negative    Hypokalemia  Assessment & Plan  Repleted  Patient refused blood work today  Patient to be discharged on Aldactone and lisinopril with outpatient follow-up BMP in 1 week    Suspected COPD with acute exacerbation [facility])  Assessment & Plan  Patient was given Solu-Medrol 125 micrograms in the ED and was later continued on 40 milligram q 8 hours which was later discontinued  Continue nebulizers p r n  CAD (coronary artery disease)  Assessment & Plan  Status post PCI to RCA in 2021 after a STEMI  Continue aspirin statin and Plavix along with beta-blocker    Bipolar disorder [facility])  Assessment & Plan  Not on any treatment    Ischemic cardiomyopathy  Assessment & Plan  Echo from November 21 showed EF of 35% with grade 1 diastolic dysfunction with moderate to severe TR  Repeat 2D echo from this admission showed EF of 22% with grade 2 diastolic dysfunction with severe TR with RV systolic pressure of 69 millimeters of mercury  Patient restarted back on Toprol-XL and lisinopril  Patient advised compliance with medications  Needs close follow-up with Cardiology for repeat 2D echo  Patient will be discharged on Lasix 20 milligram p o  Daily and Aldactone 12 5 milligram p o  Daily    Dyslipidemia  Assessment & Plan  Continue statin    Essential hypertension  Assessment & Plan  Restarted on lisinopril 5 milligram p o  Daily and Toprol-XL 50 milligram p o  Daily  Continue Lasix 20 milligram p o  Daily, Aldactone 12 5 milligram p o  Daily and lisinopril 5 milligram p o  Daily    Polysubstance abuse ([address] )  Assessment & Plan  Currently on methadone 44 milligram p o   Daily  ICU verified the dosing with state line    Type 2 diabetes mellitus with complication, with long-term current use of insulin [facility])  Assessment & Plan  Lab Results   Component Value Date    HGBA1C 5 8 (H) 11/24/2022       Recent Labs     11/27/22  1626 11/27/22  2111 11/28/22  0709 11/28/22  1125   POCGLU 103 127 103 123       Blood Sugar Average: Last 72 hrs:  (P) 729 [id number]   Blood sugars currently acceptable  Patient previously on Lantus but will hold off on any medications at the current time      Medical Problems     Resolved Problems  Date Reviewed: 11/28/2022   None       Discharging Physician / Practitioner: Zayda Hernández MD  PCP: Ricki Alvarez MD  Admission Date:   Admission Orders (From admission, onward)     Ordered        11/24/22 [facility]                      Discharge Date: 11/28/22    Consultations During Hospital Stay:  · Cardiology    Procedures Performed:   · 2D echo showed EF of 22% with mild-to-moderate MR, severe TR with PA pressure of 69 millimeters of mercury       Outpatient Tests Requested:  · BMP in 1 week with outpatient follow-up with Cardiology    Complications:  None    Reason for Admission:  Shortness of breath    Hospital Course:   Valentine Artis is a 63 y o  female patient with past medical history of CAD status post PCI, ischemic cardiomyopathy, CHF, polysubstance abuse on methadone, diabetes, hypertension who originally presented to the hospital on 11/24/2022 due to respiratory distress and chest pain  Patient was initially given Ativan and was placed on BiPAP  Chest x-ray showed vascular congestion  Patient was started on IV steroids, IV antibiotics and was given a dose of IV Lasix with significant improvement of symptoms  Later steroids and IV antibiotics were discontinued  Patient was transitioned to nasal cannula which was eventually titrated off  Patient was seen by Cardiology and restarted back on home medication regimen    Patient continued remained stable on room air and patient be discharged home with outpatient follow-up with Cardiology      Please see above list of diagnoses and related plan for additional information  Condition at Discharge: stable    Discharge Day Visit / Exam:   Subjective:  Patient is feeling well and anxious to go home  Denies any chest pain, shortness a breath, palpitations leg swelling  Vitals: Blood Pressure: 116/71 (11/28/22 0742)  Pulse: 73 (11/28/22 0742)  Temperature: 97 7 °F (36 5 °C) (11/28/22 0742)  Temp Source: Oral (11/28/22 0742)  Respirations: 17 (11/28/22 0742)  Height: 4' 11" (149 9 cm) (11/25/22 0927)  Weight - Scale: 45 2 kg (99 lb 10 4 oz) (11/28/22 0557)  SpO2: 95 % (11/28/22 0742)  Exam:   Physical Exam  Constitutional:       Appearance: Normal appearance  HENT:      Head: Normocephalic and atraumatic  Nose: Nose normal       Mouth/Throat:      Mouth: Mucous membranes are moist       Pharynx: Oropharynx is clear  Eyes:      Extraocular Movements: Extraocular movements intact  Pupils: Pupils are equal, round, and reactive to light  Cardiovascular:      Rate and Rhythm: Normal rate and regular rhythm  Heart sounds: Murmur heard  Pulmonary:      Effort: Pulmonary effort is normal       Breath sounds: Normal breath sounds  Abdominal:      General: Bowel sounds are normal  There is no distension  Palpations: Abdomen is soft  Tenderness: There is no abdominal tenderness  Musculoskeletal:         General: No swelling  Cervical back: Normal range of motion and neck supple  Skin:     General: Skin is warm and dry  Neurological:      General: No focal deficit present  Mental Status: She is alert  Discharge instructions/Information to patient and family:   See after visit summary for information provided to patient and family  Provisions for Follow-Up Care:  See after visit summary for information related to follow-up care and any pertinent home health orders         Disposition:   Home    Planned Readmission: No     Discharge Statement:  I spent 35 minutes discharging the patient  This time was spent on the day of discharge  I had direct contact with the patient on the day of discharge  Greater than 50% of the total time was spent examining patient, answering all patient questions, arranging and discussing plan of care with patient as well as directly providing post-discharge instructions  Additional time then spent on discharge activities  Discharge Medications:  See after visit summary for reconciled discharge medications provided to patient and/or family        **Please Note: This note may have been constructed using a voice recognition system**

## 2022-11-29 NOTE — ASSESSMENT & PLAN NOTE
Wt Readings from Last 3 Encounters:   11/28/22 45 2 kg (99 lb 10 4 oz)   08/13/22 38 6 kg (85 lb)   07/30/22 45 5 kg (100 lb 3 2 oz)     History of ischemic cardiomyopathy   Echo from 11/21 showed EF of 35% with grade 1 diastolic dysfunction and low normal RV systolic function  Repeat 2D echo showed EF of 22% with grade 2 diastolic dysfunction with severely depressed LV systolic function  Chest x-ray upon admission showed moderate cardiomegaly with moderate interstitial edema  Repeat chest x-ray showed improved pulmonary vascular congestion  Patient was given IV Lasix with very good response upon admission and was continued on Lasix 20 milligram IV daily  Continue Lasix 20 milligram daily along with Aldactone 12 5 milligram p o   Daily

## 2022-11-29 NOTE — ASSESSMENT & PLAN NOTE
Repleted  Patient refused blood work today    Patient to be discharged on Aldactone and lisinopril with outpatient follow-up BMP in 1 week

## 2022-11-29 NOTE — ASSESSMENT & PLAN NOTE
As evidenced by tachycardia, tachypnea, elevated lactic acid level upon admission  No evidence of infection  Likely in the setting of CHF exacerbation  UA was negative  Flu, COVID/RSV was negative  Procalcitonin level was mildly elevated at 0 67-0 40  Urine for Legionella pneumococcal antigens were negative  Blood cultures have been negative    MRSA cultures negative Patient notified that this is an US to check on his chronic DVT

## 2022-11-29 NOTE — ASSESSMENT & PLAN NOTE
Restarted on lisinopril 5 milligram p o  Daily and Toprol-XL 50 milligram p o  Daily  Continue Lasix 20 milligram p o  Daily, Aldactone 12 5 milligram p o  Daily and lisinopril 5 milligram p o   Daily

## 2022-11-29 NOTE — ASSESSMENT & PLAN NOTE
Echo from November 21 showed EF of 35% with grade 1 diastolic dysfunction with moderate to severe TR  Repeat 2D echo from this admission showed EF of 22% with grade 2 diastolic dysfunction with severe TR with RV systolic pressure of 69 millimeters of mercury  Patient restarted back on Toprol-XL and lisinopril  Patient advised compliance with medications  Needs close follow-up with Cardiology for repeat 2D echo  Patient will be discharged on Lasix 20 milligram p o  Daily and Aldactone 12 5 milligram p o   Daily

## 2022-11-29 NOTE — ASSESSMENT & PLAN NOTE
Lab Results   Component Value Date    HGBA1C 5 8 (H) 11/24/2022       Recent Labs     11/27/22  1626 11/27/22  2111 11/28/22  0709 11/28/22  1125   POCGLU 103 127 103 123       Blood Sugar Average: Last 72 hrs:  (P) 631 [id number]   Blood sugars currently acceptable  Patient previously on Lantus but will hold off on any medications at the current time

## 2022-11-30 NOTE — UTILIZATION REVIEW
NOTIFICATION OF ADMISSION DISCHARGE   This is a Notification of Discharge from [facility]  Please be advised that this patient has been discharge from our facility  Below you will find the admission and discharge date and time including the patient’s disposition  UTILIZATION REVIEW CONTACT:  Vivienne Interiano  Utilization   Network Utilization Review Department  Phone: [phone] x carefully listen to the prompts  All voicemails are confidential   Email: [email or web address]     ADMISSION INFORMATION  PRESENTATION DATE: 11/24/2022  8:17 AM  OBERVATION ADMISSION DATE:   INPATIENT ADMISSION DATE: 11/24/22 11:57 AM   DISCHARGE DATE: 11/28/2022  1:21 PM   DISPOSITION:Home/Self Care    IMPORTANT INFORMATION:  Send all requests for admission clinical reviews, approved or denied determinations and any other requests to dedicated fax number below belonging to the campus where the patient is receiving treatment   List of dedicated fax numbers:  [address] DENIALS (Administrative/Medical Necessity) [phone]   [address] (Maternity/NICU/Pediatrics) [phone]   [facility] [phone]   [facility] 87 [phone]   [address] [phone]   220 [facility] [phone]   90 [facility] [phone]   [address] [place] [address] 119 [phone]   [facility]  [phone]   [address] [facility] [phone] [facility] [phone]

## 2022-12-01 ENCOUNTER — TELEPHONE (OUTPATIENT)
Dept: FAMILY MEDICINE CLINIC | Facility: CLINIC | Age: 62
End: 2022-12-01

## 2022-12-01 NOTE — TELEPHONE ENCOUNTER
----- Message from Sadia Musa MD sent at 11/30/2022  4:48 PM EST -----  Please schedule patient for TCM, thanks!  ----- Message -----  From: Jammie Franco MD  Sent: 11/28/2022   8:04 PM EST  To: Sadia Musa MD    Thank you for allowing us to participate in the care of your patient, Nathaniel Villeda, who was hospitalized from 11/24/2022 through 11/28/2022 with the admitting diagnosis of acute respiratory failure in the setting of CHF exacerbation  Patient was restarted back on her home medications and was also given IV Lasix with very good diuresis  Patient remained stable and was seen by Cardiology  Patient to be discharged home with outpatient follow-up with Cardiology      If you have any additional questions or would like to discuss further, please feel free to contact me      Jammie Franco MD  [place] [address] Internal Medicine, Hospitalist  [phone]

## 2022-12-02 LAB
DME PARACHUTE DELIVERY DATE ACTUAL: NORMAL
DME PARACHUTE DELIVERY DATE REQUESTED: NORMAL
DME PARACHUTE ITEM DESCRIPTION: NORMAL
DME PARACHUTE ORDER STATUS: NORMAL
DME PARACHUTE SUPPLIER NAME: NORMAL
DME PARACHUTE SUPPLIER PHONE: NORMAL

## 2022-12-14 ENCOUNTER — TELEPHONE (OUTPATIENT)
Dept: INPATIENT UNIT | Facility: HOSPITAL | Age: 62
End: 2022-12-14

## 2022-12-22 ENCOUNTER — TELEPHONE (OUTPATIENT)
Dept: INPATIENT UNIT | Facility: HOSPITAL | Age: 62
End: 2022-12-22

## 2023-01-01 ENCOUNTER — APPOINTMENT (INPATIENT)
Dept: RADIOLOGY | Facility: HOSPITAL | Age: 63
DRG: 140 | End: 2023-01-01
Payer: COMMERCIAL

## 2023-01-01 ENCOUNTER — APPOINTMENT (EMERGENCY)
Dept: RADIOLOGY | Facility: HOSPITAL | Age: 63
DRG: 140 | End: 2023-01-01
Payer: COMMERCIAL

## 2023-01-01 ENCOUNTER — APPOINTMENT (INPATIENT)
Dept: NON INVASIVE DIAGNOSTICS | Facility: HOSPITAL | Age: 63
DRG: 140 | End: 2023-01-01
Payer: COMMERCIAL

## 2023-01-01 ENCOUNTER — HOSPITAL ENCOUNTER (INPATIENT)
Facility: HOSPITAL | Age: 63
LOS: 3 days | DRG: 140 | End: 2023-06-21
Attending: EMERGENCY MEDICINE | Admitting: ANESTHESIOLOGY
Payer: COMMERCIAL

## 2023-01-01 VITALS
TEMPERATURE: 98.7 F | OXYGEN SATURATION: 97 % | HEIGHT: 55 IN | SYSTOLIC BLOOD PRESSURE: 39 MMHG | DIASTOLIC BLOOD PRESSURE: 12 MMHG | BODY MASS INDEX: 21.94 KG/M2 | RESPIRATION RATE: 135 BRPM | HEART RATE: 143 BPM | WEIGHT: 94.8 LBS

## 2023-01-01 DIAGNOSIS — J96.01 ACUTE RESPIRATORY FAILURE WITH HYPOXIA AND HYPERCAPNIA (HCC): ICD-10-CM

## 2023-01-01 DIAGNOSIS — Z02.9 DISCHARGE PLANNING ISSUES: ICD-10-CM

## 2023-01-01 DIAGNOSIS — J96.02 ACUTE RESPIRATORY FAILURE WITH HYPOXIA AND HYPERCAPNIA (HCC): ICD-10-CM

## 2023-01-01 DIAGNOSIS — I50.43 ACUTE ON CHRONIC COMBINED SYSTOLIC AND DIASTOLIC HEART FAILURE (HCC): ICD-10-CM

## 2023-01-01 DIAGNOSIS — I50.9 CHF (CONGESTIVE HEART FAILURE) (HCC): Primary | ICD-10-CM

## 2023-01-01 DIAGNOSIS — J44.1 COPD WITH ACUTE EXACERBATION (HCC): ICD-10-CM

## 2023-01-01 DIAGNOSIS — J44.1 COPD WITH EXACERBATION (HCC): ICD-10-CM

## 2023-01-01 LAB
2HR DELTA HS TROPONIN: 14 NG/L
2HR DELTA HS TROPONIN: 24 NG/L
4HR DELTA HS TROPONIN: 17 NG/L
4HR DELTA HS TROPONIN: 203 NG/L
ALBUMIN SERPL BCP-MCNC: 3.4 G/DL (ref 3.5–5)
ALBUMIN SERPL BCP-MCNC: 3.7 G/DL (ref 3.5–5)
ALP SERPL-CCNC: 172 U/L (ref 34–104)
ALP SERPL-CCNC: 178 U/L (ref 34–104)
ALT SERPL W P-5'-P-CCNC: 22 U/L (ref 7–52)
ALT SERPL W P-5'-P-CCNC: 40 U/L (ref 7–52)
AMPHETAMINES SERPL QL SCN: NEGATIVE
ANION GAP SERPL CALCULATED.3IONS-SCNC: 13 MMOL/L
ANION GAP SERPL CALCULATED.3IONS-SCNC: 13 MMOL/L (ref 4–13)
ANION GAP SERPL CALCULATED.3IONS-SCNC: 14 MMOL/L (ref 4–13)
ANION GAP SERPL CALCULATED.3IONS-SCNC: 15 MMOL/L (ref 4–13)
ANION GAP SERPL CALCULATED.3IONS-SCNC: 27 MMOL/L
APTT PPP: 23 SECONDS (ref 23–37)
AST SERPL W P-5'-P-CCNC: 49 U/L (ref 13–39)
AST SERPL W P-5'-P-CCNC: 94 U/L (ref 13–39)
B PARAP IS1001 DNA NPH QL NAA+NON-PROBE: NOT DETECTED
B PERT.PT PRMT NPH QL NAA+NON-PROBE: NOT DETECTED
BACTERIA UR QL AUTO: ABNORMAL /HPF
BARBITURATES UR QL: NEGATIVE
BASE EX.OXY STD BLDV CALC-SCNC: 58.9 % (ref 60–80)
BASE EXCESS BLDA CALC-SCNC: -1.2 MMOL/L
BASE EXCESS BLDA CALC-SCNC: -7 MMOL/L (ref -2–3)
BASE EXCESS BLDA CALC-SCNC: -8 MMOL/L (ref -2–3)
BASE EXCESS BLDV CALC-SCNC: -19.7 MMOL/L
BASOPHILS # BLD AUTO: 0.01 THOUSANDS/ÂΜL (ref 0–0.1)
BASOPHILS # BLD AUTO: 0.08 THOUSANDS/ÂΜL (ref 0–0.1)
BASOPHILS # BLD MANUAL: 0 THOUSAND/UL (ref 0–0.1)
BASOPHILS NFR BLD AUTO: 0 % (ref 0–1)
BASOPHILS NFR BLD AUTO: 1 % (ref 0–1)
BASOPHILS NFR MAR MANUAL: 0 % (ref 0–1)
BENZODIAZ UR QL: NEGATIVE
BILIRUB SERPL-MCNC: 0.76 MG/DL (ref 0.2–1)
BILIRUB SERPL-MCNC: 1.75 MG/DL (ref 0.2–1)
BILIRUB UR QL STRIP: NEGATIVE
BNP SERPL-MCNC: 906 PG/ML (ref 0–100)
BODY TEMPERATURE: 98.1 DEGREES FEHRENHEIT
BUN SERPL-MCNC: 12 MG/DL (ref 5–25)
BUN SERPL-MCNC: 15 MG/DL (ref 5–25)
BUN SERPL-MCNC: 22 MG/DL (ref 5–25)
BUN SERPL-MCNC: 26 MG/DL (ref 5–25)
BUN SERPL-MCNC: 28 MG/DL (ref 5–25)
BURR CELLS BLD QL SMEAR: PRESENT
C PNEUM DNA NPH QL NAA+NON-PROBE: NOT DETECTED
CA-I BLD-SCNC: 0.96 MMOL/L (ref 1.12–1.32)
CA-I BLD-SCNC: 0.98 MMOL/L (ref 1.12–1.32)
CA-I BLD-SCNC: 1.15 MMOL/L (ref 1.12–1.32)
CALCIUM ALBUM COR SERPL-MCNC: 8.5 MG/DL (ref 8.3–10.1)
CALCIUM SERPL-MCNC: 8 MG/DL (ref 8.4–10.2)
CALCIUM SERPL-MCNC: 8 MG/DL (ref 8.4–10.2)
CALCIUM SERPL-MCNC: 8.6 MG/DL (ref 8.4–10.2)
CALCIUM SERPL-MCNC: 8.7 MG/DL (ref 8.4–10.2)
CALCIUM SERPL-MCNC: 9.4 MG/DL (ref 8.4–10.2)
CARDIAC TROPONIN I PNL SERPL HS: 191 NG/L
CARDIAC TROPONIN I PNL SERPL HS: 26 NG/L
CARDIAC TROPONIN I PNL SERPL HS: 278 NG/L
CARDIAC TROPONIN I PNL SERPL HS: 327 NG/L
CARDIAC TROPONIN I PNL SERPL HS: 40 NG/L
CARDIAC TROPONIN I PNL SERPL HS: 43 NG/L
CARDIAC TROPONIN I PNL SERPL HS: 75 NG/L
CARDIAC TROPONIN I PNL SERPL HS: 99 NG/L
CHLORIDE SERPL-SCNC: 102 MMOL/L (ref 96–108)
CHLORIDE SERPL-SCNC: 102 MMOL/L (ref 96–108)
CHLORIDE SERPL-SCNC: 103 MMOL/L (ref 96–108)
CHLORIDE SERPL-SCNC: 104 MMOL/L (ref 96–108)
CHLORIDE SERPL-SCNC: 104 MMOL/L (ref 96–108)
CLARITY UR: CLEAR
CO2 SERPL-SCNC: 13 MMOL/L (ref 21–32)
CO2 SERPL-SCNC: 24 MMOL/L (ref 21–32)
CO2 SERPL-SCNC: 24 MMOL/L (ref 21–32)
CO2 SERPL-SCNC: 25 MMOL/L (ref 21–32)
CO2 SERPL-SCNC: 26 MMOL/L (ref 21–32)
COCAINE UR QL: NEGATIVE
COLOR UR: YELLOW
CREAT SERPL-MCNC: 0.62 MG/DL (ref 0.6–1.3)
CREAT SERPL-MCNC: 0.63 MG/DL (ref 0.6–1.3)
CREAT SERPL-MCNC: 0.66 MG/DL (ref 0.6–1.3)
CREAT SERPL-MCNC: 0.92 MG/DL (ref 0.6–1.3)
CREAT SERPL-MCNC: 1.21 MG/DL (ref 0.6–1.3)
EOSINOPHIL # BLD AUTO: 0 THOUSAND/ÂΜL (ref 0–0.61)
EOSINOPHIL # BLD AUTO: 0.1 THOUSAND/ÂΜL (ref 0–0.61)
EOSINOPHIL # BLD MANUAL: 0 THOUSAND/UL (ref 0–0.4)
EOSINOPHIL NFR BLD AUTO: 0 % (ref 0–6)
EOSINOPHIL NFR BLD AUTO: 1 % (ref 0–6)
EOSINOPHIL NFR BLD MANUAL: 0 % (ref 0–6)
ERYTHROCYTE [DISTWIDTH] IN BLOOD BY AUTOMATED COUNT: 16.9 % (ref 11.6–15.1)
ERYTHROCYTE [DISTWIDTH] IN BLOOD BY AUTOMATED COUNT: 17.2 % (ref 11.6–15.1)
ERYTHROCYTE [DISTWIDTH] IN BLOOD BY AUTOMATED COUNT: 17.2 % (ref 11.6–15.1)
ERYTHROCYTE [DISTWIDTH] IN BLOOD BY AUTOMATED COUNT: 17.6 % (ref 11.6–15.1)
ERYTHROCYTE [DISTWIDTH] IN BLOOD BY AUTOMATED COUNT: 17.7 % (ref 11.6–15.1)
ETHANOL SERPL-MCNC: 114 MG/DL
FIO2 GAS DIL.REBREATH: 100 L
FIO2 GAS DIL.REBREATH: 100 L
FLUAV RNA NPH QL NAA+NON-PROBE: NOT DETECTED
FLUBV RNA NPH QL NAA+NON-PROBE: NOT DETECTED
GFR SERPL CREATININE-BSD FRML MDRD: 48 ML/MIN/1.73SQ M
GFR SERPL CREATININE-BSD FRML MDRD: 67 ML/MIN/1.73SQ M
GFR SERPL CREATININE-BSD FRML MDRD: 95 ML/MIN/1.73SQ M
GFR SERPL CREATININE-BSD FRML MDRD: 97 ML/MIN/1.73SQ M
GFR SERPL CREATININE-BSD FRML MDRD: 97 ML/MIN/1.73SQ M
GLUCOSE SERPL-MCNC: 123 MG/DL (ref 65–140)
GLUCOSE SERPL-MCNC: 132 MG/DL (ref 65–140)
GLUCOSE SERPL-MCNC: 136 MG/DL (ref 65–140)
GLUCOSE SERPL-MCNC: 137 MG/DL (ref 65–140)
GLUCOSE SERPL-MCNC: 138 MG/DL (ref 65–140)
GLUCOSE SERPL-MCNC: 169 MG/DL (ref 65–140)
GLUCOSE SERPL-MCNC: 170 MG/DL (ref 65–140)
GLUCOSE SERPL-MCNC: 177 MG/DL (ref 65–140)
GLUCOSE SERPL-MCNC: 181 MG/DL (ref 65–140)
GLUCOSE SERPL-MCNC: 182 MG/DL (ref 65–140)
GLUCOSE SERPL-MCNC: 185 MG/DL (ref 65–140)
GLUCOSE SERPL-MCNC: 187 MG/DL (ref 65–140)
GLUCOSE SERPL-MCNC: 199 MG/DL (ref 65–140)
GLUCOSE SERPL-MCNC: 200 MG/DL (ref 65–140)
GLUCOSE SERPL-MCNC: 201 MG/DL (ref 65–140)
GLUCOSE SERPL-MCNC: 202 MG/DL (ref 65–140)
GLUCOSE SERPL-MCNC: 209 MG/DL (ref 65–140)
GLUCOSE SERPL-MCNC: 241 MG/DL (ref 65–140)
GLUCOSE SERPL-MCNC: 245 MG/DL (ref 65–140)
GLUCOSE SERPL-MCNC: 52 MG/DL (ref 65–140)
GLUCOSE UR STRIP-MCNC: NEGATIVE MG/DL
HADV DNA NPH QL NAA+NON-PROBE: NOT DETECTED
HCO3 BLDA-SCNC: 19.9 MMOL/L (ref 22–28)
HCO3 BLDA-SCNC: 23.5 MMOL/L (ref 22–28)
HCO3 BLDA-SCNC: 25.6 MMOL/L (ref 22–28)
HCO3 BLDV-SCNC: 12.4 MMOL/L (ref 24–30)
HCOV 229E RNA NPH QL NAA+NON-PROBE: NOT DETECTED
HCOV HKU1 RNA NPH QL NAA+NON-PROBE: NOT DETECTED
HCOV NL63 RNA NPH QL NAA+NON-PROBE: NOT DETECTED
HCOV OC43 RNA NPH QL NAA+NON-PROBE: NOT DETECTED
HCT VFR BLD AUTO: 43.4 % (ref 34.8–46.1)
HCT VFR BLD AUTO: 43.5 % (ref 34.8–46.1)
HCT VFR BLD AUTO: 46.8 % (ref 34.8–46.1)
HCT VFR BLD AUTO: 48.1 % (ref 34.8–46.1)
HCT VFR BLD AUTO: 48.7 % (ref 34.8–46.1)
HCT VFR BLD CALC: 41 % (ref 34.8–46.1)
HCT VFR BLD CALC: 44 % (ref 34.8–46.1)
HGB BLD-MCNC: 14.1 G/DL (ref 11.5–15.4)
HGB BLD-MCNC: 14.1 G/DL (ref 11.5–15.4)
HGB BLD-MCNC: 14.9 G/DL (ref 11.5–15.4)
HGB BLD-MCNC: 15.2 G/DL (ref 11.5–15.4)
HGB BLD-MCNC: 16 G/DL (ref 11.5–15.4)
HGB BLDA-MCNC: 13.9 G/DL (ref 11.5–15.4)
HGB BLDA-MCNC: 15 G/DL (ref 11.5–15.4)
HGB UR QL STRIP.AUTO: ABNORMAL
HMPV RNA NPH QL NAA+NON-PROBE: NOT DETECTED
HPIV1 RNA NPH QL NAA+NON-PROBE: NOT DETECTED
HPIV2 RNA NPH QL NAA+NON-PROBE: NOT DETECTED
HPIV3 RNA NPH QL NAA+NON-PROBE: NOT DETECTED
HPIV4 RNA NPH QL NAA+NON-PROBE: NOT DETECTED
HYALINE CASTS #/AREA URNS LPF: ABNORMAL /LPF
IMM GRANULOCYTES # BLD AUTO: 0.03 THOUSAND/UL (ref 0–0.2)
IMM GRANULOCYTES # BLD AUTO: 0.04 THOUSAND/UL (ref 0–0.2)
IMM GRANULOCYTES # BLD AUTO: 0.07 THOUSAND/UL (ref 0–0.2)
IMM GRANULOCYTES # BLD AUTO: 0.08 THOUSAND/UL (ref 0–0.2)
IMM GRANULOCYTES NFR BLD AUTO: 0 % (ref 0–2)
IMM GRANULOCYTES NFR BLD AUTO: 1 % (ref 0–2)
INR PPP: 1.07 (ref 0.84–1.19)
INR PPP: 1.51 (ref 0.84–1.19)
KETONES UR STRIP-MCNC: NEGATIVE MG/DL
LACTATE SERPL-SCNC: 13.7 MMOL/L (ref 0.5–2)
LEUKOCYTE ESTERASE UR QL STRIP: NEGATIVE
LYMPHOCYTES # BLD AUTO: 0.43 THOUSANDS/ÂΜL (ref 0.6–4.47)
LYMPHOCYTES # BLD AUTO: 0.8 THOUSANDS/ÂΜL (ref 0.6–4.47)
LYMPHOCYTES # BLD AUTO: 1.07 THOUSANDS/ÂΜL (ref 0.6–4.47)
LYMPHOCYTES # BLD AUTO: 2.91 THOUSAND/UL (ref 0.6–4.47)
LYMPHOCYTES # BLD AUTO: 3.59 THOUSANDS/ÂΜL (ref 0.6–4.47)
LYMPHOCYTES # BLD AUTO: 34 % (ref 14–44)
LYMPHOCYTES NFR BLD AUTO: 11 % (ref 14–44)
LYMPHOCYTES NFR BLD AUTO: 49 % (ref 14–44)
LYMPHOCYTES NFR BLD AUTO: 7 % (ref 14–44)
LYMPHOCYTES NFR BLD AUTO: 8 % (ref 14–44)
M PNEUMO DNA NPH QL NAA+NON-PROBE: NOT DETECTED
MAGNESIUM SERPL-MCNC: 2 MG/DL (ref 1.9–2.7)
MAGNESIUM SERPL-MCNC: 2.2 MG/DL (ref 1.9–2.7)
MAGNESIUM SERPL-MCNC: 2.2 MG/DL (ref 1.9–2.7)
MAGNESIUM SERPL-MCNC: 2.7 MG/DL (ref 1.9–2.7)
MAGNESIUM SERPL-MCNC: 3 MG/DL (ref 1.9–2.7)
MCH RBC QN AUTO: 30.4 PG (ref 26.8–34.3)
MCH RBC QN AUTO: 30.6 PG (ref 26.8–34.3)
MCH RBC QN AUTO: 30.8 PG (ref 26.8–34.3)
MCH RBC QN AUTO: 30.9 PG (ref 26.8–34.3)
MCH RBC QN AUTO: 31.1 PG (ref 26.8–34.3)
MCHC RBC AUTO-ENTMCNC: 30.1 G/DL (ref 31.4–37.4)
MCHC RBC AUTO-ENTMCNC: 31.6 G/DL (ref 31.4–37.4)
MCHC RBC AUTO-ENTMCNC: 32.4 G/DL (ref 31.4–37.4)
MCHC RBC AUTO-ENTMCNC: 32.9 G/DL (ref 31.4–37.4)
MCHC RBC AUTO-ENTMCNC: 34.3 G/DL (ref 31.4–37.4)
MCV RBC AUTO: 102 FL (ref 82–98)
MCV RBC AUTO: 90 FL (ref 82–98)
MCV RBC AUTO: 93 FL (ref 82–98)
MCV RBC AUTO: 94 FL (ref 82–98)
MCV RBC AUTO: 98 FL (ref 82–98)
METAMYELOCYTES NFR BLD MANUAL: 3 % (ref 0–1)
METHADONE UR QL: POSITIVE
MONOCYTES # BLD AUTO: 0.09 THOUSAND/UL (ref 0–1.22)
MONOCYTES # BLD AUTO: 0.24 THOUSAND/ÂΜL (ref 0.17–1.22)
MONOCYTES # BLD AUTO: 0.37 THOUSAND/ÂΜL (ref 0.17–1.22)
MONOCYTES # BLD AUTO: 0.43 THOUSAND/ÂΜL (ref 0.17–1.22)
MONOCYTES # BLD AUTO: 0.66 THOUSAND/ÂΜL (ref 0.17–1.22)
MONOCYTES NFR BLD AUTO: 4 % (ref 4–12)
MONOCYTES NFR BLD AUTO: 5 % (ref 4–12)
MONOCYTES NFR BLD AUTO: 5 % (ref 4–12)
MONOCYTES NFR BLD AUTO: 6 % (ref 4–12)
MONOCYTES NFR BLD: 1 % (ref 4–12)
MRSA NOSE QL CULT: NORMAL
MYELOCYTES NFR BLD MANUAL: 2 % (ref 0–1)
NEUTROPHILS # BLD AUTO: 10.94 THOUSANDS/ÂΜL (ref 1.85–7.62)
NEUTROPHILS # BLD AUTO: 3.2 THOUSANDS/ÂΜL (ref 1.85–7.62)
NEUTROPHILS # BLD AUTO: 5.66 THOUSANDS/ÂΜL (ref 1.85–7.62)
NEUTROPHILS # BLD AUTO: 5.76 THOUSANDS/ÂΜL (ref 1.85–7.62)
NEUTROPHILS # BLD MANUAL: 5.14 THOUSAND/UL (ref 1.85–7.62)
NEUTS BAND NFR BLD MANUAL: 4 % (ref 0–8)
NEUTS SEG NFR BLD AUTO: 43 % (ref 43–75)
NEUTS SEG NFR BLD AUTO: 56 % (ref 43–75)
NEUTS SEG NFR BLD AUTO: 83 % (ref 43–75)
NEUTS SEG NFR BLD AUTO: 86 % (ref 43–75)
NEUTS SEG NFR BLD AUTO: 88 % (ref 43–75)
NITRITE UR QL STRIP: NEGATIVE
NON-SQ EPI CELLS URNS QL MICRO: ABNORMAL /HPF
NRBC BLD AUTO-RTO: 0 /100 WBCS
NRBC BLD AUTO-RTO: 2 /100 WBC (ref 0–2)
O2 CT BLDA-SCNC: 21.5 ML/DL (ref 16–23)
O2 CT BLDV-SCNC: 12.4 ML/DL
OPIATES UR QL SCN: NEGATIVE
OXYCODONE+OXYMORPHONE UR QL SCN: NEGATIVE
OXYHGB MFR BLDA: 97.4 % (ref 94–97)
PCO2 BLD: 21 MM HG
PCO2 BLD: 21 MMOL/L (ref 21–32)
PCO2 BLD: 28 MMOL/L (ref 21–32)
PCO2 BLD: 379 MM HG
PCO2 BLD: 47.1 MM HG (ref 36–44)
PCO2 BLD: 86.4 MM HG (ref 36–44)
PCO2 BLDA: 39.3 MM HG (ref 36–44)
PCO2 BLDA: 46.4 MM HG
PCO2 BLDA: 80.5 MM HG
PCO2 BLDV: 55.3 MM HG (ref 42–50)
PCO2 TEMP ADJ BLDA: 38.8 MM HG (ref 36–44)
PCP UR QL: NEGATIVE
PH BLD: 7.08 [PH] (ref 7.35–7.45)
PH BLD: 7.1 [PH]
PH BLD: 7.23 [PH] (ref 7.35–7.45)
PH BLD: 7.24 [PH]
PH BLD: 7.4 [PH] (ref 7.35–7.45)
PH BLDA: 7.39 [PH] (ref 7.35–7.45)
PH BLDV: 6.97 [PH] (ref 7.3–7.4)
PH UR STRIP.AUTO: 6 [PH]
PHOSPHATE SERPL-MCNC: 11.2 MG/DL (ref 2.3–4.1)
PHOSPHATE SERPL-MCNC: 3.7 MG/DL (ref 2.3–4.1)
PHOSPHATE SERPL-MCNC: 4.2 MG/DL (ref 2.3–4.1)
PHOSPHATE SERPL-MCNC: 4.4 MG/DL (ref 2.3–4.1)
PHOSPHATE SERPL-MCNC: 5.1 MG/DL (ref 2.3–4.1)
PLATELET # BLD AUTO: 216 THOUSANDS/UL (ref 149–390)
PLATELET # BLD AUTO: 229 THOUSANDS/UL (ref 149–390)
PLATELET # BLD AUTO: 247 THOUSANDS/UL (ref 149–390)
PLATELET # BLD AUTO: 268 THOUSANDS/UL (ref 149–390)
PLATELET # BLD AUTO: 92 THOUSANDS/UL (ref 149–390)
PLATELET BLD QL SMEAR: ABNORMAL
PMV BLD AUTO: 10 FL (ref 8.9–12.7)
PMV BLD AUTO: 10 FL (ref 8.9–12.7)
PMV BLD AUTO: 10.2 FL (ref 8.9–12.7)
PMV BLD AUTO: 10.2 FL (ref 8.9–12.7)
PMV BLD AUTO: 11 FL (ref 8.9–12.7)
PO2 BLD: 157.8 MM HG (ref 75–129)
PO2 BLD: 21 MM HG (ref 75–129)
PO2 BLD: 387 MM HG (ref 75–129)
PO2 BLDA: 159.5 MM HG (ref 75–129)
PO2 BLDV: 52.5 MM HG (ref 35–45)
POLYCHROMASIA BLD QL SMEAR: PRESENT
POTASSIUM BLD-SCNC: 2.7 MMOL/L (ref 3.5–5.3)
POTASSIUM BLD-SCNC: 4 MMOL/L (ref 3.5–5.3)
POTASSIUM SERPL-SCNC: 3.4 MMOL/L (ref 3.5–5.3)
POTASSIUM SERPL-SCNC: 3.5 MMOL/L (ref 3.5–5.3)
POTASSIUM SERPL-SCNC: 4.2 MMOL/L (ref 3.5–5.3)
POTASSIUM SERPL-SCNC: 4.7 MMOL/L (ref 3.5–5.3)
POTASSIUM SERPL-SCNC: 6.9 MMOL/L (ref 3.5–5.3)
PROT SERPL-MCNC: 5.4 G/DL (ref 6.4–8.4)
PROT SERPL-MCNC: 7 G/DL (ref 6.4–8.4)
PROT UR STRIP-MCNC: ABNORMAL MG/DL
PROTHROMBIN TIME: 14 SECONDS (ref 11.6–14.5)
PROTHROMBIN TIME: 18.3 SECONDS (ref 11.6–14.5)
RBC # BLD AUTO: 4.57 MILLION/UL (ref 3.81–5.12)
RBC # BLD AUTO: 4.64 MILLION/UL (ref 3.81–5.12)
RBC # BLD AUTO: 4.79 MILLION/UL (ref 3.81–5.12)
RBC # BLD AUTO: 4.93 MILLION/UL (ref 3.81–5.12)
RBC # BLD AUTO: 5.23 MILLION/UL (ref 3.81–5.12)
RBC #/AREA URNS AUTO: ABNORMAL /HPF
RBC MORPH BLD: PRESENT
RSV RNA NPH QL NAA+NON-PROBE: NOT DETECTED
RV+EV RNA NPH QL NAA+NON-PROBE: DETECTED
SAO2 % BLD FROM PO2: 100 % (ref 60–85)
SAO2 % BLD FROM PO2: 27 % (ref 60–85)
SARS-COV-2 RNA NPH QL NAA+NON-PROBE: NOT DETECTED
SODIUM BLD-SCNC: 140 MMOL/L (ref 136–145)
SODIUM BLD-SCNC: 140 MMOL/L (ref 136–145)
SODIUM SERPL-SCNC: 141 MMOL/L (ref 135–147)
SODIUM SERPL-SCNC: 142 MMOL/L (ref 135–147)
SODIUM SERPL-SCNC: 144 MMOL/L (ref 135–147)
SP GR UR STRIP.AUTO: >=1.03 (ref 1–1.03)
SPECIMEN SOURCE: ABNORMAL
SPECIMEN SOURCE: ABNORMAL
THC UR QL: NEGATIVE
UROBILINOGEN UR QL STRIP.AUTO: 0.2 E.U./DL
WBC # BLD AUTO: 12.76 THOUSAND/UL (ref 4.31–10.16)
WBC # BLD AUTO: 6.38 THOUSAND/UL (ref 4.31–10.16)
WBC # BLD AUTO: 7.03 THOUSAND/UL (ref 4.31–10.16)
WBC # BLD AUTO: 7.41 THOUSAND/UL (ref 4.31–10.16)
WBC # BLD AUTO: 8.56 THOUSAND/UL (ref 4.31–10.16)
WBC #/AREA URNS AUTO: ABNORMAL /HPF

## 2023-01-01 PROCEDURE — 85014 HEMATOCRIT: CPT

## 2023-01-01 PROCEDURE — 87205 SMEAR GRAM STAIN: CPT

## 2023-01-01 PROCEDURE — 94640 AIRWAY INHALATION TREATMENT: CPT

## 2023-01-01 PROCEDURE — 83605 ASSAY OF LACTIC ACID: CPT | Performed by: PHYSICIAN ASSISTANT

## 2023-01-01 PROCEDURE — NC001 PR NO CHARGE: Performed by: INTERNAL MEDICINE

## 2023-01-01 PROCEDURE — 36415 COLL VENOUS BLD VENIPUNCTURE: CPT | Performed by: EMERGENCY MEDICINE

## 2023-01-01 PROCEDURE — 82805 BLOOD GASES W/O2 SATURATION: CPT | Performed by: PHYSICIAN ASSISTANT

## 2023-01-01 PROCEDURE — 94760 N-INVAS EAR/PLS OXIMETRY 1: CPT

## 2023-01-01 PROCEDURE — 5A12012 PERFORMANCE OF CARDIAC OUTPUT, SINGLE, MANUAL: ICD-10-PCS | Performed by: INTERNAL MEDICINE

## 2023-01-01 PROCEDURE — 0BH17EZ INSERTION OF ENDOTRACHEAL AIRWAY INTO TRACHEA, VIA NATURAL OR ARTIFICIAL OPENING: ICD-10-PCS | Performed by: EMERGENCY MEDICINE

## 2023-01-01 PROCEDURE — 82948 REAGENT STRIP/BLOOD GLUCOSE: CPT

## 2023-01-01 PROCEDURE — 85025 COMPLETE CBC W/AUTO DIFF WBC: CPT | Performed by: EMERGENCY MEDICINE

## 2023-01-01 PROCEDURE — 93005 ELECTROCARDIOGRAM TRACING: CPT

## 2023-01-01 PROCEDURE — 92610 EVALUATE SWALLOWING FUNCTION: CPT

## 2023-01-01 PROCEDURE — 99291 CRITICAL CARE FIRST HOUR: CPT | Performed by: INTERNAL MEDICINE

## 2023-01-01 PROCEDURE — 80048 BASIC METABOLIC PNL TOTAL CA: CPT | Performed by: PHYSICIAN ASSISTANT

## 2023-01-01 PROCEDURE — 71045 X-RAY EXAM CHEST 1 VIEW: CPT

## 2023-01-01 PROCEDURE — 84100 ASSAY OF PHOSPHORUS: CPT | Performed by: PHYSICIAN ASSISTANT

## 2023-01-01 PROCEDURE — 94150 VITAL CAPACITY TEST: CPT

## 2023-01-01 PROCEDURE — 80307 DRUG TEST PRSMV CHEM ANLYZR: CPT | Performed by: NURSE PRACTITIONER

## 2023-01-01 PROCEDURE — 85025 COMPLETE CBC W/AUTO DIFF WBC: CPT | Performed by: PHYSICIAN ASSISTANT

## 2023-01-01 PROCEDURE — 87147 CULTURE TYPE IMMUNOLOGIC: CPT

## 2023-01-01 PROCEDURE — 84132 ASSAY OF SERUM POTASSIUM: CPT

## 2023-01-01 PROCEDURE — 96375 TX/PRO/DX INJ NEW DRUG ADDON: CPT

## 2023-01-01 PROCEDURE — 82803 BLOOD GASES ANY COMBINATION: CPT

## 2023-01-01 PROCEDURE — 84484 ASSAY OF TROPONIN QUANT: CPT | Performed by: NURSE PRACTITIONER

## 2023-01-01 PROCEDURE — 85025 COMPLETE CBC W/AUTO DIFF WBC: CPT | Performed by: NURSE PRACTITIONER

## 2023-01-01 PROCEDURE — 83735 ASSAY OF MAGNESIUM: CPT | Performed by: NURSE PRACTITIONER

## 2023-01-01 PROCEDURE — 31500 INSERT EMERGENCY AIRWAY: CPT | Performed by: PHYSICIAN ASSISTANT

## 2023-01-01 PROCEDURE — 80053 COMPREHEN METABOLIC PANEL: CPT | Performed by: PHYSICIAN ASSISTANT

## 2023-01-01 PROCEDURE — 06HY33Z INSERTION OF INFUSION DEVICE INTO LOWER VEIN, PERCUTANEOUS APPROACH: ICD-10-PCS | Performed by: INTERNAL MEDICINE

## 2023-01-01 PROCEDURE — 31500 INSERT EMERGENCY AIRWAY: CPT

## 2023-01-01 PROCEDURE — 87070 CULTURE OTHR SPECIMN AEROBIC: CPT

## 2023-01-01 PROCEDURE — 85007 BL SMEAR W/DIFF WBC COUNT: CPT | Performed by: PHYSICIAN ASSISTANT

## 2023-01-01 PROCEDURE — 94002 VENT MGMT INPAT INIT DAY: CPT

## 2023-01-01 PROCEDURE — 82330 ASSAY OF CALCIUM: CPT | Performed by: PHYSICIAN ASSISTANT

## 2023-01-01 PROCEDURE — 99238 HOSP IP/OBS DSCHRG MGMT 30/<: CPT | Performed by: INTERNAL MEDICINE

## 2023-01-01 PROCEDURE — 96365 THER/PROPH/DIAG IV INF INIT: CPT

## 2023-01-01 PROCEDURE — 82805 BLOOD GASES W/O2 SATURATION: CPT | Performed by: NURSE PRACTITIONER

## 2023-01-01 PROCEDURE — 80053 COMPREHEN METABOLIC PANEL: CPT | Performed by: EMERGENCY MEDICINE

## 2023-01-01 PROCEDURE — 82330 ASSAY OF CALCIUM: CPT

## 2023-01-01 PROCEDURE — 83735 ASSAY OF MAGNESIUM: CPT | Performed by: PHYSICIAN ASSISTANT

## 2023-01-01 PROCEDURE — 87077 CULTURE AEROBIC IDENTIFY: CPT

## 2023-01-01 PROCEDURE — 36556 INSERT NON-TUNNEL CV CATH: CPT | Performed by: INTERNAL MEDICINE

## 2023-01-01 PROCEDURE — 84295 ASSAY OF SERUM SODIUM: CPT

## 2023-01-01 PROCEDURE — 94762 N-INVAS EAR/PLS OXIMTRY CONT: CPT

## 2023-01-01 PROCEDURE — 84484 ASSAY OF TROPONIN QUANT: CPT | Performed by: EMERGENCY MEDICINE

## 2023-01-01 PROCEDURE — 85730 THROMBOPLASTIN TIME PARTIAL: CPT | Performed by: EMERGENCY MEDICINE

## 2023-01-01 PROCEDURE — 84100 ASSAY OF PHOSPHORUS: CPT | Performed by: NURSE PRACTITIONER

## 2023-01-01 PROCEDURE — 87081 CULTURE SCREEN ONLY: CPT | Performed by: ANESTHESIOLOGY

## 2023-01-01 PROCEDURE — 85610 PROTHROMBIN TIME: CPT | Performed by: PHYSICIAN ASSISTANT

## 2023-01-01 PROCEDURE — 83880 ASSAY OF NATRIURETIC PEPTIDE: CPT | Performed by: EMERGENCY MEDICINE

## 2023-01-01 PROCEDURE — 0202U NFCT DS 22 TRGT SARS-COV-2: CPT | Performed by: PHYSICIAN ASSISTANT

## 2023-01-01 PROCEDURE — 94003 VENT MGMT INPAT SUBQ DAY: CPT

## 2023-01-01 PROCEDURE — 5A1935Z RESPIRATORY VENTILATION, LESS THAN 24 CONSECUTIVE HOURS: ICD-10-PCS | Performed by: INTERNAL MEDICINE

## 2023-01-01 PROCEDURE — NC001 PR NO CHARGE: Performed by: PHYSICIAN ASSISTANT

## 2023-01-01 PROCEDURE — 82077 ASSAY SPEC XCP UR&BREATH IA: CPT | Performed by: NURSE PRACTITIONER

## 2023-01-01 PROCEDURE — 81001 URINALYSIS AUTO W/SCOPE: CPT | Performed by: EMERGENCY MEDICINE

## 2023-01-01 PROCEDURE — 99291 CRITICAL CARE FIRST HOUR: CPT

## 2023-01-01 PROCEDURE — 0BH17EZ INSERTION OF ENDOTRACHEAL AIRWAY INTO TRACHEA, VIA NATURAL OR ARTIFICIAL OPENING: ICD-10-PCS | Performed by: INTERNAL MEDICINE

## 2023-01-01 PROCEDURE — 99291 CRITICAL CARE FIRST HOUR: CPT | Performed by: ANESTHESIOLOGY

## 2023-01-01 PROCEDURE — 84484 ASSAY OF TROPONIN QUANT: CPT | Performed by: PHYSICIAN ASSISTANT

## 2023-01-01 PROCEDURE — 87186 SC STD MICRODIL/AGAR DIL: CPT

## 2023-01-01 PROCEDURE — 92950 HEART/LUNG RESUSCITATION CPR: CPT

## 2023-01-01 PROCEDURE — 85027 COMPLETE CBC AUTOMATED: CPT | Performed by: PHYSICIAN ASSISTANT

## 2023-01-01 PROCEDURE — 36600 WITHDRAWAL OF ARTERIAL BLOOD: CPT

## 2023-01-01 PROCEDURE — 82947 ASSAY GLUCOSE BLOOD QUANT: CPT

## 2023-01-01 PROCEDURE — 5A1945Z RESPIRATORY VENTILATION, 24-96 CONSECUTIVE HOURS: ICD-10-PCS | Performed by: EMERGENCY MEDICINE

## 2023-01-01 PROCEDURE — 80048 BASIC METABOLIC PNL TOTAL CA: CPT | Performed by: NURSE PRACTITIONER

## 2023-01-01 PROCEDURE — 85610 PROTHROMBIN TIME: CPT | Performed by: EMERGENCY MEDICINE

## 2023-01-01 RX ORDER — FENTANYL CITRATE 50 UG/ML
50 INJECTION, SOLUTION INTRAMUSCULAR; INTRAVENOUS EVERY 2 HOUR PRN
Status: DISCONTINUED | OUTPATIENT
Start: 2023-01-01 | End: 2023-01-01

## 2023-01-01 RX ORDER — METHYLPREDNISOLONE SODIUM SUCCINATE 40 MG/ML
40 INJECTION, POWDER, LYOPHILIZED, FOR SOLUTION INTRAMUSCULAR; INTRAVENOUS EVERY 8 HOURS SCHEDULED
Status: DISCONTINUED | OUTPATIENT
Start: 2023-01-01 | End: 2023-01-01

## 2023-01-01 RX ORDER — SENNOSIDES 8.8 MG/5ML
8.8 LIQUID ORAL
Status: DISCONTINUED | OUTPATIENT
Start: 2023-01-01 | End: 2023-01-01 | Stop reason: HOSPADM

## 2023-01-01 RX ORDER — PROPOFOL 10 MG/ML
INJECTION, EMULSION INTRAVENOUS
Status: COMPLETED
Start: 2023-01-01 | End: 2023-01-01

## 2023-01-01 RX ORDER — POTASSIUM CHLORIDE 14.9 MG/ML
20 INJECTION INTRAVENOUS ONCE
Status: COMPLETED | OUTPATIENT
Start: 2023-01-01 | End: 2023-01-01

## 2023-01-01 RX ORDER — METHADONE HYDROCHLORIDE 10 MG/1
30 TABLET ORAL DAILY
Status: DISCONTINUED | OUTPATIENT
Start: 2023-01-01 | End: 2023-01-01

## 2023-01-01 RX ORDER — ALBUMIN, HUMAN INJ 5% 5 %
12.5 SOLUTION INTRAVENOUS ONCE
Status: COMPLETED | OUTPATIENT
Start: 2023-01-01 | End: 2023-01-01

## 2023-01-01 RX ORDER — SUCCINYLCHOLINE/SOD CL,ISO/PF 100 MG/5ML
100 SYRINGE (ML) INTRAVENOUS ONCE
Status: COMPLETED | OUTPATIENT
Start: 2023-01-01 | End: 2023-01-01

## 2023-01-01 RX ORDER — FUROSEMIDE 10 MG/ML
40 INJECTION INTRAMUSCULAR; INTRAVENOUS ONCE
Status: COMPLETED | OUTPATIENT
Start: 2023-01-01 | End: 2023-01-01

## 2023-01-01 RX ORDER — ENOXAPARIN SODIUM 100 MG/ML
40 INJECTION SUBCUTANEOUS DAILY
Status: DISCONTINUED | OUTPATIENT
Start: 2023-01-01 | End: 2023-01-01 | Stop reason: HOSPADM

## 2023-01-01 RX ORDER — POTASSIUM CHLORIDE 29.8 MG/ML
40 INJECTION INTRAVENOUS ONCE
Status: DISCONTINUED | OUTPATIENT
Start: 2023-01-01 | End: 2023-01-01

## 2023-01-01 RX ORDER — ETOMIDATE 2 MG/ML
20 INJECTION INTRAVENOUS ONCE
Status: COMPLETED | OUTPATIENT
Start: 2023-01-01 | End: 2023-01-01

## 2023-01-01 RX ORDER — POTASSIUM CHLORIDE 20MEQ/15ML
40 LIQUID (ML) ORAL ONCE
Status: COMPLETED | OUTPATIENT
Start: 2023-01-01 | End: 2023-01-01

## 2023-01-01 RX ORDER — METHADONE HYDROCHLORIDE 10 MG/1
30 TABLET ORAL ONCE
Status: DISCONTINUED | OUTPATIENT
Start: 2023-01-01 | End: 2023-01-01

## 2023-01-01 RX ORDER — NICOTINE 21 MG/24HR
1 PATCH, TRANSDERMAL 24 HOURS TRANSDERMAL DAILY
Status: DISCONTINUED | OUTPATIENT
Start: 2023-01-01 | End: 2023-01-01 | Stop reason: HOSPADM

## 2023-01-01 RX ORDER — ACETAMINOPHEN 160 MG/5ML
650 SUSPENSION ORAL EVERY 6 HOURS PRN
Status: DISCONTINUED | OUTPATIENT
Start: 2023-01-01 | End: 2023-01-01 | Stop reason: HOSPADM

## 2023-01-01 RX ORDER — NITROGLYCERIN 20 MG/100ML
5-200 INJECTION INTRAVENOUS
Status: DISCONTINUED | OUTPATIENT
Start: 2023-01-01 | End: 2023-01-01

## 2023-01-01 RX ORDER — CHLORHEXIDINE GLUCONATE 0.12 MG/ML
15 RINSE ORAL EVERY 12 HOURS SCHEDULED
Status: DISCONTINUED | OUTPATIENT
Start: 2023-01-01 | End: 2023-01-01

## 2023-01-01 RX ORDER — FAMOTIDINE 10 MG/ML
20 INJECTION, SOLUTION INTRAVENOUS EVERY 12 HOURS SCHEDULED
Status: DISCONTINUED | OUTPATIENT
Start: 2023-01-01 | End: 2023-01-01 | Stop reason: HOSPADM

## 2023-01-01 RX ORDER — INSULIN LISPRO 100 [IU]/ML
1-5 INJECTION, SOLUTION INTRAVENOUS; SUBCUTANEOUS EVERY 6 HOURS SCHEDULED
Status: DISCONTINUED | OUTPATIENT
Start: 2023-01-01 | End: 2023-01-01 | Stop reason: HOSPADM

## 2023-01-01 RX ORDER — ALBUMIN, HUMAN INJ 5% 5 %
25 SOLUTION INTRAVENOUS ONCE
Status: COMPLETED | OUTPATIENT
Start: 2023-01-01 | End: 2023-01-01

## 2023-01-01 RX ORDER — FOLIC ACID 1 MG/1
1 TABLET ORAL DAILY
Status: DISCONTINUED | OUTPATIENT
Start: 2023-01-01 | End: 2023-01-01 | Stop reason: HOSPADM

## 2023-01-01 RX ORDER — PROPOFOL 10 MG/ML
5-50 INJECTION, EMULSION INTRAVENOUS
Status: DISCONTINUED | OUTPATIENT
Start: 2023-01-01 | End: 2023-01-01

## 2023-01-01 RX ORDER — METHYLPREDNISOLONE SODIUM SUCCINATE 40 MG/ML
20 INJECTION, POWDER, LYOPHILIZED, FOR SOLUTION INTRAMUSCULAR; INTRAVENOUS EVERY 8 HOURS SCHEDULED
Status: DISCONTINUED | OUTPATIENT
Start: 2023-01-01 | End: 2023-01-01 | Stop reason: HOSPADM

## 2023-01-01 RX ORDER — EPINEPHRINE IN SOD CHLOR,ISO 1 MG/10 ML
SYRINGE (ML) INTRAVENOUS CODE/TRAUMA/SEDATION MEDICATION
Status: COMPLETED | OUTPATIENT
Start: 2023-01-01 | End: 2023-01-01

## 2023-01-01 RX ORDER — MAGNESIUM SULFATE HEPTAHYDRATE 40 MG/ML
2 INJECTION, SOLUTION INTRAVENOUS ONCE
Status: COMPLETED | OUTPATIENT
Start: 2023-01-01 | End: 2023-01-01

## 2023-01-01 RX ORDER — ATORVASTATIN CALCIUM 40 MG/1
40 TABLET, FILM COATED ORAL
Status: DISCONTINUED | OUTPATIENT
Start: 2023-01-01 | End: 2023-01-01 | Stop reason: HOSPADM

## 2023-01-01 RX ORDER — LANOLIN ALCOHOL/MO/W.PET/CERES
100 CREAM (GRAM) TOPICAL DAILY
Status: DISCONTINUED | OUTPATIENT
Start: 2023-01-01 | End: 2023-01-01 | Stop reason: HOSPADM

## 2023-01-01 RX ORDER — SODIUM BICARBONATE 84 MG/ML
INJECTION, SOLUTION INTRAVENOUS CODE/TRAUMA/SEDATION MEDICATION
Status: COMPLETED | OUTPATIENT
Start: 2023-01-01 | End: 2023-01-01

## 2023-01-01 RX ORDER — METHADONE HYDROCHLORIDE 10 MG/1
10 TABLET ORAL ONCE
Status: COMPLETED | OUTPATIENT
Start: 2023-01-01 | End: 2023-01-01

## 2023-01-01 RX ORDER — SPIRONOLACTONE 25 MG/1
12.5 TABLET ORAL DAILY
Status: DISCONTINUED | OUTPATIENT
Start: 2023-01-01 | End: 2023-01-01

## 2023-01-01 RX ORDER — METOCLOPRAMIDE HYDROCHLORIDE 5 MG/ML
10 INJECTION INTRAMUSCULAR; INTRAVENOUS EVERY 6 HOURS PRN
Status: DISCONTINUED | OUTPATIENT
Start: 2023-01-01 | End: 2023-01-01 | Stop reason: HOSPADM

## 2023-01-01 RX ORDER — SODIUM CHLORIDE, SODIUM GLUCONATE, SODIUM ACETATE, POTASSIUM CHLORIDE, MAGNESIUM CHLORIDE, SODIUM PHOSPHATE, DIBASIC, AND POTASSIUM PHOSPHATE .53; .5; .37; .037; .03; .012; .00082 G/100ML; G/100ML; G/100ML; G/100ML; G/100ML; G/100ML; G/100ML
500 INJECTION, SOLUTION INTRAVENOUS ONCE
Status: COMPLETED | OUTPATIENT
Start: 2023-01-01 | End: 2023-01-01

## 2023-01-01 RX ORDER — CLOPIDOGREL BISULFATE 75 MG/1
75 TABLET ORAL DAILY
Status: DISCONTINUED | OUTPATIENT
Start: 2023-01-01 | End: 2023-01-01 | Stop reason: HOSPADM

## 2023-01-01 RX ORDER — IPRATROPIUM BROMIDE AND ALBUTEROL SULFATE 2.5; .5 MG/3ML; MG/3ML
3 SOLUTION RESPIRATORY (INHALATION)
Status: DISCONTINUED | OUTPATIENT
Start: 2023-01-01 | End: 2023-01-01 | Stop reason: HOSPADM

## 2023-01-01 RX ORDER — VECURONIUM BROMIDE 1 MG/ML
INJECTION, POWDER, LYOPHILIZED, FOR SOLUTION INTRAVENOUS CODE/TRAUMA/SEDATION MEDICATION
Status: COMPLETED | OUTPATIENT
Start: 2023-01-01 | End: 2023-01-01

## 2023-01-01 RX ADMIN — INSULIN LISPRO 1 UNITS: 100 INJECTION, SOLUTION INTRAVENOUS; SUBCUTANEOUS at 11:00

## 2023-01-01 RX ADMIN — METHADONE HYDROCHLORIDE 30 MG: 10 TABLET ORAL at 08:35

## 2023-01-01 RX ADMIN — INSULIN LISPRO 1 UNITS: 100 INJECTION, SOLUTION INTRAVENOUS; SUBCUTANEOUS at 17:12

## 2023-01-01 RX ADMIN — CHLORHEXIDINE GLUCONATE 15 ML: 1.2 SOLUTION ORAL at 21:13

## 2023-01-01 RX ADMIN — ENOXAPARIN SODIUM 40 MG: 40 INJECTION SUBCUTANEOUS at 08:34

## 2023-01-01 RX ADMIN — IPRATROPIUM BROMIDE AND ALBUTEROL SULFATE 3 ML: 2.5; .5 SOLUTION RESPIRATORY (INHALATION) at 17:10

## 2023-01-01 RX ADMIN — IPRATROPIUM BROMIDE AND ALBUTEROL SULFATE 3 ML: 2.5; .5 SOLUTION RESPIRATORY (INHALATION) at 20:41

## 2023-01-01 RX ADMIN — METOPROLOL TARTRATE 25 MG: 25 TABLET, FILM COATED ORAL at 08:35

## 2023-01-01 RX ADMIN — EPINEPHRINE 1 MG: 0.1 INJECTION INTRACARDIAC; INTRAVENOUS at 11:15

## 2023-01-01 RX ADMIN — DEXMEDETOMIDINE HYDROCHLORIDE 0.8 MCG/KG/HR: 100 INJECTION, SOLUTION INTRAVENOUS at 22:15

## 2023-01-01 RX ADMIN — PROPOFOL 40 MCG/KG/MIN: 10 INJECTION, EMULSION INTRAVENOUS at 09:49

## 2023-01-01 RX ADMIN — IPRATROPIUM BROMIDE AND ALBUTEROL SULFATE 3 ML: 2.5; .5 SOLUTION RESPIRATORY (INHALATION) at 14:17

## 2023-01-01 RX ADMIN — DEXMEDETOMIDINE HYDROCHLORIDE 1.5 MCG/KG/HR: 100 INJECTION, SOLUTION INTRAVENOUS at 06:46

## 2023-01-01 RX ADMIN — METHYLPREDNISOLONE SODIUM SUCCINATE 20 MG: 40 INJECTION, POWDER, FOR SOLUTION INTRAMUSCULAR; INTRAVENOUS at 21:19

## 2023-01-01 RX ADMIN — METOPROLOL TARTRATE 25 MG: 25 TABLET, FILM COATED ORAL at 08:29

## 2023-01-01 RX ADMIN — METOCLOPRAMIDE 10 MG: 5 INJECTION, SOLUTION INTRAMUSCULAR; INTRAVENOUS at 10:13

## 2023-01-01 RX ADMIN — IPRATROPIUM BROMIDE AND ALBUTEROL SULFATE 3 ML: 2.5; .5 SOLUTION RESPIRATORY (INHALATION) at 07:17

## 2023-01-01 RX ADMIN — FENTANYL CITRATE 50 MCG: 50 INJECTION, SOLUTION INTRAMUSCULAR; INTRAVENOUS at 20:08

## 2023-01-01 RX ADMIN — METHYLPREDNISOLONE SODIUM SUCCINATE 20 MG: 40 INJECTION, POWDER, FOR SOLUTION INTRAMUSCULAR; INTRAVENOUS at 21:15

## 2023-01-01 RX ADMIN — FAMOTIDINE 20 MG: 10 INJECTION, SOLUTION INTRAVENOUS at 21:18

## 2023-01-01 RX ADMIN — FOLIC ACID 1 MG: 1 TABLET ORAL at 08:27

## 2023-01-01 RX ADMIN — EPINEPHRINE 1 MG: 0.1 INJECTION INTRACARDIAC; INTRAVENOUS at 13:36

## 2023-01-01 RX ADMIN — METHYLPREDNISOLONE SODIUM SUCCINATE 20 MG: 40 INJECTION, POWDER, FOR SOLUTION INTRAMUSCULAR; INTRAVENOUS at 13:08

## 2023-01-01 RX ADMIN — EPINEPHRINE 1 MG: 0.1 INJECTION INTRACARDIAC; INTRAVENOUS at 11:12

## 2023-01-01 RX ADMIN — FUROSEMIDE 40 MG: 10 INJECTION, SOLUTION INTRAVENOUS at 15:37

## 2023-01-01 RX ADMIN — FAMOTIDINE 20 MG: 10 INJECTION, SOLUTION INTRAVENOUS at 21:16

## 2023-01-01 RX ADMIN — FAMOTIDINE 20 MG: 10 INJECTION, SOLUTION INTRAVENOUS at 08:34

## 2023-01-01 RX ADMIN — PROPOFOL 5 MCG/KG/MIN: 10 INJECTION, EMULSION INTRAVENOUS at 14:48

## 2023-01-01 RX ADMIN — NITROGLYCERIN 5 MCG/MIN: 20 INJECTION INTRAVENOUS at 14:59

## 2023-01-01 RX ADMIN — Medication 100 MG: at 13:28

## 2023-01-01 RX ADMIN — VASOPRESSIN 0.04 UNITS/MIN: 20 INJECTION INTRAVENOUS at 12:46

## 2023-01-01 RX ADMIN — NICOTINE 1 PATCH: 21 PATCH, EXTENDED RELEASE TRANSDERMAL at 08:32

## 2023-01-01 RX ADMIN — FAMOTIDINE 20 MG: 10 INJECTION, SOLUTION INTRAVENOUS at 08:33

## 2023-01-01 RX ADMIN — INSULIN LISPRO 1 UNITS: 100 INJECTION, SOLUTION INTRAVENOUS; SUBCUTANEOUS at 12:12

## 2023-01-01 RX ADMIN — IPRATROPIUM BROMIDE AND ALBUTEROL SULFATE 3 ML: 2.5; .5 SOLUTION RESPIRATORY (INHALATION) at 14:37

## 2023-01-01 RX ADMIN — CLOPIDOGREL BISULFATE 75 MG: 75 TABLET ORAL at 08:27

## 2023-01-01 RX ADMIN — CHLORHEXIDINE GLUCONATE 15 ML: 1.2 SOLUTION ORAL at 08:27

## 2023-01-01 RX ADMIN — POTASSIUM CHLORIDE 20 MEQ: 14.9 INJECTION, SOLUTION INTRAVENOUS at 16:01

## 2023-01-01 RX ADMIN — FOLIC ACID 1 MG: 1 TABLET ORAL at 08:35

## 2023-01-01 RX ADMIN — INSULIN LISPRO 1 UNITS: 100 INJECTION, SOLUTION INTRAVENOUS; SUBCUTANEOUS at 05:37

## 2023-01-01 RX ADMIN — METHYLPREDNISOLONE SODIUM SUCCINATE 20 MG: 40 INJECTION, POWDER, FOR SOLUTION INTRAMUSCULAR; INTRAVENOUS at 05:39

## 2023-01-01 RX ADMIN — METHYLPREDNISOLONE SODIUM SUCCINATE 20 MG: 40 INJECTION, POWDER, FOR SOLUTION INTRAMUSCULAR; INTRAVENOUS at 05:37

## 2023-01-01 RX ADMIN — IPRATROPIUM BROMIDE AND ALBUTEROL SULFATE 3 ML: 2.5; .5 SOLUTION RESPIRATORY (INHALATION) at 01:20

## 2023-01-01 RX ADMIN — PROPOFOL 50 MCG/KG/MIN: 10 INJECTION, EMULSION INTRAVENOUS at 18:13

## 2023-01-01 RX ADMIN — NICOTINE 1 PATCH: 21 PATCH, EXTENDED RELEASE TRANSDERMAL at 08:33

## 2023-01-01 RX ADMIN — POTASSIUM CHLORIDE 40 MEQ: 1.5 SOLUTION ORAL at 07:52

## 2023-01-01 RX ADMIN — INSULIN LISPRO 1 UNITS: 100 INJECTION, SOLUTION INTRAVENOUS; SUBCUTANEOUS at 00:10

## 2023-01-01 RX ADMIN — METHADONE HYDROCHLORIDE 10 MG: 10 TABLET ORAL at 11:22

## 2023-01-01 RX ADMIN — ATORVASTATIN CALCIUM 40 MG: 40 TABLET, FILM COATED ORAL at 16:03

## 2023-01-01 RX ADMIN — FAMOTIDINE 20 MG: 10 INJECTION, SOLUTION INTRAVENOUS at 08:32

## 2023-01-01 RX ADMIN — SODIUM BICARBONATE 50 MEQ: 84 INJECTION, SOLUTION INTRAVENOUS at 11:18

## 2023-01-01 RX ADMIN — FENTANYL CITRATE 50 MCG: 50 INJECTION, SOLUTION INTRAMUSCULAR; INTRAVENOUS at 16:11

## 2023-01-01 RX ADMIN — POTASSIUM CHLORIDE 20 MEQ: 14.9 INJECTION, SOLUTION INTRAVENOUS at 18:09

## 2023-01-01 RX ADMIN — FUROSEMIDE 40 MG: 10 INJECTION, SOLUTION INTRAVENOUS at 07:47

## 2023-01-01 RX ADMIN — SPIRONOLACTONE 12.5 MG: 25 TABLET ORAL at 08:28

## 2023-01-01 RX ADMIN — ENOXAPARIN SODIUM 40 MG: 40 INJECTION SUBCUTANEOUS at 08:32

## 2023-01-01 RX ADMIN — EPINEPHRINE 1 MG: 0.1 INJECTION INTRACARDIAC; INTRAVENOUS at 11:21

## 2023-01-01 RX ADMIN — METHYLPREDNISOLONE SODIUM SUCCINATE 40 MG: 40 INJECTION, POWDER, FOR SOLUTION INTRAMUSCULAR; INTRAVENOUS at 16:00

## 2023-01-01 RX ADMIN — AZITHROMYCIN MONOHYDRATE 500 MG: 500 INJECTION, POWDER, LYOPHILIZED, FOR SOLUTION INTRAVENOUS at 09:33

## 2023-01-01 RX ADMIN — EPINEPHRINE 10 MCG/MIN: 1 INJECTION INTRAMUSCULAR; INTRAVENOUS; SUBCUTANEOUS at 11:56

## 2023-01-01 RX ADMIN — SPIRONOLACTONE 12.5 MG: 25 TABLET ORAL at 21:14

## 2023-01-01 RX ADMIN — ALBUMIN (HUMAN) 25 G: 12.5 INJECTION, SOLUTION INTRAVENOUS at 19:49

## 2023-01-01 RX ADMIN — POTASSIUM CHLORIDE 20 MEQ: 14.9 INJECTION, SOLUTION INTRAVENOUS at 07:45

## 2023-01-01 RX ADMIN — NOREPINEPHRINE BITARTRATE 30 MCG/MIN: 1 SOLUTION INTRAVENOUS at 11:24

## 2023-01-01 RX ADMIN — PROPOFOL 50 MCG/KG/MIN: 10 INJECTION, EMULSION INTRAVENOUS at 02:23

## 2023-01-01 RX ADMIN — EPINEPHRINE 1 MG: 0.1 INJECTION INTRACARDIAC; INTRAVENOUS at 13:43

## 2023-01-01 RX ADMIN — VECURONIUM BROMIDE 10 MG: 1 INJECTION, POWDER, LYOPHILIZED, FOR SOLUTION INTRAVENOUS at 11:15

## 2023-01-01 RX ADMIN — IPRATROPIUM BROMIDE AND ALBUTEROL SULFATE 3 ML: 2.5; .5 SOLUTION RESPIRATORY (INHALATION) at 20:12

## 2023-01-01 RX ADMIN — THIAMINE HCL TAB 100 MG 100 MG: 100 TAB at 08:36

## 2023-01-01 RX ADMIN — CHLORHEXIDINE GLUCONATE 15 ML: 1.2 SOLUTION ORAL at 21:18

## 2023-01-01 RX ADMIN — METHYLPREDNISOLONE SODIUM SUCCINATE 20 MG: 40 INJECTION, POWDER, FOR SOLUTION INTRAMUSCULAR; INTRAVENOUS at 14:35

## 2023-01-01 RX ADMIN — POTASSIUM CHLORIDE 20 MEQ: 14.9 INJECTION, SOLUTION INTRAVENOUS at 09:47

## 2023-01-01 RX ADMIN — IPRATROPIUM BROMIDE AND ALBUTEROL SULFATE 3 ML: 2.5; .5 SOLUTION RESPIRATORY (INHALATION) at 10:44

## 2023-01-01 RX ADMIN — THIAMINE HCL TAB 100 MG 100 MG: 100 TAB at 08:28

## 2023-01-01 RX ADMIN — ETOMIDATE 20 MG: 2 INJECTION, SOLUTION INTRAVENOUS at 13:27

## 2023-01-01 RX ADMIN — IPRATROPIUM BROMIDE AND ALBUTEROL SULFATE 3 ML: 2.5; .5 SOLUTION RESPIRATORY (INHALATION) at 19:43

## 2023-01-01 RX ADMIN — IPRATROPIUM BROMIDE AND ALBUTEROL SULFATE 3 ML: 2.5; .5 SOLUTION RESPIRATORY (INHALATION) at 02:09

## 2023-01-01 RX ADMIN — IPRATROPIUM BROMIDE AND ALBUTEROL SULFATE 3 ML: 2.5; .5 SOLUTION RESPIRATORY (INHALATION) at 01:13

## 2023-01-01 RX ADMIN — SENNOSIDES 8.8 MG: 8.8 SYRUP ORAL at 01:43

## 2023-01-01 RX ADMIN — SODIUM CHLORIDE, SODIUM GLUCONATE, SODIUM ACETATE, POTASSIUM CHLORIDE AND MAGNESIUM CHLORIDE 500 ML: 526; 502; 368; 37; 30 INJECTION, SOLUTION INTRAVENOUS at 12:11

## 2023-01-01 RX ADMIN — SODIUM CHLORIDE, SODIUM GLUCONATE, SODIUM ACETATE, POTASSIUM CHLORIDE, MAGNESIUM CHLORIDE, SODIUM PHOSPHATE, DIBASIC, AND POTASSIUM PHOSPHATE 500 ML: .53; .5; .37; .037; .03; .012; .00082 INJECTION, SOLUTION INTRAVENOUS at 10:35

## 2023-01-01 RX ADMIN — CLOPIDOGREL BISULFATE 75 MG: 75 TABLET ORAL at 08:35

## 2023-01-01 RX ADMIN — INSULIN LISPRO 1 UNITS: 100 INJECTION, SOLUTION INTRAVENOUS; SUBCUTANEOUS at 18:39

## 2023-01-01 RX ADMIN — DEXMEDETOMIDINE HYDROCHLORIDE 0.4 MCG/KG/HR: 100 INJECTION, SOLUTION INTRAVENOUS at 11:30

## 2023-01-01 RX ADMIN — NOREPINEPHRINE BITARTRATE 30 MCG/MIN: 1 SOLUTION INTRAVENOUS at 11:58

## 2023-01-01 RX ADMIN — METOPROLOL TARTRATE 25 MG: 25 TABLET, FILM COATED ORAL at 21:16

## 2023-01-01 RX ADMIN — AZITHROMYCIN MONOHYDRATE 500 MG: 500 INJECTION, POWDER, LYOPHILIZED, FOR SOLUTION INTRAVENOUS at 10:56

## 2023-01-01 RX ADMIN — EPINEPHRINE 1 MG: 0.1 INJECTION INTRACARDIAC; INTRAVENOUS at 13:39

## 2023-01-01 RX ADMIN — INSULIN LISPRO 1 UNITS: 100 INJECTION, SOLUTION INTRAVENOUS; SUBCUTANEOUS at 06:38

## 2023-01-01 RX ADMIN — INSULIN LISPRO 1 UNITS: 100 INJECTION, SOLUTION INTRAVENOUS; SUBCUTANEOUS at 01:42

## 2023-01-01 RX ADMIN — MAGNESIUM SULFATE HEPTAHYDRATE 2 G: 40 INJECTION, SOLUTION INTRAVENOUS at 16:00

## 2023-01-01 RX ADMIN — ALBUMIN (HUMAN) 12.5 G: 12.5 INJECTION, SOLUTION INTRAVENOUS at 15:08

## 2023-01-01 RX ADMIN — IPRATROPIUM BROMIDE AND ALBUTEROL SULFATE 3 ML: 2.5; .5 SOLUTION RESPIRATORY (INHALATION) at 07:23

## 2023-01-01 RX ADMIN — FAMOTIDINE 20 MG: 10 INJECTION, SOLUTION INTRAVENOUS at 21:13

## 2023-01-01 RX ADMIN — ALBUMIN (HUMAN) 12.5 G: 12.5 INJECTION, SOLUTION INTRAVENOUS at 14:35

## 2023-01-19 ENCOUNTER — HOSPITAL ENCOUNTER (EMERGENCY)
Facility: HOSPITAL | Age: 63
Discharge: HOME | End: 2023-01-19
Attending: EMERGENCY MEDICINE | Admitting: EMERGENCY MEDICINE

## 2023-01-19 VITALS
HEIGHT: 59 IN | RESPIRATION RATE: 19 BRPM | HEART RATE: 99 BPM | WEIGHT: 89 LBS | DIASTOLIC BLOOD PRESSURE: 88 MMHG | SYSTOLIC BLOOD PRESSURE: 148 MMHG | OXYGEN SATURATION: 95 % | TEMPERATURE: 98.7 F | BODY MASS INDEX: 17.94 KG/M2

## 2023-01-19 DIAGNOSIS — R45.851 SUICIDAL IDEATION: Primary | ICD-10-CM

## 2023-01-19 NOTE — ED NOTES
Patient arrived with  from Methadone clinic  States she told her son she wanted to kill herself (in front of counselor) after he denied her money  Patient is calm and cooperative and denies all SI/HI  Patient verbalizes wanting to go home, but is agreeable to stay for medical clearance and PES evaluation  Q15 min safety checks in place  Will continue to monitor       Vane Steinberg RN  01/19/23 8110

## 2023-01-19 NOTE — ED NOTES
1/19/23 @ 4158:  TYLER met with 63year-old female, who made a suicidal statement while at [address] [place] [address] receiving her Methadone  Patient said she asked her son for money and he refused because, "he said I was going to buy alcohol; I told him no, but I was; he was right; he agreed to give me $15; I'll buy cigarettes "  Patient reports drinking pint of vodka daily, which costs "$5 "  Patient adamantly denies any suicidal ideations  Patient was calm, cooperative, and very pleasant  Patient discharged home to services at [address] [place] [address], [place]

## 2023-01-22 ENCOUNTER — APPOINTMENT (EMERGENCY)
Dept: RADIOLOGY | Facility: HOSPITAL | Age: 63
End: 2023-01-22

## 2023-01-22 ENCOUNTER — HOSPITAL ENCOUNTER (EMERGENCY)
Facility: HOSPITAL | Age: 63
Discharge: HOME | End: 2023-01-23
Attending: EMERGENCY MEDICINE

## 2023-01-22 DIAGNOSIS — R00.0 SINUS TACHYCARDIA: ICD-10-CM

## 2023-01-22 DIAGNOSIS — T50.901A POLYSUBSTANCE OVERDOSE: ICD-10-CM

## 2023-01-22 DIAGNOSIS — N39.0 UTI (URINARY TRACT INFECTION): Primary | ICD-10-CM

## 2023-01-22 DIAGNOSIS — E86.0 DEHYDRATION: ICD-10-CM

## 2023-01-22 LAB
ALBUMIN SERPL BCP-MCNC: 3.6 G/DL (ref 3.5–5)
ALP SERPL-CCNC: 208 U/L (ref 46–116)
ALT SERPL W P-5'-P-CCNC: 34 U/L (ref 12–78)
AMPHETAMINES SERPL QL SCN: NEGATIVE
ANION GAP SERPL CALCULATED.3IONS-SCNC: 13 MMOL/L (ref 4–13)
AST SERPL W P-5'-P-CCNC: 71 U/L (ref 5–45)
ATRIAL RATE: 121 BPM
BACTERIA UR QL AUTO: ABNORMAL /HPF
BARBITURATES UR QL: NEGATIVE
BASOPHILS # BLD AUTO: 0.06 THOUSANDS/ÂΜL (ref 0–0.1)
BASOPHILS NFR BLD AUTO: 1 % (ref 0–1)
BENZODIAZ UR QL: NEGATIVE
BILIRUB SERPL-MCNC: 0.35 MG/DL (ref 0.2–1)
BILIRUB UR QL STRIP: NEGATIVE
BUN SERPL-MCNC: 18 MG/DL (ref 5–25)
CALCIUM SERPL-MCNC: 8.8 MG/DL (ref 8.3–10.1)
CHLORIDE SERPL-SCNC: 103 MMOL/L (ref 96–108)
CLARITY UR: ABNORMAL
CO2 SERPL-SCNC: 24 MMOL/L (ref 21–32)
COCAINE UR QL: NEGATIVE
COLOR UR: YELLOW
CREAT SERPL-MCNC: 0.7 MG/DL (ref 0.6–1.3)
EOSINOPHIL # BLD AUTO: 0.12 THOUSAND/ÂΜL (ref 0–0.61)
EOSINOPHIL NFR BLD AUTO: 2 % (ref 0–6)
ERYTHROCYTE [DISTWIDTH] IN BLOOD BY AUTOMATED COUNT: 15.8 % (ref 11.6–15.1)
ETHANOL SERPL-MCNC: 150 MG/DL (ref 0–3)
GFR SERPL CREATININE-BSD FRML MDRD: 94 ML/MIN/1.73SQ M
GLUCOSE SERPL-MCNC: 120 MG/DL (ref 65–140)
GLUCOSE SERPL-MCNC: 121 MG/DL (ref 65–140)
GLUCOSE UR STRIP-MCNC: NEGATIVE MG/DL
HCT VFR BLD AUTO: 41.1 % (ref 34.8–46.1)
HGB BLD-MCNC: 13.2 G/DL (ref 11.5–15.4)
HGB UR QL STRIP.AUTO: ABNORMAL
IMM GRANULOCYTES # BLD AUTO: 0.02 THOUSAND/UL (ref 0–0.2)
IMM GRANULOCYTES NFR BLD AUTO: 0 % (ref 0–2)
KETONES UR STRIP-MCNC: NEGATIVE MG/DL
LEUKOCYTE ESTERASE UR QL STRIP: ABNORMAL
LYMPHOCYTES # BLD AUTO: 2.58 THOUSANDS/ÂΜL (ref 0.6–4.47)
LYMPHOCYTES NFR BLD AUTO: 35 % (ref 14–44)
MCH RBC QN AUTO: 29.7 PG (ref 26.8–34.3)
MCHC RBC AUTO-ENTMCNC: 32.1 G/DL (ref 31.4–37.4)
MCV RBC AUTO: 93 FL (ref 82–98)
METHADONE UR QL: POSITIVE
MONOCYTES # BLD AUTO: 0.72 THOUSAND/ÂΜL (ref 0.17–1.22)
MONOCYTES NFR BLD AUTO: 10 % (ref 4–12)
NEUTROPHILS # BLD AUTO: 3.78 THOUSANDS/ÂΜL (ref 1.85–7.62)
NEUTS SEG NFR BLD AUTO: 52 % (ref 43–75)
NITRITE UR QL STRIP: NEGATIVE
NON-SQ EPI CELLS URNS QL MICRO: ABNORMAL /HPF
NRBC BLD AUTO-RTO: 0 /100 WBCS
OPIATES UR QL SCN: NEGATIVE
OXYCODONE+OXYMORPHONE UR QL SCN: NEGATIVE
P AXIS: 53 DEGREES
PCP UR QL: NEGATIVE
PH UR STRIP.AUTO: 6 [PH]
PLATELET # BLD AUTO: 283 THOUSANDS/UL (ref 149–390)
PMV BLD AUTO: 9.7 FL (ref 8.9–12.7)
POTASSIUM SERPL-SCNC: 4 MMOL/L (ref 3.5–5.3)
PR INTERVAL: 138 MS
PROT SERPL-MCNC: 7.9 G/DL (ref 6.4–8.4)
PROT UR STRIP-MCNC: ABNORMAL MG/DL
QRS AXIS: -14 DEGREES
QRSD INTERVAL: 84 MS
QT INTERVAL: 342 MS
QTC INTERVAL: 485 MS
RBC # BLD AUTO: 4.44 MILLION/UL (ref 3.81–5.12)
RBC #/AREA URNS AUTO: ABNORMAL /HPF
SODIUM SERPL-SCNC: 140 MMOL/L (ref 135–147)
SP GR UR STRIP.AUTO: 1.02 (ref 1–1.03)
T WAVE AXIS: 113 DEGREES
THC UR QL: POSITIVE
UROBILINOGEN UR QL STRIP.AUTO: 1 E.U./DL
VENTRICULAR RATE: 121 BPM
WBC # BLD AUTO: 7.28 THOUSAND/UL (ref 4.31–10.16)
WBC #/AREA URNS AUTO: ABNORMAL /HPF

## 2023-01-22 RX ORDER — IPRATROPIUM BROMIDE AND ALBUTEROL SULFATE 2.5; .5 MG/3ML; MG/3ML
3 SOLUTION RESPIRATORY (INHALATION)
Status: DISCONTINUED | OUTPATIENT
Start: 2023-01-23 | End: 2023-01-22

## 2023-01-22 RX ORDER — ONDANSETRON 2 MG/ML
INJECTION INTRAMUSCULAR; INTRAVENOUS
Status: COMPLETED
Start: 2023-01-22 | End: 2023-01-22

## 2023-01-22 RX ORDER — NALOXONE HYDROCHLORIDE 1 MG/ML
INJECTION INTRAMUSCULAR; INTRAVENOUS; SUBCUTANEOUS
Status: COMPLETED
Start: 2023-01-22 | End: 2023-01-22

## 2023-01-22 RX ORDER — CEFTRIAXONE 1 G/50ML
1000 INJECTION, SOLUTION INTRAVENOUS ONCE
Status: COMPLETED | OUTPATIENT
Start: 2023-01-23 | End: 2023-01-23

## 2023-01-22 RX ORDER — FUROSEMIDE 10 MG/ML
40 INJECTION INTRAMUSCULAR; INTRAVENOUS ONCE
Status: COMPLETED | OUTPATIENT
Start: 2023-01-22 | End: 2023-01-22

## 2023-01-22 RX ORDER — IPRATROPIUM BROMIDE AND ALBUTEROL SULFATE 2.5; .5 MG/3ML; MG/3ML
3 SOLUTION RESPIRATORY (INHALATION)
Status: DISCONTINUED | OUTPATIENT
Start: 2023-01-22 | End: 2023-01-23 | Stop reason: HOSPADM

## 2023-01-22 RX ORDER — FOLIC ACID 1 MG/1
1 TABLET ORAL ONCE
Status: COMPLETED | OUTPATIENT
Start: 2023-01-22 | End: 2023-01-22

## 2023-01-22 RX ADMIN — FUROSEMIDE 40 MG: 10 INJECTION, SOLUTION INTRAMUSCULAR; INTRAVENOUS at 23:13

## 2023-01-22 RX ADMIN — Medication 1 TABLET: at 23:11

## 2023-01-22 RX ADMIN — ONDANSETRON 4 MG: 2 INJECTION INTRAMUSCULAR; INTRAVENOUS at 20:58

## 2023-01-22 RX ADMIN — SODIUM CHLORIDE 1000 ML: 0.9 INJECTION, SOLUTION INTRAVENOUS at 20:59

## 2023-01-22 RX ADMIN — THIAMINE HYDROCHLORIDE 100 MG: 100 INJECTION, SOLUTION INTRAMUSCULAR; INTRAVENOUS at 23:31

## 2023-01-22 RX ADMIN — FOLIC ACID 1 MG: 1 TABLET ORAL at 23:11

## 2023-01-22 RX ADMIN — IPRATROPIUM BROMIDE AND ALBUTEROL SULFATE 3 ML: 2.5; .5 SOLUTION RESPIRATORY (INHALATION) at 23:46

## 2023-01-22 RX ADMIN — DEXTROSE AND SODIUM CHLORIDE 500 ML: 5; .9 INJECTION, SOLUTION INTRAVENOUS at 23:43

## 2023-01-22 RX ADMIN — NALOXONE HYDROCHLORIDE 2 MG: 1 INJECTION PARENTERAL at 20:58

## 2023-01-22 NOTE — Clinical Note
Date: 1/23/2023  Patient: Kelton Asher  Admitted: 1/22/2023  8:36 PM  Attending Provider: Aida Mari MD    Kelton Asher or her authorized caregiver has made the decision for the patient to leave the emergency department against the advice  of her attending physician  She or her authorized caregiver has been informed and understands the inherent risks, including death, fall, overdose, sepsis  She or her authorized caregiver has decided to accept the responsibility for this decision  J CARLOS Ferrell and all necessary parties have been advised that she may return for further evaluation or treatment  Her condition at time of discharge was stable alert oriented    Kelton Asher had current vital signs as follows:  /76 (BP Locati on: Right arm)   Pulse (!) 106   Temp 97 8 °F (36 6 °C) (Tympanic)   Resp 17   Wt 45 5 kg (100 lb 5 oz)

## 2023-01-23 VITALS
SYSTOLIC BLOOD PRESSURE: 118 MMHG | TEMPERATURE: 97.8 F | BODY MASS INDEX: 20.26 KG/M2 | OXYGEN SATURATION: 92 % | WEIGHT: 100.31 LBS | HEART RATE: 96 BPM | DIASTOLIC BLOOD PRESSURE: 64 MMHG | RESPIRATION RATE: 16 BRPM

## 2023-01-23 RX ORDER — CEPHALEXIN 500 MG/1
500 CAPSULE ORAL EVERY 6 HOURS SCHEDULED
Qty: 20 CAPSULE | Refills: 0 | Status: SHIPPED | OUTPATIENT
Start: 2023-01-23 | End: 2023-01-28

## 2023-01-23 RX ADMIN — CEFTRIAXONE 1000 MG: 1 INJECTION, SOLUTION INTRAVENOUS at 00:45

## 2023-01-23 RX ADMIN — SODIUM CHLORIDE 1000 ML: 0.9 INJECTION, SOLUTION INTRAVENOUS at 02:26

## 2023-01-23 NOTE — ED CARE HANDOFF
Emergency Department Sign Out Note        Sign out and transfer of care from ***  See Separate Emergency Department note  The patient, Juan Ramon Door, was evaluated by the previous provider for ***  Workup Completed:  ***    ED Course / Workup Pending (followup):  ***                                  ED Course as of 01/22/23 2209   Chao Servin Jan 22, 2023 2203 Alcohol intox  EMS - "drug use"  Denied here  2mg narcan w/ response  H/o methadone - hard to tell    Offer ORP  Doesn't need FG     Procedures  MDM        Disposition  Final diagnoses:   None     ED Disposition     None      Follow-up Information    None       Patient's Medications   Discharge Prescriptions    No medications on file     No discharge procedures on file         ED Provider  Electronically Signed by Small    Comprehensive metabolic panel [[id number]]  (Abnormal) Collected: 01/22/23 2100    Lab Status: Final result Specimen: Blood from Arm, Left Updated: 01/22/23 2135     Sodium 140 mmol/L      Potassium 4 0 mmol/L      Chloride 103 mmol/L      CO2 24 mmol/L      ANION GAP 13 mmol/L      BUN 18 mg/dL      Creatinine 0 70 mg/dL      Glucose 120 mg/dL      Calcium 8 8 mg/dL      AST 71 U/L      ALT 34 U/L      Alkaline Phosphatase 208 U/L      Total Protein 7 9 g/dL      Albumin 3 6 g/dL      Total Bilirubin 0 35 mg/dL      eGFR 94 ml/min/1 73sq m     Narrative:      [place] guidelines for Chronic Kidney Disease (CKD):   •  Stage 1 with normal or high GFR (GFR > 90 mL/min/1 73 square meters)  •  Stage 2 Mild CKD (GFR = 60-89 mL/min/1 73 square meters)  •  Stage 3A Moderate CKD (GFR = 45-59 mL/min/1 73 square meters)  •  Stage 3B Moderate CKD (GFR = 30-44 mL/min/1 73 square meters)  •  Stage 4 Severe CKD (GFR = 15-29 mL/min/1 73 square meters)  •  Stage 5 End Stage CKD (GFR <15 mL/min/1 73 square meters)  Note: GFR calculation is accurate only with a steady state creatinine    Ethanol [[id number]]  (Abnormal) Collected: 01/22/23 2100    Lab Status: Final result Specimen: Blood from Arm, Left Updated: 01/22/23 2133     Ethanol Lvl 150 mg/dL     CBC and differential [[id number]]  (Abnormal) Collected: 01/22/23 2100    Lab Status: Final result Specimen: Blood from Arm, Left Updated: 01/22/23 2113     WBC 7 28 Thousand/uL      RBC 4 44 Million/uL      Hemoglobin 13 2 g/dL      Hematocrit 41 1 %      MCV 93 fL      MCH 29 7 pg      MCHC 32 1 g/dL      RDW 15 8 %      MPV 9 7 fL      Platelets 041 Thousands/uL      nRBC 0 /100 WBCs      Neutrophils Relative 52 %      Immat GRANS % 0 %      Lymphocytes Relative 35 %      Monocytes Relative 10 %      Eosinophils Relative 2 %      Basophils Relative 1 %      Neutrophils Absolute 3 78 Thousands/µL      Immature Grans Absolute 0 02 Thousand/uL Lymphocytes Absolute 2 58 Thousands/µL      Monocytes Absolute 0 72 Thousand/µL      Eosinophils Absolute 0 12 Thousand/µL      Basophils Absolute 0 06 Thousands/µL     Fingerstick Glucose (POCT) [[id number]]  (Normal) Collected: 01/22/23 2057    Lab Status: Final result Updated: 01/22/23 2058     POC Glucose 121 mg/dl         XR chest 1 view portable   ED Interpretation by Scotty Hill MD (01/22 2301)   Pulm edema  cardiomegaly      Final Result by Arely Santos MD (01/23 6101)      Mild cardiomegaly      Mild pulmonary edema      Findings concur with the preliminary report by the referring clinician already in PACS and/or our electronic record EPIC  Workstation performed: [id number]               ED Course / Workup Pending (followup): Patient justine, declines meds and additional w/u  Offered again to d/w ORP  Confirms she's on methadone and dose  Denies other drug use  Again denies SI HI    Convinced to accept vitamins and fluids   HR improves  Poss component of alcohol withdrawal                                    ED Course as of 02/02/23 2259   Sun Jan 22, 2023   2203 Alcohol intox  EMS - "drug use"  Denied here  2mg narcan w/ response  H/o methadone - hard to tell    Offer ORP  Doesn't need FG     Procedures  MDM        Disposition  Final diagnoses:   UTI (urinary tract infection)   Dehydration   Sinus tachycardia   Polysubstance overdose     Time reflects when diagnosis was documented in both MDM as applicable and the Disposition within this note     Time User Action Codes Description Comment    1/23/2023  1:27 AM Jai Fossa Add [N39 0] UTI (urinary tract infection)     1/23/2023  1:27 AM Jai Fossa Add [E86 0] Dehydration     1/23/2023  1:28 AM Jai Fossa Add [R00 0] Sinus tachycardia     1/23/2023  1:28 AM Genet Martin [address] Polysubstance overdose       ED Disposition     ED Disposition   Discharge    Condition   Stable    Date/Time   Mon Jan 23, 2023 4: [address] [facility] discharge to home/self care                 Follow-up Information     Follow up With Specialties Details Why Contact Info    Joy Maya, [address]   [facility] 166  [facility]  [address]  [phone]          Discharge Medication List as of 1/23/2023  1:29 AM      START taking these medications    Details   cephalexin (KEFLEX) 500 mg capsule Take 1 capsule (500 mg total) by mouth every 6 (six) hours for 5 days, Starting Mon 1/23/2023, Until Sat 1/28/2023, Normal         CONTINUE these medications which have NOT CHANGED    Details   acetaminophen (TYLENOL) 650 mg CR tablet Take 1 tablet (650 mg total) by mouth every 8 (eight) hours as needed for mild pain, Starting Fri 4/1/2022, Normal      atorvastatin (LIPITOR) 40 mg tablet Take 1 tablet (40 mg total) by mouth daily with dinner, Starting Mon 11/28/2022, Normal      clopidogrel (PLAVIX) 75 mg tablet Take 1 tablet (75 mg total) by mouth daily Do not start before November 27, 2022 , Starting Sun 11/27/2022, Normal      !! furosemide (LASIX) 20 mg tablet Take 1 tablet (20 mg total) by mouth daily Do not start before November 27, 2022 , Starting Sun 11/27/2022, Normal      !! furosemide (LASIX) 20 mg tablet Take 1 tablet (20 mg total) by mouth daily, Starting Mon 11/28/2022, Normal      Insulin Pen Needle 29G X 12MM MISC by Does not apply route daily at bedtime, Starting Fri 3/22/2019, Normal      ipratropium-albuterol (DUO-NEB) 0 5-2 5 mg/3 mL nebulizer solution Take 3 mL by nebulization 3 (three) times a day, Starting Mon 11/28/2022, Normal      lisinopril (ZESTRIL) 5 mg tablet Take 1 tablet (5 mg total) by mouth daily Do not start before November 27, 2022 , Starting Sun 11/27/2022, Normal      methadone (DOLOPHINE) 10 MG/5ML solution Take 30 mg by mouth in the morning [facility], Historical Med      metoprolol succinate (TOPROL-XL) 50 mg 24 hr tablet Take 1 tablet (50 mg total) by mouth daily Do not start before November 29, 2022 , Starting Tue 11/29/2022, Normal      multivitamin [facility]) TABS Take 1 tablet by mouth daily, Starting Wed 11/17/2021, Until Fri 12/17/2021, Normal      nicotine (NICODERM CQ) 21 mg/24 hr TD 24 hr patch Place 1 patch on the skin daily Do not start before November 27, 2022 , Starting Sun 11/27/2022, Normal      spironolactone (ALDACTONE) 25 mg tablet Take 0 5 tablets (12 5 mg total) by mouth daily Do not start before November 29, 2022 , Starting Tue 11/29/2022, Until Thu 12/29/2022, Normal       !! - Potential duplicate medications found  Please discuss with provider  No discharge procedures on file         ED Provider  Electronically Signed by     To Peters MD  02/02/23 1344

## 2023-01-23 NOTE — ED NOTES
This RN attempted to administer medications and IV fluids to patient but patient refused stating she does not want any of it and just wants to go home       Paresh Hernandez, URI  01/22/23 6044

## 2023-01-23 NOTE — ED PROVIDER NOTES
History  Chief Complaint   Patient presents with   • Altered Mental Status     Patient arrives via EMS  Per ems pt was drinking and admitted to drug use (possible heroin and/or cocaine)  During triage pt admitted to alcohol use but denied any drug use  Patient brought in by EMS for evaluation of altered mental status  As per EMS patient admitted to drinking alcohol and using drugs such as cocaine and heroin  On arrival patient admits to drinking alcohol but is currently denying any other drug use  She is very lethargic on arrival       History provided by:  Patient, medical records and EMS personnel  History limited by:  Mental status change   used: No        Prior to Admission Medications   Prescriptions Last Dose Informant Patient Reported? Taking? Insulin Pen Needle 29G X 12MM MISC   No No   Sig: by Does not apply route daily at bedtime   Patient not taking: Reported on 4/1/2022   acetaminophen (TYLENOL) 650 mg CR tablet   No No   Sig: Take 1 tablet (650 mg total) by mouth every 8 (eight) hours as needed for mild pain   Patient not taking: Reported on 1/19/2023   atorvastatin (LIPITOR) 20 mg tablet   No No   Sig: Take 1 tablet (20 mg total) by mouth daily with dinner   atorvastatin (LIPITOR) 40 mg tablet   No No   Sig: Take 1 tablet (40 mg total) by mouth daily with dinner   Patient not taking: Reported on 1/19/2023   clopidogrel (PLAVIX) 75 mg tablet   No No   Sig: Take 1 tablet (75 mg total) by mouth daily Do not start before November 27, 2022  Patient not taking: Reported on 1/19/2023   furosemide (LASIX) 20 mg tablet   No No   Sig: Take 1 tablet (20 mg total) by mouth daily Do not start before November 27, 2022     Patient not taking: Reported on 1/19/2023   furosemide (LASIX) 20 mg tablet   No No   Sig: Take 1 tablet (20 mg total) by mouth daily   Patient not taking: Reported on 1/19/2023   ipratropium-albuterol (DUO-NEB) 0 5-2 5 mg/3 mL nebulizer solution   No No   Sig: Take 3 mL by nebulization 3 (three) times a day   Patient not taking: Reported on 1/19/2023   lisinopril (ZESTRIL) 5 mg tablet   No No   Sig: Take 1 tablet (5 mg total) by mouth daily Do not start before November 27, 2022  Patient not taking: Reported on 1/19/2023   methadone (DOLOPHINE) 10 MG/5ML solution   Yes No   Sig: Take 30 mg by mouth in the morning [facility]   metoprolol succinate (TOPROL-XL) 50 mg 24 hr tablet   No No   Sig: Take 1 tablet (50 mg total) by mouth daily Do not start before November 29, 2022  Patient not taking: Reported on 1/19/2023   multivitamin (THERAGRAN) TABS   No No   Sig: Take 1 tablet by mouth daily   nicotine (NICODERM CQ) 21 mg/24 hr TD 24 hr patch   No No   Sig: Place 1 patch on the skin daily Do not start before November 27, 2022  Patient not taking: Reported on 1/19/2023   spironolactone (ALDACTONE) 25 mg tablet   No No   Sig: Take 0 5 tablets (12 5 mg total) by mouth daily Do not start before November 29, 2022  Facility-Administered Medications: None       Past Medical History:   Diagnosis Date   • Alcohol abuse    • Arthritis    • Asthma    • Bipolar disorder ([address] )    • Diabetes mellitus ([address] )    • O[facility], continuous [facility] )        Past Surgical History:   Procedure Laterality Date   • ABDOMINAL SURGERY      stomach uler with perforation       History reviewed  No pertinent family history  I have reviewed and agree with the history as documented      E-Cigarette/Vaping   • E-Cigarette Use Never User      E-Cigarette/Vaping Substances   • Nicotine No    • THC No    • CBD No    • Flavoring No    • Other No    • Unknown No      Social History     Tobacco Use   • Smoking status: Every Day     Packs/day: 2 00     Types: Cigarettes   • Smokeless tobacco: Never   Vaping Use   • Vaping Use: Never used   Substance Use Topics   • Alcohol use: Yes     Comment: 1 pint of vodka daily   • Drug use: Yes     Comment: on methadone       Review of Systems   Unable to perform ROS: Mental status change       Physical Exam  Physical Exam  Vitals and nursing note reviewed  Constitutional:       General: She is in acute distress  HENT:      Head: Atraumatic  Right Ear: External ear normal       Left Ear: External ear normal       Nose: Nose normal       Mouth/Throat:      Mouth: Mucous membranes are dry  Pharynx: Oropharynx is clear  Eyes:      Comments: Small pupils bilaterally   Cardiovascular:      Rate and Rhythm: Regular rhythm  Tachycardia present  Pulses: Normal pulses  Pulmonary:      Effort: Respiratory distress present  Breath sounds: Rhonchi present  No wheezing or rales  Comments: Slow breathing  Abdominal:      General: Abdomen is flat  Bowel sounds are normal  There is no distension  Palpations: Abdomen is soft  Tenderness: There is no abdominal tenderness  There is no guarding or rebound  Musculoskeletal:         General: No deformity  Normal range of motion  Skin:     Capillary Refill: Capillary refill takes less than 2 seconds  Findings: No rash  Neurological:      GCS: GCS eye subscore is 3  GCS verbal subscore is 4  GCS motor subscore is 5        Comments: Lethargic slow to answer questions moving all extremities will respond to verbal         Vital Signs  ED Triage Vitals [01/22/23 2042]   Temperature Pulse Respirations Blood Pressure SpO2   97 8 °F (36 6 °C) (!) 119 16 (!) 159/102 100 %      Temp Source Heart Rate Source Patient Position - Orthostatic VS BP Location FiO2 (%)   Tympanic Monitor -- -- --      Pain Score       --           Vitals:    01/22/23 2042 01/22/23 2115 01/22/23 2130   BP: (!) 159/102 143/89 (!) 146/101   Pulse: (!) 119 (!) 114 (!) 112         Visual Acuity      ED Medications  Medications   sodium chloride 0 9 % bolus 1,000 mL (1,000 mL Intravenous New Bag 1/22/23 2059)   naloxone (NARCAN) 2 MG/2ML injection **ADS Override Pull** (2 mg  Given 1/22/23 2058)   ondansetron (ZOFRAN) 4 mg/2 mL injection **ADS Override Pull** (4 mg  Given 1/22/23 2058)       Diagnostic Studies  Results Reviewed     Procedure Component Value Units Date/Time    Comprehensive metabolic panel [[id number]]  (Abnormal) Collected: 01/22/23 2100    Lab Status: Final result Specimen: Blood from Arm, Left Updated: 01/22/23 2135     Sodium 140 mmol/L      Potassium 4 0 mmol/L      Chloride 103 mmol/L      CO2 24 mmol/L      ANION GAP 13 mmol/L      BUN 18 mg/dL      Creatinine 0 70 mg/dL      Glucose 120 mg/dL      Calcium 8 8 mg/dL      AST 71 U/L      ALT 34 U/L      Alkaline Phosphatase 208 U/L      Total Protein 7 9 g/dL      Albumin 3 6 g/dL      Total Bilirubin 0 35 mg/dL      eGFR 94 ml/min/1 73sq m     Narrative:      [place] guidelines for Chronic Kidney Disease (CKD):   •  Stage 1 with normal or high GFR (GFR > 90 mL/min/1 73 square meters)  •  Stage 2 Mild CKD (GFR = 60-89 mL/min/1 73 square meters)  •  Stage 3A Moderate CKD (GFR = 45-59 mL/min/1 73 square meters)  •  Stage 3B Moderate CKD (GFR = 30-44 mL/min/1 73 square meters)  •  Stage 4 Severe CKD (GFR = 15-29 mL/min/1 73 square meters)  •  Stage 5 End Stage CKD (GFR <15 mL/min/1 73 square meters)  Note: GFR calculation is accurate only with a steady state creatinine    Ethanol [[id number]]  (Abnormal) Collected: 01/22/23 2100    Lab Status: Final result Specimen: Blood from Arm, Left Updated: 01/22/23 2133     Ethanol Lvl 150 mg/dL     CBC and differential [[id number]]  (Abnormal) Collected: 01/22/23 2100    Lab Status: Final result Specimen: Blood from Arm, Left Updated: 01/22/23 2113     WBC 7 28 Thousand/uL      RBC 4 44 Million/uL      Hemoglobin 13 2 g/dL      Hematocrit 41 1 %      MCV 93 fL      MCH 29 7 pg      MCHC 32 1 g/dL      RDW 15 8 %      MPV 9 7 fL      Platelets 648 Thousands/uL      nRBC 0 /100 WBCs      Neutrophils Relative 52 %      Immat GRANS % 0 %      Lymphocytes Relative 35 %      Monocytes Relative 10 %      Eosinophils Relative 2 %      Basophils Relative 1 %      Neutrophils Absolute 3 78 Thousands/µL      Immature Grans Absolute 0 02 Thousand/uL      Lymphocytes Absolute 2 58 Thousands/µL      Monocytes Absolute 0 72 Thousand/µL      Eosinophils Absolute 0 12 Thousand/µL      Basophils Absolute 0 06 Thousands/µL     Fingerstick Glucose (POCT) [[id number]]  (Normal) Collected: 01/22/23 2057    Lab Status: Final result Updated: 01/22/23 2058     POC Glucose 121 mg/dl     UA (URINE) with reflex to Scope [[id number]]     Lab Status: No result Specimen: Urine     Rapid drug screen, urine [[id number]]     Lab Status: No result Specimen: Urine                  XR chest 1 view portable    (Results Pending)              Procedures  ECG 12 Lead Documentation Only    Date/Time: 1/22/2023 8:57 PM  Performed by: Maryam Rodriguez DO  Authorized by: Maryam Rodriguez DO     ECG reviewed by me, the ED Provider: yes    Patient location:  ED  Interpretation:     Interpretation: abnormal    Rate:     ECG rate:  121    ECG rate assessment: tachycardic    Rhythm:     Rhythm: sinus rhythm    Ectopy:     Ectopy: PVCs    ST segments:     ST segments:  Normal  Other findings:     Other findings: WAYNE and LVH      CriticalCare Time  Performed by: Maryam Rodriguez DO  Authorized by: Maryam Rodriguez DO     Critical care provider statement:     Critical care time (minutes):  40    Critical care time was exclusive of:  Separately billable procedures and treating other patients    Critical care was necessary to treat or prevent imminent or life-threatening deterioration of the following conditions:  Respiratory failure and CNS failure or compromise    Critical care was time spent personally by me on the following activities:  Blood draw for specimens, obtaining history from patient or surrogate, development of treatment plan with patient or surrogate, evaluation of patient's response to treatment, examination of patient, review of old charts, re-evaluation of patient's condition, ordering and review of radiographic studies, ordering and review of laboratory studies, ordering and performing treatments and interventions and interpretation of cardiac output measurements  Comments: On arrival patient's breathing was shallow and is adequate with a low oxygen saturation  Suspected patient had opiates on board  Patient was given 2 mg of Narcan IV with improvement in respiratory rate  She was also placed on nonrebreather and oxygenation improved as well  ED Course                               SBIRT 22yo+    Flowsheet Row Most Recent Value   SBIRT (25 yo +)    In order to provide better care to our patients, we are screening all of our patients for alcohol and drug use  Would it be okay to ask you these screening questions? Unable to answer at this time Filed at: 01/22/2023 2110                    Medical Decision Making  Pulse ox was 75% on room air indicating poor oxygenation  Pulse ox was 100% on nonrebreather indicating adequate oxygenation  CXR: NAD as read by me    Signed out to next provider Dr Brittany Sierra pending sobriety  Dehydration: acute illness or injury  Polysubstance overdose: acute illness or injury  Sinus tachycardia: acute illness or injury  UTI (urinary tract infection): acute illness or injury  Amount and/or Complexity of Data Reviewed  Labs: ordered  Radiology: ordered and independent interpretation performed  Risk  OTC drugs  Prescription drug management  Disposition  Final diagnoses:   None     ED Disposition     None      Follow-up Information    None         Patient's Medications   Discharge Prescriptions    No medications on file       No discharge procedures on file      PDMP Review     None          ED Provider  Electronically Signed by           Reese Drummond DO  01/23/23 8147

## 2023-01-23 NOTE — ED NOTES
Patient now cooperative and agreeable to treatment plan and receiving medications and fluids per Dr Amber Diaz, RN  01/22/23 5677

## 2023-01-23 NOTE — ED NOTES
Left message per patients request for son to  since she does not have her keys     Hieu Soto, URI  01/23/23 2456

## 2023-01-23 NOTE — ED NOTES
This RN went in to discuss urine sample with pt; Pt was asked if she would use a bedpan or permit this RN to set her up with a purewick; pt stated she would not give urine, did not want the purewick, and just wants to be left alone and leave  Provided made aware       Surendra Bishop, URI  01/22/23 3546

## 2023-03-16 ENCOUNTER — APPOINTMENT (EMERGENCY)
Dept: RADIOLOGY | Facility: HOSPITAL | Age: 63
End: 2023-03-16

## 2023-03-16 ENCOUNTER — HOSPITAL ENCOUNTER (INPATIENT)
Facility: HOSPITAL | Age: 63
LOS: 1 days | Discharge: HOME | End: 2023-03-18
Attending: EMERGENCY MEDICINE | Admitting: FAMILY MEDICINE

## 2023-03-16 DIAGNOSIS — I50.9 CHF (CONGESTIVE HEART FAILURE) (HCC): Primary | ICD-10-CM

## 2023-03-16 DIAGNOSIS — I50.43 ACUTE ON CHRONIC COMBINED SYSTOLIC AND DIASTOLIC HEART FAILURE (HCC): ICD-10-CM

## 2023-03-16 DIAGNOSIS — R07.9 CHEST PAIN: ICD-10-CM

## 2023-03-16 DIAGNOSIS — Z86.59 HISTORY OF BIPOLAR DISORDER: ICD-10-CM

## 2023-03-16 DIAGNOSIS — I25.10 CAD (CORONARY ARTERY DISEASE): ICD-10-CM

## 2023-03-16 PROBLEM — E11.9 TYPE 2 DIABETES MELLITUS, WITHOUT LONG-TERM CURRENT USE OF INSULIN (HCC): Status: ACTIVE | Noted: 2019-03-22

## 2023-03-16 LAB
2HR DELTA HS TROPONIN: 2 NG/L
4HR DELTA HS TROPONIN: 1 NG/L
ALBUMIN SERPL BCP-MCNC: 4 G/DL (ref 3.5–5)
ALP SERPL-CCNC: 188 U/L (ref 34–104)
ALT SERPL W P-5'-P-CCNC: 27 U/L (ref 7–52)
AMPHETAMINES SERPL QL SCN: NEGATIVE
ANION GAP SERPL CALCULATED.3IONS-SCNC: 20 MMOL/L (ref 4–13)
APAP SERPL-MCNC: <10 UG/ML (ref 10–20)
ARTERIAL PATENCY WRIST A: YES
AST SERPL W P-5'-P-CCNC: 51 U/L (ref 13–39)
ATRIAL RATE: 147 BPM
BACTERIA UR QL AUTO: ABNORMAL /HPF
BARBITURATES UR QL: NEGATIVE
BASE EXCESS BLDA CALC-SCNC: -8.7 MMOL/L
BASOPHILS # BLD AUTO: 0.05 THOUSANDS/ÂΜL (ref 0–0.1)
BASOPHILS NFR BLD AUTO: 1 % (ref 0–1)
BENZODIAZ UR QL: NEGATIVE
BILIRUB SERPL-MCNC: 1.33 MG/DL (ref 0.2–1)
BILIRUB UR QL STRIP: ABNORMAL
BNP SERPL-MCNC: 1002 PG/ML (ref 0–100)
BODY TEMPERATURE: 98.6 DEGREES FEHRENHEIT
BUN SERPL-MCNC: 14 MG/DL (ref 5–25)
CALCIUM SERPL-MCNC: 8.8 MG/DL (ref 8.4–10.2)
CARDIAC TROPONIN I PNL SERPL HS: 32 NG/L
CARDIAC TROPONIN I PNL SERPL HS: 33 NG/L
CARDIAC TROPONIN I PNL SERPL HS: 34 NG/L
CHLORIDE SERPL-SCNC: 100 MMOL/L (ref 96–108)
CLARITY UR: CLEAR
CO2 SERPL-SCNC: 21 MMOL/L (ref 21–32)
COCAINE UR QL: NEGATIVE
COLOR UR: YELLOW
CREAT SERPL-MCNC: 0.86 MG/DL (ref 0.6–1.3)
EOSINOPHIL # BLD AUTO: 0.02 THOUSAND/ÂΜL (ref 0–0.61)
EOSINOPHIL NFR BLD AUTO: 0 % (ref 0–6)
ERYTHROCYTE [DISTWIDTH] IN BLOOD BY AUTOMATED COUNT: 15.7 % (ref 11.6–15.1)
ETHANOL SERPL-MCNC: <10 MG/DL
FLUAV RNA RESP QL NAA+PROBE: NEGATIVE
FLUBV RNA RESP QL NAA+PROBE: NEGATIVE
GFR SERPL CREATININE-BSD FRML MDRD: 73 ML/MIN/1.73SQ M
GLUCOSE SERPL-MCNC: 145 MG/DL (ref 65–140)
GLUCOSE SERPL-MCNC: 152 MG/DL (ref 65–140)
GLUCOSE UR STRIP-MCNC: NEGATIVE MG/DL
HCO3 BLDA-SCNC: 18.9 MMOL/L (ref 22–28)
HCT VFR BLD AUTO: 53.4 % (ref 34.8–46.1)
HGB BLD-MCNC: 17.2 G/DL (ref 11.5–15.4)
HGB UR QL STRIP.AUTO: NEGATIVE
HYALINE CASTS #/AREA URNS LPF: ABNORMAL /LPF
IMM GRANULOCYTES # BLD AUTO: 0.03 THOUSAND/UL (ref 0–0.2)
IMM GRANULOCYTES NFR BLD AUTO: 0 % (ref 0–2)
KETONES UR STRIP-MCNC: NEGATIVE MG/DL
LACTATE SERPL-SCNC: 2 MMOL/L (ref 0.5–2)
LACTATE SERPL-SCNC: 3.5 MMOL/L (ref 0.5–2)
LEUKOCYTE ESTERASE UR QL STRIP: NEGATIVE
LYMPHOCYTES # BLD AUTO: 1.65 THOUSANDS/ÂΜL (ref 0.6–4.47)
LYMPHOCYTES NFR BLD AUTO: 21 % (ref 14–44)
MAGNESIUM SERPL-MCNC: 1.8 MG/DL (ref 1.9–2.7)
MCH RBC QN AUTO: 30.4 PG (ref 26.8–34.3)
MCHC RBC AUTO-ENTMCNC: 32.2 G/DL (ref 31.4–37.4)
MCV RBC AUTO: 95 FL (ref 82–98)
METHADONE UR QL: POSITIVE
MONOCYTES # BLD AUTO: 0.4 THOUSAND/ÂΜL (ref 0.17–1.22)
MONOCYTES NFR BLD AUTO: 5 % (ref 4–12)
NEUTROPHILS # BLD AUTO: 5.56 THOUSANDS/ÂΜL (ref 1.85–7.62)
NEUTS SEG NFR BLD AUTO: 73 % (ref 43–75)
NITRITE UR QL STRIP: NEGATIVE
NON VENT ROOM AIR: 21 %
NON-SQ EPI CELLS URNS QL MICRO: ABNORMAL /HPF
NRBC BLD AUTO-RTO: 0 /100 WBCS
O2 CT BLDA-SCNC: 17.5 ML/DL (ref 16–23)
OPIATES UR QL SCN: NEGATIVE
OXYCODONE+OXYMORPHONE UR QL SCN: NEGATIVE
OXYHGB MFR BLDA: 71.3 % (ref 94–97)
P AXIS: 65 DEGREES
PCO2 BLDA: 46.5 MM HG (ref 36–44)
PCO2 TEMP ADJ BLDA: 46.5 MM HG (ref 36–44)
PCP UR QL: NEGATIVE
PH BLD: 7.23 [PH] (ref 7.35–7.45)
PH BLDA: 7.23 [PH] (ref 7.35–7.45)
PH UR STRIP.AUTO: 6 [PH]
PLATELET # BLD AUTO: 249 THOUSANDS/UL (ref 149–390)
PMV BLD AUTO: 10.1 FL (ref 8.9–12.7)
PO2 BLD: 47.9 MM HG (ref 75–129)
PO2 BLDA: 47.9 MM HG (ref 75–129)
POTASSIUM SERPL-SCNC: 4.3 MMOL/L (ref 3.5–5.3)
PR INTERVAL: 124 MS
PROT SERPL-MCNC: 7.5 G/DL (ref 6.4–8.4)
PROT UR STRIP-MCNC: ABNORMAL MG/DL
QRS AXIS: -24 DEGREES
QRSD INTERVAL: 84 MS
QT INTERVAL: 260 MS
QTC INTERVAL: 406 MS
RBC # BLD AUTO: 5.65 MILLION/UL (ref 3.81–5.12)
RBC #/AREA URNS AUTO: ABNORMAL /HPF
RSV RNA RESP QL NAA+PROBE: NEGATIVE
SALICYLATES SERPL-MCNC: <5 MG/DL (ref 3–20)
SARS-COV-2 RNA RESP QL NAA+PROBE: NEGATIVE
SODIUM SERPL-SCNC: 141 MMOL/L (ref 135–147)
SP GR UR STRIP.AUTO: >=1.03 (ref 1–1.03)
SPECIMEN SOURCE: ABNORMAL
T WAVE AXIS: 87 DEGREES
THC UR QL: NEGATIVE
UROBILINOGEN UR QL STRIP.AUTO: 1 E.U./DL
VENTRICULAR RATE: 147 BPM
WBC # BLD AUTO: 7.71 THOUSAND/UL (ref 4.31–10.16)
WBC #/AREA URNS AUTO: ABNORMAL /HPF

## 2023-03-16 RX ORDER — CLOPIDOGREL BISULFATE 75 MG/1
75 TABLET ORAL DAILY
Status: DISCONTINUED | OUTPATIENT
Start: 2023-03-17 | End: 2023-03-18 | Stop reason: HOSPADM

## 2023-03-16 RX ORDER — LANOLIN ALCOHOL/MO/W.PET/CERES
100 CREAM (GRAM) TOPICAL DAILY
Status: DISCONTINUED | OUTPATIENT
Start: 2023-03-17 | End: 2023-03-18 | Stop reason: HOSPADM

## 2023-03-16 RX ORDER — ENOXAPARIN SODIUM 100 MG/ML
40 INJECTION SUBCUTANEOUS DAILY
Status: DISCONTINUED | OUTPATIENT
Start: 2023-03-17 | End: 2023-03-18 | Stop reason: HOSPADM

## 2023-03-16 RX ORDER — LORAZEPAM 2 MG/ML
1 INJECTION INTRAMUSCULAR ONCE
Status: COMPLETED | OUTPATIENT
Start: 2023-03-16 | End: 2023-03-16

## 2023-03-16 RX ORDER — METOPROLOL SUCCINATE 50 MG/1
50 TABLET, EXTENDED RELEASE ORAL DAILY
Status: DISCONTINUED | OUTPATIENT
Start: 2023-03-17 | End: 2023-03-18 | Stop reason: HOSPADM

## 2023-03-16 RX ORDER — ACETAMINOPHEN 325 MG/1
650 TABLET ORAL EVERY 6 HOURS PRN
Status: DISCONTINUED | OUTPATIENT
Start: 2023-03-16 | End: 2023-03-18 | Stop reason: HOSPADM

## 2023-03-16 RX ORDER — ATORVASTATIN CALCIUM 40 MG/1
40 TABLET, FILM COATED ORAL
Status: DISCONTINUED | OUTPATIENT
Start: 2023-03-17 | End: 2023-03-18 | Stop reason: HOSPADM

## 2023-03-16 RX ORDER — SODIUM CHLORIDE FOR INHALATION 0.9 %
3 VIAL, NEBULIZER (ML) INHALATION ONCE
Status: COMPLETED | OUTPATIENT
Start: 2023-03-16 | End: 2023-03-16

## 2023-03-16 RX ORDER — OLANZAPINE 10 MG/1
10 INJECTION, POWDER, LYOPHILIZED, FOR SOLUTION INTRAMUSCULAR ONCE
Status: COMPLETED | OUTPATIENT
Start: 2023-03-16 | End: 2023-03-16

## 2023-03-16 RX ORDER — NICOTINE 21 MG/24HR
1 PATCH, TRANSDERMAL 24 HOURS TRANSDERMAL DAILY
Status: DISCONTINUED | OUTPATIENT
Start: 2023-03-17 | End: 2023-03-18 | Stop reason: HOSPADM

## 2023-03-16 RX ORDER — METHYLPREDNISOLONE SODIUM SUCCINATE 125 MG/2ML
100 INJECTION, POWDER, LYOPHILIZED, FOR SOLUTION INTRAMUSCULAR; INTRAVENOUS ONCE
Status: COMPLETED | OUTPATIENT
Start: 2023-03-16 | End: 2023-03-16

## 2023-03-16 RX ORDER — FUROSEMIDE 10 MG/ML
40 INJECTION INTRAMUSCULAR; INTRAVENOUS DAILY
Status: DISCONTINUED | OUTPATIENT
Start: 2023-03-17 | End: 2023-03-17

## 2023-03-16 RX ORDER — INSULIN LISPRO 100 [IU]/ML
1-5 INJECTION, SOLUTION INTRAVENOUS; SUBCUTANEOUS
Status: DISCONTINUED | OUTPATIENT
Start: 2023-03-17 | End: 2023-03-18 | Stop reason: HOSPADM

## 2023-03-16 RX ORDER — FOLIC ACID 1 MG/1
1 TABLET ORAL DAILY
Status: DISCONTINUED | OUTPATIENT
Start: 2023-03-17 | End: 2023-03-18 | Stop reason: HOSPADM

## 2023-03-16 RX ORDER — METHADONE HYDROCHLORIDE 10 MG/ML
30 CONCENTRATE ORAL DAILY
Status: DISCONTINUED | OUTPATIENT
Start: 2023-03-17 | End: 2023-03-17

## 2023-03-16 RX ORDER — LISINOPRIL 5 MG/1
5 TABLET ORAL DAILY
Status: DISCONTINUED | OUTPATIENT
Start: 2023-03-17 | End: 2023-03-18 | Stop reason: HOSPADM

## 2023-03-16 RX ORDER — MAGNESIUM SULFATE HEPTAHYDRATE 40 MG/ML
2 INJECTION, SOLUTION INTRAVENOUS ONCE
Status: COMPLETED | OUTPATIENT
Start: 2023-03-16 | End: 2023-03-17

## 2023-03-16 RX ORDER — FUROSEMIDE 10 MG/ML
40 INJECTION INTRAMUSCULAR; INTRAVENOUS ONCE
Status: COMPLETED | OUTPATIENT
Start: 2023-03-16 | End: 2023-03-16

## 2023-03-16 RX ADMIN — METHYLPREDNISOLONE SODIUM SUCCINATE 100 MG: 125 INJECTION, POWDER, FOR SOLUTION INTRAMUSCULAR; INTRAVENOUS at 16:18

## 2023-03-16 RX ADMIN — ISODIUM CHLORIDE 3 ML: 0.03 SOLUTION RESPIRATORY (INHALATION) at 14:49

## 2023-03-16 RX ADMIN — OLANZAPINE 10 MG: 10 INJECTION, POWDER, FOR SOLUTION INTRAMUSCULAR at 15:00

## 2023-03-16 RX ADMIN — LORAZEPAM 1 MG: 2 INJECTION INTRAMUSCULAR; INTRAVENOUS at 14:55

## 2023-03-16 RX ADMIN — ALBUTEROL SULFATE 10 MG: 2.5 SOLUTION RESPIRATORY (INHALATION) at 14:49

## 2023-03-16 RX ADMIN — FUROSEMIDE 40 MG: 10 INJECTION, SOLUTION INTRAMUSCULAR; INTRAVENOUS at 17:02

## 2023-03-16 RX ADMIN — IPRATROPIUM BROMIDE 1 MG: 0.5 SOLUTION RESPIRATORY (INHALATION) at 14:50

## 2023-03-16 NOTE — Clinical Note
Case was discussed with Dr Robel Carroll and the patient's admission status was agreed to be Admission Status: observation status to the service of Dr Robel Carroll

## 2023-03-16 NOTE — ED PROVIDER NOTES
History  Chief Complaint   Patient presents with   • Shortness of Breath     CP and SOB since this am      82-year-old female with a history of alcohol abuse, bipolar disorder, diabetes, opiate dependence presents via EMS complaining of chest pain and increasing shortness of breath  Patient is belligerent and combative at the time of arrival, shouting, cursing at staff and refusing treatment  Noncompliant with BiPAP or neb which we attempted to administer  Patient insisted that she had to have a bowel movement first, so she was placed on the bedpan at the time of initial evaluation  Patient did not improve with 1 mg of IM Ativan  10 mg IM Zyprexa was administered to facilitate treatment  History provided by:  Patient  History limited by:  Acuity of condition and psychiatric disorder   used: No    Shortness of Breath  Severity:  Moderate  Onset quality:  Unable to specify  Timing:  Constant  Progression:  Unchanged  Chronicity:  New  Relieved by:  Nothing  Worsened by:  Nothing  Ineffective treatments:  None tried  Associated symptoms: no abdominal pain, no cough, no fever, no neck pain and no vomiting        Prior to Admission Medications   Prescriptions Last Dose Informant Patient Reported? Taking?    Insulin Pen Needle 29G X 12MM MISC   No No   Sig: by Does not apply route daily at bedtime   Patient not taking: Reported on 4/1/2022   acetaminophen (TYLENOL) 650 mg CR tablet   No No   Sig: Take 1 tablet (650 mg total) by mouth every 8 (eight) hours as needed for mild pain   Patient not taking: Reported on 1/19/2023   atorvastatin (LIPITOR) 20 mg tablet   No No   Sig: Take 1 tablet (20 mg total) by mouth daily with dinner   atorvastatin (LIPITOR) 40 mg tablet   No No   Sig: Take 1 tablet (40 mg total) by mouth daily with dinner   Patient not taking: Reported on 1/19/2023   clopidogrel (PLAVIX) 75 mg tablet   No No   Sig: Take 1 tablet (75 mg total) by mouth daily Do not start before November 27, 2022  Patient not taking: Reported on 1/19/2023   furosemide (LASIX) 20 mg tablet   No No   Sig: Take 1 tablet (20 mg total) by mouth daily Do not start before November 27, 2022  Patient not taking: Reported on 1/19/2023   furosemide (LASIX) 20 mg tablet   No No   Sig: Take 1 tablet (20 mg total) by mouth daily   Patient not taking: Reported on 1/19/2023   ipratropium-albuterol (DUO-NEB) 0 5-2 5 mg/3 mL nebulizer solution   No No   Sig: Take 3 mL by nebulization 3 (three) times a day   Patient not taking: Reported on 1/19/2023   lisinopril (ZESTRIL) 5 mg tablet   No No   Sig: Take 1 tablet (5 mg total) by mouth daily Do not start before November 27, 2022  Patient not taking: Reported on 1/19/2023   methadone (DOLOPHINE) 10 MG/5ML solution   Yes No   Sig: Take 30 mg by mouth in the morning [facility]   metoprolol succinate (TOPROL-XL) 50 mg 24 hr tablet   No No   Sig: Take 1 tablet (50 mg total) by mouth daily Do not start before November 29, 2022  Patient not taking: Reported on 1/19/2023   multivitamin (THERAGRAN) TABS   No No   Sig: Take 1 tablet by mouth daily   nicotine (NICODERM CQ) 21 mg/24 hr TD 24 hr patch   No No   Sig: Place 1 patch on the skin daily Do not start before November 27, 2022  Patient not taking: Reported on 1/19/2023   spironolactone (ALDACTONE) 25 mg tablet   No No   Sig: Take 0 5 tablets (12 5 mg total) by mouth daily Do not start before November 29, 2022  Facility-Administered Medications: None       Past Medical History:   Diagnosis Date   • Alcohol abuse    • Arthritis    • Asthma    • Bipolar disorder ([facility] [address] )    • Diabetes mellitus ([facility] [address] )    • [facility]        Past Surgical History:   Procedure Laterality Date   • ABDOMINAL SURGERY      stomach uler with perforation       History reviewed  No pertinent family history  I have reviewed and agree with the history as documented      E-Cigarette/Vaping   • E-Cigarette Use Never User E-Cigarette/Vaping Substances   • Nicotine No    • THC No    • CBD No    • Flavoring No    • Other No    • Unknown No      Social History     Tobacco Use   • Smoking status: Every Day     Packs/day: 2 00     Types: Cigarettes   • Smokeless tobacco: Never   Vaping Use   • Vaping Use: Never used   Substance Use Topics   • Alcohol use: Yes     Comment: 1 pint of vodka daily   • Drug use: Yes     Comment: on methadone       Review of Systems   Constitutional: Negative for chills and fever  Respiratory: Positive for shortness of breath  Negative for cough and chest tightness  Gastrointestinal: Negative for abdominal pain, diarrhea, nausea and vomiting  Genitourinary: Negative for dysuria, frequency, hematuria and urgency  Musculoskeletal: Negative for back pain, neck pain and neck stiffness  All other systems reviewed and are negative  Physical Exam  Physical Exam  Vitals and nursing note reviewed  Constitutional:       General: She is not in acute distress  Appearance: She is well-developed  She is diaphoretic  HENT:      Head: Normocephalic and atraumatic  Eyes:      Conjunctiva/sclera: Conjunctivae normal       Pupils: Pupils are equal, round, and reactive to light  Cardiovascular:      Rate and Rhythm: Normal rate and regular rhythm  Heart sounds: Normal heart sounds  No murmur heard  Pulmonary:      Effort: Pulmonary effort is normal  Tachypnea present  No respiratory distress  Breath sounds: Examination of the right-upper field reveals decreased breath sounds  Examination of the left-upper field reveals decreased breath sounds  Examination of the right-middle field reveals decreased breath sounds  Examination of the left-middle field reveals decreased breath sounds  Examination of the right-lower field reveals decreased breath sounds  Examination of the left-lower field reveals decreased breath sounds  Decreased breath sounds present     Abdominal:      General: Bowel sounds are normal  There is no distension  Palpations: Abdomen is soft  Tenderness: There is no abdominal tenderness  Musculoskeletal:         General: No deformity  Normal range of motion  Cervical back: Normal range of motion and neck supple  Skin:     General: Skin is warm  Capillary Refill: Capillary refill takes less than 2 seconds  Coloration: Skin is not pale  Findings: No rash  Neurological:      General: No focal deficit present  Mental Status: She is alert and oriented to person, place, and time  Cranial Nerves: No cranial nerve deficit  Psychiatric:         Mood and Affect: Mood is anxious  Behavior: Behavior is agitated           Vital Signs  ED Triage Vitals   Temperature Pulse Respirations Blood Pressure SpO2   03/16/23 1455 03/16/23 1455 03/16/23 1455 03/16/23 1455 03/16/23 1455   98 5 °F (36 9 °C) (!) 147 (!) 30 (!) 244/114 (!) 87 %      Temp Source Heart Rate Source Patient Position - Orthostatic VS BP Location FiO2 (%)   03/16/23 1455 03/16/23 1455 03/16/23 1455 03/16/23 1455 03/16/23 1557   Oral Monitor Lying Right arm 45      Pain Score       --                  Vitals:    03/16/23 1700 03/16/23 1730 03/16/23 1930 03/16/23 2000   BP: 119/68 111/64 108/72 123/72   Pulse: (!) 120 (!) 112 102 100   Patient Position - Orthostatic VS:   Lying Lying         Visual Acuity      ED Medications  Medications   albuterol inhalation solution 10 mg (10 mg Nebulization Given 3/16/23 1449)     And   ipratropium (ATROVENT) 0 02 % inhalation solution 1 mg (1 mg Nebulization Given 3/16/23 1450)     And   sodium chloride 0 9 % inhalation solution 3 mL (3 mL Nebulization Given 3/16/23 1449)   LORazepam (ATIVAN) injection 1 mg (1 mg Intramuscular Given 3/16/23 1455)   OLANZapine (ZyPREXA) IM injection 10 mg (10 mg Intramuscular Given 3/16/23 1500)   methylPREDNISolone sodium succinate (Solu-MEDROL) injection 100 mg (100 mg Intravenous Given 3/16/23 1618) furosemide (LASIX) injection 40 mg (40 mg Intravenous Given 3/16/23 1702)       Diagnostic Studies  Results Reviewed     Procedure Component Value Units Date/Time    FLU/RSV/COVID - if FLU/RSV clinically relevant [[id number]]  (Normal) Collected: 03/16/23 1814    Lab Status: Final result Specimen: Nares from Nose Updated: 03/16/23 1907     SARS-CoV-2 Negative     INFLUENZA A PCR Negative     INFLUENZA B PCR Negative     RSV PCR Negative    Narrative:      FOR PEDIATRIC PATIENTS - copy/paste COVID Guidelines URL to browser: [email or web address]  [email or web address]    SARS-CoV-2 assay is a Nucleic Acid Amplification assay intended for the  qualitative detection of nucleic acid from SARS-CoV-2 in nasopharyngeal  swabs  Results are for the presumptive identification of SARS-CoV-2 RNA  Positive results are indicative of infection with SARS-CoV-2, the virus  causing COVID-19, but do not rule out bacterial infection or co-infection  with other viruses  Laboratories within the [place] and its  territories are required to report all positive results to the appropriate  public health authorities  Negative results do not preclude SARS-CoV-2  infection and should not be used as the sole basis for treatment or other  patient management decisions  Negative results must be combined with  clinical observations, patient history, and epidemiological information  This test has not been FDA cleared or approved  This test has been authorized by FDA under an Emergency Use Authorization  (EUA)  This test is only authorized for the duration of time the  declaration that circumstances exist justifying the authorization of the  emergency use of an in vitro diagnostic tests for detection of SARS-CoV-2  virus and/or diagnosis of COVID-19 infection under section 564(b)(1) of  the Act, 21 U  S C  689IOW-3(X)(1), unless the authorization is terminated  or revoked sooner   The test has been validated but independent review by FDA  and CLIA is pending  Test performed using [employer] GeneXpert: This RT-PCR assay targets N2,  a region unique to SARS-CoV-2  A conserved region in the E-gene was chosen  for pan-Sarbecovirus detection which includes SARS-CoV-2  According to CMS-2020-01-R, this platform meets the definition of high-throughput technology  HS Troponin I 2hr [[id number]]  (Normal) Collected: 03/16/23 1808    Lab Status: Final result Specimen: Blood from Arm, Right Updated: 03/16/23 1851     hs TnI 2hr 34 ng/L      Delta 2hr hsTnI 2 ng/L     Lactic acid 2 Hours [[id number]]  (Normal) Collected: 03/16/23 1808    Lab Status: Final result Specimen: Blood from Arm, Right Updated: 03/16/23 1831     LACTIC ACID 2 0 mmol/L     Narrative:      Result may be elevated if tourniquet was used during collection  Urine Microscopic [[id number]]  (Abnormal) Collected: 03/16/23 1712    Lab Status: Final result Specimen: Urine, Indwelling Pino Catheter Updated: 03/16/23 1813     RBC, UA None Seen /hpf      WBC, UA None Seen /hpf      Epithelial Cells Occasional /hpf      Bacteria, UA Occasional /hpf      Hyaline Casts, UA 0-1 /lpf     Rapid drug screen, urine [[id number]]  (Abnormal) Collected: 03/16/23 1712    Lab Status: Final result Specimen: Urine, Catheter Updated: 03/16/23 1736     Amph/Meth UR Negative     Barbiturate Ur Negative     Benzodiazepine Urine Negative     Cocaine Urine Negative     Methadone Urine Positive     Opiate Urine Negative     PCP Ur Negative     THC Urine Negative     Oxycodone Urine Negative    Narrative:      Presumptive report  If requested, specimen will be sent to reference lab for confirmation  FOR MEDICAL PURPOSES ONLY  IF CONFIRMATION NEEDED PLEASE CONTACT THE LAB WITHIN 5 DAYS      Drug Screen Cutoff Levels:  AMPHETAMINE/METHAMPHETAMINES  1000 ng/mL  BARBITURATES     200 ng/mL  BENZODIAZEPINES     200 ng/mL  COCAINE      300 ng/mL  METHADONE      300 ng/mL  OPIATES      300 ng/mL  PHENCYCLIDINE     25 ng/mL  THC       50 ng/mL  OXYCODONE      100 ng/mL    UA (URINE) with reflex to Scope [[id number]]  (Abnormal) Collected: 03/16/23 1712    Lab Status: Final result Specimen: Urine, Indwelling Pino Catheter Updated: 03/16/23 1721     Color, UA Yellow     Clarity, UA Clear     Specific Gravity, UA >=1 030     pH, UA 6 0     Leukocytes, UA Negative     Nitrite, UA Negative     Protein, UA 30 (1+) mg/dl      Glucose, UA Negative mg/dl      Ketones, UA Negative mg/dl      Urobilinogen, UA 1 0 E U /dl      Bilirubin, UA Small     Occult Blood, UA Negative    Lactic acid [[id number]]  (Abnormal) Collected: 03/16/23 1622    Lab Status: Final result Specimen: Blood from Arm, Right Updated: 03/16/23 1705     LACTIC ACID 3 5 mmol/L     Narrative:      Result may be elevated if tourniquet was used during collection      Comprehensive metabolic panel [[id number]]  (Abnormal) Collected: 03/16/23 1538    Lab Status: Final result Specimen: Blood from Arm, Left Updated: 03/16/23 1703     Sodium 141 mmol/L      Potassium 4 3 mmol/L      Chloride 100 mmol/L      CO2 21 mmol/L      ANION GAP 20 mmol/L      BUN 14 mg/dL      Creatinine 0 86 mg/dL      Glucose 152 mg/dL      Calcium 8 8 mg/dL      AST 51 U/L      ALT 27 U/L      Alkaline Phosphatase 188 U/L      Total Protein 7 5 g/dL      Albumin 4 0 g/dL      Total Bilirubin 1 33 mg/dL      eGFR 73 ml/min/1 73sq m     Narrative:      [facility] guidelines for Chronic Kidney Disease (CKD):   •  Stage 1 with normal or high GFR (GFR > 90 mL/min/1 73 square meters)  •  Stage 2 Mild CKD (GFR = 60-89 mL/min/1 73 square meters)  •  Stage 3A Moderate CKD (GFR = 45-59 mL/min/1 73 square meters)  •  Stage 3B Moderate CKD (GFR = 30-44 mL/min/1 73 square meters)  •  Stage 4 Severe CKD (GFR = 15-29 mL/min/1 73 square meters)  •  Stage 5 End Stage CKD (GFR <15 mL/min/1 73 square meters)  Note: GFR calculation is accurate only with a steady state creatinine    Magnesium [[id number]]  (Abnormal) Collected: 03/16/23 1538    Lab Status: Final result Specimen: Blood from Arm, Left Updated: 03/16/23 1703     Magnesium 1 8 mg/dL     Ethanol [[id number]]  (Normal) Collected: 03/16/23 1538    Lab Status: Final result Specimen: Blood from Arm, Left Updated: 03/16/23 1632     Ethanol Lvl <10 mg/dL     Blood culture #1 [[id number]] Collected: 03/16/23 1621    Lab Status: In process Specimen: Blood from Arm, Left Updated: 03/16/23 1627    Fingerstick Glucose (POCT) [[id number]]  (Abnormal) Collected: 03/16/23 1505    Lab Status: Final result Updated: 03/16/23 1622     POC Glucose 277 mg/dl     Salicylate level [[id number]]  (Normal) Collected: 03/16/23 1519    Lab Status: Final result Specimen: Blood from Arm, Right Updated: 61/20/19 4819     Salicylate Lvl <5 mg/dL     Acetaminophen level-If concentration is detectable, please discuss with medical  on call  [[id number]]  (Abnormal) Collected: 03/16/23 1519    Lab Status: Final result Specimen: Blood from Arm, Right Updated: 03/16/23 1610     Acetaminophen Level <10 ug/mL     HS Troponin I 4hr [[id number]]     Lab Status: No result Specimen: Blood     HS Troponin 0hr (reflex protocol) [[id number]]  (Normal) Collected: 03/16/23 1519    Lab Status: Final result Specimen: Blood from Arm, Left Updated: 03/16/23 1552     hs TnI 0hr 32 ng/L     B-Type Natriuretic Peptide(BNP) [[id number]]  (Abnormal) Collected: 03/16/23 1519    Lab Status: Final result Specimen: Blood from Arm, Left Updated: 03/16/23 1550     BNP 1,002 pg/mL     Blood culture #2 [[id number]] Collected: 03/16/23 1539    Lab Status:  In process Specimen: Blood from Arm, Left Updated: 03/16/23 1544    Blood gas, arterial [[id number]]  (Abnormal) Collected: 03/16/23 1502    Lab Status: Final result Specimen: Blood, Arterial from Brachial, Left Updated: 03/16/23 1538     pH, Arterial 7 227     PH ART TC 7 227     pCO2, Arterial 46 5 mm Hg      PCO2 (TC) Arterial 46 5 mm Hg      pO2, Arterial 47 9 mm Hg      PO2 (TC) Arterial 47 9 mm Hg      HCO3, Arterial 18 9 mmol/L      Base Excess, Arterial -8 7 mmol/L      O2 Content, Arterial 17 5 mL/dL      O2 HGB,Arterial  71 3 %      SOURCE Brachial, Left     AKILA TEST Yes     Temperature 98 6 Degrees Fehrenheit      ROOM AIR FIO2 21 %     CBC and differential [[id number]]  (Abnormal) Collected: 03/16/23 1519    Lab Status: Final result Specimen: Blood from Arm, Left Updated: 03/16/23 1525     WBC 7 71 Thousand/uL      RBC 5 65 Million/uL      Hemoglobin 17 2 g/dL      Hematocrit 53 4 %      MCV 95 fL      MCH 30 4 pg      MCHC 32 2 g/dL      RDW 15 7 %      MPV 10 1 fL      Platelets 687 Thousands/uL      nRBC 0 /100 WBCs      Neutrophils Relative 73 %      Immat GRANS % 0 %      Lymphocytes Relative 21 %      Monocytes Relative 5 %      Eosinophils Relative 0 %      Basophils Relative 1 %      Neutrophils Absolute 5 56 Thousands/µL      Immature Grans Absolute 0 03 Thousand/uL      Lymphocytes Absolute 1 65 Thousands/µL      Monocytes Absolute 0 40 Thousand/µL      Eosinophils Absolute 0 02 Thousand/µL      Basophils Absolute 0 05 Thousands/µL                  XR chest 1 view portable   Final Result by Regulo Real MD (03/16 1532)      Congestive heart failure                    Workstation performed: [id number]                    Procedures  ECG 12 Lead Documentation Only    Date/Time: 3/16/2023 2:55 PM  Performed by: Jazmin Hoffmnan DO  Authorized by: Jazmin Hoffmann DO     Indications / Diagnosis:  Shortness of breath  ECG reviewed by me, the ED Provider: yes    Patient location:  ED  Previous ECG:     Previous ECG:  Compared to current    Similarity:  Changes noted    Comparison to cardiac monitor: Yes    Interpretation:     Interpretation: abnormal    Rate:     ECG rate:  147bpm    ECG rate assessment: tachycardic    Rhythm:     Rhythm: sinus tachycardia    Ectopy:     Ectopy: none    QRS:     QRS axis: Normal  Conduction:     Conduction: normal    ST segments:     ST segments:  Normal  T waves:     T waves: normal      CriticalCare Time    Date/Time: 3/16/2023 8:19 PM  Performed by: Cherelle Menjivar DO  Authorized by: Cherelle Menjivar DO     Critical care provider statement:     Critical care time (minutes):  35    Critical care time was exclusive of:  Separately billable procedures and treating other patients and teaching time    Critical care was necessary to treat or prevent imminent or life-threatening deterioration of the following conditions:  Respiratory failure    Critical care was time spent personally by me on the following activities:  Blood draw for specimens, obtaining history from patient or surrogate, development of treatment plan with patient or surrogate, evaluation of patient's response to treatment, examination of patient, interpretation of cardiac output measurements, ordering and performing treatments and interventions, ordering and review of laboratory studies, ordering and review of radiographic studies, re-evaluation of patient's condition and review of old charts    I assumed direction of critical care for this patient from another provider in my specialty: no               ED Course                                             Medical Decision Making  29-year-old female presents emergency department complaint of chest pain and respiratory distress  Patient significantly agitated at time of initial evaluation and had to be sedated with Ativan/Zyprexa to facilitate treatment  Differential diagnosis includes but not limited to ACS, COPD exacerbation, CHF, acute drug intoxication, viral infection  Labs and x-ray ordered and reviewed  Patient is in CHF  Lasix/PENA neb administered  Patient will be admitted    Amount and/or Complexity of Data Reviewed  Labs: ordered  Radiology: ordered  Risk  Prescription drug management    Decision regarding hospitalization  Disposition  Final diagnoses:   CHF (congestive heart failure) ([facility])   Chest pain   History of bipolar disorder     Time reflects when diagnosis was documented in both MDM as applicable and the Disposition within this note     Time User Action Codes Description Comment    3/16/2023  5:55 PM Thena Danie O Add [I50 9] CHF (congestive heart failure) ([facility] [address] )     3/16/2023  5:55 PM Thena Danei O Add [R07 9] Chest pain     3/16/2023  5:56 PM Thena Danie Add [Z86 [id number]] History of bipolar disorder       ED Disposition     ED Disposition   Admit    Condition   Stable    Date/Time   Thu Mar 16, 2023  6:06 PM    Comment   Case was discussed with Dr Bruce Clark and the patient's admission status was agreed to be Admission Status: observation status to the service of Dr Bruce Clark  Follow-up Information    None         Patient's Medications   Discharge Prescriptions    No medications on file       No discharge procedures on file      PDMP Review     None          ED Provider  Electronically Signed by           Alexandra Bradley DO  03/16/23 2028

## 2023-03-17 ENCOUNTER — TELEPHONE (OUTPATIENT)
Dept: OTHER | Facility: HOSPITAL | Age: 63
End: 2023-03-17

## 2023-03-17 PROBLEM — E46 PROTEIN-CALORIE MALNUTRITION (HCC): Status: ACTIVE | Noted: 2023-03-17

## 2023-03-17 PROBLEM — J96.01 ACUTE RESPIRATORY FAILURE WITH HYPOXIA AND HYPERCAPNIA (HCC): Status: RESOLVED | Noted: 2022-11-24 | Resolved: 2023-03-17

## 2023-03-17 PROBLEM — J96.02 ACUTE RESPIRATORY FAILURE WITH HYPOXIA AND HYPERCAPNIA (HCC): Status: RESOLVED | Noted: 2022-11-24 | Resolved: 2023-03-17

## 2023-03-17 LAB
ALBUMIN SERPL BCP-MCNC: 3.4 G/DL (ref 3.5–5)
ALP SERPL-CCNC: 133 U/L (ref 34–104)
ALT SERPL W P-5'-P-CCNC: 21 U/L (ref 7–52)
ANION GAP SERPL CALCULATED.3IONS-SCNC: 12 MMOL/L (ref 4–13)
AST SERPL W P-5'-P-CCNC: 26 U/L (ref 13–39)
BILIRUB SERPL-MCNC: 1.21 MG/DL (ref 0.2–1)
BUN SERPL-MCNC: 15 MG/DL (ref 5–25)
CALCIUM ALBUM COR SERPL-MCNC: 9.1 MG/DL (ref 8.3–10.1)
CALCIUM SERPL-MCNC: 8.6 MG/DL (ref 8.4–10.2)
CHLORIDE SERPL-SCNC: 99 MMOL/L (ref 96–108)
CHOLEST SERPL-MCNC: 169 MG/DL
CO2 SERPL-SCNC: 27 MMOL/L (ref 21–32)
CREAT SERPL-MCNC: 0.62 MG/DL (ref 0.6–1.3)
ERYTHROCYTE [DISTWIDTH] IN BLOOD BY AUTOMATED COUNT: 14.6 % (ref 11.6–15.1)
EST. AVERAGE GLUCOSE BLD GHB EST-MCNC: 114 MG/DL
GFR SERPL CREATININE-BSD FRML MDRD: 98 ML/MIN/1.73SQ M
GLUCOSE P FAST SERPL-MCNC: 136 MG/DL (ref 65–99)
GLUCOSE SERPL-MCNC: 125 MG/DL (ref 65–140)
GLUCOSE SERPL-MCNC: 136 MG/DL (ref 65–140)
GLUCOSE SERPL-MCNC: 140 MG/DL (ref 65–140)
GLUCOSE SERPL-MCNC: 198 MG/DL (ref 65–140)
GLUCOSE SERPL-MCNC: 96 MG/DL (ref 65–140)
HBA1C MFR BLD: 5.6 %
HCT VFR BLD AUTO: 45.8 % (ref 34.8–46.1)
HDLC SERPL-MCNC: 59 MG/DL
HGB BLD-MCNC: 15.3 G/DL (ref 11.5–15.4)
LDLC SERPL CALC-MCNC: 98 MG/DL (ref 0–100)
MAGNESIUM SERPL-MCNC: 2.7 MG/DL (ref 1.9–2.7)
MCH RBC QN AUTO: 30 PG (ref 26.8–34.3)
MCHC RBC AUTO-ENTMCNC: 33.4 G/DL (ref 31.4–37.4)
MCV RBC AUTO: 90 FL (ref 82–98)
NONHDLC SERPL-MCNC: 110 MG/DL
PLATELET # BLD AUTO: 240 THOUSANDS/UL (ref 149–390)
PMV BLD AUTO: 10.7 FL (ref 8.9–12.7)
POTASSIUM SERPL-SCNC: 3.9 MMOL/L (ref 3.5–5.3)
PROT SERPL-MCNC: 6.6 G/DL (ref 6.4–8.4)
RBC # BLD AUTO: 5.1 MILLION/UL (ref 3.81–5.12)
SODIUM SERPL-SCNC: 138 MMOL/L (ref 135–147)
TRIGL SERPL-MCNC: 60 MG/DL
WBC # BLD AUTO: 6.03 THOUSAND/UL (ref 4.31–10.16)

## 2023-03-17 RX ORDER — METHADONE HYDROCHLORIDE 10 MG/ML
40 CONCENTRATE ORAL DAILY
Status: DISCONTINUED | OUTPATIENT
Start: 2023-03-17 | End: 2023-03-17

## 2023-03-17 RX ORDER — METHADONE HYDROCHLORIDE 10 MG/ML
40 CONCENTRATE ORAL DAILY
Status: DISCONTINUED | OUTPATIENT
Start: 2023-03-18 | End: 2023-03-17

## 2023-03-17 RX ORDER — SPIRONOLACTONE 25 MG/1
12.5 TABLET ORAL DAILY
Status: DISCONTINUED | OUTPATIENT
Start: 2023-03-17 | End: 2023-03-18 | Stop reason: HOSPADM

## 2023-03-17 RX ORDER — METHADONE HYDROCHLORIDE 10 MG/1
40 TABLET ORAL DAILY
Status: DISCONTINUED | OUTPATIENT
Start: 2023-03-17 | End: 2023-03-18 | Stop reason: HOSPADM

## 2023-03-17 RX ORDER — LANOLIN ALCOHOL/MO/W.PET/CERES
3 CREAM (GRAM) TOPICAL
Status: DISCONTINUED | OUTPATIENT
Start: 2023-03-17 | End: 2023-03-18 | Stop reason: HOSPADM

## 2023-03-17 RX ADMIN — Medication 3 MG: at 21:06

## 2023-03-17 RX ADMIN — THIAMINE HCL TAB 100 MG 100 MG: 100 TAB at 10:45

## 2023-03-17 RX ADMIN — ATORVASTATIN CALCIUM 40 MG: 40 TABLET, FILM COATED ORAL at 15:35

## 2023-03-17 RX ADMIN — FUROSEMIDE 40 MG: 10 INJECTION, SOLUTION INTRAMUSCULAR; INTRAVENOUS at 10:45

## 2023-03-17 RX ADMIN — NICOTINE 1 PATCH: 21 PATCH, EXTENDED RELEASE TRANSDERMAL at 10:45

## 2023-03-17 RX ADMIN — INSULIN LISPRO 1 UNITS: 100 INJECTION, SOLUTION INTRAVENOUS; SUBCUTANEOUS at 17:41

## 2023-03-17 RX ADMIN — FOLIC ACID 1 MG: 1 TABLET ORAL at 10:45

## 2023-03-17 RX ADMIN — ENOXAPARIN SODIUM 40 MG: 40 INJECTION SUBCUTANEOUS at 10:44

## 2023-03-17 RX ADMIN — B-COMPLEX W/ C & FOLIC ACID TAB 1 TABLET: TAB at 10:44

## 2023-03-17 RX ADMIN — METHADONE HYDROCHLORIDE 40 MG: 10 TABLET ORAL at 15:35

## 2023-03-17 RX ADMIN — CLOPIDOGREL BISULFATE 75 MG: 75 TABLET ORAL at 10:44

## 2023-03-17 RX ADMIN — MAGNESIUM SULFATE HEPTAHYDRATE 2 G: 40 INJECTION, SOLUTION INTRAVENOUS at 02:21

## 2023-03-17 RX ADMIN — METOPROLOL SUCCINATE 50 MG: 50 TABLET, EXTENDED RELEASE ORAL at 10:45

## 2023-03-17 RX ADMIN — LISINOPRIL 5 MG: 5 TABLET ORAL at 10:45

## 2023-03-17 NOTE — ASSESSMENT & PLAN NOTE
On epic it states patient drinks a pint of vodka daily    · Alcohol level less than 10  · CIWA protocol  · Vitamin, folic acid, thiamine  · Counseling on alcohol cessation

## 2023-03-17 NOTE — ASSESSMENT & PLAN NOTE
Wt Readings from Last 3 Encounters:   01/22/23 45 5 kg (100 lb 5 oz)   01/19/23 40 4 kg (89 lb)   11/28/22 45 2 kg (99 lb 10 4 oz)     Patient with history of ischemic cardiomyopathy  Echo November 2022 showed EF of 22% with grade 2 diastolic dysfunction  Severe tricuspid regurgitation noted  Patient with elevated proBNP and chest x-ray suggestive of CHF  Received 40 mg of IV Lasix x1 in the ER    Continue with 40 mg of IV Lasix daily for now  Continue Toprol-XL  Trops X 2 normal  I/O, daily weights  Cardiology consult

## 2023-03-17 NOTE — CONSULTS
Consultation - Cardiology   [facility]     Cally Santiago 63 y o  female MRN: [id number]  : 1960  Unit/Bed#: [address] [place] [address] Encounter: [id number]      Assessment & Plan   1  Chest pain  -  Patient is a poor historian but states she started to experience chest pain and shortness of breath two days ago  She is unable to state what she was doing when she noted her symptoms  She is unable to describe her chest pain when asked  She states since her admissions she has not had any chest pain or shortness of breath  - Patient with known CAD s/p RCA PCI in 2021    - 3/16/23 hs troponin: 32 (0 hrs), 34 (2 hrs), 33 (4 hrs)  - 3/16/23 EKG: Sinus tachycardia, rate 147  Biatrial enlargement  Left ventricular hypertrophy  Inferior infarct , age undetermined  ST & T wave abnormality, consider lateral ischemia  - 11/15/21 nuclear stress test: left ventricular perfusion defect that is medium in size present in the inferior and inferoseptal location(s) that is mainly fixed fixed with no significant reversible ischemia  - 21 s/p cardiac catheterization with RCA angioplasty and stenting    - Continue plavix 75 mg daily and atorvastatin 40 mg daily  - Continue telemetry  2  Acute on chronic combined diastolic heart failure      - 2022 TTE: LVEF 22%  Systolic function is severely reduced  There is severe global hypokinesis  Diastolic function is moderately abnormal, consistent with grade II (pseudonormal) relaxation  Left atrial filling pressure is elevated  The right ventricular systolic pressure is severely elevated  The estimated right ventricular systolic pressure is 40 95 mmHg  - Patient lost to follow up with Cardiology after 2022 hospitalization  Patient poor candidate for ICD given she is not compliant  - 3/16/23 BNP: 1,002    - 3/16/23 CXR: Congestive heart failure  - Continue lasix 40 mg daily, lisinopril 5 mg daily, Toprol XL 50 mg daily     - Recommend restarting home medication of aldactone 12 5 mg daily    - Strict I/Os  - Daily weight (standing scale)  - Continue low sodium diet and 1500 mL fluid restriction    - CHF education  3  Hypertension      - Hypertensive urgency on arrival to ED, BP now improved  - Continue lisinopril 5 mg daily, Toprol XL 50 mg daily  - Continue to monitor  4  Pulmonary HTN  - 11/25/2022 TTE: The right ventricular systolic pressure is severely elevated  The estimated right ventricular systolic pressure is 04 63 mmHg  5  Diabetes mellitus, type II      - 3/17/23 HgbA1c: 5 6    - Care per primary team      6  Polysubstance abuse  - Currently on methadone  - 3/16/23 EKG: QTc interval 485     - Follow up EKGs to monitor QT interval      7  Tobacco abuse  - Currently on nicotine patch  - Smoking cessation education  Summary of Recommendations: Thank you for your consultation  Physician Requesting Consult: Rodney Bradford MD    Reason for Consult / Principal Problem: shortness of breath, chest pain  Consults    HPI: Nohemy Angelo is a 63y o  year old female current smoker with PMHx of chronic combined systolic and diastolic heart failure (16/86/80 TTE: LVEF 22%), STEMI, CAD s/p PCI RCA (4/2021), HTN, pulm HTN, dyslipidemia, DMII, polysubstance abuse, bipolar disorder and ETOH abuse who presents for evaluation for shortness of breath and chest pain for two days  Patient is a poor historian but states she started to experience chest pain and shortness of breath two days ago  She is unable to state what she was doing when she noted her symptoms  She is unable to describe her chest pain when asked  She states since her admissions she has not had any chest pain or shortness of breath  She denies experiencing palpitations with chest pain and shortness of breath   Patient denieschanges in weight, lower extremity edema, lightheadedness, dizziness, orthopnea, or changes in appetite  Of note, patient has been lost to follow up since last hospitalization in November 2022  Review of Systems   Constitutional: Positive for fatigue  Negative for activity change, fever and unexpected weight change  Respiratory: Positive for chest tightness and shortness of breath  Negative for cough and wheezing  Cardiovascular: Positive for chest pain  Negative for palpitations and leg swelling  Neurological: Negative for dizziness, syncope, weakness and light-headedness  Historical Information   Past Medical History:   Diagnosis Date   • Alcohol abuse    • Arthritis    • Asthma    • Bipolar disorder ([place] [address] )    • Diabetes mellitus ([place] [address] )    • Opiate abuse, continuous ([place] [address] )    • Smoker      Past Surgical History:   Procedure Laterality Date   • ABDOMINAL SURGERY      stomach uler with perforation     Social History     Substance and Sexual Activity   Alcohol Use Yes    Comment: 1 pint of vodka daily     Social History     Substance and Sexual Activity   Drug Use Yes    Comment: on methadone     Social History     Tobacco Use   Smoking Status Every Day   • Packs/day: 2 00   • Types: Cigarettes   Smokeless Tobacco Never     Family History: History reviewed  No pertinent family history      Meds/Allergies    PTA meds:    Medications Prior to Admission   Medication   • acetaminophen (TYLENOL) 650 mg CR tablet   • atorvastatin (LIPITOR) 20 mg tablet   • atorvastatin (LIPITOR) 40 mg tablet   • clopidogrel (PLAVIX) 75 mg tablet   • furosemide (LASIX) 20 mg tablet   • furosemide (LASIX) 20 mg tablet   • Insulin Pen Needle 29G X 12MM MISC   • ipratropium-albuterol (DUO-NEB) 0 5-2 5 mg/3 mL nebulizer solution   • lisinopril (ZESTRIL) 5 mg tablet   • methadone (DOLOPHINE) 10 MG/5ML solution   • metoprolol succinate (TOPROL-XL) 50 mg 24 hr tablet   • multivitamin (THERAGRAN) TABS   • nicotine (NICODERM CQ) 21 mg/24 hr TD 24 hr patch   • spironolactone (ALDACTONE) 25 mg tablet      No Known Allergies    Current Facility-Administered Medications:   •  acetaminophen (TYLENOL) tablet 650 mg, 650 mg, Oral, Q6H PRN, Marielean Aranaar, DO  •  atorvastatin (LIPITOR) tablet 40 mg, 40 mg, Oral, Daily With Dinner, Marielena Aranaar, DO  •  clopidogrel (PLAVIX) tablet 75 mg, 75 mg, Oral, Daily, Marielena Aranaar, DO, 75 mg at 03/17/23 1044  •  enoxaparin (LOVENOX) subcutaneous injection 40 mg, 40 mg, Subcutaneous, Daily, Marielena Revimanar, DO, 40 mg at 14/10/05 7839  •  folic acid (FOLVITE) tablet 1 mg, 1 mg, Oral, Daily, Marielena Revankar, DO, 1 mg at 03/17/23 1045  •  furosemide (LASIX) injection 40 mg, 40 mg, Intravenous, Daily, Marielena Aranaar, DO, 40 mg at 03/17/23 1045  •  insulin lispro (HumaLOG) 100 units/mL subcutaneous injection 1-5 Units, 1-5 Units, Subcutaneous, TID AC **AND** Fingerstick Glucose (POCT), , , TID AC, Marielena Aranaar, DO  •  lisinopril (ZESTRIL) tablet 5 mg, 5 mg, Oral, Daily, Marielena Aranaar, DO, 5 mg at 03/17/23 1045  •  [START ON 3/18/2023] methadone (DOLOPHINE) oral concentrated solution 40 mg, 40 mg, Oral, Daily, Denise Davis MD  •  metoprolol succinate (TOPROL-XL) 24 hr tablet 50 mg, 50 mg, Oral, Daily, Marielena Aranaar, DO, 50 mg at 03/17/23 1045  •  multivitamin stress formula tablet 1 tablet, 1 tablet, Oral, Daily, Marielena Revimanar, DO, 1 tablet at 03/17/23 1044  •  nicotine (NICODERM CQ) 21 mg/24 hr TD 24 hr patch 1 patch, 1 patch, Transdermal, Daily, Marielena Revimanar, DO, 1 patch at 03/17/23 1045  •  thiamine tablet 100 mg, 100 mg, Oral, Daily, Marielena Aranaar, DO, 100 mg at 03/17/23 1045    VTE Pharmacologic Prophylaxis:   Enoxaparin (Lovenox)    Objective:   Vitals: Blood pressure 114/74, pulse 101, temperature 97 8 °F (36 6 °C), temperature source Oral, resp  rate 19, height 5' (1 524 m), weight 38 9 kg (85 lb 12 1 oz), SpO2 96 %, not currently breastfeeding  Body mass index is 16 75 kg/m²    Wt Readings from Last 3 Encounters:   03/17/23 38 9 kg (85 lb 12 1 oz)   01/22/23 45 5 kg (100 lb 5 oz)   01/19/23 40 4 kg (89 lb)     BP Readings from Last 3 Encounters:   03/17/23 114/74   01/23/23 118/64   01/19/23 148/88     Orthostatic Blood Pressures    Flowsheet Row Most Recent Value   Blood Pressure 114/74 filed at 03/17/2023 2434   Patient Position - Orthostatic VS Lying filed at 03/17/2023 0739        No intake or output data in the 24 hours ending 03/17/23 1320    Invasive Devices     Peripheral Intravenous Line  Duration           Peripheral IV 03/16/23 Left;Ventral (anterior) Hand <1 day                  Physical Exam:   Physical Exam  Vitals reviewed  Constitutional:       General: She is not in acute distress  Appearance: She is underweight  Cardiovascular:      Rate and Rhythm: Regular rhythm  Tachycardia present  Heart sounds: Murmur heard  Pulmonary:      Effort: Pulmonary effort is normal  No respiratory distress  Breath sounds: Decreased breath sounds present  Musculoskeletal:      Right lower leg: No edema  Left lower leg: No edema  Skin:     General: Skin is warm and dry  Neurological:      Mental Status: She is alert         Labs:   Troponins:  Results from last 7 days   Lab Units 03/16/23  2240 03/16/23  1808   HSTNI D2 ng/L  --  2   HSTNI D4 ng/L 1  --        CBC with diff:   Results from last 7 days   Lab Units 03/17/23  0447 03/16/23  1519   WBC Thousand/uL 6 03 7 71   HEMOGLOBIN g/dL 15 3 17 2*   HEMATOCRIT % 45 8 53 4*   MCV fL 90 95   PLATELETS Thousands/uL 240 249   MCH pg 30 0 30 4   MCHC g/dL 33 4 32 2   RDW % 14 6 15 7*   MPV fL 10 7 10 1   NRBC AUTO /100 WBCs  --  0       CMP:   Results from last 7 days   Lab Units 03/17/23  0447 03/16/23  1538   SODIUM mmol/L 138 141   POTASSIUM mmol/L 3 9 4 3   CHLORIDE mmol/L 99 100   CO2 mmol/L 27 21   ANION GAP mmol/L 12 20*   BUN mg/dL 15 14   CREATININE mg/dL 0 62 0 86   GLUCOSE FASTING mg/dL 136*  --    CALCIUM mg/dL 8 6 8 8   AST U/L 26 51*   ALT U/L 21 27   ALK PHOS U/L 133* 188*   TOTAL PROTEIN g/dL 6 6 7 5   ALBUMIN g/dL 3 4* 4 0   TOTAL BILIRUBIN mg/dL 1 21* 1 33*   EGFR ml/min/1 73sq m 98 73   GLUCOSE RANDOM mg/dL 136 152*       Magnesium:   Results from last 7 days   Lab Units 03/17/23  0447 03/16/23  1538   MAGNESIUM mg/dL 2 7 1 8*     Coags:     TSH:      No components found for: TSH3  Lipid Profile:   Results from last 7 days   Lab Units 03/17/23  0447   CHOLESTEROL mg/dL 169   TRIGLYCERIDES mg/dL 60   HDL mg/dL 59   LDL CALC mg/dL 98     Lipid Profile:   Lab Results   Component Value Date    CHOLESTEROL 169 03/17/2023    HDL 59 03/17/2023    LDLCALC 98 03/17/2023    TRIG 60 03/17/2023     Hgb A1c:   Results from last 7 days   Lab Units 03/17/23 0447   HEMOGLOBIN A1C % 5 6     NT-proBNP: No results for input(s): NTBNP in the last 72 hours  Imaging & Testing     Cardiac testing:     Echo Complete with Contrast if Indicated - 11/25/22    Left Ventricle Left ventricular cavity size is mildly dilated  Wall thickness is normal  The left ventricular ejection fraction is 22% by biplane measurement  Systolic function is severely reduced  There is severe global hypokinesis  Diastolic function is moderately abnormal, consistent with grade II (pseudonormal) relaxation  Left atrial filling pressure is elevated  Right Ventricle Right ventricular cavity size is normal  Systolic function is mildly reduced  Wall thickness is normal    Left Atrium The atrium is moderately dilated (42-48 mL/m2)  Right Atrium The atrium is mildly dilated  Aortic Valve The aortic valve is trileaflet  The leaflets are not thickened  The leaflets are not calcified  The leaflets exhibit normal mobility  There is trace regurgitation  The aortic valve has no significant stenosis  Mitral Valve There is mild thickening  The leaflets are not calcified  There is moderately reduced mobility of the posterior leaflet  There is mild to moderate regurgitation  There is no evidence of stenosis     Tricuspid Valve Tricuspid valve structure is normal  There is severe regurgitation  There is no evidence of stenosis  The right ventricular systolic pressure is severely elevated  The estimated right ventricular systolic pressure is 43 69 mmHg  Pulmonic Valve Pulmonic valve structure is normal  There is mild regurgitation  There is no evidence of stenosis  IVC/SVC The right atrial pressure is estimated at 8 0 mmHg  Results for orders placed during the hospital encounter of 21    Echo Complete with Contrast if Indicated    Narrative  [address]  [facility] [place] [address] [facility]  [phone]    Transthoracic Echocardiogram  2D, M-mode, Doppler, and Color Doppler    Study date:  2021    Patient: Niki De La Garza  MR number: [id number]  Account number: [de-identified]  : 1960  Age: 63 years  Gender: Female  Status: Inpatient  Location: Bedside  Height: 57 in  Weight: 86 lb  BP: 113/ 85 mmHg    Indications: Acute MI    Diagnoses: I21 3 - ST elevation (STEMI) myocardial infarction of unspecified site    Sonographer:  Adair Monreal,  [place] [facility]  Primary Physician:  Antonio Lagos MD  Referring Physician:  Earl Polk DO  Group:  [facility]  Cardiology Fellow: Chioma Valdez MD  Interpreting Physician:  Cole Marte MD    SUMMARY    PROCEDURE INFORMATION:  Intravenous contrast ( 0 4mL/min Definity in NSS) was administered to opacify the left ventricle  LEFT VENTRICLE:  Systolic function was mildly reduced  Ejection fraction was estimated to be 45 %  There was severe hypokinesis of the basal-mid inferoseptal and basal-mid inferior wall(s)  The changes were consistent with concentric remodeling (increased wall thickness with normal wall mass)  Due to tachycardia, there was fusion of early and atrial contributions to ventricular filling      RIGHT VENTRICLE:  The size was normal   Systolic function was normal     MITRAL VALVE:  There was mild regurgitation  AORTIC VALVE:  There was mild regurgitation  TRICUSPID VALVE:  Pulmonary artery systolic pressure was within the normal range  Estimated peak PA pressure was 27 mmHg  HISTORY: PRIOR HISTORY: STEMI; Polysubstance abuse; Hx of pulmonary embolism; Hypertension; Dyslipidemia; DM2; Asthma    PROCEDURE: The procedure was performed at the bedside  This was a routine study  The transthoracic approach was used  The study included complete 2D imaging, M-mode, complete spectral Doppler, and color Doppler  The heart rate was 108 bpm,  at the start of the study  Images were obtained from the parasternal, apical, subcostal, and suprasternal notch acoustic windows  Intravenous contrast ( 0 4mL/min Definity in NSS) was administered to opacify the left ventricle  Image  quality was adequate  LEFT VENTRICLE: Size was normal  Systolic function was mildly reduced  Ejection fraction was estimated to be 45 %  There was severe hypokinesis of the basal-mid inferoseptal and basal-mid inferior wall(s)  Wall thickness was at the upper  limits of normal  The changes were consistent with concentric remodeling (increased wall thickness with normal wall mass)  DOPPLER: Due to tachycardia, there was fusion of early and atrial contributions to ventricular filling  The study  was not technically sufficient to allow evaluation of LV diastolic function  RIGHT VENTRICLE: The size was normal  Systolic function was normal     LEFT ATRIUM: Size was at the upper limits of normal     RIGHT ATRIUM: Size was normal     MITRAL VALVE: Valve structure was normal  There was normal leaflet separation  DOPPLER: The transmitral velocity was within the normal range  There was no evidence for stenosis  There was mild regurgitation  Regurgitation grade was 1+ on a  scale of 0 to 4+  AORTIC VALVE: The valve was probably trileaflet  Leaflets exhibited mildly increased thickness and normal cuspal separation   DOPPLER: Transaortic velocity was within the normal range  There was no evidence for stenosis  There was mild  regurgitation  Regurgitation grade was 1+ on a scale of 0 to 4+  The regurgitant jet was directed centrally  TRICUSPID VALVE: The valve structure was normal  There was normal leaflet separation  DOPPLER: The transtricuspid velocity was within the normal range  There was no evidence for stenosis  There was trace regurgitation  Pulmonary artery  systolic pressure was within the normal range  Estimated peak PA pressure was 27 mmHg  PULMONIC VALVE: Leaflets exhibited normal thickness, no calcification, and normal cuspal separation  DOPPLER: The transpulmonic velocity was within the normal range  There was no evidence for stenosis  There was no significant  regurgitation  PERICARDIUM: There was no pericardial effusion  The pericardium was normal in appearance  AORTA: The root exhibited normal size  SYSTEMIC VEINS: IVC: The inferior vena cava was normal in size and course   Respirophasic changes were normal     SYSTEM MEASUREMENT TABLES    2D  %FS: 10 84 %  Ao Diam: 2 5 cm  EDV(Teich): 83 58 ml  EF(Teich): 23 82 %  ESV(Teich): 63 67 ml  IVSd: 1 02 cm  LA Area: 14 2 cm2  LA Diam: 3 24 cm  LVEDV MOD A4C: 68 42 ml  LVEF MOD A4C: 34 23 %  LVESV MOD A4C: 45 ml  LVIDd: 4 31 cm  LVIDs: 3 84 cm  LVLd A4C: 7 05 cm  LVLs A4C: 6 26 cm  LVPWd: 0 94 cm  RA Area: 6 49 cm2  RVIDd: 2 62 cm  SV MOD A4C: 23 42 ml  SV(Teich): 19 91 ml    CW  AR Dec [place]: 2 78 m/s2  AR Dec Time: 1414 87 ms  AR PHT: 410 31 ms  AR Vmax: 3 86 m/s  AR maxP 7 mmHg  TR Vmax: 2 46 m/s  TR maxP 16 mmHg    MM  TAPSE: 1 05 cm    [employer] Accredited Echocardiography Laboratory    Prepared and electronically signed by    Yelena Ritchie MD  Signed 2021 09:21:23      Results for orders placed during the hospital encounter of 21    NM Myocardial Perfusion Spect (Pharmacological Induced Stress and/or Rest)    Interpretation Summary  •  Abnormal pharmacologic nuclear stress test  There is mainly fixed defects involving inferior wall and inferoseptal region with no significant reversible ischemia  Moderately decreased LV systolic function with ejection fraction of 38%  •  Stress ECG: No ST deviation is noted  •  Perfusion: There is a left ventricular perfusion defect that is medium in size present in the inferior and inferoseptal location(s) that is mainly fixed fixed with no significant reversible ischemia  Summed stress score is 11 and differential score is 1  •  Stress Function: Left ventricular function post-stress is abnormal  Global function is mildly reduced  Calculated ejection fraction is 38%  Imaging: I have personally reviewed pertinent reports  XR chest 1 view portable    Result Date: 3/16/2023  Narrative: CHEST INDICATION:   cp  COMPARISON:  1/22/2023 EXAM PERFORMED/VIEWS:  XR CHEST PORTABLE FINDINGS: Cardiomediastinal silhouette appears enlarged  Diffuse pulmonary edema without pleural effusion or pneumothorax  Osseous structures appear within normal limits for patient age  Impression: Congestive heart failure  Workstation performed: [id number]       EKG/ Monitor: Personally reviewed       Telemetry reviewed: currently sinus tachycardia, rate 105 bpm       Code Status: Level 1 - Full Code  Advance Directive and Living Will:      POLST:        Christophe Cruz PA-C

## 2023-03-17 NOTE — TELEPHONE ENCOUNTER
Please assist patient with transportation to our clinic  She would like to reestablish with our clinic  She is currently in the hospitalized   Thank you

## 2023-03-17 NOTE — ASSESSMENT & PLAN NOTE
On epic it states patient drinks a pint of vodka daily  · Alcohol level less than 10  · Place patient on CIWA protocol  · Vitamin, folic acid, thiamine  · When awake will need to discuss alcohol cessation

## 2023-03-17 NOTE — ASSESSMENT & PLAN NOTE
Per ER note, patient was initially very belligerent and combative, shouting and cursing at staff members and refusing treatment  Did receive 1 mg of IM Ativan and 10 mg of IM Zyprexa    Currently sleepy  May consider psychiatry input based on behavior tomorrow when she is more awake  Urine tox positive for methadone with alcohol level of less than 10

## 2023-03-17 NOTE — ASSESSMENT & PLAN NOTE
· Per prior notes, patient with PCI to RCA in 2021 after a STEMI  · Restart Toprol-XL, Plavix, statin  · lipid panel-WNL

## 2023-03-17 NOTE — ASSESSMENT & PLAN NOTE
Lab Results   Component Value Date    HGBA1C 5 8 (H) 11/24/2022       Recent Labs     03/16/23  1505   POCGLU 145*     Patient not noted to be on any medications at home  Last A1c acceptable  Repeat A1c in a m    Place patient on Accu-Cheks with sliding scale insulin

## 2023-03-17 NOTE — ASSESSMENT & PLAN NOTE
Currently controlled,    Continue lisinopril and Toprol-XL at home which it appears patient was not taking  Blood pressure initially 244/114 in the ER

## 2023-03-17 NOTE — PLAN OF CARE
Problem: MOBILITY - ADULT  Goal: Maintain or return to baseline ADL function  Description: INTERVENTIONS:  -  Assess patient's ability to carry out ADLs; assess patient's baseline for ADL function and identify physical deficits which impact ability to perform ADLs (bathing, care of mouth/teeth, toileting, grooming, dressing, etc )  - Assess/evaluate cause of self-care deficits   - Assess range of motion  - Assess patient's mobility; develop plan if impaired  - Assess patient's need for assistive devices and provide as appropriate  - Encourage maximum independence but intervene and supervise when necessary  - Involve family in performance of ADLs  - Assess for home care needs following discharge   - Consider OT consult to assist with ADL evaluation and planning for discharge  - Provide patient education as appropriate  Outcome: Progressing  Goal: Maintains/Returns to pre admission functional level  Description: INTERVENTIONS:  - Perform BMAT or MOVE assessment daily    - Set and communicate daily mobility goal to care team and patient/family/caregiver  - Collaborate with rehabilitation services on mobility goals if consulted  - Perform Range of Motion  times a day  - Reposition patient every  hours    - Dangle patient  times a day  - Stand patient  times a day  - Ambulate patient  times a day  - Out of bed to chair  times a day   - Out of bed for meals times a day  - Out of bed for toileting  - Record patient progress and toleration of activity level   Outcome: Progressing     Problem: PAIN - ADULT  Goal: Verbalizes/displays adequate comfort level or baseline comfort level  Description: Interventions:  - Encourage patient to monitor pain and request assistance  - Assess pain using appropriate pain scale  - Administer analgesics based on type and severity of pain and evaluate response  - Implement non-pharmacological measures as appropriate and evaluate response  - Consider cultural and social influences on pain and pain management  - Notify physician/advanced practitioner if interventions unsuccessful or patient reports new pain  Outcome: Progressing     Problem: INFECTION - ADULT  Goal: Absence or prevention of progression during hospitalization  Description: INTERVENTIONS:  - Assess and monitor for signs and symptoms of infection  - Monitor lab/diagnostic results  - Monitor all insertion sites, i e  indwelling lines, tubes, and drains  - Monitor endotracheal if appropriate and nasal secretions for changes in amount and color  - [place] appropriate cooling/warming therapies per order  - Administer medications as ordered  - Instruct and encourage patient and family to use good hand hygiene technique  - Identify and instruct in appropriate isolation precautions for identified infection/condition  Outcome: Progressing  Goal: Absence of fever/infection during neutropenic period  Description: INTERVENTIONS:  - Monitor WBC    Outcome: Progressing     Problem: SAFETY ADULT  Goal: Maintain or return to baseline ADL function  Description: INTERVENTIONS:  -  Assess patient's ability to carry out ADLs; assess patient's baseline for ADL function and identify physical deficits which impact ability to perform ADLs (bathing, care of mouth/teeth, toileting, grooming, dressing, etc )  - Assess/evaluate cause of self-care deficits   - Assess range of motion  - Assess patient's mobility; develop plan if impaired  - Assess patient's need for assistive devices and provide as appropriate  - Encourage maximum independence but intervene and supervise when necessary  - Involve family in performance of ADLs  - Assess for home care needs following discharge   - Consider OT consult to assist with ADL evaluation and planning for discharge  - Provide patient education as appropriate  Outcome: Progressing  Goal: Maintains/Returns to pre admission functional level  Description: INTERVENTIONS:  - Perform BMAT or MOVE assessment daily    - Set and communicate daily mobility goal to care team and patient/family/caregiver  - Collaborate with rehabilitation services on mobility goals if consulted  - Perform Range of Motion  times a day  - Reposition patient every  hours    - Dangle patient  times a day  - Stand patient  times a day  - Ambulate patient  times a day  - Out of bed to chair  times a day   - Out of bed for meals  times a day  - Out of bed for toileting  - Record patient progress and toleration of activity level   Outcome: Progressing  Goal: Patient will remain free of falls  Description: INTERVENTIONS:  - Educate patient/family on patient safety including physical limitations  - Instruct patient to call for assistance with activity   - Consult OT/PT to assist with strengthening/mobility   - Keep Call bell within reach  - Keep bed low and locked with side rails adjusted as appropriate  - Keep care items and personal belongings within reach  - Initiate and maintain comfort rounds  - Make Fall Risk Sign visible to staff  - Offer Toileting every  Hours, in advance of need  - Initiate/Maintain alarm  - Obtain necessary fall risk management equipment:   - Apply yellow socks and bracelet for high fall risk patients  - Consider moving patient to room near nurses station  Outcome: Progressing     Problem: DISCHARGE PLANNING  Goal: Discharge to home or other facility with appropriate resources  Description: INTERVENTIONS:  - Identify barriers to discharge w/patient and caregiver  - Arrange for needed discharge resources and transportation as appropriate  - Identify discharge learning needs (meds, wound care, etc )  - Arrange for interpretive services to assist at discharge as needed  - Refer to Case Management Department for coordinating discharge planning if the patient needs post-hospital services based on physician/advanced practitioner order or complex needs related to functional status, cognitive ability, or social support system  Outcome: Progressing Problem: Knowledge Deficit  Goal: Patient/family/caregiver demonstrates understanding of disease process, treatment plan, medications, and discharge instructions  Description: Complete learning assessment and assess knowledge base    Interventions:  - Provide teaching at level of understanding  - Provide teaching via preferred learning methods  Outcome: Progressing

## 2023-03-17 NOTE — H&P
[address] 47  H&P- Patricia Math 1960, 63 y o  female MRN: [id number]  Unit/Bed#: ED 01 Encounter: [id number]  Primary Care Provider: Gem Marroquin MD   Date and time admitted to hospital: 3/16/2023  2:47 PM    * Acute on chronic combined systolic and diastolic heart failure [facility])  Assessment & Plan  Wt Readings from Last 3 Encounters:   01/22/23 45 5 kg (100 lb 5 oz)   01/19/23 40 4 kg (89 lb)   11/28/22 45 2 kg (99 lb 10 4 oz)     Patient with history of ischemic cardiomyopathy  Echo November 2022 showed EF of 22% with grade 2 diastolic dysfunction  Severe tricuspid regurgitation noted  Patient with elevated proBNP and chest x-ray suggestive of CHF  Received 40 mg of IV Lasix x1 in the ER  Continue with 40 mg of IV Lasix daily for now  Continue Toprol-XL  Trops X 2 normal  I/O, daily weights  Cardiology consult          Acute respiratory failure with hypoxia and hypercapnia (HCC)  Assessment & Plan  Was noted to have a O2 sat of 87% on room air along with tachypnea  Placed on 10 L Venturi mask and then transitioned to 2 L nasal cannula  · Patient was noncompliant with BiPAP/nebulizer treatment in the ER  · Titrate off oxygen as tolerated  · ABG reviewed, PCO2 mildly elevated with pH of 7 2  · Due to CHF exacerbation  · Patient received nebulizer treatments, IV Solu-Medrol in the ER  Will hold off on further steroids as patient does not seem to be having a flareup of her COPD    SIRS (systemic inflammatory response syndrome) (HCC)  Assessment & Plan  Noted by tachypnea, tachycardia with no obvious source of infection  · X-ray negative for pneumonia and UA negative for UTI  · Patient also with elevated lactic acid on lab work which is most likely related to the nebulizer treatments she received  · Hold off on antibiotics for now    Follow-up blood cultures    QT prolongation  Assessment & Plan  Initial EKG shows sinus tachycardia with LVH  Repeat EKG showed sinus tachycardia with improved heart rate, LVH and prolonged QTc of 563  · Patient did receive Ativan and Zyprexa prior to this repeat EKG which can cause QTc prolongation  · Will need repeat EKG at some point during hospitalization to ensure that QTc interval has improved    Alcohol abuse  Assessment & Plan  On epic it states patient drinks a pint of vodka daily  · Alcohol level less than 10  · Place patient on CIWA protocol  · Vitamin, folic acid, thiamine  · When awake will need to discuss alcohol cessation    Bipolar disorder [facility])  Assessment & Plan  Per ER note, patient was initially very belligerent and combative, shouting and cursing at staff members and refusing treatment  Did receive 1 mg of IM Ativan and 10 mg of IM Zyprexa  Currently sleepy  May consider psychiatry input based on behavior tomorrow when she is more awake  Urine tox positive for methadone with alcohol level of less than 10    CAD (coronary artery disease)  Assessment & Plan  Per prior notes, patient with PCI to RCA in 2021 after a STEMI  Restart Toprol-XL, Plavix, statin  Check lipid panel    Essential hypertension  Assessment & Plan  On lisinopril and Toprol-XL at home which it appears patient was not taking  Restart meds and monitor blood pressure  Blood pressure initially 244/114 in the ER    Smoker  Assessment & Plan  Nicotine patch  When awake will need to discuss tobacco cessation    Polysubstance abuse [facility])  Assessment & Plan  On methadone per home medication list   Dose will need to be confirmed with [facility] tomorrow    Type 2 diabetes mellitus, without long-term current use of insulin [facility])  Assessment & Plan  Lab Results   Component Value Date    HGBA1C 5 8 (H) 11/24/2022       Recent Labs     03/16/23  1505   POCGLU 145*     Patient not noted to be on any medications at home  Last A1c acceptable  Repeat A1c in a m    Place patient on Accu-Cheks with sliding scale insulin    VTE Pharmacologic Prophylaxis:   lovenox  Code Status: Level 1 - Full Code     Anticipated Length of Stay: Patient will be admitted on an observation basis with an anticipated length of stay of less than 2 midnights secondary to CHF exacerbation  Total Time Spent on Date of Encounter in care of patient: 55 minutes This time was spent on one or more of the following: performing physical exam; counseling and coordination of care; obtaining or reviewing history; documenting in the medical record; reviewing/ordering tests, medications or procedures; communicating with other healthcare professionals and discussing with patient's family/caregivers  Chief Complaint: Shortness of breath    History of Present Illness:  Bruna Braun is a 63 y o  female who presents with shortness of breath and chest pain  Patient unable to provide any information as she is extremely sleepy after receiving Ativan and Zyprexa in the ER so most of the history was obtained by reviewing ER note, speaking with the ER physician and by reviewing old records  Patient was belligerent and combative when she came to the ER, shouting and cursing at staff members and refusing treatment  She refused BiPAP/nebulizer treatment initially and then was given 1 mg of IM Ativan and IM 10 mg Zyprexa  Initially noted to be hypoxic, tachycardic, tachypneic  Placed on Venturi mask and since then transition to 2 L of oxygen  Also received IV Lasix 40 mg x 1 in the ER    Review of Systems:  Review of Systems   Unable to perform ROS: Other (sleepy)       Past Medical and Surgical History:   Past Medical History:   Diagnosis Date   • Alcohol abuse    • Arthritis    • Asthma    • Bipolar disorder ([facility] )    • Diabetes mellitus ([facility] )    • [facility]        Past Surgical History:   Procedure Laterality Date   • ABDOMINAL SURGERY      stomach uler with perforation       Meds/Allergies:  Prior to Admission medications    Medication Sig Start Date End Date Taking?  Authorizing Provider   acetaminophen (TYLENOL) 650 mg CR tablet Take 1 tablet (650 mg total) by mouth every 8 (eight) hours as needed for mild pain  Patient not taking: Reported on 1/19/2023 4/1/22   Brendon Olsen PA-C   atorvastatin (LIPITOR) 20 mg tablet Take 1 tablet (20 mg total) by mouth daily with dinner 11/26/22 12/26/22  Godfrey Cardoso MD   atorvastatin (LIPITOR) 40 mg tablet Take 1 tablet (40 mg total) by mouth daily with dinner  Patient not taking: Reported on 1/19/2023 11/28/22   Godfrey Cardoso MD   clopidogrel (PLAVIX) 75 mg tablet Take 1 tablet (75 mg total) by mouth daily Do not start before November 27, 2022  Patient not taking: Reported on 1/19/2023 11/27/22   Godfrey Cardoso MD   furosemide (LASIX) 20 mg tablet Take 1 tablet (20 mg total) by mouth daily Do not start before November 27, 2022  Patient not taking: Reported on 1/19/2023 11/27/22   Godfrey Cardoso MD   furosemide (LASIX) 20 mg tablet Take 1 tablet (20 mg total) by mouth daily  Patient not taking: Reported on 1/19/2023 11/28/22   YASMIN Herrera   Insulin Pen Needle 29G X 12MM MISC by Does not apply route daily at bedtime  Patient not taking: Reported on 4/1/2022 3/22/19   Ilya Murry DO   ipratropium-albuterol (DUO-NEB) 0 5-2 5 mg/3 mL nebulizer solution Take 3 mL by nebulization 3 (three) times a day  Patient not taking: Reported on 1/19/2023 11/28/22   Godfrey Cardoso MD   lisinopril (ZESTRIL) 5 mg tablet Take 1 tablet (5 mg total) by mouth daily Do not start before November 27, 2022  Patient not taking: Reported on 1/19/2023 11/27/22   Godfrey Cardoso MD   methadone (DOLOPHINE) 10 MG/5ML solution Take 30 mg by mouth in the morning Selene Sahni MD   metoprolol succinate (TOPROL-XL) 50 mg 24 hr tablet Take 1 tablet (50 mg total) by mouth daily Do not start before November 29, 2022    Patient not taking: Reported on 1/19/2023 11/29/22   Godfrey Cardoso MD   multivitamin [facility]) TABS Take 1 tablet by mouth daily 11/17/21 12/17/21  YASMIN Denton   nicotine (NICODERM CQ) 21 mg/24 hr TD 24 hr patch Place 1 patch on the skin daily Do not start before November 27, 2022  Patient not taking: Reported on 1/19/2023 11/27/22   Geoffrey Salomon MD   spironolactone (ALDACTONE) 25 mg tablet Take 0 5 tablets (12 5 mg total) by mouth daily Do not start before November 29, 2022 11/29/22 12/29/22  Geoffrey Salomon MD     I have reviewed home medications using recent Epic encounter  Allergies: No Known Allergies    Social History:  Marital Status: Single   Occupation: Unknown  Patient Pre-hospital Living Situation: Unknown  Patient Pre-hospital Level of Mobility: Unknown  Patient Pre-hospital Diet Restrictions: none  Substance Use History:   Social History     Substance and Sexual Activity   Alcohol Use Yes    Comment: 1 pint of vodka daily     Social History     Tobacco Use   Smoking Status Every Day   • Packs/day: 2 00   • Types: Cigarettes   Smokeless Tobacco Never     Social History     Substance and Sexual Activity   Drug Use Yes    Comment: on methadone       Family History:  History reviewed  No pertinent family history  Physical Exam:     Vitals:   Blood Pressure: 116/73 (03/16/23 2100)  Pulse: 96 (03/16/23 2100)  Temperature: 98 5 °F (36 9 °C) (03/16/23 1455)  Temp Source: Oral (03/16/23 1455)  Respirations: 18 (03/16/23 2100)  SpO2: 100 % (03/16/23 2100)    Physical Exam  Constitutional:       General: She is not in acute distress  Comments: Sleepy after receiving Ativan and Zyprexa in the ER  Opens eyes briefly but not long enough to have a conversation   HENT:      Head: Normocephalic and atraumatic  Cardiovascular:      Rate and Rhythm: Normal rate and regular rhythm  Pulmonary:      Effort: No respiratory distress  Breath sounds: Rales present  No wheezing        Comments: Decreased breath sounds bilaterally although patient with poor inspiratory effort  Abdominal:      General: Bowel sounds are normal  There is no distension  Palpations: Abdomen is soft  Tenderness: There is no abdominal tenderness  Musculoskeletal:      Right lower leg: No edema  Left lower leg: No edema  Additional Data:     Lab Results:  Results from last 7 days   Lab Units 03/16/23  1519   WBC Thousand/uL 7 71   HEMOGLOBIN g/dL 17 2*   HEMATOCRIT % 53 4*   PLATELETS Thousands/uL 249   NEUTROS PCT % 73   LYMPHS PCT % 21   MONOS PCT % 5   EOS PCT % 0     Results from last 7 days   Lab Units 03/16/23  1538   SODIUM mmol/L 141   POTASSIUM mmol/L 4 3   CHLORIDE mmol/L 100   CO2 mmol/L 21   BUN mg/dL 14   CREATININE mg/dL 0 86   ANION GAP mmol/L 20*   CALCIUM mg/dL 8 8   ALBUMIN g/dL 4 0   TOTAL BILIRUBIN mg/dL 1 33*   ALK PHOS U/L 188*   ALT U/L 27   AST U/L 51*   GLUCOSE RANDOM mg/dL 152*         Results from last 7 days   Lab Units 03/16/23  1505   POC GLUCOSE mg/dl 145*         Results from last 7 days   Lab Units 03/16/23  1808 03/16/23  1622   LACTIC ACID mmol/L 2 0 3 5*       Lines/Drains:  Invasive Devices     Peripheral Intravenous Line  Duration           Peripheral IV 03/16/23 Left;Ventral (anterior) Hand <1 day                    Imaging: Reviewed radiology reports from this admission including: chest xray  XR chest 1 view portable   Final Result by Naomi Salinas MD (03/16 1532)      Congestive heart failure  Workstation performed: [id number]             EKG and Other Studies Reviewed on Admission:   · EKG: Initial EKG with sinus tachycardia and LVH  Repeat EKG with sinus tachycardia with improved heart rate, LVH and prolonged QTc     ** Please Note: This note has been constructed using a voice recognition system   **

## 2023-03-17 NOTE — ASSESSMENT & PLAN NOTE
· Body mass index is 16 75 kg/m²     · Nutrition consulted recs appreciated  · Ensures for supplements

## 2023-03-17 NOTE — ASSESSMENT & PLAN NOTE
Per prior notes, patient with PCI to RCA in 2021 after a STEMI  Restart Toprol-XL, Plavix, statin  Check lipid panel

## 2023-03-17 NOTE — PROGRESS NOTES
[address]  Progress Note James Page 1960, 63 y o  female MRN: [id number]  Unit/Bed#: [address] [place] [address] Encounter: [id number]  Primary Care Provider: Gem Marroquin MD   Date and time admitted to hospital: 3/16/2023  2:47 PM    * Acute on chronic combined systolic and diastolic heart failure [facility])  Assessment & Plan  Wt Readings from Last 3 Encounters:   03/17/23 38 9 kg (85 lb 12 1 oz)   01/22/23 45 5 kg (100 lb 5 oz)   01/19/23 40 4 kg (89 lb)     Patient with history of ischemic cardiomyopathy  · Echo November 2022 showed EF of 22% with grade 2 diastolic dysfunction  Severe tricuspid regurgitation noted  · proBNP and chest x-ray suggestive of CHF  · 40 mg of IV Lasix x1 in the ER  Continue   · Trops X 2 normal  · I/O, daily weights  · Cardiology recs: 40 mg daily, lisinopril 5 mg daily, Toprol XL 50 mg daily  aldactone 12 5 mg daily  SIRS (systemic inflammatory response syndrome) (HCC)  Assessment & Plan  Noted by tachypnea, tachycardia with no obvious source of infection    · X-ray negative for pneumonia and UA negative for UTI  · elevated lactic acid on lab work which is most likely related to the nebulizer treatments she received  · Hold off on antibiotics for now  Follow-up blood cultures    Protein-calorie malnutrition ([facility] )  Assessment & Plan    · Body mass index is 16 75 kg/m²  · Nutrition consulted recs appreciated  · Ensures for supplements    CAD (coronary artery disease)  Assessment & Plan  · Per prior notes, patient with PCI to RCA in 2021 after a STEMI  · Restart Toprol-XL, Plavix, statin  · lipid panel-WNL    Bipolar disorder [facility])  Assessment & Plan  Per ER note, patient was initially very belligerent and combative, shouting and cursing at staff members and refusing treatment  Did receive 1 mg of IM Ativan and 10 mg of IM Zyprexa       · Currently stable mood  · Consider psych eval if needed  · Urine tox: Methadone with alcohol level of less than 10    QT prolongation  Assessment & Plan  Initial EKG shows sinus tachycardia with LVH    · Repeat EKG showed sinus tachycardia with improved heart rate, LVH and prolonged QTc of 563  · Patient did receive Ativan and Zyprexa prior to this repeat EKG which can cause QTc prolongation  · Cardio recs for repeat EKG     Alcohol abuse  Assessment & Plan  On epic it states patient drinks a pint of vodka daily    · Alcohol level less than 10  · CIWA protocol  · Vitamin, folic acid, thiamine  · Counseling on alcohol cessation    Essential hypertension  Assessment & Plan  Currently controlled,    Continue lisinopril and Toprol-XL at home which it appears patient was not taking  Blood pressure initially 244/114 in the ER    Smoker  Assessment & Plan  · Nicotine patch  · discuss tobacco cessation    Polysubstance abuse ([facility] )  Assessment & Plan  On methadone per home medication list   40 mg dose confirmed with [place] via pharmacist    Type 2 diabetes mellitus, without long-term current use of insulin [facility])  Assessment & Plan  Lab Results   Component Value Date    HGBA1C 5 6 03/17/2023       Recent Labs     03/16/23  1505 03/17/23  0745 03/17/23  1140   POCGLU 145* 125 140     Patient not noted to be on any medications at home  · Last A1c acceptable  Repeat-pending  · Diet control while inpatient    Acute respiratory failure with hypoxia and hypercapnia (HCC)-resolved as of 3/17/2023  Assessment & Plan  Resolved, currently 94-96 % on room air    Was noted to have a O2 sat of 87% on room air along with tachypnea  Placed on 10 L Venturi mask and then transitioned to 2 L nasal cannula    · Patient was noncompliant with BiPAP/nebulizer treatment in the ER  · Titrate off oxygen as tolerated  · ABG reviewed, PCO2 mildly elevated with pH of 7 2  · Due to CHF exacerbation  · nebulizer treatments, IV Solu-Medrol in the ER    No steroids at this time        VTE Pharmacologic Prophylaxis:   Low Risk (Score 0-2) - Encourage Ambulation  Patient Centered Rounds: I performed bedside rounds with nursing staff today  Discussions with Specialists or Other Care Team Provider: Cardio    Education and Discussions with Family / Patient: Patient declined call to   Total Time Spent on Date of Encounter in care of patient: 55 minutes This time was spent on one or more of the following: performing physical exam; counseling and coordination of care; obtaining or reviewing history; documenting in the medical record; reviewing/ordering tests, medications or procedures; communicating with other healthcare professionals and discussing with patient's family/caregivers  Current Length of Stay: 0 day(s)  Current Patient Status: Observation   Certification Statement: The patient will continue to require additional inpatient hospital stay due to Clinical course  Discharge Plan: Anticipate discharge in 24-48 hrs to discharge location to be determined pending rehab evaluations  Code Status: Level 1 - Full Code    Subjective:   Patient seen and examined at bedside, reported no shortness of breath, no chest pain, no leg swelling, no abdominal pain  Patient stated that she wanted to go home, made aware of labs and specialist Ren  Patient agreed to stay and follow-up on medical compliance  Patient reported that she is followed outpatient by [facility], has been unable to get to the doctor's office since 2019  After speaking with [facility] given patient information on how to contact them prior to office visits in which they will arrange transportation with a free Lyft ride  Objective:     Vitals:   Temp (24hrs), Av 2 °F (36 8 °C), Min:97 8 °F (36 6 °C), Max:99 °F (37 2 °C)    Temp:  [97 8 °F (36 6 °C)-99 °F (37 2 °C)] 97 8 °F (36 6 °C)  HR:  [] 101  Resp:  [15-26] 19  BP: (108-134)/(64-85) 114/74  SpO2:  [94 %-100 %] 96 %  Body mass index is 16 75 kg/m²       Input and Output Summary (last 24 hours):   No intake or output data in the 24 hours ending 03/17/23 1510    Physical Exam:   Physical Exam  Vitals and nursing note reviewed  Constitutional:       General: She is not in acute distress  Appearance: She is underweight  HENT:      Head: Normocephalic and atraumatic  Eyes:      Conjunctiva/sclera: Conjunctivae normal    Cardiovascular:      Rate and Rhythm: Normal rate and regular rhythm  Heart sounds: No murmur heard  No friction rub  No gallop  Pulmonary:      Effort: Pulmonary effort is normal  No respiratory distress  Breath sounds: Normal breath sounds  No stridor  No wheezing, rhonchi or rales  Abdominal:      Palpations: Abdomen is soft  Tenderness: There is no abdominal tenderness  There is no guarding or rebound  Musculoskeletal:         General: No swelling or tenderness  Cervical back: Neck supple  Right lower leg: No edema  Left lower leg: No edema  Skin:     General: Skin is warm and dry  Capillary Refill: Capillary refill takes less than 2 seconds  Findings: No bruising  Neurological:      Mental Status: She is alert and oriented to person, place, and time  Motor: No weakness     Psychiatric:         Mood and Affect: Mood normal          Behavior: Behavior normal           Additional Data:     Labs:  Results from last 7 days   Lab Units 03/17/23  0447 03/16/23  1519   WBC Thousand/uL 6 03 7 71   HEMOGLOBIN g/dL 15 3 17 2*   HEMATOCRIT % 45 8 53 4*   PLATELETS Thousands/uL 240 249   NEUTROS PCT %  --  73   LYMPHS PCT %  --  21   MONOS PCT %  --  5   EOS PCT %  --  0     Results from last 7 days   Lab Units 03/17/23  0447   SODIUM mmol/L 138   POTASSIUM mmol/L 3 9   CHLORIDE mmol/L 99   CO2 mmol/L 27   BUN mg/dL 15   CREATININE mg/dL 0 62   ANION GAP mmol/L 12   CALCIUM mg/dL 8 6   ALBUMIN g/dL 3 4*   TOTAL BILIRUBIN mg/dL 1 21*   ALK PHOS U/L 133*   ALT U/L 21   AST U/L 26   GLUCOSE RANDOM mg/dL 136         Results from last 7 days   Lab Units 03/17/23  1140 03/17/23  0745 03/16/23  1505   POC GLUCOSE mg/dl 140 125 145*     Results from last 7 days   Lab Units 03/17/23  0447   HEMOGLOBIN A1C % 5 6     Results from last 7 days   Lab Units 03/16/23  1808 03/16/23  1622   LACTIC ACID mmol/L 2 0 3 5*       Lines/Drains:  Invasive Devices     Peripheral Intravenous Line  Duration           Peripheral IV 03/16/23 Left;Ventral (anterior) Hand <1 day                  Telemetry:  Telemetry Orders (From admission, onward)             48 Hour Telemetry Monitoring  Continuous x 48 hours        References:    Telemetry Guidelines   Question:  Reason for 48 Hour Telemetry  Answer:  Acute Decompensated CHF (continuous diuretic infusion or total diuretic dose > 200 mg daily, associated electrolyte derangement, ionotropic drip, history of ventricular arrhythmia, or new EF <35%)                        Imaging: Reviewed radiology reports from this admission including: chest xray    Recent Cultures (last 7 days):   Results from last 7 days   Lab Units 03/16/23  1621 03/16/23  1539   BLOOD CULTURE  Received in Microbiology Lab  Culture in Progress  Received in Microbiology Lab  Culture in Progress         Last 24 Hours Medication List:   Current Facility-Administered Medications   Medication Dose Route Frequency Provider Last Rate   • acetaminophen  650 mg Oral Q6H PRN Marielena Revankar, DO     • atorvastatin  40 mg Oral Daily With Hope-Jorge Luis Revankar, DO     • clopidogrel  75 mg Oral Daily Marielena Revankar, DO     • enoxaparin  40 mg Subcutaneous Daily Marielena Revankar, DO     • folic acid  1 mg Oral Daily Marielena Revankar, DO     • furosemide  40 mg Intravenous Daily Marielena Revankar, DO     • insulin lispro  1-5 Units Subcutaneous TID AC Marielena Revankar, DO     • lisinopril  5 mg Oral Daily Marielena Revankar, DO     • methadone  40 mg Oral Daily Miriam Toth MD     • metoprolol succinate  50 mg Oral Daily Marielena Revankar, DO     • multivitamin stress formula  1 tablet Oral Daily Marielena Gar, DO     • nicotine  1 patch Transdermal Daily Marielena Gar, DO     • spironolactone  12 5 mg Oral Daily Marshall Lucio PA-C     • thiamine  100 mg Oral Daily Marielena Gar DO          Today, Patient Was Seen By: Heath Schultz MD    **Please Note: This note may have been constructed using a voice recognition system  **

## 2023-03-17 NOTE — UTILIZATION REVIEW
Initial Clinical Review    Observation 3/16 @ 1806 and change to Inpatient 3/17 @ 1512  Pt requiring continue stay for Acute on chronic combined systolic and diastolic heart failure, SIRS/ Iv Lasix    Admission: Date/Time/Statement:   Admission Orders (From admission, onward)     Inpatient Admission  Once        Transfer Service: Hospitalist       Question Answer   Level of Care Med Surg   Estimated length of stay More than 2 Midnights   Certification I certify that inpatient services are medically necessary for this patient for a duration of greater than two midnights  See H&P and MD Progress Notes for additional information about the patient's course of treatment  03/17/23 1512        Ordered        03/16/23 1806  Place in Observation  Once                   Orders Placed This Encounter   Procedures   • Inpatient Admission     Standing Status:   Standing     Number of Occurrences:   1     Order Specific Question:   Level of Care     Answer:   Med Surg [16]     Order Specific Question:   Estimated length of stay     Answer:   More than 2 Midnights     Order Specific Question:   Certification     Answer:   I certify that inpatient services are medically necessary for this patient for a duration of greater than two midnights  See H&P and MD Progress Notes for additional information about the patient's course of treatment  ED Arrival Information     Expected   -    Arrival   3/16/2023 14:34    Acuity   Urgent            Means of arrival   Ambulance    Escorted by   [address]    Admission type   Emergency            Arrival complaint   -           Chief Complaint   Patient presents with   • Shortness of Breath     CP and SOB since this am        Initial Presentation: 63 y o  female to ED presents for shortness of breath and chest pain  Pt unable to provide any information as she is extremely sleepy after receiving Ativan and Zyprexa in the ED   Pt was belligerent and combative when she came to the ER, shouting and cursing at staff members and refusing treatment  She refused BiPAP/nebulizer treatment initially and then was given 1 mg of IM Ativan and IM 10 mg Zyprexa  Initially noted to be hypoxic, tachycardic, tachypneic  Placed on Venturi mask and since then transition to 2 L of oxygen  Given IV Lasix 40 mg x 1 in the ER  PMH for Bipolar disorder, CAD, HTN, T2DM, Polysubstance abuse and smoker  Ischemic cardiomyopathy  Drinks a pint of vodka daily,   Admit Observation level of care for Acute on chronic combined systolic and diastolic heart failure, Acute respiratory failure with hypoxia and hypercapnia, SIRS, QT prolongation and Alcohol abuse  O2 sat of 87% on room air, currently on O2 2L NC  ABG with PCO2 mildly elevated with pH of 7 2  Echo in Nov/2022 with EF of 22% with with grade 2 diastolic dysfunction  Severe tricuspid regurgitation noted  Elevated BNP  Continue IV Lasix daily  Trend troponin  Cardiology consult  Given neb treatments and Iv Solu-Medrol in ED  Elevated lactic acid, hold off on antibiotics for now  Bld cultures  EKG shows sinus tachycardia with LVH  Repeat EKG showed sinus tachycardia with improved heart rate, LVH and prolonged QTc of 563  Will repeat EKG  Etoh level less than 10  CIWA assess  Thiamine and folic acid  Urine tox positive for methadone  3/17 Changed to Inpatient status  Cardiology cons; Acute on chronic combined diastolic heart failure  Elevated BNP 1,002, CXR CHF  Continue Iv Lasix  Fluid restriction 1500 ml  Pt poor candidate for ICD given she is not complaint  BP improved from admit  On exam; tachycardia  Decreased breath sounds  Murmur  Progress notes; No steriod at this time  Continue Iv Lasix         ED Triage Vitals   Temperature Pulse Respirations Blood Pressure SpO2   03/16/23 1455 03/16/23 1455 03/16/23 1455 03/16/23 1455 03/16/23 1455   98 5 °F (36 9 °C) (!) 147 (!) 30 (!) 244/114 (!) 87 %      Temp Source Heart Rate Source Patient Position - Orthostatic VS BP Location FiO2 (%)   03/16/23 1455 03/16/23 1455 03/16/23 1455 03/16/23 1455 03/16/23 1557   Oral Monitor Lying Right arm 45      Pain Score       03/17/23 0000       No Pain          Wt Readings from Last 1 Encounters:   03/17/23 38 9 kg (85 lb 12 1 oz)     Additional Vital Signs:   03/17/23 0739 97 8 °F (36 6 °C) 101 19 114/74 90 96 % -- 28 -- 2 L/min Nasal cannula Lying   03/17/23 0336 98 °F (36 7 °C) 92 18 121/85 99 94 % -- 28 2 L/min 2 L/min Nasal cannula Lying   03/17/23 0000 99 °F (37 2 °C) 95 15 131/79 100 96 % -- -- 2 L/min -- Nasal cannula Lying     03/16/23 2251 97 8 °F (36 6 °C) 93 16 -- -- 95 % -- -- 2 L/min -- Nasal cannula --   03/16/23 2100 -- 96 18 116/73 89 100 % -- -- 2 L/min -- Nasal cannula Lying     03/16/23 1730 -- 112   Abnormal  -- 111/64 83 98 % -- -- -- -- -- --   03/16/23 1700 -- 120   Abnormal  20 119/68 88 99 % -- -- 2 L/min -- Nasal cannula --     Pertinent Labs/Diagnostic Test Results:   XR chest 1 view portable   Final Result by Minda Park MD (03/16 1532)      Congestive heart failure                    Workstation performed: [id number]           Results from last 7 days   Lab Units 03/16/23  1814   SARS-COV-2  Negative     Results from last 7 days   Lab Units 03/17/23  0447 03/16/23  1519   WBC Thousand/uL 6 03 7 71   HEMOGLOBIN g/dL 15 3 17 2*   HEMATOCRIT % 45 8 53 4*   PLATELETS Thousands/uL 240 249   NEUTROS ABS Thousands/µL  --  5 56         Results from last 7 days   Lab Units 03/17/23  0447 03/16/23  1538   SODIUM mmol/L 138 141   POTASSIUM mmol/L 3 9 4 3   CHLORIDE mmol/L 99 100   CO2 mmol/L 27 21   ANION GAP mmol/L 12 20*   BUN mg/dL 15 14   CREATININE mg/dL 0 62 0 86   EGFR ml/min/1 73sq m 98 73   CALCIUM mg/dL 8 6 8 8   MAGNESIUM mg/dL 2 7 1 8*     Results from last 7 days   Lab Units 03/17/23  0447 03/16/23  1538   AST U/L 26 51*   ALT U/L 21 27   ALK PHOS U/L 133* 188*   TOTAL PROTEIN g/dL 6 6 7 5   ALBUMIN g/dL 3 4* 4 0   TOTAL BILIRUBIN mg/dL 1 21* 1 33*     Results from last 7 days   Lab Units 03/17/23  1140 03/17/23  0745 03/16/23  1505   POC GLUCOSE mg/dl 140 125 145*     Results from last 7 days   Lab Units 03/17/23  0447 03/16/23  1538   GLUCOSE RANDOM mg/dL 136 152*         Results from last 7 days   Lab Units 03/17/23  0447   HEMOGLOBIN A1C % 5 6   EAG mg/dl 114     BETA-HYDROXYBUTYRATE   Date Value Ref Range Status   05/25/2020 0 1 <0 6 mmol/L Final   07/10/2019 0 1 <0 6 mmol/L Final      Results from last 7 days   Lab Units 03/16/23  1502   PH ART  7 227*   PCO2 ART mm Hg 46 5*   PO2 ART mm Hg 47 9*   HCO3 ART mmol/L 18 9*   BASE EXC ART mmol/L -8 7   O2 CONTENT ART mL/dL 17 5   O2 HGB, ARTERIAL % 71 3*   ABG SOURCE  Brachial, Left                 Results from last 7 days   Lab Units 03/16/23  2240 03/16/23  1808 03/16/23  1519   HS TNI 0HR ng/L  --   --  32   HS TNI 2HR ng/L  --  34  --    HSTNI D2 ng/L  --  2  --    HS TNI 4HR ng/L 33  --   --    HSTNI D4 ng/L 1  --   --                      Results from last 7 days   Lab Units 03/16/23  1808 03/16/23  1622   LACTIC ACID mmol/L 2 0 3 5*             Results from last 7 days   Lab Units 03/16/23  1519   BNP pg/mL 1,002*       Results from last 7 days   Lab Units 03/16/23  1712   CLARITY UA  Clear   COLOR UA  Yellow   SPEC GRAV UA  >=1 030   PH UA  6 0   GLUCOSE UA mg/dl Negative   KETONES UA mg/dl Negative   BLOOD UA  Negative   PROTEIN UA mg/dl 30 (1+)*   NITRITE UA  Negative   BILIRUBIN UA  Small*   UROBILINOGEN UA E U /dl 1 0   LEUKOCYTES UA  Negative   WBC UA /hpf None Seen   RBC UA /hpf None Seen   BACTERIA UA /hpf Occasional   EPITHELIAL CELLS WET PREP /hpf Occasional     Results from last 7 days   Lab Units 03/16/23  1814   INFLUENZA A PCR  Negative   INFLUENZA B PCR  Negative   RSV PCR  Negative         Results from last 7 days   Lab Units 03/16/23  1712   AMPH/METH  Negative   BARBITURATE UR  Negative   BENZODIAZEPINE UR  Negative   COCAINE UR  Negative   METHADONE URINE Positive*   OPIATE UR  Negative   PCP UR  Negative   THC UR  Negative     Results from last 7 days   Lab Units 03/16/23  1538 03/16/23  1519   ETHANOL LVL mg/dL <10  --    ACETAMINOPHEN LVL ug/mL  --  <48*   SALICYLATE LVL mg/dL  --  <5                 Results from last 7 days   Lab Units 03/16/23  1621 03/16/23  1539   BLOOD CULTURE  Received in Microbiology Lab  Culture in Progress  Received in Microbiology Lab  Culture in Progress                 ED Treatment:   Medication Administration from 03/16/2023 1434 to 03/16/2023 2214       Date/Time Order Dose Route Action     03/16/2023 1449 EDT albuterol inhalation solution 10 mg 10 mg Nebulization Given     03/16/2023 1450 EDT ipratropium (ATROVENT) 0 02 % inhalation solution 1 mg 1 mg Nebulization Given     03/16/2023 1449 EDT sodium chloride 0 9 % inhalation solution 3 mL 3 mL Nebulization Given     03/16/2023 1455 EDT LORazepam (ATIVAN) injection 1 mg 1 mg Intramuscular Given     03/16/2023 1500 EDT OLANZapine (ZyPREXA) IM injection 10 mg 10 mg Intramuscular Given     03/16/2023 1618 EDT methylPREDNISolone sodium succinate (Solu-MEDROL) injection 100 mg 100 mg Intravenous Given     03/16/2023 1702 EDT furosemide (LASIX) injection 40 mg 40 mg Intravenous Given        Past Medical History:   Diagnosis Date   • Alcohol abuse    • Arthritis    • Asthma    • Bipolar disorder ([facility] )    • Diabetes mellitus ([facility] )    • Opiate abuse, continuous ([facility] )    • Smoker      Present on Admission:  • Acute on chronic combined systolic and diastolic heart failure (HCC)  • (Resolved) Acute respiratory failure with hypoxia and hypercapnia ([facility])  • Bipolar disorder (HCC)  • CAD (coronary artery disease)  • Essential hypertension  • Polysubstance abuse ([facility])  • QT prolongation  • Type 2 diabetes mellitus, without long-term current use of insulin ([facility])  • SIRS (systemic inflammatory response syndrome) ([facility] )  • Alcohol abuse      Admitting Diagnosis: Acute on chronic combined systolic and diastolic heart failure (HCC) [I50 43]  CHF (congestive heart failure) (HCC) [I50 9]  Chest pain [R07 9]  History of bipolar disorder [Z86 59]  Age/Sex: 63 y o  female     Admission Orders:  Scheduled Medications:  atorvastatin, 40 mg, Oral, Daily With Dinner  clopidogrel, 75 mg, Oral, Daily  enoxaparin, 40 mg, Subcutaneous, Daily  folic acid, 1 mg, Oral, Daily  furosemide, 40 mg, Intravenous, Daily  insulin lispro, 1-5 Units, Subcutaneous, TID AC  lisinopril, 5 mg, Oral, Daily  methadone, 40 mg, Oral, Daily  metoprolol succinate, 50 mg, Oral, Daily  multivitamin stress formula, 1 tablet, Oral, Daily  nicotine, 1 patch, Transdermal, Daily  spironolactone, 12 5 mg, Oral, Daily  thiamine, 100 mg, Oral, Daily      Continuous IV Infusions: None     PRN Meds:  acetaminophen, 650 mg, Oral, Q6H PRN        IP CONSULT TO NUTRITION SERVICES  IP CONSULT TO CARDIOLOGY  IP CONSULT TO PSYCHIATRY    Network Utilization Review Department  ATTENTION: Please call with any questions or concerns to [phone] and carefully listen to the prompts so that you are directed to the right person  All voicemails are confidential   Seth Chase all requests for admission clinical reviews, approved or denied determinations and any other requests to dedicated fax number below belonging to the campus where the patient is receiving treatment   List of dedicated fax numbers for the Facilities:  [address] DENIALS (Administrative/Medical Necessity) [phone]   [address] [place] [address] (Maternity/NICU/Pediatrics) [phone]   [address] Rylie Dillard  [phone]   [address] [facility]  [address]  [address] [facility] 310 [phone]   [address] [phone]

## 2023-03-17 NOTE — ASSESSMENT & PLAN NOTE
Noted by tachypnea, tachycardia with no obvious source of infection  · X-ray negative for pneumonia and UA negative for UTI  · Patient also with elevated lactic acid on lab work which is most likely related to the nebulizer treatments she received  · Hold off on antibiotics for now    Follow-up blood cultures

## 2023-03-17 NOTE — ASSESSMENT & PLAN NOTE
Per ER note, patient was initially very belligerent and combative, shouting and cursing at staff members and refusing treatment  Did receive 1 mg of IM Ativan and 10 mg of IM Zyprexa       · Currently stable mood  · Consider psych eval if needed  · Urine tox: Methadone with alcohol level of less than 10

## 2023-03-17 NOTE — ASSESSMENT & PLAN NOTE
Initial EKG shows sinus tachycardia with LVH    · Repeat EKG showed sinus tachycardia with improved heart rate, LVH and prolonged QTc of 563  · Patient did receive Ativan and Zyprexa prior to this repeat EKG which can cause QTc prolongation  · Cardio recs for repeat EKG

## 2023-03-17 NOTE — ASSESSMENT & PLAN NOTE
Resolved, currently 94-96 % on room air    Was noted to have a O2 sat of 87% on room air along with tachypnea  Placed on 10 L Venturi mask and then transitioned to 2 L nasal cannula    · Patient was noncompliant with BiPAP/nebulizer treatment in the ER  · Titrate off oxygen as tolerated  · ABG reviewed, PCO2 mildly elevated with pH of 7 2  · Due to CHF exacerbation  · nebulizer treatments, IV Solu-Medrol in the ER    No steroids at this time

## 2023-03-17 NOTE — ASSESSMENT & PLAN NOTE
On methadone per home medication list   Dose will need to be confirmed with [facility] clinic tomorrow

## 2023-03-17 NOTE — ASSESSMENT & PLAN NOTE
Was noted to have a O2 sat of 87% on room air along with tachypnea  Placed on 10 L Venturi mask and then transitioned to 2 L nasal cannula  · Patient was noncompliant with BiPAP/nebulizer treatment in the ER  · Titrate off oxygen as tolerated  · ABG reviewed, PCO2 mildly elevated with pH of 7 2  · Due to CHF exacerbation  · Patient received nebulizer treatments, IV Solu-Medrol in the ER    Will hold off on further steroids as patient does not seem to be having a flareup of her COPD

## 2023-03-17 NOTE — ASSESSMENT & PLAN NOTE
Wt Readings from Last 3 Encounters:   03/17/23 38 9 kg (85 lb 12 1 oz)   01/22/23 45 5 kg (100 lb 5 oz)   01/19/23 40 4 kg (89 lb)     Patient with history of ischemic cardiomyopathy  · Echo November 2022 showed EF of 22% with grade 2 diastolic dysfunction  Severe tricuspid regurgitation noted  · proBNP and chest x-ray suggestive of CHF  · 40 mg of IV Lasix x1 in the ER  Continue   · Trops X 2 normal  · I/O, daily weights  · Cardiology recs: 40 mg daily, lisinopril 5 mg daily, Toprol XL 50 mg daily  aldactone 12 5 mg daily

## 2023-03-17 NOTE — ASSESSMENT & PLAN NOTE
Noted by tachypnea, tachycardia with no obvious source of infection    · X-ray negative for pneumonia and UA negative for UTI  · elevated lactic acid on lab work which is most likely related to the nebulizer treatments she received  · Hold off on antibiotics for now    Follow-up blood cultures

## 2023-03-17 NOTE — PLAN OF CARE
Problem: MOBILITY - ADULT  Goal: Maintain or return to baseline ADL function  Description: INTERVENTIONS:  -  Assess patient's ability to carry out ADLs; assess patient's baseline for ADL function and identify physical deficits which impact ability to perform ADLs (bathing, care of mouth/teeth, toileting, grooming, dressing, etc )  - Assess/evaluate cause of self-care deficits   - Assess range of motion  - Assess patient's mobility; develop plan if impaired  - Assess patient's need for assistive devices and provide as appropriate  - Encourage maximum independence but intervene and supervise when necessary  - Involve family in performance of ADLs  - Assess for home care needs following discharge   - Consider OT consult to assist with ADL evaluation and planning for discharge  - Provide patient education as appropriate  Outcome: Progressing  Goal: Maintains/Returns to pre admission functional level  Description: INTERVENTIONS:  - Perform BMAT or MOVE assessment daily    - Set and communicate daily mobility goal to care team and patient/family/caregiver  - Collaborate with rehabilitation services on mobility goals if consulted  - Perform Range of Motion 3 times a day  - Reposition patient every 2 hours    - Dangle patient 3 times a day  - Stand patient 3 times a day  - Ambulate patient 3 times a day  - Out of bed to chair 3 times a day   - Out of bed for meals 3 times a day  - Out of bed for toileting  - Record patient progress and toleration of activity level   Outcome: Progressing     Problem: PAIN - ADULT  Goal: Verbalizes/displays adequate comfort level or baseline comfort level  Description: Interventions:  - Encourage patient to monitor pain and request assistance  - Assess pain using appropriate pain scale  - Administer analgesics based on type and severity of pain and evaluate response  - Implement non-pharmacological measures as appropriate and evaluate response  - Consider cultural and social influences on pain and pain management  - Notify physician/advanced practitioner if interventions unsuccessful or patient reports new pain  Outcome: Progressing     Problem: INFECTION - ADULT  Goal: Absence or prevention of progression during hospitalization  Description: INTERVENTIONS:  - Assess and monitor for signs and symptoms of infection  - Monitor lab/diagnostic results  - Monitor all insertion sites, i e  indwelling lines, tubes, and drains  - Monitor endotracheal if appropriate and nasal secretions for changes in amount and color  - [place] appropriate cooling/warming therapies per order  - Administer medications as ordered  - Instruct and encourage patient and family to use good hand hygiene technique  - Identify and instruct in appropriate isolation precautions for identified infection/condition  Outcome: Progressing  Goal: Absence of fever/infection during neutropenic period  Description: INTERVENTIONS:  - Monitor WBC    Outcome: Progressing     Problem: SAFETY ADULT  Goal: Maintain or return to baseline ADL function  Description: INTERVENTIONS:  -  Assess patient's ability to carry out ADLs; assess patient's baseline for ADL function and identify physical deficits which impact ability to perform ADLs (bathing, care of mouth/teeth, toileting, grooming, dressing, etc )  - Assess/evaluate cause of self-care deficits   - Assess range of motion  - Assess patient's mobility; develop plan if impaired  - Assess patient's need for assistive devices and provide as appropriate  - Encourage maximum independence but intervene and supervise when necessary  - Involve family in performance of ADLs  - Assess for home care needs following discharge   - Consider OT consult to assist with ADL evaluation and planning for discharge  - Provide patient education as appropriate  Outcome: Progressing  Goal: Maintains/Returns to pre admission functional level  Description: INTERVENTIONS:  - Perform BMAT or MOVE assessment daily    - Set and communicate daily mobility goal to care team and patient/family/caregiver  - Collaborate with rehabilitation services on mobility goals if consulted  - Perform Range of Motion 3 times a day  - Reposition patient every 2 hours    - Dangle patient 3 times a day  - Stand patient 3 times a day  - Ambulate patient 3 times a day  - Out of bed to chair 3 times a day   - Out of bed for meals 3 times a day  - Out of bed for toileting  - Record patient progress and toleration of activity level   Outcome: Progressing  Goal: Patient will remain free of falls  Description: INTERVENTIONS:  - Educate patient/family on patient safety including physical limitations  - Instruct patient to call for assistance with activity   - Consult OT/PT to assist with strengthening/mobility   - Keep Call bell within reach  - Keep bed low and locked with side rails adjusted as appropriate  - Keep care items and personal belongings within reach  - Initiate and maintain comfort rounds  - Make Fall Risk Sign visible to staff  - Offer Toileting every 2 Hours, in advance of need  - Initiate/Maintain bed alarm  - Obtain necessary fall risk management equipment: bed alarm, yellow socks   - Apply yellow socks and bracelet for high fall risk patients  - Consider moving patient to room near nurses station  Outcome: Progressing     Problem: DISCHARGE PLANNING  Goal: Discharge to home or other facility with appropriate resources  Description: INTERVENTIONS:  - Identify barriers to discharge w/patient and caregiver  - Arrange for needed discharge resources and transportation as appropriate  - Identify discharge learning needs (meds, wound care, etc )  - Arrange for interpretive services to assist at discharge as needed  - Refer to Case Management Department for coordinating discharge planning if the patient needs post-hospital services based on physician/advanced practitioner order or complex needs related to functional status, cognitive ability, or social support system  Outcome: Progressing     Problem: Knowledge Deficit  Goal: Patient/family/caregiver demonstrates understanding of disease process, treatment plan, medications, and discharge instructions  Description: Complete learning assessment and assess knowledge base  Interventions:  - Provide teaching at level of understanding  - Provide teaching via preferred learning methods  Outcome: Progressing     Problem: Prexisting or High Potential for Compromised Skin Integrity  Goal: Skin integrity is maintained or improved  Description: INTERVENTIONS:  - Identify patients at risk for skin breakdown  - Assess and monitor skin integrity  - Assess and monitor nutrition and hydration status  - Monitor labs   - Assess for incontinence   - Turn and reposition patient  - Assist with mobility/ambulation  - Relieve pressure over bony prominences  - Avoid friction and shearing  - Provide appropriate hygiene as needed including keeping skin clean and dry  - Evaluate need for skin moisturizer/barrier cream  - Collaborate with interdisciplinary team   - Patient/family teaching  - Consider wound care consult   Outcome: Progressing     Problem: Nutrition/Hydration-ADULT  Goal: Nutrient/Hydration intake appropriate for improving, restoring or maintaining nutritional needs  Description: Monitor and assess patient's nutrition/hydration status for malnutrition  Collaborate with interdisciplinary team and initiate plan and interventions as ordered  Monitor patient's weight and dietary intake as ordered or per policy  Utilize nutrition screening tool and intervene as necessary  Determine patient's food preferences and provide high-protein, high-caloric foods as appropriate       INTERVENTIONS:  - Monitor oral intake, urinary output, labs, and treatment plans  - Assess nutrition and hydration status and recommend course of action  - Evaluate amount of meals eaten  - Assist patient with eating if necessary   - Allow adequate time for meals  - Recommend/ encourage appropriate diets, oral nutritional supplements, and vitamin/mineral supplements  - Assess need for intravenous fluids  - Provide specific nutrition/hydration education as appropriate  - Include patient/family/caregiver in decisions related to nutrition  Outcome: Progressing

## 2023-03-17 NOTE — ASSESSMENT & PLAN NOTE
On lisinopril and Toprol-XL at home which it appears patient was not taking  Restart meds and monitor blood pressure  Blood pressure initially 244/114 in the ER

## 2023-03-17 NOTE — ASSESSMENT & PLAN NOTE
Initial EKG shows sinus tachycardia with LVH  Repeat EKG showed sinus tachycardia with improved heart rate, LVH and prolonged QTc of 563  · Patient did receive Ativan and Zyprexa prior to this repeat EKG which can cause QTc prolongation  · Will need repeat EKG at some point during hospitalization to ensure that QTc interval has improved

## 2023-03-17 NOTE — ED NOTES
Patient's significant other called to check in the status of the pt  Significant other was informed that pt is stable and is resting comfortably at this time and will tell the pt that he called       Nila Calvo RN  03/16/23 2107

## 2023-03-17 NOTE — ASSESSMENT & PLAN NOTE
Lab Results   Component Value Date    HGBA1C 5 6 03/17/2023       Recent Labs     03/16/23  1505 03/17/23  0745 03/17/23  1140   POCGLU 145* 125 140     Patient not noted to be on any medications at home  · Last A1c acceptable    Repeat-pending  · Diet control while inpatient

## 2023-03-18 VITALS
WEIGHT: 92.81 LBS | OXYGEN SATURATION: 98 % | BODY MASS INDEX: 18.22 KG/M2 | HEART RATE: 94 BPM | DIASTOLIC BLOOD PRESSURE: 79 MMHG | RESPIRATION RATE: 16 BRPM | TEMPERATURE: 97.5 F | HEIGHT: 60 IN | SYSTOLIC BLOOD PRESSURE: 135 MMHG

## 2023-03-18 LAB
ATRIAL RATE: 102 BPM
GLUCOSE SERPL-MCNC: 179 MG/DL (ref 65–140)
GLUCOSE SERPL-MCNC: 60 MG/DL (ref 65–140)
GLUCOSE SERPL-MCNC: 89 MG/DL (ref 65–140)
P AXIS: 61 DEGREES
PR INTERVAL: 148 MS
QRS AXIS: -12 DEGREES
QRSD INTERVAL: 102 MS
QT INTERVAL: 432 MS
QTC INTERVAL: 563 MS
T WAVE AXIS: 132 DEGREES
VENTRICULAR RATE: 102 BPM

## 2023-03-18 RX ADMIN — METHADONE HYDROCHLORIDE 40 MG: 10 TABLET ORAL at 08:42

## 2023-03-18 RX ADMIN — CLOPIDOGREL BISULFATE 75 MG: 75 TABLET ORAL at 08:42

## 2023-03-18 RX ADMIN — METOPROLOL SUCCINATE 50 MG: 50 TABLET, EXTENDED RELEASE ORAL at 08:43

## 2023-03-18 RX ADMIN — B-COMPLEX W/ C & FOLIC ACID TAB 1 TABLET: TAB at 08:43

## 2023-03-18 RX ADMIN — LISINOPRIL 5 MG: 5 TABLET ORAL at 08:43

## 2023-03-18 RX ADMIN — NICOTINE 1 PATCH: 21 PATCH, EXTENDED RELEASE TRANSDERMAL at 08:47

## 2023-03-18 RX ADMIN — ENOXAPARIN SODIUM 40 MG: 40 INJECTION SUBCUTANEOUS at 08:46

## 2023-03-18 RX ADMIN — THIAMINE HCL TAB 100 MG 100 MG: 100 TAB at 08:43

## 2023-03-18 RX ADMIN — INSULIN LISPRO 1 UNITS: 100 INJECTION, SOLUTION INTRAVENOUS; SUBCUTANEOUS at 08:43

## 2023-03-18 RX ADMIN — SPIRONOLACTONE 12.5 MG: 25 TABLET ORAL at 08:46

## 2023-03-18 RX ADMIN — FOLIC ACID 1 MG: 1 TABLET ORAL at 08:43

## 2023-03-18 NOTE — DISCHARGE SUMMARY
[address]  [facility] 1960[facility] 63 [facility]  [facility] MRN: [id number]  Unit/Bed#: [address] [place] [address] Encounter: [id number]  Primary Care Provider: Jb Prado MD   Date and time admitted to hospital: 3/16/2023  2:47 PM    * Acute on chronic combined systolic and diastolic heart failure [facility])  Assessment & Plan      Wt Readings from Last 3 Encounters:   03/17/23 38 9 kg (85 lb 12 1 oz)   01/22/23 45 5 kg (100 lb 5 oz)   01/19/23 40 4 kg (89 lb)      Patient with history of ischemic cardiomyopathy  • Echo November 2022 showed EF of 22% with grade 2 diastolic dysfunction  Severe tricuspid regurgitation noted  • proBNP and chest x-ray suggestive of CHF  • 40 mg of IV Lasix x1 in the ER  Continue   • Trops X 2 normal  • I/O, daily weights  • Cardiology recs: 40 mg daily, lisinopril 5 mg daily, Toprol XL 50 mg daily  aldactone 12 5 mg daily            SIRS (systemic inflammatory response syndrome) (HCC)-resolved  Assessment & Plan  Noted by tachypnea, tachycardia with no obvious source of infection     • X-ray negative for pneumonia and UA negative for UTI  • elevated lactic acid on lab work which is most likely related to the nebulizer treatments she received  • Hold off on antibiotics for now  Follow-up blood cultures     Protein-calorie malnutrition ([address] )  Assessment & Plan     • Body mass index is 16 75 kg/m²     • Nutrition consulted recs appreciated  • Ensures for supplements     CAD (coronary artery disease)  Assessment & Plan  • Per prior notes, patient with PCI to RCA in 2021 after a STEMI  • Restart Toprol-XL, Plavix, statin  • lipid panel-WNL     Bipolar disorder [facility])  Assessment & Plan  Per ER note, patient was initially very belligerent and combative, shouting and cursing at staff members and refusing treatment  Did receive 1 mg of IM Ativan and 10 mg of IM Zyprexa       • Currently stable mood  • Consider psych eval if needed  • Urine tox: Methadone with alcohol level of less than 10     QT prolongation  Assessment & Plan  Initial EKG shows sinus tachycardia with LVH     • Repeat EKG showed sinus tachycardia with improved heart rate, LVH and prolonged QTc of 563  • Patient did receive Ativan and Zyprexa prior to this repeat EKG which can cause QTc prolongation  • Cardio recs for repeat EKG      Alcohol abuse  Assessment & Plan  On epic it states patient drinks a pint of vodka daily     • Alcohol level less than 10  • [place] protocol  • Vitamin, folic acid, thiamine  • Counseling on alcohol cessation     Essential hypertension  Assessment & Plan  Currently controlled,     Continue lisinopril and Toprol-XL at home which it appears patient was not taking  Blood pressure initially 244/114 in the ER     Smoker  Assessment & Plan  • Nicotine patch  • discuss tobacco cessation     Polysubstance abuse ([facility] [address] )  Assessment & Plan  On methadone per home medication list   40 mg dose confirmed with [place] via pharmacist     Type 2 diabetes mellitus, without long-term current use of insulin [facility])  Assessment & Plan        Lab Results   Component Value Date     HGBA1C 5 6 03/17/2023               Recent Labs     03/16/23  1505 03/17/23  0745 03/17/23  1140   POCGLU 145* 125 140      Patient not noted to be on any medications at home  • Last A1c acceptable  Repeat-pending  • Diet control while inpatient     Acute respiratory failure with hypoxia and hypercapnia (HCC)-resolved as of 3/17/2023  Assessment & Plan  Resolved, currently 94-96 % on room air     Was noted to have a O2 sat of 87% on room air along with tachypnea  Placed on 10 L Venturi mask and then transitioned to 2 L nasal cannula     • Patient was noncompliant with BiPAP/nebulizer treatment in the ER  • Titrate off oxygen as tolerated  • ABG reviewed, PCO2 mildly elevated with pH of 7 2  • Due to CHF exacerbation  • nebulizer treatments, IV Solu-Medrol in the ER    No steroids at this time      Medical Problems     Resolved Problems  Date Reviewed: 3/18/2023          Resolved    Acute respiratory failure with hypoxia and hypercapnia ([address] ) 3/17/2023     Resolved by  Sujey Echavarria MD        Discharging Physician / Practitioner: Sujey Echavarria MD  PCP: Kamar Resendiz MD  Admission Date:   Admission Orders (From admission, onward)     Ordered        03/17/23 1512  Inpatient Admission  Once            03/16/23 1806  Place in Observation  Once            03/16/23 1757  Place in Observation  Once,   Status:  Canceled                      Discharge Date: 03/18/23    Consultations During Hospital Stay:  · Cardio, behavioral health    Procedures Performed:   · N/A    Significant Findings / Test Results:   · N/A    Incidental Findings:   · N/A    Test Results Pending at Discharge (will require follow up):   · N/A     Outpatient Tests Requested:  · N/A    Complications: N/A    Reason for Admission: Shortness of breath and emotional disturbance    Hospital Course:   Griselda Chancellor is a 63 y o  female patient who originally presented to the hospital on 3/16/2023 due to shortness of breath and emotional disturbance  Upon arrival patient was noted to be in acute respiratory failure secondary to underlying heart failure, patient was given O2 support, seen by cardiology, diuresed and discharged with home diuretics  Patient met SIRS criteria as likely secondary to admission vital signs, upon discharge BCx -48 hours  Patient noted to have longer QT interval, seen by cardiology with adherence and continue previous medication Lasix 20 mg and Aldactone 12 5 mg from home  Patient had elevated blood pressure upon arrival, resolved back at baseline prior to discharge we will continue home metoprolol and lisinopril    Patient noted to have a history of bipolar 1 disorder, will continue home Zyprexa and follow-up outpatient specialist   Patient has a history of alcohol abuse, not in withdrawal upon arrival and discharge, given MVI and supplements, counseled on cessation  Patient has significant history of polysubstance use and counseled on cessation, continued inpatient Suboxone 40 mg confirmed with [place] by pharmacy, patient have follow-up outpatient  All other chronic conditions controlled with home medication or inpatient equivalent  Please see above list of diagnoses and related plan for additional information  Condition at Discharge: stable    Discharge Day Visit / Exam:   Subjective: Patient seen and examined at bedside, reported no chest pain, worsening shortness of breath or swelling  Patient stated that she felt fine and was ready to get home and wanted to catch the bus  Patient expressed concerns that PCP was [facility] but she has not been able to see them since 2019; patient informed that follow-up and appointments at [facility] can be made and to contact [facility] for transportation and able to get her Lyft ride  Patient reported that she would be compliant with medications and PCP follow-up visits to prevent further admissions  Vitals: Blood Pressure: 135/79 (03/18/23 0843)  Pulse: 94 (03/18/23 0843)  Temperature: 97 5 °F (36 4 °C) (03/18/23 0330)  Temp Source: Oral (03/18/23 0330)  Respirations: 16 (03/18/23 0330)  Height: 5' (152 4 cm) (03/16/23 2251)  Weight - Scale: 42 1 kg (92 lb 13 oz) (03/18/23 0600)  SpO2: 98 % (03/18/23 0330)     Exam:   Physical Exam  Vitals and nursing note reviewed  Constitutional:       General: She is not in acute distress  Appearance: She is well-developed  HENT:      Head: Normocephalic and atraumatic  Eyes:      Conjunctiva/sclera: Conjunctivae normal    Cardiovascular:      Rate and Rhythm: Normal rate and regular rhythm  Heart sounds: No murmur heard  No friction rub  No gallop  Pulmonary:      Effort: Pulmonary effort is normal  No respiratory distress  Breath sounds: Normal breath sounds  No stridor  No wheezing, rhonchi or rales     Abdominal:      Palpations: Abdomen is soft       Tenderness: There is no abdominal tenderness  There is no guarding or rebound  Musculoskeletal:         General: No swelling or tenderness  Cervical back: Neck supple  Right lower leg: No edema  Left lower leg: No edema  Skin:     General: Skin is warm and dry  Capillary Refill: Capillary refill takes less than 2 seconds  Findings: No bruising  Neurological:      Mental Status: She is alert and oriented to person, place, and time  Motor: No weakness  Psychiatric:         Mood and Affect: Mood normal          Behavior: Behavior normal           Discussion with Family: Patient declined call to   Discharge instructions/Information to patient and family:   See after visit summary for information provided to patient and family  Provisions for Follow-Up Care:  See after visit summary for information related to follow-up care and any pertinent home health orders  Disposition:   Home    Planned Readmission: No     Discharge Statement:  I spent 45 minutes discharging the patient  This time was spent on the day of discharge  I had direct contact with the patient on the day of discharge  Greater than 50% of the total time was spent examining patient, answering all patient questions, arranging and discussing plan of care with patient as well as directly providing post-discharge instructions  Additional time then spent on discharge activities  Discharge Medications:  See after visit summary for reconciled discharge medications provided to patient and/or family        **Please Note: This note may have been constructed using a voice recognition system**

## 2023-03-18 NOTE — CONSULTS
TeleConsultation - [address] Mariaelena 63 y o  female MRN: [id number]  Unit/Bed#: [address] [place] [address] [id number] Encounter: [id number]        REQUIRED DOCUMENTATION:     1  This service was provided via Telemedicine  2  Provider located at [facility]   3  TeleMed provider: Juan Antonio Kessler MD   4  Identify all parties in room with patient during tele consult: Patient   5  Patient was then informed that this was a Telemedicine visit and that the exam was being conducted confidentially over secure lines  My office door was closed  No one else was in the room  Patient acknowledged consent and understanding of privacy and security of the Telemedicine visit, and gave us permission to have the assistant stay in the room in order to assist with the history and to conduct the exam   I informed the patient that I have reviewed their record in Epic and presented the opportunity for them to ask any questions regarding the visit today  The patient agreed to participate  Assessment/Plan     Principal Problem:    Acute on chronic combined systolic and diastolic heart failure (HCC)  Active Problems:    Type 2 diabetes mellitus, without long-term current use of insulin (HCC)    Polysubstance abuse (HCC)    Smoker    Essential hypertension    Alcohol abuse    QT prolongation    Bipolar disorder (HCC)    SIRS (systemic inflammatory response syndrome) (HCC)    CAD (coronary artery disease)    Protein-calorie malnutrition (HCC)    Assessment:    Bipolar Disorder, type I, mixed, in full remission     -Patient without any active mood symptoms or other acute psychiatric complaints that would warrant voluntary or involuntary IP Psych       Treatment Plan:    Planned Medication Changes:    -None     Current Medications:     Current Facility-Administered Medications   Medication Dose Route Frequency Provider Last Rate   • acetaminophen  650 mg Oral Q6H PRN Marielena Gar DO     • atorvastatin  40 mg Oral Daily With [address] [place] [address], DO • clopidogrel  75 mg Oral Daily Marielena Revankar, DO     • enoxaparin  40 mg Subcutaneous Daily Marielena Revankar, DO     • folic acid  1 mg Oral Daily Marielena Revankar, DO     • insulin lispro  1-5 Units Subcutaneous TID AC Marielena Revankar, DO     • lisinopril  5 mg Oral Daily Marielena Revankar, DO     • melatonin  3 mg Oral HS YASMIN Denton     • methadone  40 mg Oral Daily Juan Caldera MD     • metoprolol succinate  50 mg Oral Daily Marielena Revankar, DO     • multivitamin stress formula  1 tablet Oral Daily Marielena Revankar, DO     • nicotine  1 patch Transdermal Daily Marielena Revankar, DO     • spironolactone  12 5 mg Oral Daily Nathaly Dillard PA-C     • thiamine  100 mg Oral Daily Marielena Revankar, DO         Risks / Benefits of Treatment:    Risks, benefits, and possible side effects of medications explained to patient and patient verbalizes understanding  Other treatment modalities ordered as indicated:    · psychotherapy  · outpatient referral      Inpatient consult to Psychiatry  Consult performed by: Javad Caban MD  Consult ordered by: Juan Caldera MD        Physician Requesting Consult: Juan Caldera MD  Principal Problem:Acute on chronic combined systolic and diastolic heart failure [facility])    Reason for Consult: Psych Evaluation       History of Present Illness      Patient reports that she is doing much better than she was doing yesterday as she could not breath  Patient reports that when she came in she was very agitated  Patient reports that she is doing well currently and that her mood has been stable  Patient reports that she does daily dosing of methadone and was at 140 mg and had heart attack and her dose has been lowered  Patient denied any current SI/HI/AVH or other acute psychiatric complaints        Psychiatric Review Of Systems:    sleep: yes  appetite changes: no  weight changes: no  energy/anergy: no  interest/pleasure/anhedonia: no  somatic symptoms: no  anxiety/panic: no  ashok: no  guilty/hopeless: no  self injurious behavior/risky behavior: no    Historical Information     Past Psychiatric History:     Psychiatric Hospitalizations:   • No history of past inpatient psychiatric admissions  Outpatient Treatment History:   • Denied  Suicide Attempts:   • None  History of self-harm:   • None  Violence History:   • no  Past Psychiatric medication trials: Unsure    Substance Abuse History: Denied any current use and has been clean for 6 years         Family Psychiatric History:  Denied         Social History:    Education: high school diploma/GED  Learning Disabilities: Denied  Marital history: single  Children: Yes  Living arrangement, social support: The patient lives in home with alone  Occupational History: on permanent disability  Functioning Relationships: good support system    Other Pertinent History: None    Traumatic History:     Abuse: None  Other Traumatic Events: none    Past Medical History:   Diagnosis Date   • Alcohol abuse    • Arthritis    • Asthma    • Bipolar disorder ([facility] )    • Diabetes mellitus ([facility] )    • Opiate abuse, continuous ([facility] )    • Smoker        Medical Review Of Systems:    Review of Systems    Meds/Allergies     all current active meds have been reviewed  No Known Allergies    Objective     Vital signs in last 24 hours:  Temp:  [97 8 °F (36 6 °C)-99 °F (37 2 °C)] 98 3 °F (36 8 °C)  HR:  [] 99  Resp:  [15-20] 16  BP: (100-131)/(61-85) 101/68    No intake or output data in the 24 hours ending 03/17/23 2041    Mental Status Evaluation:    Appearance:  age appropriate   Behavior:  normal   Speech:  normal pitch and normal volume   Mood:  normal   Affect:  normal   Language: naming objects   Thought Process:  normal   Associations intact associations   Thought Content:  normal   Perceptual Disturbances: None   Risk Potential: Suicidal Ideations none  Homicidal Ideations none  Potential for Aggression No   Sensorium:  person, place and time/date   Cognition: recent and remote memory grossly intact   Consciousness:  alert    Attention: attention span and concentration were age appropriate   Intellect: within normal limits   Fund of Knowledge: awareness of current events: President   Insight:  limited   Judgment: limited   Muscle Strength:  Muscle Tone: normal NFT  normal   Gait/Station: normal gait/station   Motor Activity: no abnormal movements       Lab Results: I have personally reviewed all pertinent laboratory/tests results  Most Recent Labs:   Lab Results   Component Value Date    WBC 6 03 03/17/2023    RBC 5 10 03/17/2023    HGB 15 3 03/17/2023    HCT 45 8 03/17/2023     03/17/2023    RDW 14 6 03/17/2023    NEUTROABS 5 56 03/16/2023    SODIUM 138 03/17/2023    K 3 9 03/17/2023    CL 99 03/17/2023    CO2 27 03/17/2023    BUN 15 03/17/2023    CREATININE 0 62 03/17/2023    GLUC 136 03/17/2023    GLUF 136 (H) 03/17/2023    CALCIUM 8 6 03/17/2023    AST 26 03/17/2023    ALT 21 03/17/2023    ALKPHOS 133 (H) 03/17/2023    TP 6 6 03/17/2023    ALB 3 4 (L) 03/17/2023    TBILI 1 21 (H) 03/17/2023    CHOLESTEROL 169 03/17/2023    HDL 59 03/17/2023    TRIG 60 03/17/2023    LDLCALC 98 03/17/2023    NONHDLC 110 03/17/2023    [id number] 0 331 (L) 04/21/2021    FREET4 1 67 (H) 04/21/2021    HGBA1C 5 6 03/17/2023     03/17/2023       Imaging Studies: XR chest 1 view portable    Result Date: 3/16/2023  Narrative: CHEST INDICATION:   cp  COMPARISON:  1/22/2023 EXAM PERFORMED/VIEWS:  XR CHEST PORTABLE FINDINGS: Cardiomediastinal silhouette appears enlarged  Diffuse pulmonary edema without pleural effusion or pneumothorax  Osseous structures appear within normal limits for patient age  Impression: Congestive heart failure   Workstation performed: [id number]     EKG/Pathology/Other Studies:   Lab Results   Component Value Date    VENTRATE 147 03/16/2023    ATRIALRATE 147 03/16/2023    PRINT 124 03/16/2023    QRSDINT 84 03/16/2023    QTINT 260 03/16/2023 QTCINT 406 03/16/2023    PAXIS 65 03/16/2023    QRSAXIS -24 03/16/2023    TWAVEAXIS 87 03/16/2023        Code Status: Level 1 - Full Code  Advance Directive and Living Will:      Power of : Yes  POLST:      Counseling / Coordination of Care: Total floor / unit time spent today 30 minutes  Greater than 50% of total time was spent with the patient and / or family counseling and / or coordination of care  A description of the counseling / coordination of care: Direct Patient Care, Chart Review, and Documentation

## 2023-03-18 NOTE — NURSING NOTE
Discharge instructions given to patient and family  No questions at this time  New medications reviewed, all questions answered  All belongings at side, patient left steady   Was discharged to lobby with  This RN and got into [employer] vehicle safely to her home destination

## 2023-03-18 NOTE — PLAN OF CARE
Problem: MOBILITY - ADULT  Goal: Maintain or return to baseline ADL function  Description: INTERVENTIONS:  -  Assess patient's ability to carry out ADLs; assess patient's baseline for ADL function and identify physical deficits which impact ability to perform ADLs (bathing, care of mouth/teeth, toileting, grooming, dressing, etc )  - Assess/evaluate cause of self-care deficits   - Assess range of motion  - Assess patient's mobility; develop plan if impaired  - Assess patient's need for assistive devices and provide as appropriate  - Encourage maximum independence but intervene and supervise when necessary  - Involve family in performance of ADLs  - Assess for home care needs following discharge   - Consider OT consult to assist with ADL evaluation and planning for discharge  - Provide patient education as appropriate  Outcome: Progressing  Goal: Maintains/Returns to pre admission functional level  Description: INTERVENTIONS:  - Perform BMAT or MOVE assessment daily    - Set and communicate daily mobility goal to care team and patient/family/caregiver  - Collaborate with rehabilitation services on mobility goals if consulted  - Perform Range of Motion 3 times a day  - Reposition patient every 2 hours    - Dangle patient 3 times a day  - Stand patient 3 times a day  - Ambulate patient 3 times a day  - Out of bed to chair 3 times a day   - Out of bed for meals 3 times a day  - Out of bed for toileting  - Record patient progress and toleration of activity level   Outcome: Progressing     Problem: PAIN - ADULT  Goal: Verbalizes/displays adequate comfort level or baseline comfort level  Description: Interventions:  - Encourage patient to monitor pain and request assistance  - Assess pain using appropriate pain scale  - Administer analgesics based on type and severity of pain and evaluate response  - Implement non-pharmacological measures as appropriate and evaluate response  - Consider cultural and social influences on pain and pain management  - Notify physician/advanced practitioner if interventions unsuccessful or patient reports new pain  Outcome: Progressing     Problem: INFECTION - ADULT  Goal: Absence or prevention of progression during hospitalization  Description: INTERVENTIONS:  - Assess and monitor for signs and symptoms of infection  - Monitor lab/diagnostic results  - Monitor all insertion sites, i e  indwelling lines, tubes, and drains  - Monitor endotracheal if appropriate and nasal secretions for changes in amount and color  - [place] appropriate cooling/warming therapies per order  - Administer medications as ordered  - Instruct and encourage patient and family to use good hand hygiene technique  - Identify and instruct in appropriate isolation precautions for identified infection/condition  Outcome: Progressing  Goal: Absence of fever/infection during neutropenic period  Description: INTERVENTIONS:  - Monitor WBC    Outcome: Progressing     Problem: SAFETY ADULT  Goal: Maintain or return to baseline ADL function  Description: INTERVENTIONS:  -  Assess patient's ability to carry out ADLs; assess patient's baseline for ADL function and identify physical deficits which impact ability to perform ADLs (bathing, care of mouth/teeth, toileting, grooming, dressing, etc )  - Assess/evaluate cause of self-care deficits   - Assess range of motion  - Assess patient's mobility; develop plan if impaired  - Assess patient's need for assistive devices and provide as appropriate  - Encourage maximum independence but intervene and supervise when necessary  - Involve family in performance of ADLs  - Assess for home care needs following discharge   - Consider OT consult to assist with ADL evaluation and planning for discharge  - Provide patient education as appropriate  Outcome: Progressing  Goal: Maintains/Returns to pre admission functional level  Description: INTERVENTIONS:  - Perform BMAT or MOVE assessment daily    - Set and communicate daily mobility goal to care team and patient/family/caregiver  - Collaborate with rehabilitation services on mobility goals if consulted  - Perform Range of Motion 3 times a day  - Reposition patient every 2 hours    - Dangle patient 3 times a day  - Stand patient 3 times a day  - Ambulate patient 3 times a day  - Out of bed to chair 3 times a day   - Out of bed for meals 3 times a day  - Out of bed for toileting  - Record patient progress and toleration of activity level   Outcome: Progressing  Goal: Patient will remain free of falls  Description: INTERVENTIONS:  - Educate patient/family on patient safety including physical limitations  - Instruct patient to call for assistance with activity   - Consult OT/PT to assist with strengthening/mobility   - Keep Call bell within reach  - Keep bed low and locked with side rails adjusted as appropriate  - Keep care items and personal belongings within reach  - Initiate and maintain comfort rounds  - Make Fall Risk Sign visible to staff  - Offer Toileting every 2 Hours, in advance of need  - Initiate/Maintain BED alarm  - Obtain necessary fall risk management equipment: bed alarm   - Apply yellow socks and bracelet for high fall risk patients  - Consider moving patient to room near nurses station  Outcome: Progressing     Problem: DISCHARGE PLANNING  Goal: Discharge to home or other facility with appropriate resources  Description: INTERVENTIONS:  - Identify barriers to discharge w/patient and caregiver  - Arrange for needed discharge resources and transportation as appropriate  - Identify discharge learning needs (meds, wound care, etc )  - Arrange for interpretive services to assist at discharge as needed  - Refer to Case Management Department for coordinating discharge planning if the patient needs post-hospital services based on physician/advanced practitioner order or complex needs related to functional status, cognitive ability, or social support system  Outcome: Progressing     Problem: Knowledge Deficit  Goal: Patient/family/caregiver demonstrates understanding of disease process, treatment plan, medications, and discharge instructions  Description: Complete learning assessment and assess knowledge base  Interventions:  - Provide teaching at level of understanding  - Provide teaching via preferred learning methods  Outcome: Progressing     Problem: Prexisting or High Potential for Compromised Skin Integrity  Goal: Skin integrity is maintained or improved  Description: INTERVENTIONS:  - Identify patients at risk for skin breakdown  - Assess and monitor skin integrity  - Assess and monitor nutrition and hydration status  - Monitor labs   - Assess for incontinence   - Turn and reposition patient  - Assist with mobility/ambulation  - Relieve pressure over bony prominences  - Avoid friction and shearing  - Provide appropriate hygiene as needed including keeping skin clean and dry  - Evaluate need for skin moisturizer/barrier cream  - Collaborate with interdisciplinary team   - Patient/family teaching  - Consider wound care consult   Outcome: Progressing     Problem: Nutrition/Hydration-ADULT  Goal: Nutrient/Hydration intake appropriate for improving, restoring or maintaining nutritional needs  Description: Monitor and assess patient's nutrition/hydration status for malnutrition  Collaborate with interdisciplinary team and initiate plan and interventions as ordered  Monitor patient's weight and dietary intake as ordered or per policy  Utilize nutrition screening tool and intervene as necessary  Determine patient's food preferences and provide high-protein, high-caloric foods as appropriate       INTERVENTIONS:  - Monitor oral intake, urinary output, labs, and treatment plans  - Assess nutrition and hydration status and recommend course of action  - Evaluate amount of meals eaten  - Assist patient with eating if necessary   - Allow adequate time for meals  - Recommend/ encourage appropriate diets, oral nutritional supplements, and vitamin/mineral supplements  - Assess need for intravenous fluids  - Provide specific nutrition/hydration education as appropriate  - Include patient/family/caregiver in decisions related to nutrition  Outcome: Progressing

## 2023-03-18 NOTE — PLAN OF CARE
Problem: MOBILITY - ADULT  Goal: Maintain or return to baseline ADL function  Description: INTERVENTIONS:  -  Assess patient's ability to carry out ADLs; assess patient's baseline for ADL function and identify physical deficits which impact ability to perform ADLs (bathing, care of mouth/teeth, toileting, grooming, dressing, etc )  - Assess/evaluate cause of self-care deficits   - Assess range of motion  - Assess patient's mobility; develop plan if impaired  - Assess patient's need for assistive devices and provide as appropriate  - Encourage maximum independence but intervene and supervise when necessary  - Involve family in performance of ADLs  - Assess for home care needs following discharge   - Consider OT consult to assist with ADL evaluation and planning for discharge  - Provide patient education as appropriate  Outcome: Progressing  Goal: Maintains/Returns to pre admission functional level  Description: INTERVENTIONS:  - Perform BMAT or MOVE assessment daily    - Set and communicate daily mobility goal to care team and patient/family/caregiver  - Collaborate with rehabilitation services on mobility goals if consulted  - Perform [place] of Motion 2 times a day  - Reposition patient every 2 hours    - Stand patient 2 times a day  - Ambulate patient 2 times a day  - Out of bed to chair 2 times a day   - Out of bed for meals 3 times a day  - Out of bed for toileting  - Record patient progress and toleration of activity level   Outcome: Progressing     Problem: PAIN - ADULT  Goal: Verbalizes/displays adequate comfort level or baseline comfort level  Description: Interventions:  - Encourage patient to monitor pain and request assistance  - Assess pain using appropriate pain scale  - Administer analgesics based on type and severity of pain and evaluate response  - Implement non-pharmacological measures as appropriate and evaluate response  - Consider cultural and social influences on pain and pain management  - Notify physician/advanced practitioner if interventions unsuccessful or patient reports new pain  Outcome: Progressing     Problem: INFECTION - ADULT  Goal: Absence or prevention of progression during hospitalization  Description: INTERVENTIONS:  - Assess and monitor for signs and symptoms of infection  - Monitor lab/diagnostic results  - Monitor all insertion sites, i e  indwelling lines, tubes, and drains  - Monitor endotracheal if appropriate and nasal secretions for changes in amount and color  - [place] appropriate cooling/warming therapies per order  - Administer medications as ordered  - Instruct and encourage patient and family to use good hand hygiene technique  - Identify and instruct in appropriate isolation precautions for identified infection/condition  Outcome: Progressing  Goal: Absence of fever/infection during neutropenic period  Description: INTERVENTIONS:  - Monitor WBC    Outcome: Progressing     Problem: SAFETY ADULT  Goal: Maintain or return to baseline ADL function  Description: INTERVENTIONS:  -  Assess patient's ability to carry out ADLs; assess patient's baseline for ADL function and identify physical deficits which impact ability to perform ADLs (bathing, care of mouth/teeth, toileting, grooming, dressing, etc )  - Assess/evaluate cause of self-care deficits   - Assess range of motion  - Assess patient's mobility; develop plan if impaired  - Assess patient's need for assistive devices and provide as appropriate  - Encourage maximum independence but intervene and supervise when necessary  - Involve family in performance of ADLs  - Assess for home care needs following discharge   - Consider OT consult to assist with ADL evaluation and planning for discharge  - Provide patient education as appropriate  Outcome: Progressing  Goal: Maintains/Returns to pre admission functional level  Description: INTERVENTIONS:  - Perform BMAT or MOVE assessment daily    - Set and communicate daily mobility goal to care team and patient/family/caregiver  - Collaborate with rehabilitation services on mobility goals if consulted  - Out of bed for toileting  - Record patient progress and toleration of activity level   Outcome: Progressing  Goal: Patient will remain free of falls  Description: INTERVENTIONS:  - Educate patient/family on patient safety including physical limitations  - Instruct patient to call for assistance with activity   - Consult OT/PT to assist with strengthening/mobility   - Keep Call bell within reach  - Keep bed low and locked with side rails adjusted as appropriate  - Keep care items and personal belongings within reach  - Initiate and maintain comfort rounds  - Make Fall Risk Sign visible to staff  - Offer Toileting every 2 Hours, in advance of need  - Initiate/Maintain bed alarm  - Apply yellow socks and bracelet for high fall risk patients  - Consider moving patient to room near nurses station  Outcome: Progressing     Problem: DISCHARGE PLANNING  Goal: Discharge to home or other facility with appropriate resources  Description: INTERVENTIONS:  - Identify barriers to discharge w/patient and caregiver  - Arrange for needed discharge resources and transportation as appropriate  - Identify discharge learning needs (meds, wound care, etc )  - Arrange for interpretive services to assist at discharge as needed  - Refer to Case Management Department for coordinating discharge planning if the patient needs post-hospital services based on physician/advanced practitioner order or complex needs related to functional status, cognitive ability, or social support system  Outcome: Progressing     Problem: Knowledge Deficit  Goal: Patient/family/caregiver demonstrates understanding of disease process, treatment plan, medications, and discharge instructions  Description: Complete learning assessment and assess knowledge base    Interventions:  - Provide teaching at level of understanding  - Provide teaching via preferred learning methods  Outcome: Progressing     Problem: Prexisting or High Potential for Compromised Skin Integrity  Goal: Skin integrity is maintained or improved  Description: INTERVENTIONS:  - Identify patients at risk for skin breakdown  - Assess and monitor skin integrity  - Assess and monitor nutrition and hydration status  - Monitor labs   - Assess for incontinence   - Turn and reposition patient  - Assist with mobility/ambulation  - Relieve pressure over bony prominences  - Avoid friction and shearing  - Provide appropriate hygiene as needed including keeping skin clean and dry  - Evaluate need for skin moisturizer/barrier cream  - Collaborate with interdisciplinary team   - Patient/family teaching  - Consider wound care consult   Outcome: Progressing     Problem: Nutrition/Hydration-ADULT  Goal: Nutrient/Hydration intake appropriate for improving, restoring or maintaining nutritional needs  Description: Monitor and assess patient's nutrition/hydration status for malnutrition  Collaborate with interdisciplinary team and initiate plan and interventions as ordered  Monitor patient's weight and dietary intake as ordered or per policy  Utilize nutrition screening tool and intervene as necessary  Determine patient's food preferences and provide high-protein, high-caloric foods as appropriate       INTERVENTIONS:  - Monitor oral intake, urinary output, labs, and treatment plans  - Assess nutrition and hydration status and recommend course of action  - Evaluate amount of meals eaten  - Assist patient with eating if necessary   - Allow adequate time for meals  - Recommend/ encourage appropriate diets, oral nutritional supplements, and vitamin/mineral supplements  - Assess need for intravenous fluids  - Provide specific nutrition/hydration education as appropriate  - Include patient/family/caregiver in decisions related to nutrition  Outcome: Progressing

## 2023-03-19 NOTE — UTILIZATION REVIEW
NOTIFICATION OF INPATIENT ADMISSION   AUTHORIZATION REQUEST   SERVICING FACILITY:   [place] [address]  Tax ID: [id number]  NPI: [id number] ATTENDING PROVIDER:  Attending Name and NPI#: Constantin Ricci [[id number]]  Address: [address] [place] [address]  Phone: [phone]   ADMISSION INFORMATION:  Place of Service: Inpatient [address] [place] [address] Code: 21  Inpatient Admission Date/Time: 3/17/23  3:12 PM  Discharge Date/Time: 3/18/2023 12:03 PM  Admitting Diagnosis Code/Description:  Acute on chronic combined systolic and diastolic heart failure (HCC) [I50 43]  CHF (congestive heart failure) ([facility] [address] ) [I50 9]  Chest pain [R07 9]  History of bipolar disorder [Z86 59]     UTILIZATION REVIEW CONTACT:  Sakshi Renee Utilization   Network Utilization Review Department  Phone: [phone]  Fax [phone]  Email: [email or web address]  Contact for approvals/pending authorizations, clinical reviews, and discharge  PHYSICIAN ADVISORY SERVICES:  Medical Necessity Denial & Akpv-pw-Eqfz Review  Phone: [phone]  Fax: [phone]  Email: [email or web address]

## 2023-03-19 NOTE — UTILIZATION REVIEW
NOTIFICATION OF INPATIENT ADMISSION   AUTHORIZATION REQUEST   SERVICING FACILITY:   [place] [address]  [address] [place] [address]  Tax ID: [id number]  NPI: [id number] ATTENDING PROVIDER:  Attending Name and NPI#: Constantin Renee [[id number]]  Address: [address] [place] [address]  Phone: [phone]   ADMISSION INFORMATION:  Place of Service: Inpatient [facility] Code: 21  Inpatient Admission Date/Time: 3/17/23  3:12 PM  Discharge Date/Time: 3/18/2023 12:03 PM  Admitting Diagnosis Code/Description:  Acute on chronic combined systolic and diastolic heart failure (HCC) [I50 43]  CHF (congestive heart failure) ([facility] [address] ) [I50 9]  Chest pain [R07 9]  History of bipolar disorder [Z86 59]     UTILIZATION REVIEW CONTACT:  Asher Mccarthy Utilization   Network Utilization Review Department  Phone: [phone]  Fax [phone]  Email: [email or web address]  Contact for approvals/pending authorizations, clinical reviews, and discharge  PHYSICIAN ADVISORY SERVICES:  Medical Necessity Denial & Cxud-cv-Iqzq Review  Phone: [phone]  Fax: [phone]  Email: [email or web address]

## 2023-03-19 NOTE — UTILIZATION REVIEW
Initial Clinical Review    Observation 3/16 @ 1806 and change to Inpatient 3/17 @ 1512  Pt requiring continue stay for Acute on chronic combined systolic and diastolic heart failure, SIRS/ Iv Lasix    Admission: Date/Time/Statement:   Admission Orders (From admission, onward)     Inpatient Admission  Once        Transfer Service: Hospitalist       Question Answer   Level of Care Med Surg   Estimated length of stay More than 2 Midnights   Certification I certify that inpatient services are medically necessary for this patient for a duration of greater than two midnights  See H&P and MD Progress Notes for additional information about the patient's course of treatment  03/17/23 1512        Ordered        03/16/23 1806  Place in Observation  Once                   Orders Placed This Encounter   Procedures   • Inpatient Admission     Standing Status:   Standing     Number of Occurrences:   1     Order Specific Question:   Level of Care     Answer:   Med Surg [16]     Order Specific Question:   Estimated length of stay     Answer:   More than 2 Midnights     Order Specific Question:   Certification     Answer:   I certify that inpatient services are medically necessary for this patient for a duration of greater than two midnights  See H&P and MD Progress Notes for additional information about the patient's course of treatment  ED Arrival Information     Expected   -    Arrival   3/16/2023 14:34    Acuity   Urgent            Means of arrival   Ambulance    Escorted by   [address]    Admission type   Emergency            Arrival complaint   -           Chief Complaint   Patient presents with   • Shortness of Breath     CP and SOB since this am        Initial Presentation: 63 y o  female to ED presents for shortness of breath and chest pain  Pt unable to provide any information as she is extremely sleepy after receiving Ativan and Zyprexa in the ED   Pt was belligerent and combative when she came to the ER, shouting and cursing at staff members and refusing treatment  She refused BiPAP/nebulizer treatment initially and then was given 1 mg of IM Ativan and IM 10 mg Zyprexa  Initially noted to be hypoxic, tachycardic, tachypneic  Placed on Venturi mask and since then transition to 2 L of oxygen  Given IV Lasix 40 mg x 1 in the ER  PMH for Bipolar disorder, CAD, HTN, T2DM, Polysubstance abuse and smoker  Ischemic cardiomyopathy  Drinks a pint of vodka daily,   Admit Observation level of care for Acute on chronic combined systolic and diastolic heart failure, Acute respiratory failure with hypoxia and hypercapnia, SIRS, QT prolongation and Alcohol abuse  O2 sat of 87% on room air, currently on O2 2L NC  ABG with PCO2 mildly elevated with pH of 7 2  Echo in Nov/2022 with EF of 22% with with grade 2 diastolic dysfunction  Severe tricuspid regurgitation noted  Elevated BNP  Continue IV Lasix daily  Trend troponin  Cardiology consult  Given neb treatments and Iv Solu-Medrol in ED  Elevated lactic acid, hold off on antibiotics for now  Bld cultures  EKG shows sinus tachycardia with LVH  Repeat EKG showed sinus tachycardia with improved heart rate, LVH and prolonged QTc of 563  Will repeat EKG  Etoh level less than 10  CIWA assess  Thiamine and folic acid  Urine tox positive for methadone  3/17 Changed to Inpatient status  Cardiology cons; Acute on chronic combined diastolic heart failure  Elevated BNP 1,002, CXR CHF  Continue Iv Lasix  Fluid restriction 1500 ml  Pt poor candidate for ICD given she is not complaint  BP improved from admit  On exam; tachycardia  Decreased breath sounds  Murmur  Progress notes; No steriod at this time  Continue Iv Lasix         ED Triage Vitals   Temperature Pulse Respirations Blood Pressure SpO2   03/16/23 1455 03/16/23 1455 03/16/23 1455 03/16/23 1455 03/16/23 1455   98 5 °F (36 9 °C) (!) 147 (!) 30 (!) 244/114 (!) 87 %      Temp Source Heart Rate Source Patient Position - Orthostatic VS BP Location FiO2 (%)   03/16/23 1455 03/16/23 1455 03/16/23 1455 03/16/23 1455 03/16/23 1557   Oral Monitor Lying Right arm 45      Pain Score       03/17/23 0000       No Pain          Wt Readings from Last 1 Encounters:   03/18/23 42 1 kg (92 lb 13 oz)     Additional Vital Signs:   03/17/23 0739 97 8 °F (36 6 °C) 101 19 114/74 90 96 % -- 28 -- 2 L/min Nasal cannula Lying   03/17/23 0336 98 °F (36 7 °C) 92 18 121/85 99 94 % -- 28 2 L/min 2 L/min Nasal cannula Lying   03/17/23 0000 99 °F (37 2 °C) 95 15 131/79 100 96 % -- -- 2 L/min -- Nasal cannula Lying     03/16/23 2251 97 8 °F (36 6 °C) 93 16 -- -- 95 % -- -- 2 L/min -- Nasal cannula --   03/16/23 2100 -- 96 18 116/73 89 100 % -- -- 2 L/min -- Nasal cannula Lying     03/16/23 1730 -- 112   Abnormal  -- 111/64 83 98 % -- -- -- -- -- --   03/16/23 1700 -- 120   Abnormal  20 119/68 88 99 % -- -- 2 L/min -- Nasal cannula --     Pertinent Labs/Diagnostic Test Results:   XR chest 1 view portable   Final Result by Armin Braswell MD (03/16 1532)      Congestive heart failure                    Workstation performed: [id number]           Results from last 7 days   Lab Units 03/16/23  1814   SARS-COV-2  Negative     Results from last 7 days   Lab Units 03/17/23  0447 03/16/23  1519   WBC Thousand/uL 6 03 7 71   HEMOGLOBIN g/dL 15 3 17 2*   HEMATOCRIT % 45 8 53 4*   PLATELETS Thousands/uL 240 249   NEUTROS ABS Thousands/µL  --  5 56         Results from last 7 days   Lab Units 03/17/23  0447 03/16/23  1538   SODIUM mmol/L 138 141   POTASSIUM mmol/L 3 9 4 3   CHLORIDE mmol/L 99 100   CO2 mmol/L 27 21   ANION GAP mmol/L 12 20*   BUN mg/dL 15 14   CREATININE mg/dL 0 62 0 86   EGFR ml/min/1 73sq m 98 73   CALCIUM mg/dL 8 6 8 8   MAGNESIUM mg/dL 2 7 1 8*     Results from last 7 days   Lab Units 03/17/23  0447 03/16/23  1538   AST U/L 26 51*   ALT U/L 21 27   ALK PHOS U/L 133* 188*   TOTAL PROTEIN g/dL 6 6 7 5   ALBUMIN g/dL 3 4* 4 0   TOTAL BILIRUBIN mg/dL 1 21* 1 33*     Results from last 7 days   Lab Units 03/18/23  1040 03/18/23  0744 03/18/23  0700 03/17/23  2035 03/17/23  1613 03/17/23  1140 03/17/23  0745 03/16/23  1505   POC GLUCOSE mg/dl 89 179* 60* 96 198* 140 125 145*     Results from last 7 days   Lab Units 03/17/23  0447 03/16/23  1538   GLUCOSE RANDOM mg/dL 136 152*         Results from last 7 days   Lab Units 03/17/23  0447   HEMOGLOBIN A1C % 5 6   EAG mg/dl 114     BETA-HYDROXYBUTYRATE   Date Value Ref [place] Status   05/25/2020 0 1 <0 6 mmol/L Final   07/10/2019 0 1 <0 6 mmol/L Final      Results from last 7 days   Lab Units 03/16/23  1502   PH ART  7 227*   PCO2 ART mm Hg 46 5*   PO2 ART mm Hg 47 9*   HCO3 ART mmol/L 18 9*   BASE EXC ART mmol/L -8 7   O2 CONTENT ART mL/dL 17 5   O2 HGB, ARTERIAL % 71 3*   ABG SOURCE  Brachial, Left                 Results from last 7 days   Lab Units 03/16/23  2240 03/16/23  1808 03/16/23  1519   HS TNI 0HR ng/L  --   --  32   HS TNI 2HR ng/L  --  34  --    HSTNI D2 ng/L  --  2  --    HS TNI 4HR ng/L 33  --   --    HSTNI D4 ng/L 1  --   --                      Results from last 7 days   Lab Units 03/16/23  1808 03/16/23  1622   LACTIC ACID mmol/L 2 0 3 5*             Results from last 7 days   Lab Units 03/16/23  1519   BNP pg/mL 1,002*       Results from last 7 days   Lab Units 03/16/23  1712   CLARITY UA  Clear   COLOR UA  Yellow   SPEC GRAV UA  >=1 030   PH UA  6 0   GLUCOSE UA mg/dl Negative   KETONES UA mg/dl Negative   BLOOD UA  Negative   PROTEIN UA mg/dl 30 (1+)*   NITRITE UA  Negative   BILIRUBIN UA  Small*   UROBILINOGEN UA E U /dl 1 0   LEUKOCYTES UA  Negative   WBC UA /hpf None Seen   RBC UA /hpf None Seen   BACTERIA UA /hpf Occasional   EPITHELIAL CELLS WET PREP /hpf Occasional     Results from last 7 days   Lab Units 03/16/23  1814   INFLUENZA A PCR  Negative   INFLUENZA B PCR  Negative   RSV PCR  Negative         Results from last 7 days   Lab Units 03/16/23  1712   AMPH/METH  Negative BARBITURATE UR  Negative   BENZODIAZEPINE UR  Negative   COCAINE UR  Negative   METHADONE URINE  Positive*   OPIATE UR  Negative   PCP UR  Negative   THC UR  Negative     Results from last 7 days   Lab Units 03/16/23  1538 03/16/23  1519   ETHANOL LVL mg/dL <10  --    ACETAMINOPHEN LVL ug/mL  --  <68*   SALICYLATE LVL mg/dL  --  <5                 Results from last 7 days   Lab Units 03/16/23  1621 03/16/23  1539   BLOOD CULTURE  No Growth at 48 hrs  No Growth at 48 hrs                 ED Treatment:   Medication Administration from 03/16/2023 1434 to 03/16/2023 2214       Date/Time Order Dose Route Action     03/16/2023 1449 EDT albuterol inhalation solution 10 mg 10 mg Nebulization Given     03/16/2023 1450 EDT ipratropium (ATROVENT) 0 02 % inhalation solution 1 mg 1 mg Nebulization Given     03/16/2023 1449 EDT sodium chloride 0 9 % inhalation solution 3 mL 3 mL Nebulization Given     03/16/2023 1455 EDT LORazepam (ATIVAN) injection 1 mg 1 mg Intramuscular Given     03/16/2023 1500 EDT OLANZapine (ZyPREXA) IM injection 10 mg 10 mg Intramuscular Given     03/16/2023 1618 EDT methylPREDNISolone sodium succinate (Solu-MEDROL) injection 100 mg 100 mg Intravenous Given     03/16/2023 1702 EDT furosemide (LASIX) injection 40 mg 40 mg Intravenous Given        Past Medical History:   Diagnosis Date   • Alcohol abuse    • Arthritis    • Asthma    • Bipolar disorder ([facility] )    • Diabetes mellitus ([facility] )    • Opiate abuse, continuous ([place] [address] )    • Smoker      Present on Admission:  • Acute on chronic combined systolic and diastolic heart failure (HCC)  • (Resolved) Acute respiratory failure with hypoxia and hypercapnia ([facility])  • Bipolar disorder ([facility])  • CAD (coronary artery disease)  • Essential hypertension  • Polysubstance abuse ([facility])  • QT prolongation  • Type 2 diabetes mellitus, without long-term current use of insulin ([facility])  • SIRS (systemic inflammatory response syndrome) ([facility] )  • Alcohol abuse      Admitting Diagnosis: Acute on chronic combined systolic and diastolic heart failure (HCC) [I50 43]  CHF (congestive heart failure) ([facility] [address] ) [I50 9]  Chest pain [R07 9]  History of bipolar disorder [Z86 59]  Age/Sex: 63 y o  female     Admission Orders:  Scheduled Medications:  No current facility-administered medications for this encounter  Continuous IV Infusions: None  No current facility-administered medications for this encounter  PRN Meds:  No current facility-administered medications for this encounter  IP CONSULT TO CARDIOLOGY  IP CONSULT TO PSYCHIATRY    Network Utilization Review Department  ATTENTION: Please call with any questions or concerns to [phone] and carefully listen to the prompts so that you are directed to the right person  All voicemails are confidential   Farrel Bodily all requests for admission clinical reviews, approved or denied determinations and any other requests to dedicated fax number below belonging to the campus where the patient is receiving treatment   List of dedicated fax numbers for the Facilities:  [address] DENIALS (Administrative/Medical Necessity) [phone]   [address] (Maternity/NICU/Pediatrics) [phone]   7 Viri Youngblood [phone]   [facility] [phone]   [address] [place] [address] Medical [place] [place] [address] [place] [address] [phone]   [address] [facility] [place] [address] [place] [address] [phone]

## 2023-03-21 ENCOUNTER — TRANSITIONAL CARE MANAGEMENT (OUTPATIENT)
Dept: FAMILY MEDICINE CLINIC | Facility: CLINIC | Age: 63
End: 2023-03-21

## 2023-03-21 ENCOUNTER — PATIENT OUTREACH (OUTPATIENT)
Dept: FAMILY MEDICINE CLINIC | Facility: CLINIC | Age: 63
End: 2023-03-21

## 2023-03-21 DIAGNOSIS — I50.43 ACUTE ON CHRONIC COMBINED SYSTOLIC AND DIASTOLIC HEART FAILURE (HCC): Primary | ICD-10-CM

## 2023-03-21 DIAGNOSIS — Z74.8 ASSISTANCE NEEDED WITH TRANSPORTATION: ICD-10-CM

## 2023-03-21 LAB
BACTERIA BLD CULT: NORMAL
BACTERIA BLD CULT: NORMAL

## 2023-03-21 NOTE — PROGRESS NOTES
[facility] had received an in basket on 3/17 from From , Karla Donald r/t transportation issues  Per in basket, patient needs discussion of long term transportation options  Per in basket, a Lyft ride will be scheduled for this patient's appointment with VENITA EMANUELCM had completed a chart review  Per chart, patient has Medicaid  SWCM notes medical transportation for Medicaid eligible patient is scheduled through [address]  SWCM will call patient to discuss this information further  JENAE sent in basket response back to Yasmine Castillo, provider and RN ALKA Martin who were all included in message  SWCM will continue to f/u

## 2023-03-21 NOTE — PROGRESS NOTES
Attempted contacting patient to schedule New Patient/ TCM appointment reached VM left message with appointment information  If/Once patient calls back to confirm appointment time/date we can then set up LY Transportion        TCM 3/22/23 @ 9:40 w/ Dr Claudell Mosses

## 2023-03-23 ENCOUNTER — PATIENT OUTREACH (OUTPATIENT)
Dept: FAMILY MEDICINE CLINIC | Facility: CLINIC | Age: 63
End: 2023-03-23

## 2023-03-23 NOTE — PROGRESS NOTES
GINA had called patient via phone  GINA had left a voicemail for this patient  [facility] will attempt to call again at a later date and time  SW will continue to be available

## 2023-03-24 ENCOUNTER — TELEPHONE (OUTPATIENT)
Dept: INPATIENT UNIT | Facility: HOSPITAL | Age: 63
End: 2023-03-24

## 2023-03-30 ENCOUNTER — TELEPHONE (OUTPATIENT)
Dept: INPATIENT UNIT | Facility: HOSPITAL | Age: 63
End: 2023-03-30

## 2023-04-04 ENCOUNTER — PATIENT OUTREACH (OUTPATIENT)
Dept: FAMILY MEDICINE CLINIC | Facility: CLINIC | Age: 63
End: 2023-04-04

## 2023-04-04 NOTE — PROGRESS NOTES
GINA had called the patient via phone  GINA left a voicemail  GINA notes this is the second phone call attempt  GINA will mail out unable to reach letter today  JENAE closed referral  Please reconsult SW for future needs

## 2023-04-04 NOTE — LETTER
[facility] [address]    Re: Cl Becky to reach   4/4/2023       Dear Zak Rodgers,    We tried to reach you by phone and was unfortunately unable to reach you  It is important that you contact the Outpatient Care Manager from [address] [place] [address] at [phone]      Sincerely,         [address] [facility] [address]

## 2023-05-06 ENCOUNTER — APPOINTMENT (EMERGENCY)
Dept: RADIOLOGY | Facility: HOSPITAL | Age: 63
End: 2023-05-06

## 2023-05-06 ENCOUNTER — HOSPITAL ENCOUNTER (EMERGENCY)
Facility: HOSPITAL | Age: 63
Discharge: HOME | End: 2023-05-06
Attending: EMERGENCY MEDICINE

## 2023-05-06 VITALS
HEART RATE: 89 BPM | DIASTOLIC BLOOD PRESSURE: 81 MMHG | RESPIRATION RATE: 20 BRPM | SYSTOLIC BLOOD PRESSURE: 119 MMHG | TEMPERATURE: 97.5 F | OXYGEN SATURATION: 99 %

## 2023-05-06 DIAGNOSIS — F10.10 CHRONIC ALCOHOL ABUSE: ICD-10-CM

## 2023-05-06 DIAGNOSIS — F10.929 ALCOHOL INTOXICATION (HCC): ICD-10-CM

## 2023-05-06 DIAGNOSIS — R07.9 CHEST PAIN, UNSPECIFIED: Primary | ICD-10-CM

## 2023-05-06 DIAGNOSIS — Z86.79 HISTORY OF CHRONIC CHF: ICD-10-CM

## 2023-05-06 LAB
2HR DELTA HS TROPONIN: 1 NG/L
ALBUMIN SERPL BCP-MCNC: 3.6 G/DL (ref 3.5–5)
ALP SERPL-CCNC: 177 U/L (ref 34–104)
ALT SERPL W P-5'-P-CCNC: 15 U/L (ref 7–52)
AMPHETAMINES SERPL QL SCN: NEGATIVE
ANION GAP SERPL CALCULATED.3IONS-SCNC: 8 MMOL/L (ref 4–13)
AST SERPL W P-5'-P-CCNC: 25 U/L (ref 13–39)
BARBITURATES UR QL: NEGATIVE
BASOPHILS # BLD AUTO: 0.04 THOUSANDS/ÂΜL (ref 0–0.1)
BASOPHILS NFR BLD AUTO: 1 % (ref 0–1)
BENZODIAZ UR QL: NEGATIVE
BILIRUB SERPL-MCNC: 0.45 MG/DL (ref 0.2–1)
BILIRUB UR QL STRIP: NEGATIVE
BNP SERPL-MCNC: 820 PG/ML (ref 0–100)
BUN SERPL-MCNC: 12 MG/DL (ref 5–25)
CALCIUM SERPL-MCNC: 8.5 MG/DL (ref 8.4–10.2)
CARDIAC TROPONIN I PNL SERPL HS: 20 NG/L
CARDIAC TROPONIN I PNL SERPL HS: 21 NG/L
CHLORIDE SERPL-SCNC: 103 MMOL/L (ref 96–108)
CLARITY UR: CLEAR
CO2 SERPL-SCNC: 29 MMOL/L (ref 21–32)
COCAINE UR QL: NEGATIVE
COLOR UR: NORMAL
CREAT SERPL-MCNC: 0.53 MG/DL (ref 0.6–1.3)
EOSINOPHIL # BLD AUTO: 0.08 THOUSAND/ÂΜL (ref 0–0.61)
EOSINOPHIL NFR BLD AUTO: 1 % (ref 0–6)
ERYTHROCYTE [DISTWIDTH] IN BLOOD BY AUTOMATED COUNT: 15.4 % (ref 11.6–15.1)
ETHANOL SERPL-MCNC: 161 MG/DL
GFR SERPL CREATININE-BSD FRML MDRD: 102 ML/MIN/1.73SQ M
GLUCOSE SERPL-MCNC: 89 MG/DL (ref 65–140)
GLUCOSE UR STRIP-MCNC: NEGATIVE MG/DL
HCT VFR BLD AUTO: 40.5 % (ref 34.8–46.1)
HGB BLD-MCNC: 13.4 G/DL (ref 11.5–15.4)
HGB UR QL STRIP.AUTO: NEGATIVE
IMM GRANULOCYTES # BLD AUTO: 0.01 THOUSAND/UL (ref 0–0.2)
IMM GRANULOCYTES NFR BLD AUTO: 0 % (ref 0–2)
KETONES UR STRIP-MCNC: NEGATIVE MG/DL
LEUKOCYTE ESTERASE UR QL STRIP: NEGATIVE
LIPASE SERPL-CCNC: 13 U/L (ref 11–82)
LYMPHOCYTES # BLD AUTO: 2.3 THOUSANDS/ÂΜL (ref 0.6–4.47)
LYMPHOCYTES NFR BLD AUTO: 37 % (ref 14–44)
MAGNESIUM SERPL-MCNC: 1.7 MG/DL (ref 1.9–2.7)
MCH RBC QN AUTO: 30.2 PG (ref 26.8–34.3)
MCHC RBC AUTO-ENTMCNC: 33.1 G/DL (ref 31.4–37.4)
MCV RBC AUTO: 91 FL (ref 82–98)
METHADONE UR QL: POSITIVE
MONOCYTES # BLD AUTO: 0.51 THOUSAND/ÂΜL (ref 0.17–1.22)
MONOCYTES NFR BLD AUTO: 8 % (ref 4–12)
NEUTROPHILS # BLD AUTO: 3.36 THOUSANDS/ÂΜL (ref 1.85–7.62)
NEUTS SEG NFR BLD AUTO: 53 % (ref 43–75)
NITRITE UR QL STRIP: NEGATIVE
NRBC BLD AUTO-RTO: 0 /100 WBCS
OPIATES UR QL SCN: NEGATIVE
OXYCODONE+OXYMORPHONE UR QL SCN: NEGATIVE
PCP UR QL: NEGATIVE
PH UR STRIP.AUTO: 6 [PH]
PLATELET # BLD AUTO: 330 THOUSANDS/UL (ref 149–390)
PMV BLD AUTO: 9.7 FL (ref 8.9–12.7)
POTASSIUM SERPL-SCNC: 4 MMOL/L (ref 3.5–5.3)
PROT SERPL-MCNC: 7 G/DL (ref 6.4–8.4)
PROT UR STRIP-MCNC: NEGATIVE MG/DL
RBC # BLD AUTO: 4.44 MILLION/UL (ref 3.81–5.12)
SARS-COV-2 RNA RESP QL NAA+PROBE: NEGATIVE
SODIUM SERPL-SCNC: 140 MMOL/L (ref 135–147)
SP GR UR STRIP.AUTO: 1.01 (ref 1–1.03)
THC UR QL: NEGATIVE
UROBILINOGEN UR QL STRIP.AUTO: 0.2 E.U./DL
WBC # BLD AUTO: 6.3 THOUSAND/UL (ref 4.31–10.16)

## 2023-05-06 RX ORDER — ENOXAPARIN SODIUM 100 MG/ML
40 INJECTION SUBCUTANEOUS DAILY
Status: CANCELLED | OUTPATIENT
Start: 2023-05-07

## 2023-05-06 RX ORDER — MAGNESIUM HYDROXIDE/ALUMINUM HYDROXICE/SIMETHICONE 120; 1200; 1200 MG/30ML; MG/30ML; MG/30ML
30 SUSPENSION ORAL EVERY 6 HOURS PRN
Status: CANCELLED | OUTPATIENT
Start: 2023-05-06

## 2023-05-06 RX ORDER — FAMOTIDINE 10 MG/ML
40 INJECTION, SOLUTION INTRAVENOUS ONCE
Status: COMPLETED | OUTPATIENT
Start: 2023-05-06 | End: 2023-05-06

## 2023-05-06 RX ORDER — NICOTINE 21 MG/24HR
1 PATCH, TRANSDERMAL 24 HOURS TRANSDERMAL DAILY
Status: CANCELLED | OUTPATIENT
Start: 2023-05-07

## 2023-05-06 RX ORDER — ONDANSETRON 2 MG/ML
4 INJECTION INTRAMUSCULAR; INTRAVENOUS EVERY 6 HOURS PRN
Status: CANCELLED | OUTPATIENT
Start: 2023-05-06

## 2023-05-06 RX ORDER — ASPIRIN 325 MG
325 TABLET ORAL ONCE
Status: COMPLETED | OUTPATIENT
Start: 2023-05-06 | End: 2023-05-06

## 2023-05-06 RX ORDER — ACETAMINOPHEN 325 MG/1
650 TABLET ORAL EVERY 6 HOURS PRN
Status: CANCELLED | OUTPATIENT
Start: 2023-05-06

## 2023-05-06 RX ORDER — MAGNESIUM SULFATE HEPTAHYDRATE 40 MG/ML
2 INJECTION, SOLUTION INTRAVENOUS ONCE
Status: COMPLETED | OUTPATIENT
Start: 2023-05-06 | End: 2023-05-06

## 2023-05-06 RX ORDER — SODIUM CHLORIDE 9 MG/ML
100 INJECTION, SOLUTION INTRAVENOUS CONTINUOUS
Status: CANCELLED | OUTPATIENT
Start: 2023-05-06

## 2023-05-06 RX ORDER — DICYCLOMINE HYDROCHLORIDE 10 MG/1
20 CAPSULE ORAL ONCE
Status: COMPLETED | OUTPATIENT
Start: 2023-05-06 | End: 2023-05-06

## 2023-05-06 RX ORDER — FUROSEMIDE 10 MG/ML
40 INJECTION INTRAMUSCULAR; INTRAVENOUS ONCE
Status: COMPLETED | OUTPATIENT
Start: 2023-05-06 | End: 2023-05-06

## 2023-05-06 RX ADMIN — FAMOTIDINE 40 MG: 10 INJECTION, SOLUTION INTRAVENOUS at 14:54

## 2023-05-06 RX ADMIN — SODIUM CHLORIDE 500 ML: 0.9 INJECTION, SOLUTION INTRAVENOUS at 12:22

## 2023-05-06 RX ADMIN — THIAMINE HYDROCHLORIDE 100 MG: 100 INJECTION, SOLUTION INTRAMUSCULAR; INTRAVENOUS at 13:40

## 2023-05-06 RX ADMIN — FOLIC ACID 1 MG: 5 INJECTION, SOLUTION INTRAMUSCULAR; INTRAVENOUS; SUBCUTANEOUS at 14:33

## 2023-05-06 RX ADMIN — ASPIRIN 325 MG: 325 TABLET ORAL at 14:00

## 2023-05-06 RX ADMIN — MAGNESIUM SULFATE HEPTAHYDRATE 2 G: 40 INJECTION, SOLUTION INTRAVENOUS at 12:42

## 2023-05-06 RX ADMIN — FUROSEMIDE 40 MG: 10 INJECTION, SOLUTION INTRAVENOUS at 14:47

## 2023-05-06 RX ADMIN — DICYCLOMINE HYDROCHLORIDE 20 MG: 10 CAPSULE ORAL at 17:53

## 2023-05-06 NOTE — Clinical Note
You Nicolas should be transferred out to [address] [place] [address] detox unit under Dr Fani Soto

## 2023-05-06 NOTE — ED PROVIDER NOTES
History  Chief Complaint   Patient presents with   • Chest Pain     Brought by BLS, chest pain for a couple days  Admits to drinking a pint of vodka today  Per report 02 sat was 82% on RA, presents to ED on [address] 80     51-year-old female with past history of chronic alcohol abuse, insulin-dependent diabetes, arthritis, asthma, opiate abuse, bipolar disorder, chronic daily smoker, CHF, hyperlipidemia, hypertension, presents to the ED for evaluation of substernal and epigastric chest discomfort since this morning  Patient states that she drank a pint of liquor earlier this morning  Later on chest pain started  Patient lives alone but her fiancé was with her this morning  Fiancé called EMS and requested patient to be brought to the emergency department for further evaluation  According to EMS personnel, when patient falls asleep her oxygen saturation dips to 85%  Patient appears to be intoxicated in the ED  Patient states that she has had a history of daily alcohol use  Patient is currently requesting inpatient alcohol detox  Patient denies any suicidal homicidal ideations  History provided by:  Patient  Chest Pain  Associated symptoms: abdominal pain    Associated symptoms: no back pain, no cough, no fever, no palpitations, no shortness of breath and not vomiting        Prior to Admission Medications   Prescriptions Last Dose Informant Patient Reported? Taking?    Insulin Pen Needle 29G X 12MM MISC   No No   Sig: by Does not apply route daily at bedtime   Patient not taking: Reported on 4/1/2022   acetaminophen (TYLENOL) 650 mg CR tablet   No No   Sig: Take 1 tablet (650 mg total) by mouth every 8 (eight) hours as needed for mild pain   atorvastatin (LIPITOR) 40 mg tablet   No No   Sig: Take 1 tablet (40 mg total) by mouth daily with dinner   clopidogrel (PLAVIX) 75 mg tablet   No No   Sig: Take 1 tablet (75 mg total) by mouth daily Do not start before November 27, 2022    furosemide (LASIX) 20 mg tablet   No No   Sig: Take 1 tablet (20 mg total) by mouth daily Do not start before November 27, 2022  ipratropium-albuterol (DUO-NEB) 0 5-2 5 mg/3 mL nebulizer solution   No No   Sig: Take 3 mL by nebulization 3 (three) times a day   methadone (DOLOPHINE) 10 MG/5ML solution   Yes No   Sig: Take 30 mg by mouth in the morning [facility]   metoprolol succinate (TOPROL-XL) 50 mg 24 hr tablet   No No   Sig: Take 1 tablet (50 mg total) by mouth daily Do not start before November 29, 2022  multivitamin (THERAGRAN) TABS   No No   Sig: Take 1 tablet by mouth daily   nicotine (NICODERM CQ) 21 mg/24 hr TD 24 hr patch   No No   Sig: Place 1 patch on the skin daily Do not start before November 27, 2022  spironolactone (ALDACTONE) 25 mg tablet   No No   Sig: Take 0 5 tablets (12 5 mg total) by mouth daily Do not start before November 29, 2022  Facility-Administered Medications: None       Past Medical History:   Diagnosis Date   • Alcohol abuse    • Arthritis    • Asthma    • Bipolar disorder ([facility] )    • Diabetes mellitus ([facility] )    • Opiate abuse, continuous ([facility] )    • Smoker        Past Surgical History:   Procedure Laterality Date   • ABDOMINAL SURGERY      stomach uler with perforation       History reviewed  No pertinent family history  I have reviewed and agree with the history as documented  E-Cigarette/Vaping   • E-Cigarette Use Never User      E-Cigarette/Vaping Substances   • Nicotine No    • THC No    • CBD No    • Flavoring No    • Other No    • Unknown No      Social History     Tobacco Use   • Smoking status: Every Day     Packs/day: 2 00     Types: Cigarettes   • Smokeless tobacco: Never   Vaping Use   • Vaping Use: Never used   Substance Use Topics   • Alcohol use: Yes     Comment: 1 pint of vodka daily   • Drug use: Yes     Comment: on methadone       Review of Systems   Constitutional: Negative for chills and fever  HENT: Negative for ear pain and sore throat      Eyes: Negative for pain and visual disturbance  Respiratory: Negative for cough and shortness of breath  Cardiovascular: Positive for chest pain  Negative for palpitations  Gastrointestinal: Positive for abdominal pain  Negative for vomiting  Genitourinary: Negative for dysuria and hematuria  Musculoskeletal: Negative for arthralgias and back pain  Skin: Negative for color change and rash  Neurological: Negative for seizures and syncope  All other systems reviewed and are negative  Physical Exam  Physical Exam  Vitals and nursing note reviewed  Constitutional:       General: She is not in acute distress  Appearance: She is well-developed  HENT:      Head: Normocephalic and atraumatic  Eyes:      Conjunctiva/sclera: Conjunctivae normal    Cardiovascular:      Rate and Rhythm: Normal rate and regular rhythm  Heart sounds: No murmur heard  Pulmonary:      Effort: Pulmonary effort is normal  No respiratory distress  Breath sounds: Normal breath sounds  Comments: Lungs are essentially clear to auscultation bilateral   Fine rales noted at the bases  Tenderness to palpation noted to the lower sternal region  Abdominal:      Palpations: Abdomen is soft  Tenderness: There is no abdominal tenderness  Comments: Abdomen is soft, nondistended, with bowel sound present to all 4 quadrants  Mild epigastric tenderness noted to palpation  Musculoskeletal:         General: No swelling  Cervical back: Neck supple  Skin:     General: Skin is warm and dry  Capillary Refill: Capillary refill takes less than 2 seconds  Neurological:      Mental Status: She is alert     Psychiatric:         Mood and Affect: Mood normal          Vital Signs  ED Triage Vitals [05/06/23 1205]   Temperature Pulse Respirations Blood Pressure SpO2   97 5 °F (36 4 °C) 93 20 138/99 99 %      Temp Source Heart Rate Source Patient Position - Orthostatic VS BP Location FiO2 (%)   Oral Monitor Lying Right arm --      Pain Score       10 - Worst Possible Pain           Vitals:    05/06/23 1205 05/06/23 1300 05/06/23 1500   BP: 138/99  142/93   Pulse: 93 92 88   Patient Position - Orthostatic VS: Lying  Lying         Visual Acuity      ED Medications  Medications   dicyclomine (BENTYL) capsule 20 mg (has no administration in time range)   sodium chloride 0 9 % bolus 500 mL (0 mL Intravenous Stopped 5/6/23 1322)   magnesium sulfate 2 g/50 mL IVPB (premix) 2 g (0 g Intravenous Stopped 5/6/23 1442)   thiamine (VITAMIN B1) 100 mg in sodium chloride 0 9 % 50 mL IVPB (0 mg Intravenous Stopped 4/2/52 3049)   folic acid 1 mg in sodium chloride 0 9 % 50 mL IVPB (0 mg Intravenous Stopped 5/6/23 1503)   furosemide (LASIX) injection 40 mg (40 mg Intravenous Given 5/6/23 1447)   Famotidine (PF) (PEPCID) injection 40 mg (40 mg Intravenous Given 5/6/23 1454)   aspirin tablet 325 mg (325 mg Oral Given 5/6/23 1400)       Diagnostic Studies  Results Reviewed     Procedure Component Value Units Date/Time    UA w Reflex to Microscopic w Reflex to Culture [[id number]] Collected: 05/06/23 1555    Lab Status: Final result Specimen: Urine, Other Updated: 05/06/23 1601     Color, UA Light Yellow     Clarity, UA Clear     Specific Gravity, UA 1 010     pH, UA 6 0     Leukocytes, UA Negative     Nitrite, UA Negative     Protein, UA Negative mg/dl      Glucose, UA Negative mg/dl      Ketones, UA Negative mg/dl      Urobilinogen, UA 0 2 E U /dl      Bilirubin, UA Negative     Occult Blood, UA Negative    Rapid drug screen, urine [[id number]] Collected: 05/06/23 1555    Lab Status:  In process Specimen: Urine, Other Updated: 05/06/23 1557    HS Troponin I 2hr [[id number]]  (Normal) Collected: 05/06/23 1440    Lab Status: Final result Specimen: Blood from Arm, Right Updated: 05/06/23 1519     hs TnI 2hr 21 ng/L      Delta 2hr hsTnI 1 ng/L     HS Troponin I 4hr [[id number]]     Lab Status: No result Specimen: Blood     COVID only [[id number]]  (Normal) Collected: 05/06/23 1226    Lab Status: Final result Specimen: Nares from Nose Updated: 05/06/23 1304     SARS-CoV-2 Negative    Narrative:      FOR PEDIATRIC PATIENTS - copy/paste COVID Guidelines URL to browser: [email or web address]  [email or web address]    SARS-CoV-2 assay is a Nucleic Acid Amplification assay intended for the  qualitative detection of nucleic acid from SARS-CoV-2 in nasopharyngeal  swabs  Results are for the presumptive identification of SARS-CoV-2 RNA  Positive results are indicative of infection with SARS-CoV-2, the virus  causing COVID-19, but do not rule out bacterial infection or co-infection  with other viruses  Laboratories within the [place] and its  territories are required to report all positive results to the appropriate  public health authorities  Negative results do not preclude SARS-CoV-2  infection and should not be used as the sole basis for treatment or other  patient management decisions  Negative results must be combined with  clinical observations, patient history, and epidemiological information  This test has not been FDA cleared or approved  This test has been authorized by FDA under an Emergency Use Authorization  (EUA)  This test is only authorized for the duration of time the  declaration that circumstances exist justifying the authorization of the  emergency use of an in vitro diagnostic tests for detection of SARS-CoV-2  virus and/or diagnosis of COVID-19 infection under section 564(b)(1) of  the Act, 21 U  S C  006NXP-9(K)(4), unless the authorization is terminated  or revoked sooner  The test has been validated but independent review by FDA  and CLIA is pending  Test performed using [employer] GeneXpert: This RT-PCR assay targets N2,  a region unique to SARS-CoV-2  A conserved region in the E-gene was chosen  for pan-Sarbecovirus detection which includes SARS-CoV-2      According to CMS-2020-01-R, this platform meets the definition of high-throughput technology      HS Troponin 0hr (reflex protocol) [[id number]]  (Normal) Collected: 05/06/23 1219    Lab Status: Final result Specimen: Blood from Arm, Right Updated: 05/06/23 1256     hs TnI 0hr 20 ng/L     B-Type Natriuretic Peptide(BNP) [[id number]]  (Abnormal) Collected: 05/06/23 1219    Lab Status: Final result Specimen: Blood from Arm, Right Updated: 05/06/23 1255      pg/mL     Comprehensive metabolic panel [[id number]]  (Abnormal) Collected: 05/06/23 1219    Lab Status: Final result Specimen: Blood from Arm, Right Updated: 05/06/23 1247     Sodium 140 mmol/L      Potassium 4 0 mmol/L      Chloride 103 mmol/L      CO2 29 mmol/L      ANION GAP 8 mmol/L      BUN 12 mg/dL      Creatinine 0 53 mg/dL      Glucose 89 mg/dL      Calcium 8 5 mg/dL      AST 25 U/L      ALT 15 U/L      Alkaline Phosphatase 177 U/L      Total Protein 7 0 g/dL      Albumin 3 6 g/dL      Total Bilirubin 0 45 mg/dL      eGFR 102 ml/min/1 73sq m     Narrative:      [place] guidelines for Chronic Kidney Disease (CKD):   •  Stage 1 with normal or high GFR (GFR > 90 mL/min/1 73 square meters)  •  Stage 2 Mild CKD (GFR = 60-89 mL/min/1 73 square meters)  •  Stage 3A Moderate CKD (GFR = 45-59 mL/min/1 73 square meters)  •  Stage 3B Moderate CKD (GFR = 30-44 mL/min/1 73 square meters)  •  Stage 4 Severe CKD (GFR = 15-29 mL/min/1 73 square meters)  •  Stage 5 End Stage CKD (GFR <15 mL/min/1 73 square meters)  Note: GFR calculation is accurate only with a steady state creatinine    Magnesium [[id number]]  (Abnormal) Collected: 05/06/23 1219    Lab Status: Final result Specimen: Blood from Arm, Right Updated: 05/06/23 1247     Magnesium 1 7 mg/dL     Ethanol [[id number]]  (Abnormal) Collected: 05/06/23 1219    Lab Status: Final result Specimen: Blood from Arm, Right Updated: 05/06/23 1246     Ethanol Lvl 161 mg/dL     Lipase [[id number]]  (Normal) Collected: 05/06/23 4768    Lab Status: Final result Specimen: Blood from Arm, Right Updated: 05/06/23 1246     Lipase 13 u/L     CBC and differential [[id number]]  (Abnormal) Collected: 05/06/23 1219    Lab Status: Final result Specimen: Blood from Arm, Right Updated: 05/06/23 1236     WBC 6 30 Thousand/uL      RBC 4 44 Million/uL      Hemoglobin 13 4 g/dL      Hematocrit 40 5 %      MCV 91 fL      MCH 30 2 pg      MCHC 33 1 g/dL      RDW 15 4 %      MPV 9 7 fL      Platelets 674 Thousands/uL      nRBC 0 /100 WBCs      Neutrophils Relative 53 %      Immat GRANS % 0 %      Lymphocytes Relative 37 %      Monocytes Relative 8 %      Eosinophils Relative 1 %      Basophils Relative 1 %      Neutrophils Absolute 3 36 Thousands/µL      Immature Grans Absolute 0 01 Thousand/uL      Lymphocytes Absolute 2 30 Thousands/µL      Monocytes Absolute 0 51 Thousand/µL      Eosinophils Absolute 0 08 Thousand/µL      Basophils Absolute 0 04 Thousands/µL                  XR chest 1 view portable    (Results Pending)              Procedures  ECG 12 Lead Documentation Only    Date/Time: 5/6/2023 1:38 PM  Performed by: Teresa Little DO  Authorized by: Teresa Little DO     Indications / Diagnosis:  Chest pain   ECG reviewed by me, the ED Provider: yes    Patient location:  ED  Previous ECG:     Previous ECG:  Compared to current    Similarity:  Changes noted    Comparison to cardiac monitor: Yes    Interpretation:     Interpretation: abnormal    Comments:      Sinus rhythm, rate 91, normal axis, QTc 484 ms no acute ST/T wave abnormalities noted, wide P waves noted throughout suggesting biatrial enlargement, left ventricular hypertrophy noted, previous EKG significant QT prolongation  QT intervals improved compared to previous study      CriticalCare Time    Date/Time: 5/6/2023 3:34 PM  Performed by: Teresa Little DO  Authorized by: Teresa Little DO     Critical care provider statement:     Critical care time (minutes):  65    Critical care time was exclusive of:  Separately billable procedures and treating other patients    Critical care was necessary to treat or prevent imminent or life-threatening deterioration of the following conditions:  Toxidrome    Critical care was time spent personally by me on the following activities:  Blood draw for specimens, obtaining history from patient or surrogate, development of treatment plan with patient or surrogate, discussions with consultants, evaluation of patient's response to treatment, examination of patient, review of old charts, re-evaluation of patient's condition, ordering and review of laboratory studies, ordering and review of radiographic studies, interpretation of cardiac output measurements and ordering and performing treatments and interventions  Comments:      Alcohol intoxication, history of chronic alcohol abuse, going to inpatient detox  ED Course  ED Course as of 05/06/23 1618   Sat May 06, 2023   1221 Patient drank a pint of liquor early this morning  Patient complains of chest pain and epigastric abdominal pain  Patient is requesting inpatient alcohol detox  1310 When patient is awake and conversing oxygen saturation is within normal range  Patient is currently sleeping in bed  Her room air oxygen saturation is 85%  We will place patient on supplemental nasal, oxygen  1312 Reached out to inpatient detox team at [facility]  Awaiting response   with detox team at [facility] who is recommending obtaining 2-hour troponin to make sure troponin is not rising before they accept patient  1529 Patient is excepted by inpatient detox team at [facility] by Dr Concha Ferrari  Patient is currently awaiting transport  1617 Transfer time scheduled for 7:15 PM tonight with LASHELL                HEART Risk Score    Flowsheet Row Most Recent Value   Heart Score Risk Calculator    History 0 Filed at: 05/06/2023 1546   ECG 1 Filed at: 05/06/2023 1546   Age 1 Filed at: 05/06/2023 1546   Risk Factors 1 Filed at: 05/06/2023 1546   Troponin 1 Filed at: 05/06/2023 1546   HEART Score 4 Filed at: 05/06/2023 1546                                      Medical Decision Making  Blood work, EKG, troponin, BNP, chest x-ray, blood alcohol level, UA  Give gentle hydration as well as magnesium, thiamine, folic acid and continue to monitor patient for any worsening symptoms  Reach out to [facility] detox center for possible transfer for inpatient detox    Patient BNP was mildly elevated in the ED however was improved compared to previous study  Chest x-ray shows improvement in pulmonary congestion compared to previous study  Formal radiology reading is pending  She started to feel better with her chest pain while in the emergency department  Patient's blood alcohol level was 161  Patient's first troponin was 20 and second troponin was 21 with a delta of +1  Patient requested inpatient detox  Case discussed with detox team at [address] [place] [address]  At this time patient is excepted by Dr Siomara Gardner at [facility] for inpatient detox  Patient agrees with transfer plans  Patient is currently awaiting transport  Transport time scheduled for 7:15 PM [facility] patient signed out to the next attending who continue to monitor patient until patient is transported  Amount and/or Complexity of Data Reviewed  External Data Reviewed: ECG  Labs: ordered  Decision-making details documented in ED Course  Radiology: ordered and independent interpretation performed  Decision-making details documented in ED Course  ECG/medicine tests: ordered and independent interpretation performed  Decision-making details documented in ED Course  Risk  OTC drugs  Prescription drug management            Disposition  Final diagnoses:   Chest pain, unspecified   Alcohol intoxication ([facility] [address] )   Chronic alcohol abuse   History of chronic CHF     Time reflects when diagnosis was documented in both MDM as applicable and the Disposition within this note Time User Action Codes Description Comment    5/6/2023  3:29 PM Migue Bustamante Add [R07 9] Chest pain, unspecified     5/6/2023  3:30 PM Migue Bustamante Add [F10 929] Alcohol intoxication ([address] )     5/6/2023  3:30 PM Migue Bustamante Add [F10 10] Chronic alcohol abuse     5/6/2023  3:30 PM Migue Bustamante Add [Z86 79] History of chronic CHF       ED Disposition     ED Disposition   Transfer to Another Facility-In Network    Condition   --    Date/Time   Sat May 6, 2023  3:30 PM    Comment   Shwetha Schwarz should be transferred out to [address] [place] [address] detox unit under Dr Suman Iqbal MD Documentation    Suzette Gonzalez Most Recent Value   Patient Condition The patient has been stabilized such that within reasonable medical probability, no material deterioration of the patient condition or the condition of the unborn child(tom) is likely to result from the transfer   Reason for Transfer Level of Care needed not available at this facility   Benefits of Transfer Specialized equipment and/or services available at the receiving facility (Include comment)________________________   Risks of Transfer Potential for delay in receiving treatment, Potential deterioration of medical condition, Loss of IV, Increased discomfort during transfer, Possible worsening of condition or death during transfer   Accepting Physician Dr Reyna Wilson Name, [id number] detox unit   Transported by (Company and Unit #) slets   Sending MD Dr Mariam Peterson Name, [place] [id number] detox unit   Transported by [employer] and Unit #) slets      Follow-up Information    None         Patient's Medications   Discharge Prescriptions    No medications on file       No discharge procedures on file      PDMP Review     None          ED Provider  Electronically Signed by           Zenobia Rea DO  05/06/23 Rox Menard 108, DO  05/06/23 Rox Menard 108, DO  05/06/23 1611

## 2023-05-06 NOTE — EMTALA/ACUTE CARE TRANSFER
[address] [address]  Dept: [phone]      EMTALA TRANSFER CONSENT    NAME Murtaza Galo                                         1960                              MRN [id number]    I have been informed of my rights regarding examination, treatment, and transfer   by Dr Maninder Lerner DO    Benefits: Specialized equipment and/or services available at the receiving facility (Include comment)________________________    Risks: Potential for delay in receiving treatment, Potential deterioration of medical condition, Loss of IV, Increased discomfort during transfer, Possible worsening of condition or death during transfer      Consent for Transfer:  I acknowledge that my medical condition has been evaluated and explained to me by the emergency department physician or other qualified medical person and/or my attending physician, who has recommended that I be transferred to the service of  Accepting Physician: Dr Dalton Hernández at [address] [place] [address] Name, [address] : [address] [facility],[address] Heart detox unit  The above potential benefits of such transfer, the potential risks associated with such transfer, and the probable risks of not being transferred have been explained to me, and I fully understand them  The doctor has explained that, in my case, the benefits of transfer outweigh the risks  I agree to be transferred  I authorize the performance of emergency medical procedures and treatments upon me in both transit and upon arrival at the receiving facility  Additionally, I authorize the release of any and all medical records to the receiving facility and request they be transported with me, if possible  I understand that the safest mode of transportation during a medical emergency is an ambulance and that the Hospital advocates the use of this mode of transport   Risks of traveling to the receiving facility by car, including absence of medical control, life sustaining equipment, such as oxygen, and medical personnel has been explained to me and I fully understand them  ([place] CORRECT BOX BELOW)  [  ]  I consent to the stated transfer and to be transported by ambulance/helicopter  [  ]  I consent to the stated transfer, but refuse transportation by ambulance and accept full responsibility for my transportation by car  I understand the risks of non-ambulance transfers and I exonerate the Hospital and its staff from any deterioration in my condition that results from this refusal     X___________________________________________    DATE  23  TIME________  Signature of patient or legally responsible individual signing on patient behalf           RELATIONSHIP TO PATIENT_________________________          Provider Certification    NAME Melodie Stack                                        [facility] 1960                              MRN [id number]    A medical screening exam was performed on the above named patient  Based on the examination:    Condition Necessitating Transfer The primary encounter diagnosis was Chest pain, unspecified  Diagnoses of Alcohol intoxication ([facility] [address] ), Chronic alcohol abuse, and History of chronic CHF were also pertinent to this visit      Patient Condition: The patient has been stabilized such that within reasonable medical probability, no material deterioration of the patient condition or the condition of the unborn child(tom) is likely to result from the transfer    Reason for Transfer: Level of Care needed not available at this facility    Transfer Requirements: [address] detox unit   · Space available and qualified personnel available for treatment as acknowledged by    · Agreed to accept transfer and to provide appropriate medical treatment as acknowledged by       Dr Derik Jackson  · Appropriate medical records of the examination and treatment of the patient are provided at the time of transfer   STAFF INITIAL WHEN COMPLETED _______  · Transfer will be performed by qualified personnel from [facility]  and appropriate transfer equipment as required, including the use of necessary and appropriate life support measures  Provider Certification: I have examined the patient and explained the following risks and benefits of being transferred/refusing transfer to the patient/family:         Based on these reasonable risks and benefits to the patient and/or the unborn child(tom), and based upon the information available at the time of the patient’s examination, I certify that the medical benefits reasonably to be expected from the provision of appropriate medical treatments at another medical facility outweigh the increasing risks, if any, to the individual’s medical condition, and in the case of labor to the unborn child, from effecting the transfer      X____________________________________________ DATE 05/06/23        TIME_______      ORIGINAL - SEND TO MEDICAL RECORDS   COPY - SEND WITH PATIENT DURING TRANSFER

## 2023-05-07 LAB
ATRIAL RATE: 91 BPM
P AXIS: 60 DEGREES
PR INTERVAL: 146 MS
QRS AXIS: -17 DEGREES
QRSD INTERVAL: 82 MS
QT INTERVAL: 394 MS
QTC INTERVAL: 484 MS
T WAVE AXIS: 93 DEGREES
VENTRICULAR RATE: 91 BPM

## 2023-05-07 NOTE — ED NOTES
Transport arrived to  patient to take to Detox inpatient facility  Patient refuses to go and wants to be discharged       Hamzah Mckee RN  05/06/23 4813

## 2023-05-07 NOTE — DISCHARGE INSTRUCTIONS
We have performed an evaluation of your chest pain in the emergency department and determined that you can be safely discharged home  We have provided you with general information about chest pain in adults  We recommend that you follow up with your primary care provider in the next 5-7 days for further evaluation  If your chest pain changes, worsens, if you have significant associated shortness of breath, palpitations, diaphoresis, persistent nausea and vomiting, or if you pass out, return to the emergency department immediately  If you have any severe symptoms of withdrawal, severe worsening shakiness, anxiety, confusion, hallucinations, seizures, return to the emergency department immediately

## 2023-05-17 ENCOUNTER — HOSPITAL ENCOUNTER (EMERGENCY)
Facility: HOSPITAL | Age: 63
Discharge: HOME | End: 2023-05-18
Attending: EMERGENCY MEDICINE

## 2023-05-17 DIAGNOSIS — R07.89 ATYPICAL CHEST PAIN: ICD-10-CM

## 2023-05-17 DIAGNOSIS — F10.929 ALCOHOL INTOXICATION (HCC): Primary | ICD-10-CM

## 2023-05-17 LAB — GLUCOSE SERPL-MCNC: 149 MG/DL (ref 65–140)

## 2023-05-18 ENCOUNTER — APPOINTMENT (EMERGENCY)
Dept: RADIOLOGY | Facility: HOSPITAL | Age: 63
End: 2023-05-18

## 2023-05-18 VITALS
OXYGEN SATURATION: 95 % | RESPIRATION RATE: 14 BRPM | TEMPERATURE: 98 F | DIASTOLIC BLOOD PRESSURE: 79 MMHG | HEART RATE: 90 BPM | SYSTOLIC BLOOD PRESSURE: 127 MMHG

## 2023-05-18 LAB
2HR DELTA HS TROPONIN: 0 NG/L
ALBUMIN SERPL BCP-MCNC: 3.7 G/DL (ref 3.5–5)
ALP SERPL-CCNC: 180 U/L (ref 34–104)
ALT SERPL W P-5'-P-CCNC: 12 U/L (ref 7–52)
AMPHETAMINES SERPL QL SCN: NEGATIVE
ANION GAP SERPL CALCULATED.3IONS-SCNC: 12 MMOL/L (ref 4–13)
AST SERPL W P-5'-P-CCNC: 26 U/L (ref 13–39)
BACTERIA UR QL AUTO: NORMAL /HPF
BARBITURATES UR QL: NEGATIVE
BASOPHILS # BLD AUTO: 0.05 THOUSANDS/ÂΜL (ref 0–0.1)
BASOPHILS NFR BLD AUTO: 1 % (ref 0–1)
BENZODIAZ UR QL: NEGATIVE
BILIRUB SERPL-MCNC: 0.92 MG/DL (ref 0.2–1)
BILIRUB UR QL STRIP: NEGATIVE
BUN SERPL-MCNC: 20 MG/DL (ref 5–25)
CALCIUM SERPL-MCNC: 9.4 MG/DL (ref 8.4–10.2)
CARDIAC TROPONIN I PNL SERPL HS: 35 NG/L
CARDIAC TROPONIN I PNL SERPL HS: 35 NG/L
CHLORIDE SERPL-SCNC: 104 MMOL/L (ref 96–108)
CLARITY UR: ABNORMAL
CO2 SERPL-SCNC: 25 MMOL/L (ref 21–32)
COCAINE UR QL: NEGATIVE
COLOR UR: ABNORMAL
CREAT SERPL-MCNC: 0.49 MG/DL (ref 0.6–1.3)
EOSINOPHIL # BLD AUTO: 0.05 THOUSAND/ÂΜL (ref 0–0.61)
EOSINOPHIL NFR BLD AUTO: 1 % (ref 0–6)
ERYTHROCYTE [DISTWIDTH] IN BLOOD BY AUTOMATED COUNT: 15.7 % (ref 11.6–15.1)
ETHANOL SERPL-MCNC: 261 MG/DL
GFR SERPL CREATININE-BSD FRML MDRD: 105 ML/MIN/1.73SQ M
GLUCOSE SERPL-MCNC: 98 MG/DL (ref 65–140)
GLUCOSE UR STRIP-MCNC: NEGATIVE MG/DL
HCT VFR BLD AUTO: 45.7 % (ref 34.8–46.1)
HGB BLD-MCNC: 15.5 G/DL (ref 11.5–15.4)
HGB UR QL STRIP.AUTO: NEGATIVE
IMM GRANULOCYTES # BLD AUTO: 0 THOUSAND/UL (ref 0–0.2)
IMM GRANULOCYTES NFR BLD AUTO: 0 % (ref 0–2)
KETONES UR STRIP-MCNC: NEGATIVE MG/DL
LEUKOCYTE ESTERASE UR QL STRIP: NEGATIVE
LYMPHOCYTES # BLD AUTO: 2.54 THOUSANDS/ÂΜL (ref 0.6–4.47)
LYMPHOCYTES NFR BLD AUTO: 53 % (ref 14–44)
MCH RBC QN AUTO: 30.2 PG (ref 26.8–34.3)
MCHC RBC AUTO-ENTMCNC: 33.9 G/DL (ref 31.4–37.4)
MCV RBC AUTO: 89 FL (ref 82–98)
METHADONE UR QL: POSITIVE
MONOCYTES # BLD AUTO: 0.58 THOUSAND/ÂΜL (ref 0.17–1.22)
MONOCYTES NFR BLD AUTO: 12 % (ref 4–12)
NEUTROPHILS # BLD AUTO: 1.57 THOUSANDS/ÂΜL (ref 1.85–7.62)
NEUTS SEG NFR BLD AUTO: 33 % (ref 43–75)
NITRITE UR QL STRIP: NEGATIVE
NON-SQ EPI CELLS URNS QL MICRO: NORMAL /HPF
NRBC BLD AUTO-RTO: 0 /100 WBCS
OPIATES UR QL SCN: NEGATIVE
OXYCODONE+OXYMORPHONE UR QL SCN: NEGATIVE
PCP UR QL: NEGATIVE
PH UR STRIP.AUTO: 6 [PH]
PLATELET # BLD AUTO: 225 THOUSANDS/UL (ref 149–390)
PMV BLD AUTO: 10.2 FL (ref 8.9–12.7)
POTASSIUM SERPL-SCNC: 3.3 MMOL/L (ref 3.5–5.3)
PROT SERPL-MCNC: 7 G/DL (ref 6.4–8.4)
PROT UR STRIP-MCNC: ABNORMAL MG/DL
RBC # BLD AUTO: 5.14 MILLION/UL (ref 3.81–5.12)
RBC #/AREA URNS AUTO: NORMAL /HPF
SODIUM SERPL-SCNC: 141 MMOL/L (ref 135–147)
SP GR UR STRIP.AUTO: 1.01 (ref 1–1.03)
THC UR QL: NEGATIVE
UROBILINOGEN UR QL STRIP.AUTO: 2 E.U./DL
WBC # BLD AUTO: 4.79 THOUSAND/UL (ref 4.31–10.16)
WBC #/AREA URNS AUTO: NORMAL /HPF

## 2023-05-18 RX ORDER — FOLIC ACID 1 MG/1
1 TABLET ORAL ONCE
Status: COMPLETED | OUTPATIENT
Start: 2023-05-18 | End: 2023-05-18

## 2023-05-18 RX ORDER — LANOLIN ALCOHOL/MO/W.PET/CERES
100 CREAM (GRAM) TOPICAL ONCE
Status: COMPLETED | OUTPATIENT
Start: 2023-05-18 | End: 2023-05-18

## 2023-05-18 RX ADMIN — THIAMINE HCL TAB 100 MG 100 MG: 100 TAB at 05:32

## 2023-05-18 RX ADMIN — FOLIC ACID 1 MG: 1 TABLET ORAL at 05:32

## 2023-05-18 RX ADMIN — Medication 1 TABLET: at 05:32

## 2023-05-18 NOTE — ED PROVIDER NOTES
Final Diagnosis:  1  Alcohol intoxication ([facility] [address] )    2  Atypical chest pain        Chief Complaint   Patient presents with   • Chest Pain     Pt arrives from home with SOB/CP starting earlier today, reports drinking two bottles of vodka today, hx of alcoholism and drug abuse including cocaine use, denies today  Describes pain as sharp and mid sternal     • Alcohol Intoxication       HPI  Patient presents from home intoxicated  Notes she was drinking vodka and started w/ chest pain  Nonexertional  Denies cocaine abuse today  Sharp mid sternal pain  No n/v  SOB assoc  Doesn't appear SOB or in resp distress  Sleeping comfortably when I went in  Wakes to voice well appearing full sentences  Not diaphoretic  Recent admit for CHF echo w/ 22% EF on chart review  Declines OORP and detox consideration  Declines discussion w/ crisis    EMS reports if applicable: report intoxicated patient called EMS for chest pain and declines cocaine use today  - Previous charting underwent limited review with attention to last ED visits, labs, ekgs, and prior imaging    Chart review reveals :     Admission on 05/06/2023, Discharged on 05/06/2023   Component Date Value Ref Range Status   • WBC 05/06/2023 6 30  4 31 - 10 16 Thousand/uL Final   • RBC 05/06/2023 4 44  3 81 - 5 12 Million/uL Final   • Hemoglobin 05/06/2023 13 4  11 5 - 15 4 g/dL Final   • Hematocrit 05/06/2023 40 5  34 8 - 46 1 % Final   • MCV 05/06/2023 91  82 - 98 fL Final   • MCH 05/06/2023 30 2  26 8 - 34 3 pg Final   • MCHC 05/06/2023 33 1  31 4 - 37 4 g/dL Final   • RDW 05/06/2023 15 4 (H)  11 6 - 15 1 % Final   • MPV 05/06/2023 9 7  8 9 - 12 7 fL Final   • Platelets 28/59/3984 330  149 - 390 Thousands/uL Final   • nRBC 05/06/2023 0  /100 WBCs Final   • Neutrophils Relative 05/06/2023 53  43 - 75 % Final   • Immat GRANS % 05/06/2023 0  0 - 2 % Final   • Lymphocytes Relative 05/06/2023 37  14 - 44 % Final   • Monocytes Relative 05/06/2023 8  4 - 12 % Final   • Eosinophils Relative 05/06/2023 1  0 - 6 % Final   • Basophils Relative 05/06/2023 1  0 - 1 % Final   • Neutrophils Absolute 05/06/2023 3 36  1 85 - 7 62 Thousands/µL Final   • Immature Grans Absolute 05/06/2023 0 01  0 00 - 0 20 Thousand/uL Final   • Lymphocytes Absolute 05/06/2023 2 30  0 60 - 4 47 Thousands/µL Final   • Monocytes Absolute 05/06/2023 0 51  0 17 - 1 22 Thousand/µL Final   • Eosinophils Absolute 05/06/2023 0 08  0 00 - 0 61 Thousand/µL Final   • Basophils Absolute 05/06/2023 0 04  0 00 - 0 10 Thousands/µL Final   • Sodium 05/06/2023 140  135 - 147 mmol/L Final   • Potassium 05/06/2023 4 0  3 5 - 5 3 mmol/L Final   • Chloride 05/06/2023 103  96 - 108 mmol/L Final   • CO2 05/06/2023 29  21 - 32 mmol/L Final   • ANION GAP 05/06/2023 8  4 - 13 mmol/L Final   • BUN 05/06/2023 12  5 - 25 mg/dL Final   • Creatinine 05/06/2023 0 53 (L)  0 60 - 1 30 mg/dL Final    Standardized to IDMS reference method   • Glucose 05/06/2023 89  65 - 140 mg/dL Final    If the patient is fasting, the ADA then defines impaired fasting glucose as > 100 mg/dL and diabetes as > or equal to 123 mg/dL  • Calcium 05/06/2023 8 5  8 4 - 10 2 mg/dL Final   • AST 05/06/2023 25  13 - 39 U/L Final   • ALT 05/06/2023 15  7 - 52 U/L Final    Specimen collection should occur prior to Sulfasalazine administration due to the potential for falsely depressed results  • Alkaline Phosphatase 05/06/2023 177 (H)  34 - 104 U/L Final   • Total Protein 05/06/2023 7 0  6 4 - 8 4 g/dL Final   • Albumin 05/06/2023 3 6  3 5 - 5 0 g/dL Final   • Total Bilirubin 05/06/2023 0 45  0 20 - 1 00 mg/dL Final    Use of this assay is not recommended for patients undergoing treatment with eltrombopag due to the potential for falsely elevated results  N-acetyl-p-benzoquinone imine (metabolite of Acetaminophen) will generate erroneously low results in samples for patients that have taken an overdose of Acetaminophen     • eGFR 05/06/2023 102  ml/min/1 73sq m Final   • "SARS-CoV-2 05/06/2023 Negative  Negative Final   • Color, UA 05/06/2023 Light Yellow   Final   • Clarity, UA 05/06/2023 Clear   Final   • Specific Gravity, UA 05/06/2023 1 010  1 000 - 1 030 Final   • pH, UA 05/06/2023 6 0  5 0, 5 5, 6 0, 6 5, 7 0, 7 5, 8 0, 8 5, 9 0 Final   • Leukocytes, UA 05/06/2023 Negative  Negative Final   • Nitrite, UA 05/06/2023 Negative  Negative Final   • Protein, UA 05/06/2023 Negative  Negative mg/dl Final   • Glucose, UA 05/06/2023 Negative  Negative mg/dl Final   • Ketones, UA 05/06/2023 Negative  Negative mg/dl Final   • Urobilinogen, UA 05/06/2023 0 2  0 2, 1 0 E U /dl E U /dl Final   • Bilirubin, UA 05/06/2023 Negative  Negative Final   • Occult Blood, UA 05/06/2023 Negative  Negative Final   • Amph/Meth UR 05/06/2023 Negative  Negative Final   • Barbiturate Ur 05/06/2023 Negative  Negative Final   • Benzodiazepine Urine 05/06/2023 Negative  Negative Final   • Cocaine Urine 05/06/2023 Negative  Negative Final   • Methadone Urine 05/06/2023 Positive (A)  Negative Final   • Opiate Urine 05/06/2023 Negative  Negative Final   • PCP Ur 05/06/2023 Negative  Negative Final   • THC Urine 05/06/2023 Negative  Negative Final   • Oxycodone Urine 05/06/2023 Negative  Negative Final   • Ethanol Lvl 05/06/2023 161 (H)  <10 mg/dL Final   • Magnesium 05/06/2023 1 7 (L)  1 9 - 2 7 mg/dL Final   • hs TnI 0hr 05/06/2023 20  \"Refer to ACS Flowchart\"- see link ng/L Final    Comment:                                              Initial (time 0) result  If >=50 ng/L, Myocardial injury suggested ;  Type of myocardial injury and treatment strategy  to be determined  If 5-49 ng/L, a delta result at 2 hours and or 4 hours will be needed to further evaluate  If <4 ng/L, and chest pain has been >3 hours since onset, patient may qualify for discharge based on the HEART score in the ED  If <5 ng/L and <3hours since onset of chest pain, a delta result at 2 hours will be needed to further evaluate      HS " "Troponin 99th Percentile URL of a Health Population=12 ng/L with a 95% Confidence Interval of 8-18 ng/L  Second Troponin (time 2 hours)  If calculated delta >= 20 ng/L,  Myocardial injury suggested ; Type of myocardial injury and treatment strategy to be determined  If 5-49 ng/L and the calculated delta is 5-19 ng/L, consult medical service for evaluation  Continue evaluation for ischemia on ecg and other possible etiology and repeat hs troponin at 4 hours  If delta                            is <5 ng/L at 2 hours, consider discharge based on risk stratification via the HEART score (if in ED), or KAREN risk score in IP/Observation  HS Troponin 99th Percentile URL of a Health Population=12 ng/L with a 95% Confidence Interval of 8-18 ng/L  • BNP 05/06/2023 820 (H)  0 - 100 pg/mL Final   • Lipase 05/06/2023 13  11 - 82 u/L Final   • hs TnI 2hr 05/06/2023 21  \"Refer to ACS Flowchart\"- see link ng/L Final    Comment:                                              Initial (time 0) result  If >=50 ng/L, Myocardial injury suggested ;  Type of myocardial injury and treatment strategy  to be determined  If 5-49 ng/L, a delta result at 2 hours and or 4 hours will be needed to further evaluate  If <4 ng/L, and chest pain has been >3 hours since onset, patient may qualify for discharge based on the HEART score in the ED  If <5 ng/L and <3hours since onset of chest pain, a delta result at 2 hours will be needed to further evaluate  HS Troponin 99th Percentile URL of a Health Population=12 ng/L with a 95% Confidence Interval of 8-18 ng/L  Second Troponin (time 2 hours)  If calculated delta >= 20 ng/L,  Myocardial injury suggested ; Type of myocardial injury and treatment strategy to be determined  If 5-49 ng/L and the calculated delta is 5-19 ng/L, consult medical service for evaluation  Continue evaluation for ischemia on ecg and other possible etiology and repeat hs troponin at 4 hours    If delta               " is <5 ng/L at 2 hours, consider discharge based on risk stratification via the HEART score (if in ED), or KAREN risk score in IP/Observation  HS Troponin 99th Percentile URL of a Health Population=12 ng/L with a 95% Confidence Interval of 8-18 ng/L  • Delta 2hr hsTnI 05/06/2023 1  <20 ng/L Final   • Ventricular Rate 05/06/2023 91  BPM Final   • Atrial Rate 05/06/2023 91  BPM Final   • WI Interval 05/06/2023 146  ms Final   • QRSD Interval 05/06/2023 82  ms Final   • QT Interval 05/06/2023 394  ms Final   • QTC Interval 05/06/2023 484  ms Final   • P Axis 05/06/2023 60  degrees Final   • QRS Axis 05/06/2023 -17  degrees Final   • T Wave Axis 05/06/2023 93  degrees Final       - No language barrier    - History obtained from patient   - There are no limitations to the history obtained  PMH:   has a past medical history of Alcohol abuse, Arthritis, Asthma, Bipolar disorder ([facility] [address] ), Diabetes mellitus ([facility] [address] ), Opiate abuse, continuous ([facility] [address] ), and Smoker  PSH:   has a past surgical history that includes Abdominal surgery  Social History:  Tobacco Use: High Risk   • Smoking Tobacco Use: Every Day   • Smokeless Tobacco Use: Never   • Passive Exposure: Not on file     Alcohol Use: Heavy Drinker   • Frequency of Alcohol Consumption: 4 or more times a week   • Average Number of Drinks: 10 or more   • Frequency of Binge Drinking: Daily or almost daily     No illicit use       ROS:  Pertinent positives/negatives: Cora Vanessa Some ROS may be present in the HPI and would take precedent over these standard questions asked below  Review of Systems   Constitutional: Negative for fatigue and fever  Respiratory: Positive for shortness of breath  Negative for cough, wheezing and stridor  Cardiovascular: Positive for chest pain  Negative for palpitations and leg swelling  CONSTITUTIONAL:  No lethargy  No weakness  No unexpected weight loss  No appetite change  EYES:  No pain, redness, or discharge   No loss of vision  No orbital trauma or pain  ENT:  No tinnitus or decreased hearing  No epistaxis/purulent rhinorrhea  No voice change, airway closing, trismus  CARDIOVASCULAR:  No chest pain  No skin mottling or pallor  No change in exertional capacity  RESPIRATORY:  No hemoptysis  No paroxysmal nocturnal dyspnea  No stridor  No audible wheezing  No production with cough  GASTROINTESTINAL:  Normal appetite  No vomiting, diarrhea  No pain  No bloating  No melena  No hematochezia  GENITOURINARY:  No frequency, urgency, nocturia  No hematuria or dysuria  No discharge  No sores/adenopathy  MUSCULOSKELETAL:  No arthralgias or myalgias that are new  No new deformity  INTEGUMENTARY:  No swelling  No unexpected contusions  No abrasions  No lymphangitis  NEUROLOGIC:  No meningismus  No new numbness of the extremities  No new focal weakness  No postural instability  PSYCHIATRIC:  No SI HI AVH  HEMATOLOGICAL:  No bleeding  No petechiae  No bruising  ALLERGIES:  No urticaria  No sudden abd cramping  No stridor  PE:     Physical exam highlights:   Physical Exam       Vitals:    05/18/23 0215 05/18/23 0230 05/18/23 0300 05/18/23 0400   BP: 110/72 102/68 115/78 119/77   BP Location:  Right arm  Right arm   Pulse: 92 92 96 92   Resp: 13 15 13 15   Temp:       TempSrc:       SpO2: 99% 100% 100% 100%     Vitals reviewed by me  Nursing note reviewed  Chaperone present for all sensitive exam   Const: No acute distress  Alert  Nontoxic  Not diaphoretic  HEENT: External ears normal  No protrusion drainage swelling  Nose normal  No drainage/traumatic deformity  MMM  Mouth with baseline/symmetric movement  No trismus  Eyes: No squinting  No icterus  No tearing/swelling/drainage  Tracks through the room with normal EOM  Neck: ROM normal  No rigidity  No meningismus  Cards: Rate as per vitals Compared to monitor sinus unless documented  Regular Well perfused  Pulm: able to verbalize without additional effort  Effort and excursion normal  No distress  No audible wheezing/ stridor  Normal resp rate without retraction or change in work of breathing  Abd: No distension beyond baseline  No fluctuant wave  Patient without peritoneal pain with shifting/bumping the bed  MSK: ROM normal baseline  No deformity  No contractures from baseline  Skin: No new rashes visible  Well perfused  No wounds visualized on exposed skin  Neuro: Nonfocal  Baseline  CN grossly intact  Moving all four with coordination  Psych: Normal behavior and affect  A:  - Nursing note reviewed  Ddx and MDM  Considered diagnoses    Chronic alcoholism - thiamine, folvite and centrum  Offer rehab discussion    R/o ACS  HEART 5   Ambulatory referral  Trop stable  nonsepcific EKG    R/o aspiration pneumonitis - chest xr and auscultation not          My conversation with consultant reveals: NA       Decision rules:     HEART Risk Score    Flowsheet Row Most Recent Value   Heart Score Risk Calculator    History 0 Filed at: 05/18/2023 0451   ECG 1 Filed at: 05/18/2023 0451   Age 1 Filed at: 05/18/2023 0451   Risk Factors 2 Filed at: 05/18/2023 0451   Troponin 1 Filed at: 05/18/2023 0451   HEART Score 5 Filed at: 05/18/2023 0451                     ED Course as of 05/18/23 0606   u May 18, 2023   0446 Procedure Note: EKG  Date/Time: 05/18/23 4:47 AM   Interpreted by: Devin Balderas  Indications / Diagnosis: CP  ECG reviewed by me, the ED Provider: yes   The EKG demonstrates:  Rhythm: sinus  tach  Intervals: normal intervals  Axis: normal axis  QRS/Blocks: normal QRS  ST Changes: No acute ST Changes, no STD/ANAYA    T wave changes: lateral depression, nonspecific             My read of the XR/CT scan reveals:  XR chest 1 view portable   ED Interpretation   I cannot appreciate acute cardiopulmonary process          Orders Placed This Encounter   Procedures   • XR chest 1 view portable   • Rapid drug screen, urine   • CBC and differential   • Comprehensive metabolic panel   • HS Troponin 0hr (reflex protocol)   • Rapid drug screen, urine   • Ethanol   • UA (URINE) with reflex to Scope   • HS Troponin I 2hr   • Ambulatory Referral to Cardiology   • Diet Regular; Finger Foods   • Notify physician of an increase in chest pain, symptomatic hypotension, a change in cardiac rhythm, or an O2 saturation of less than 90%  • Notify physician immediately if patient has persistent Chest Pain     • Insert peripheral IV   • Continuous cardiac monitoring   • Continuous pulse oximetry   • Nursing communication Prepare room for behavioral health patient   • ECG 12 lead     Labs Reviewed   CBC AND DIFFERENTIAL - Abnormal       Result Value Ref Range Status    WBC 4 79  4 31 - 10 16 Thousand/uL Final    RBC 5 14 (*) 3 81 - 5 12 Million/uL Final    Hemoglobin 15 5 (*) 11 5 - 15 4 g/dL Final    Hematocrit 45 7  34 8 - 46 1 % Final    MCV 89  82 - 98 fL Final    MCH 30 2  26 8 - 34 3 pg Final    MCHC 33 9  31 4 - 37 4 g/dL Final    RDW 15 7 (*) 11 6 - 15 1 % Final    MPV 10 2  8 9 - 12 7 fL Final    Platelets 125  995 - 390 Thousands/uL Final    nRBC 0  /100 WBCs Final    Neutrophils Relative 33 (*) 43 - 75 % Final    Immat GRANS % 0  0 - 2 % Final    Lymphocytes Relative 53 (*) 14 - 44 % Final    Monocytes Relative 12  4 - 12 % Final    Eosinophils Relative 1  0 - 6 % Final    Basophils Relative 1  0 - 1 % Final    Neutrophils Absolute 1 57 (*) 1 85 - 7 62 Thousands/µL Final    Immature Grans Absolute 0 00  0 00 - 0 20 Thousand/uL Final    Lymphocytes Absolute 2 54  0 60 - 4 47 Thousands/µL Final    Monocytes Absolute 0 58  0 17 - 1 22 Thousand/µL Final    Eosinophils Absolute 0 05  0 00 - 0 61 Thousand/µL Final    Basophils Absolute 0 05  0 00 - 0 10 Thousands/µL Final   COMPREHENSIVE METABOLIC PANEL - Abnormal    Sodium 141  135 - 147 mmol/L Final    Potassium 3 3 (*) 3 5 - 5 3 mmol/L Final    Chloride 104  96 - 108 mmol/L Final    CO2 25  21 - 32 mmol/L Final    ANION GAP 12  4 - 13 "mmol/L Final    BUN 20  5 - 25 mg/dL Final    Creatinine 0 49 (*) 0 60 - 1 30 mg/dL Final    Comment: Standardized to IDMS reference method    Glucose 98  65 - 140 mg/dL Final    Comment: If the patient is fasting, the ADA then defines impaired fasting glucose as > 100 mg/dL and diabetes as > or equal to 123 mg/dL  Calcium 9 4  8 4 - 10 2 mg/dL Final    AST 26  13 - 39 U/L Final    ALT 12  7 - 52 U/L Final    Comment: Specimen collection should occur prior to Sulfasalazine administration due to the potential for falsely depressed results  Alkaline Phosphatase 180 (*) 34 - 104 U/L Final    Total Protein 7 0  6 4 - 8 4 g/dL Final    Albumin 3 7  3 5 - 5 0 g/dL Final    Total Bilirubin 0 92  0 20 - 1 00 mg/dL Final    Comment: Use of this assay is not recommended for patients undergoing treatment with eltrombopag due to the potential for falsely elevated results  N-acetyl-p-benzoquinone imine (metabolite of Acetaminophen) will generate erroneously low results in samples for patients that have taken an overdose of Acetaminophen  eGFR 105  ml/min/1 73sq m Final    Narrative:     [place] guidelines for Chronic Kidney Disease (CKD):   •  Stage 1 with normal or high GFR (GFR > 90 mL/min/1 73 square meters)  •  Stage 2 Mild CKD (GFR = 60-89 mL/min/1 73 square meters)  •  Stage 3A Moderate CKD (GFR = 45-59 mL/min/1 73 square meters)  •  Stage 3B Moderate CKD (GFR = 30-44 mL/min/1 73 square meters)  •  Stage 4 Severe CKD (GFR = 15-29 mL/min/1 73 square meters)  •  Stage 5 End Stage CKD (GFR <15 mL/min/1 73 square meters)  Note: GFR calculation is accurate only with a steady state creatinine   MEDICAL ALCOHOL - Abnormal    Ethanol Lvl 261 (*) <10 mg/dL Final    Narrative: Moderate Hemolysis     POCT GLUCOSE - Abnormal    POC Glucose 149 (*) 65 - 140 mg/dl Final   HS TROPONIN I 0HR - Normal    hs TnI 0hr 35  \"Refer to ACS Flowchart\"- see link ng/L Final    Comment:                         " "                     Initial (time 0) result  If >=50 ng/L, Myocardial injury suggested ;  Type of myocardial injury and treatment strategy  to be determined  If 5-49 ng/L, a delta result at 2 hours and or 4 hours will be needed to further evaluate  If <4 ng/L, and chest pain has been >3 hours since onset, patient may qualify for discharge based on the HEART score in the ED  If <5 ng/L and <3hours since onset of chest pain, a delta result at 2 hours will be needed to further evaluate  HS Troponin 99th Percentile URL of a Health Population=12 ng/L with a 95% Confidence Interval of 8-18 ng/L  Second Troponin (time 2 hours)  If calculated delta >= 20 ng/L,  Myocardial injury suggested ; Type of myocardial injury and treatment strategy to be determined  If 5-49 ng/L and the calculated delta is 5-19 ng/L, consult medical service for evaluation  Continue evaluation for ischemia on ecg and other possible etiology and repeat hs troponin at 4 hours  If delta is <5 ng/L at 2 hours, consider discharge based on risk stratification via the HEART score (if in ED), or KAREN risk score in IP/Observation  HS Troponin 99th Percentile URL of a Health Population=12 ng/L with a 95% Confidence Interval of 8-18 ng/L    HS TROPONIN I 2HR - Normal    hs TnI 2hr 35  \"Refer to ACS Flowchart\"- see link ng/L Final    Comment:                                              Initial (time 0) result  If >=50 ng/L, Myocardial injury suggested ;  Type of myocardial injury and treatment strategy  to be determined  If 5-49 ng/L, a delta result at 2 hours and or 4 hours will be needed to further evaluate  If <4 ng/L, and chest pain has been >3 hours since onset, patient may qualify for discharge based on the HEART score in the ED  If <5 ng/L and <3hours since onset of chest pain, a delta result at 2 hours will be needed to further evaluate      HS Troponin 99th Percentile URL of a Health Population=12 ng/L with a 95% Confidence Interval of " 8-18 ng/L  Second Troponin (time 2 hours)  If calculated delta >= 20 ng/L,  Myocardial injury suggested ; Type of myocardial injury and treatment strategy to be determined  If 5-49 ng/L and the calculated delta is 5-19 ng/L, consult medical service for evaluation  Continue evaluation for ischemia on ecg and other possible etiology and repeat hs troponin at 4 hours  If delta is <5 ng/L at 2 hours, consider discharge based on risk stratification via the HEART score (if in ED), or KAREN risk score in IP/Observation  HS Troponin 99th Percentile URL of a Health Population=12 ng/L with a 95% Confidence Interval of 8-18 ng/L  Delta 2hr hsTnI 0  <20 ng/L Final   RAPID DRUG SCREEN, URINE   RAPID DRUG SCREEN, URINE   URINALYSIS WITH REFLEX TO SCOPE   LIGHT BLUE TOP       *Each of these labs was reviewed  Particular standout labs will be noted in the ED Course above     Final Diagnosis:  1  Alcohol intoxication ([address] )    2  Atypical chest pain          P:  - hospital tx includes   Medications   thiamine tablet 100 mg (100 mg Oral Given 7/36/70 2072)   folic acid (FOLVITE) tablet 1 mg (1 mg Oral Given 5/18/23 0532)   multivitamin-minerals (CENTRUM) tablet 1 tablet (1 tablet Oral Given 5/18/23 0532)         - dispositio  Time reflects when diagnosis was documented in both MDM as applicable and the Disposition within this note     Time User Action Codes Description Comment    5/18/2023  4:51 AM Delon Plaster Add [F10 929] Alcohol intoxication ([address] )     5/18/2023  4:51 AM Delon Plaster Add [R07 89] Atypical chest pain       ED Disposition     ED Disposition   Discharge    Condition   Stable    Date/Time   u May 18, 2023  4:51 AM    Comment   Kelton Lu discharge to home/self care                 Follow-up Information     Follow up With Specialties Details Why Contact Info Additional [facility]   [address] [place] [address] [place] [address]  [address], [address] [place] [address], [facility], [place], [place], [facility]          - patient will call their PCP to let them know they were in the emergency department  We discuss return precautions and patient is agreeable with plan and aformentioned disposition  - additional treatment intended, if consistent with primary provider:  - patient to follow with :      Patient's Medications   Discharge Prescriptions    No medications on file       Prior to Admission Medications   Prescriptions Last Dose Informant Patient Reported? Taking? Insulin Pen Needle 29G X 12MM MISC   No No   Sig: by Does not apply route daily at bedtime   Patient not taking: Reported on 4/1/2022   acetaminophen (TYLENOL) 650 mg CR tablet   No No   Sig: Take 1 tablet (650 mg total) by mouth every 8 (eight) hours as needed for mild pain   atorvastatin (LIPITOR) 40 mg tablet   No No   Sig: Take 1 tablet (40 mg total) by mouth daily with dinner   clopidogrel (PLAVIX) 75 mg tablet   No No   Sig: Take 1 tablet (75 mg total) by mouth daily Do not start before November 27, 2022    furosemide (LASIX) 20 mg tablet   No No   Sig: Take 1 tablet (20 mg total) by mouth daily Do not start before November 27, 2022  ipratropium-albuterol (DUO-NEB) 0 5-2 5 mg/3 mL nebulizer solution   No No   Sig: Take 3 mL by nebulization 3 (three) times a day   methadone (DOLOPHINE) 10 MG/5ML solution   Yes No   Sig: Take 30 mg by mouth in the morning [facility]   metoprolol succinate (TOPROL-XL) 50 mg 24 hr tablet   No No   Sig: Take 1 tablet (50 mg total) by mouth daily Do not start before November 29, 2022  multivitamin (THERAGRAN) TABS   No No   Sig: Take 1 tablet by mouth daily   nicotine (NICODERM CQ) 21 mg/24 hr TD 24 hr patch   No No   Sig: Place 1 patch on the skin daily Do not start before November 27, 2022     spironolactone (ALDACTONE) 25 mg tablet   No No "  Sig: Take 0 5 tablets (12 5 mg total) by mouth daily Do not start before November 29, 2022  Facility-Administered Medications: None       Portions of the record may have been created with voice recognition software  Occasional wrong word or \"sound a like\" substitutions may have occurred due to the inherent limitations of voice recognition software  Read the chart carefully and recognize, using context, where substitutions have occurred      Electronically signed by:  MD Stefania Jiménez MD  05/18/23 5244    "

## 2023-05-21 LAB
ATRIAL RATE: 110 BPM
ATRIAL RATE: 95 BPM
P AXIS: 70 DEGREES
P AXIS: 74 DEGREES
PR INTERVAL: 138 MS
PR INTERVAL: 142 MS
QRS AXIS: 27 DEGREES
QRS AXIS: 8 DEGREES
QRSD INTERVAL: 88 MS
QRSD INTERVAL: 88 MS
QT INTERVAL: 346 MS
QT INTERVAL: 386 MS
QTC INTERVAL: 468 MS
QTC INTERVAL: 485 MS
T WAVE AXIS: 111 DEGREES
T WAVE AXIS: 114 DEGREES
VENTRICULAR RATE: 110 BPM
VENTRICULAR RATE: 95 BPM

## 2023-05-22 ENCOUNTER — HOSPITAL ENCOUNTER (EMERGENCY)
Facility: HOSPITAL | Age: 63
Discharge: HOME | End: 2023-05-23
Attending: EMERGENCY MEDICINE

## 2023-05-22 DIAGNOSIS — R07.89 ATYPICAL CHEST PAIN: Primary | ICD-10-CM

## 2023-05-22 LAB
ALBUMIN SERPL BCP-MCNC: 3.8 G/DL (ref 3.5–5)
ALP SERPL-CCNC: 156 U/L (ref 34–104)
ALT SERPL W P-5'-P-CCNC: 16 U/L (ref 7–52)
ANION GAP SERPL CALCULATED.3IONS-SCNC: 12 MMOL/L (ref 4–13)
APTT PPP: 24 SECONDS (ref 23–37)
AST SERPL W P-5'-P-CCNC: 32 U/L (ref 13–39)
BASOPHILS # BLD AUTO: 0.04 THOUSANDS/ÂΜL (ref 0–0.1)
BASOPHILS NFR BLD AUTO: 1 % (ref 0–1)
BILIRUB SERPL-MCNC: 0.55 MG/DL (ref 0.2–1)
BUN SERPL-MCNC: 12 MG/DL (ref 5–25)
CALCIUM SERPL-MCNC: 9 MG/DL (ref 8.4–10.2)
CARDIAC TROPONIN I PNL SERPL HS: 22 NG/L
CHLORIDE SERPL-SCNC: 104 MMOL/L (ref 96–108)
CO2 SERPL-SCNC: 25 MMOL/L (ref 21–32)
CREAT SERPL-MCNC: 0.56 MG/DL (ref 0.6–1.3)
D DIMER PPP FEU-MCNC: 0.56 UG/ML FEU
EOSINOPHIL # BLD AUTO: 0.1 THOUSAND/ÂΜL (ref 0–0.61)
EOSINOPHIL NFR BLD AUTO: 2 % (ref 0–6)
ERYTHROCYTE [DISTWIDTH] IN BLOOD BY AUTOMATED COUNT: 15.3 % (ref 11.6–15.1)
GFR SERPL CREATININE-BSD FRML MDRD: 100 ML/MIN/1.73SQ M
GLUCOSE SERPL-MCNC: 88 MG/DL (ref 65–140)
HCT VFR BLD AUTO: 42 % (ref 34.8–46.1)
HGB BLD-MCNC: 14.2 G/DL (ref 11.5–15.4)
IMM GRANULOCYTES # BLD AUTO: 0 THOUSAND/UL (ref 0–0.2)
IMM GRANULOCYTES NFR BLD AUTO: 0 % (ref 0–2)
INR PPP: 0.92 (ref 0.84–1.19)
LYMPHOCYTES # BLD AUTO: 2.6 THOUSANDS/ÂΜL (ref 0.6–4.47)
LYMPHOCYTES NFR BLD AUTO: 61 % (ref 14–44)
MCH RBC QN AUTO: 30.2 PG (ref 26.8–34.3)
MCHC RBC AUTO-ENTMCNC: 33.8 G/DL (ref 31.4–37.4)
MCV RBC AUTO: 89 FL (ref 82–98)
MONOCYTES # BLD AUTO: 0.41 THOUSAND/ÂΜL (ref 0.17–1.22)
MONOCYTES NFR BLD AUTO: 10 % (ref 4–12)
NEUTROPHILS # BLD AUTO: 1.09 THOUSANDS/ÂΜL (ref 1.85–7.62)
NEUTS SEG NFR BLD AUTO: 26 % (ref 43–75)
NRBC BLD AUTO-RTO: 0 /100 WBCS
PLATELET # BLD AUTO: 236 THOUSANDS/UL (ref 149–390)
PMV BLD AUTO: 10.3 FL (ref 8.9–12.7)
POTASSIUM SERPL-SCNC: 3.8 MMOL/L (ref 3.5–5.3)
PROT SERPL-MCNC: 7.3 G/DL (ref 6.4–8.4)
PROTHROMBIN TIME: 12.4 SECONDS (ref 11.6–14.5)
RBC # BLD AUTO: 4.7 MILLION/UL (ref 3.81–5.12)
SODIUM SERPL-SCNC: 141 MMOL/L (ref 135–147)
WBC # BLD AUTO: 4.24 THOUSAND/UL (ref 4.31–10.16)

## 2023-05-22 RX ORDER — KETOROLAC TROMETHAMINE 30 MG/ML
15 INJECTION, SOLUTION INTRAMUSCULAR; INTRAVENOUS ONCE
Status: COMPLETED | OUTPATIENT
Start: 2023-05-22 | End: 2023-05-22

## 2023-05-22 RX ORDER — FOLIC ACID 1 MG/1
1 TABLET ORAL ONCE
Status: COMPLETED | OUTPATIENT
Start: 2023-05-22 | End: 2023-05-22

## 2023-05-22 RX ORDER — FAMOTIDINE 20 MG/1
20 TABLET, FILM COATED ORAL ONCE
Status: COMPLETED | OUTPATIENT
Start: 2023-05-22 | End: 2023-05-22

## 2023-05-22 RX ADMIN — THIAMINE HYDROCHLORIDE 100 MG: 100 INJECTION, SOLUTION INTRAMUSCULAR; INTRAVENOUS at 22:40

## 2023-05-22 RX ADMIN — SODIUM CHLORIDE 1000 ML: 0.9 INJECTION, SOLUTION INTRAVENOUS at 22:37

## 2023-05-22 RX ADMIN — FOLIC ACID 1 MG: 1 TABLET ORAL at 22:17

## 2023-05-22 RX ADMIN — FAMOTIDINE 20 MG: 20 TABLET, FILM COATED ORAL at 23:25

## 2023-05-22 RX ADMIN — KETOROLAC TROMETHAMINE 15 MG: 30 INJECTION, SOLUTION INTRAMUSCULAR at 23:25

## 2023-05-22 RX ADMIN — Medication 1 TABLET: at 22:17

## 2023-05-23 VITALS
BODY MASS INDEX: 21.48 KG/M2 | HEART RATE: 100 BPM | DIASTOLIC BLOOD PRESSURE: 71 MMHG | WEIGHT: 92.81 LBS | HEIGHT: 55 IN | SYSTOLIC BLOOD PRESSURE: 139 MMHG | TEMPERATURE: 97.4 F | OXYGEN SATURATION: 95 % | RESPIRATION RATE: 22 BRPM

## 2023-05-23 LAB
2HR DELTA HS TROPONIN: 4 NG/L
CARDIAC TROPONIN I PNL SERPL HS: 26 NG/L

## 2023-05-23 NOTE — ED PROVIDER NOTES
Final Diagnosis:  1  Atypical chest pain        Chief Complaint   Patient presents with   • Chest Pain     Patient c/o stabbing chest pain that began earlier in the day  Pt also intoxicated at the time of triage, states she drank one bottle of vodka today  Pt also states she has not taken her methadone since last Friday  HPI  65 yo presents w/ chest pain  Stabbing  Started hours ago  Drank a bottle of vodka today otherwise  Presents here w/ alcohol  No n/v  Hasn't taken her methadone either  Seen here a few d ago for similar  Encouraged to f/u cards, ambulatory referral placed  Encouraged to allow [facility]ORP and detox but doesn't want this  I ask why she comes here each time instead of f/u, and she says because she gets scared in her home at night  That's why she drinks too  We discuss her family support etc who are all in [address]  [facility]  Talks to them daily on the phone  EMS reports if applicable: N/A     - Previous charting underwent limited review with attention to last ED visits, labs, ekgs, and prior imaging    Chart review reveals :     Admission on 05/17/2023, Discharged on 05/18/2023   Component Date Value Ref Range Status   • Amph/Meth UR 05/18/2023 Negative  Negative Final   • Barbiturate Ur 05/18/2023 Negative  Negative Final   • Benzodiazepine Urine 05/18/2023 Negative  Negative Final   • Cocaine Urine 05/18/2023 Negative  Negative Final   • Methadone Urine 05/18/2023 Positive (A)  Negative Final   • Opiate Urine 05/18/2023 Negative  Negative Final   • PCP Ur 05/18/2023 Negative  Negative Final   • THC Urine 05/18/2023 Negative  Negative Final   • Oxycodone Urine 05/18/2023 Negative  Negative Final   • WBC 05/17/2023 4 79  4 31 - 10 16 Thousand/uL Final   • RBC 05/17/2023 5 14 (H)  3 81 - 5 12 Million/uL Final   • Hemoglobin 05/17/2023 15 5 (H)  11 5 - 15 4 g/dL Final   • Hematocrit 05/17/2023 45 7  34 8 - 46 1 % Final   • MCV 05/17/2023 89  82 - 98 fL Final   • MCH 05/17/2023 30 2  26 8 - 34 3 pg Final   • MCHC 05/17/2023 33 9  31 4 - 37 4 g/dL Final   • RDW 05/17/2023 15 7 (H)  11 6 - 15 1 % Final   • MPV 05/17/2023 10 2  8 9 - 12 7 fL Final   • Platelets 63/31/8036 225  149 - 390 Thousands/uL Final   • nRBC 05/17/2023 0  /100 WBCs Final   • Neutrophils Relative 05/17/2023 33 (L)  43 - 75 % Final   • Immat GRANS % 05/17/2023 0  0 - 2 % Final   • Lymphocytes Relative 05/17/2023 53 (H)  14 - 44 % Final   • Monocytes Relative 05/17/2023 12  4 - 12 % Final   • Eosinophils Relative 05/17/2023 1  0 - 6 % Final   • Basophils Relative 05/17/2023 1  0 - 1 % Final   • Neutrophils Absolute 05/17/2023 1 57 (L)  1 85 - 7 62 Thousands/µL Final   • Immature Grans Absolute 05/17/2023 0 00  0 00 - 0 20 Thousand/uL Final   • Lymphocytes Absolute 05/17/2023 2 54  0 60 - 4 47 Thousands/µL Final   • Monocytes Absolute 05/17/2023 0 58  0 17 - 1 22 Thousand/µL Final   • Eosinophils Absolute 05/17/2023 0 05  0 00 - 0 61 Thousand/µL Final   • Basophils Absolute 05/17/2023 0 05  0 00 - 0 10 Thousands/µL Final   • Sodium 05/17/2023 141  135 - 147 mmol/L Final   • Potassium 05/17/2023 3 3 (L)  3 5 - 5 3 mmol/L Final   • Chloride 05/17/2023 104  96 - 108 mmol/L Final   • CO2 05/17/2023 25  21 - 32 mmol/L Final   • ANION GAP 05/17/2023 12  4 - 13 mmol/L Final   • BUN 05/17/2023 20  5 - 25 mg/dL Final   • Creatinine 05/17/2023 0 49 (L)  0 60 - 1 30 mg/dL Final    Standardized to IDMS reference method   • Glucose 05/17/2023 98  65 - 140 mg/dL Final    If the patient is fasting, the ADA then defines impaired fasting glucose as > 100 mg/dL and diabetes as > or equal to 123 mg/dL  • Calcium 05/17/2023 9 4  8 4 - 10 2 mg/dL Final   • AST 05/17/2023 26  13 - 39 U/L Final   • ALT 05/17/2023 12  7 - 52 U/L Final    Specimen collection should occur prior to Sulfasalazine administration due to the potential for falsely depressed results      • Alkaline Phosphatase 05/17/2023 180 (H)  34 - 104 U/L Final   • Total Protein 05/17/2023 7 0  6 4 - 8 4 g/dL Final   • "Albumin 05/17/2023 3 7  3 5 - 5 0 g/dL Final   • Total Bilirubin 05/17/2023 0 92  0 20 - 1 00 mg/dL Final    Use of this assay is not recommended for patients undergoing treatment with eltrombopag due to the potential for falsely elevated results  N-acetyl-p-benzoquinone imine (metabolite of Acetaminophen) will generate erroneously low results in samples for patients that have taken an overdose of Acetaminophen  • eGFR 05/17/2023 105  ml/min/1 73sq m Final   • hs TnI 0hr 05/17/2023 35  \"Refer to ACS Flowchart\"- see link ng/L Final    Comment:                                              Initial (time 0) result  If >=50 ng/L, Myocardial injury suggested ;  Type of myocardial injury and treatment strategy  to be determined  If 5-49 ng/L, a delta result at 2 hours and or 4 hours will be needed to further evaluate  If <4 ng/L, and chest pain has been >3 hours since onset, patient may qualify for discharge based on the HEART score in the ED  If <5 ng/L and <3hours since onset of chest pain, a delta result at 2 hours will be needed to further evaluate  HS Troponin 99th Percentile URL of a Health Population=12 ng/L with a 95% Confidence Interval of 8-18 ng/L  Second Troponin (time 2 hours)  If calculated delta >= 20 ng/L,  Myocardial injury suggested ; Type of myocardial injury and treatment strategy to be determined  If 5-49 ng/L and the calculated delta is 5-19 ng/L, consult medical service for evaluation  Continue evaluation for ischemia on ecg and other possible etiology and repeat hs troponin at 4 hours  If delta                            is <5 ng/L at 2 hours, consider discharge based on risk stratification via the HEART score (if in ED), or KAREN risk score in IP/Observation      HS Troponin 99th Percentile URL of a Health Population=12 ng/L with a 95% Confidence Interval of 8-18 ng/L    • POC Glucose 05/17/2023 149 (H)  65 - 140 mg/dl Final   • Ethanol Lvl 05/18/2023 261 (H)  <10 mg/dL Final   • " "Color, UA 05/18/2023 Red   Final   • Clarity, UA 05/18/2023 Slightly Cloudy   Final   • Specific Gravity, UA 05/18/2023 1 015  1 000 - 1 030 Final   • pH, UA 05/18/2023 6 0  5 0, 5 5, 6 0, 6 5, 7 0, 7 5, 8 0, 8 5, 9 0 Final   • Leukocytes, UA 05/18/2023 Negative  Negative Final   • Nitrite, UA 05/18/2023 Negative  Negative Final   • Protein, UA 05/18/2023 Trace (A)  Negative mg/dl Final   • Glucose, UA 05/18/2023 Negative  Negative mg/dl Final   • Ketones, UA 05/18/2023 Negative  Negative mg/dl Final   • Urobilinogen, UA 05/18/2023 2 0 (A)  0 2, 1 0 E U /dl E U /dl Final   • Bilirubin, UA 05/18/2023 Negative  Negative Final   • Occult Blood, UA 05/18/2023 Negative  Negative Final   • hs TnI 2hr 05/18/2023 35  \"Refer to ACS Flowchart\"- see link ng/L Final    Comment:                                              Initial (time 0) result  If >=50 ng/L, Myocardial injury suggested ;  Type of myocardial injury and treatment strategy  to be determined  If 5-49 ng/L, a delta result at 2 hours and or 4 hours will be needed to further evaluate  If <4 ng/L, and chest pain has been >3 hours since onset, patient may qualify for discharge based on the HEART score in the ED  If <5 ng/L and <3hours since onset of chest pain, a delta result at 2 hours will be needed to further evaluate  HS Troponin 99th Percentile URL of a Health Population=12 ng/L with a 95% Confidence Interval of 8-18 ng/L  Second Troponin (time 2 hours)  If calculated delta >= 20 ng/L,  Myocardial injury suggested ; Type of myocardial injury and treatment strategy to be determined  If 5-49 ng/L and the calculated delta is 5-19 ng/L, consult medical service for evaluation  Continue evaluation for ischemia on ecg and other possible etiology and repeat hs troponin at 4 hours    If delta                            is <5 ng/L at 2 hours, consider discharge based on risk stratification via the HEART score (if in ED), or KAREN risk score in " IP/Observation  HS Troponin 99th Percentile URL of a Health Population=12 ng/L with a 95% Confidence Interval of 8-18 ng/L  • Delta 2hr hsTnI 05/18/2023 0  <20 ng/L Final   • RBC, UA 05/18/2023 None Seen  None Seen, 0-1, 1-2, 2-4, 0-5 /hpf Final   • WBC, UA 05/18/2023 0-1  None Seen, 0-1, 1-2, 0-5, 2-4 /hpf Final   • Epithelial Cells 05/18/2023 Occasional  None Seen, Occasional /hpf Final   • Bacteria, UA 05/18/2023 Occasional  None Seen, Occasional /hpf Final   • Ventricular Rate 05/17/2023 110  BPM Final   • Atrial Rate 05/17/2023 110  BPM Final   • OR Interval 05/17/2023 142  ms Final   • QRSD Interval 05/17/2023 88  ms Final   • QT Interval 05/17/2023 346  ms Final   • QTC Interval 05/17/2023 468  ms Final   • P Axis 05/17/2023 70  degrees Final   • QRS Axis 05/17/2023 27  degrees Final   • T Wave Axis 05/17/2023 114  degrees Final   • Ventricular Rate 05/18/2023 95  BPM Final   • Atrial Rate 05/18/2023 95  BPM Final   • OR Interval 05/18/2023 138  ms Final   • QRSD Interval 05/18/2023 88  ms Final   • QT Interval 05/18/2023 386  ms Final   • QTC Interval 05/18/2023 485  ms Final   • P Axis 05/18/2023 74  degrees Final   • QRS Axis 05/18/2023 8  degrees Final   • T Wave Axis 05/18/2023 111  degrees Final       - No language barrier    - History obtained from patient   - There are no limitations to the history obtained  PMH:   has a past medical history of Alcohol abuse, Arthritis, Asthma, Bipolar disorder ([facility] [address] ), Diabetes mellitus ([facility] [address] ), Opiate abuse, continuous ([facility] [address] ), and Smoker  PSH:   has a past surgical history that includes Abdominal surgery       Social History:  Tobacco Use: High Risk   • Smoking Tobacco Use: Every Day   • Smokeless Tobacco Use: Never   • Passive Exposure: Not on file     Alcohol Use: Heavy Drinker   • Frequency of Alcohol Consumption: 4 or more times a week   • Average Number of Drinks: 10 or more   • Frequency of Binge Drinking: Daily or almost daily     No illicit use ROS:  Pertinent positives/negatives: Rubin Redmond Some ROS may be present in the HPI and would take precedent over these standard questions asked below  Review of Systems   Constitutional: Negative for diaphoresis  Respiratory: Negative for cough, shortness of breath, wheezing and stridor  Cardiovascular: Positive for chest pain  Gastrointestinal: Negative for abdominal pain, nausea and vomiting  Psychiatric/Behavioral: The patient is nervous/anxious  CONSTITUTIONAL:  No lethargy  No weakness  No unexpected weight loss  No appetite change  EYES:  No pain, redness, or discharge  No loss of vision  No orbital trauma or pain  ENT:  No tinnitus or decreased hearing  No epistaxis/purulent rhinorrhea  No voice change, airway closing, trismus  CARDIOVASCULAR:  No chest pain  No skin mottling or pallor  No change in exertional capacity  RESPIRATORY:  No hemoptysis  No paroxysmal nocturnal dyspnea  No stridor  No audible wheezing  No production with cough  GASTROINTESTINAL:  Normal appetite  No vomiting, diarrhea  No pain  No bloating  No melena  No hematochezia  GENITOURINARY:  No frequency, urgency, nocturia  No hematuria or dysuria  No discharge  No sores/adenopathy  MUSCULOSKELETAL:  No arthralgias or myalgias that are new  No new deformity  INTEGUMENTARY:  No swelling  No unexpected contusions  No abrasions  No lymphangitis  NEUROLOGIC:  No meningismus  No new numbness of the extremities  No new focal weakness  No postural instability  PSYCHIATRIC:  No SI HI AVH  HEMATOLOGICAL:  No bleeding  No petechiae  No bruising  ALLERGIES:  No urticaria  No sudden abd cramping  No stridor      PE:     Physical exam highlights:   Physical Exam       Vitals:    05/23/23 0000 05/23/23 0015 05/23/23 0030 05/23/23 0100   BP: 120/78 112/75 114/58 139/71   BP Location: Right arm Right arm Right arm    Pulse: 102 102 104 100   Resp: 21 19 17 22   Temp:       TempSrc:       SpO2: 98% 96% 94% 95%   Weight: Height:         Vitals reviewed by me  Nursing note reviewed  Chaperone present for all sensitive exam   Const: No acute distress  Alert  Nontoxic  Not diaphoretic  Sleeps promptly when I leave the room  HEENT: External ears normal  No protrusion drainage swelling  Nose normal  No drainage/traumatic deformity  MMM  Mouth with baseline/symmetric movement  No trismus  Eyes: No squinting  No icterus  No tearing/swelling/drainage  Tracks through the room with normal EOM  Neck: ROM normal  No rigidity  No meningismus  Cards: Rate as per vitals Compared to monitor sinus unless documented  Regular Well perfused  Pulm: able to verbalize without additional effort  Effort and excursion normal  No distress  No audible wheezing/ stridor  Normal resp rate without retraction or change in work of breathing  CTAB  Abd: No distension beyond baseline  No fluctuant wave  Patient without peritoneal pain with shifting/bumping the bed  Soft abd  MSK: ROM normal baseline  No deformity  No contractures from baseline  Skin: No new rashes visible  Well perfused  No wounds visualized on exposed skin  Neuro: Nonfocal  Baseline  CN grossly intact  Moving all four with coordination  Psych: Normal behavior and affect  A:  - Nursing note reviewed      Ddx and MDM  Considered diagnoses    Social reasons for presentation    R/o ACS  Needs ambulatory f/u    Offer detox  Vitamins for chronic alcoholism    No signs symptoms of aspiration pneumonia though at risk    Prior PE she can't elaborate on  Low risk         My conversation with consultant reveals: NA       Decision rules:     HEART Risk Score    Flowsheet Row Most Recent Value   Heart Score Risk Calculator    History 0 Filed at: 05/22/2023 2317   ECG 1 Filed at: 05/22/2023 2317   Age 1 Filed at: 05/22/2023 2317   Risk Factors 2 Filed at: 05/22/2023 2317   Troponin 1 Filed at: 05/22/2023 2317   HEART Score 5 Filed at: 05/22/2023 2317         Wells' Criteria for PE Flowsheet Row Most Recent Value   Wells' Criteria for PE    Clinical signs and symptoms of DVT 0 Filed at: 05/23/2023 0700   PE is primary diagnosis or equally likely 0 Filed at: 05/23/2023 0700   HR >100 1 5 Filed at: 05/23/2023 0700   Immobilization at least 3 days or Surgery in the previous 4 weeks 0 Filed at: 05/23/2023 0700   Previous, objectively diagnosed PE or DVT 1 5 Filed at: 05/23/2023 0700   Hemoptysis 0 Filed at: 05/23/2023 0700   Malignancy with treatment within 6 months or palliative 0 Filed at: 05/23/2023 0700   Wells' Criteria Total 3 Filed at: 05/23/2023 0700         age adjusts dimer at moderate risk  < 3% liklihood of PE  Given amount of imaging will spare CTAPE                My read of the XR/CT scan reveals:  NA   No orders to display       Orders Placed This Encounter   Procedures   • HS Troponin 0hr (reflex protocol)   • D-dimer, quantitative   • Protime-INR   • APTT   • Comprehensive metabolic panel   • CBC and differential   • HS Troponin I 2hr   • ECG 12 lead     Labs Reviewed   D-DIMER, QUANTITATIVE - Abnormal       Result Value Ref Range Status    D-Dimer, Quant 0 56 (*) <0 50 ug/ml FEU Final    Comment: Reference and upper limits to exclude DVT and PE are the same  Do not use to exclude if clinical symptoms are present  Pregnant women:  1st trimester:  <0 22 - 1 06 ug/ml FEU  2nd trimester:  <0 22 - 1 88 ug/ml FEU  3rd trimester:   0 24 - 3 28 ug/ml FEU    Note: Normal ranges may not apply to patients who are transgender, non-binary, or whose legal sex, sex at birth, and gender identity differ  Narrative: In the evaluation for possible pulmonary embolism, in the appropriate (Well's Score of 4 or less) patient, the age adjusted d-dimer cutoff for this patient can be calculated as:    Age x 0 01 (in ug/mL) for Age-adjusted D-dimer exclusion threshold for a patient over 50 years     COMPREHENSIVE METABOLIC PANEL - Abnormal    Sodium 141  135 - 147 mmol/L Final    Potassium 3 8 3 5 - 5 3 mmol/L Final    Chloride 104  96 - 108 mmol/L Final    CO2 25  21 - 32 mmol/L Final    ANION GAP 12  4 - 13 mmol/L Final    BUN 12  5 - 25 mg/dL Final    Creatinine 0 56 (*) 0 60 - 1 30 mg/dL Final    Comment: Standardized to IDMS reference method    Glucose 88  65 - 140 mg/dL Final    Comment: If the patient is fasting, the ADA then defines impaired fasting glucose as > 100 mg/dL and diabetes as > or equal to 123 mg/dL  Calcium 9 0  8 4 - 10 2 mg/dL Final    AST 32  13 - 39 U/L Final    ALT 16  7 - 52 U/L Final    Comment: Specimen collection should occur prior to Sulfasalazine administration due to the potential for falsely depressed results  Alkaline Phosphatase 156 (*) 34 - 104 U/L Final    Total Protein 7 3  6 4 - 8 4 g/dL Final    Albumin 3 8  3 5 - 5 0 g/dL Final    Total Bilirubin 0 55  0 20 - 1 00 mg/dL Final    Comment: Use of this assay is not recommended for patients undergoing treatment with eltrombopag due to the potential for falsely elevated results  N-acetyl-p-benzoquinone imine (metabolite of Acetaminophen) will generate erroneously low results in samples for patients that have taken an overdose of Acetaminophen      eGFR 100  ml/min/1 73sq m Final    Narrative:     [place] guidelines for Chronic Kidney Disease (CKD):   •  Stage 1 with normal or high GFR (GFR > 90 mL/min/1 73 square meters)  •  Stage 2 Mild CKD (GFR = 60-89 mL/min/1 73 square meters)  •  Stage 3A Moderate CKD (GFR = 45-59 mL/min/1 73 square meters)  •  Stage 3B Moderate CKD (GFR = 30-44 mL/min/1 73 square meters)  •  Stage 4 Severe CKD (GFR = 15-29 mL/min/1 73 square meters)  •  Stage 5 End Stage CKD (GFR <15 mL/min/1 73 square meters)  Note: GFR calculation is accurate only with a steady state creatinine   CBC AND DIFFERENTIAL - Abnormal    WBC 4 24 (*) 4 31 - 10 16 Thousand/uL Final    RBC 4 70  3 81 - 5 12 Million/uL Final    Hemoglobin 14 2  11 5 - 15 4 g/dL Final    Hematocrit 42 0 "34 8 - 46 1 % Final    MCV 89  82 - 98 fL Final    MCH 30 2  26 8 - 34 3 pg Final    MCHC 33 8  31 4 - 37 4 g/dL Final    RDW 15 3 (*) 11 6 - 15 1 % Final    MPV 10 3  8 9 - 12 7 fL Final    Platelets 671  231 - 390 Thousands/uL Final    nRBC 0  /100 WBCs Final    Neutrophils Relative 26 (*) 43 - 75 % Final    Immat GRANS % 0  0 - 2 % Final    Lymphocytes Relative 61 (*) 14 - 44 % Final    Monocytes Relative 10  4 - 12 % Final    Eosinophils Relative 2  0 - 6 % Final    Basophils Relative 1  0 - 1 % Final    Neutrophils Absolute 1 09 (*) 1 85 - 7 62 Thousands/µL Final    Immature Grans Absolute 0 00  0 00 - 0 20 Thousand/uL Final    Lymphocytes Absolute 2 60  0 60 - 4 47 Thousands/µL Final    Monocytes Absolute 0 41  0 17 - 1 22 Thousand/µL Final    Eosinophils Absolute 0 10  0 00 - 0 61 Thousand/µL Final    Basophils Absolute 0 04  0 00 - 0 10 Thousands/µL Final   HS TROPONIN I 0HR - Normal    hs TnI 0hr 22  \"Refer to ACS Flowchart\"- see link ng/L Final    Comment:                                              Initial (time 0) result  If >=50 ng/L, Myocardial injury suggested ;  Type of myocardial injury and treatment strategy  to be determined  If 5-49 ng/L, a delta result at 2 hours and or 4 hours will be needed to further evaluate  If <4 ng/L, and chest pain has been >3 hours since onset, patient may qualify for discharge based on the HEART score in the ED  If <5 ng/L and <3hours since onset of chest pain, a delta result at 2 hours will be needed to further evaluate  HS Troponin 99th Percentile URL of a Health Population=12 ng/L with a 95% Confidence Interval of 8-18 ng/L  Second Troponin (time 2 hours)  If calculated delta >= 20 ng/L,  Myocardial injury suggested ; Type of myocardial injury and treatment strategy to be determined  If 5-49 ng/L and the calculated delta is 5-19 ng/L, consult medical service for evaluation    Continue evaluation for ischemia on ecg and other possible etiology and repeat hs " "troponin at 4 hours  If delta is <5 ng/L at 2 hours, consider discharge based on risk stratification via the HEART score (if in ED), or KAREN risk score in IP/Observation  HS Troponin 99th Percentile URL of a Health Population=12 ng/L with a 95% Confidence Interval of 8-18 ng/L  PROTIME-INR - Normal    Protime 12 4  11 6 - 14 5 seconds Final    INR 0 92  0 84 - 1 19 Final   APTT - Normal    PTT 24  23 - 37 seconds Final    Comment: Therapeutic Heparin Range =  60-90 seconds   HS TROPONIN I 2HR - Normal    hs TnI 2hr 26  \"Refer to ACS Flowchart\"- see link ng/L Final    Comment:                                              Initial (time 0) result  If >=50 ng/L, Myocardial injury suggested ;  Type of myocardial injury and treatment strategy  to be determined  If 5-49 ng/L, a delta result at 2 hours and or 4 hours will be needed to further evaluate  If <4 ng/L, and chest pain has been >3 hours since onset, patient may qualify for discharge based on the HEART score in the ED  If <5 ng/L and <3hours since onset of chest pain, a delta result at 2 hours will be needed to further evaluate  HS Troponin 99th Percentile URL of a Health Population=12 ng/L with a 95% Confidence Interval of 8-18 ng/L  Second Troponin (time 2 hours)  If calculated delta >= 20 ng/L,  Myocardial injury suggested ; Type of myocardial injury and treatment strategy to be determined  If 5-49 ng/L and the calculated delta is 5-19 ng/L, consult medical service for evaluation  Continue evaluation for ischemia on ecg and other possible etiology and repeat hs troponin at 4 hours  If delta is <5 ng/L at 2 hours, consider discharge based on risk stratification via the HEART score (if in ED), or KAREN risk score in IP/Observation  HS Troponin 99th Percentile URL of a Health Population=12 ng/L with a 95% Confidence Interval of 8-18 ng/L  Delta 2hr hsTnI 4  <20 ng/L Final       *Each of these labs was reviewed   Particular standout labs will " be noted in the ED Course above     Final Diagnosis:  1  Atypical chest pain          P:  - hospital tx includes   Medications   sodium chloride 0 9 % bolus 1,000 mL (0 mL Intravenous Stopped 5/23/23 0041)   thiamine (VITAMIN B1) 100 mg in sodium chloride 0 9 % 50 mL IVPB (0 mg Intravenous Stopped 1/36/86 7031)   folic acid (FOLVITE) tablet 1 mg (1 mg Oral Given 5/22/23 2217)   multivitamin-minerals (CENTRUM) tablet 1 tablet (1 tablet Oral Given 5/22/23 2217)   famotidine (PEPCID) tablet 20 mg (20 mg Oral Given 5/22/23 2325)   ketorolac (TORADOL) injection 15 mg (15 mg Intravenous Given 5/22/23 2325)         - disposition  Time reflects when diagnosis was documented in both MDM as applicable and the Disposition within this note     Time User Action Codes Description Comment    5/22/2023 11:16 PM Mary Lou Dixon Add [R07 [id number]] Atypical chest pain       ED Disposition     ED Disposition   Discharge    Condition   Stable    Date/Time   Mon May 22, 2023 11:16 PM    Comment   Cristina Sapp discharge to home/self care  Follow-up Information    None         - patient will call their PCP to let them know they were in the emergency department  We discuss return precautions and patient is agreeable with plan and aformentioned disposition         - additional treatment intended, if consistent with primary provider:  - patient to follow with :      Discharge Medication List as of 5/22/2023 11:17 PM      CONTINUE these medications which have NOT CHANGED    Details   acetaminophen (TYLENOL) 650 mg CR tablet Take 1 tablet (650 mg total) by mouth every 8 (eight) hours as needed for mild pain, Starting Fri 4/1/2022, Normal      atorvastatin (LIPITOR) 40 mg tablet Take 1 tablet (40 mg total) by mouth daily with dinner, Starting Mon 11/28/2022, Normal      clopidogrel (PLAVIX) 75 mg tablet Take 1 tablet (75 mg total) by mouth daily Do not start before November 27, 2022 , Starting Sun 11/27/2022, Normal      furosemide (LASIX) 20 mg tablet Take 1 tablet (20 mg total) by mouth daily Do not start before November 27, 2022 , Starting Sun 11/27/2022, Normal      Insulin Pen Needle 29G X 12MM MISC by Does not apply route daily at bedtime, Starting Fri 3/22/2019, Normal      ipratropium-albuterol (DUO-NEB) 0 5-2 5 mg/3 mL nebulizer solution Take 3 mL by nebulization 3 (three) times a day, Starting Mon 11/28/2022, Normal      methadone (DOLOPHINE) 10 MG/5ML solution Take 30 mg by mouth in the morning [facility], Historical Med      metoprolol succinate (TOPROL-XL) 50 mg 24 hr tablet Take 1 tablet (50 mg total) by mouth daily Do not start before November 29, 2022 , Starting Tue 11/29/2022, Normal      multivitamin [facility]) TABS Take 1 tablet by mouth daily, Starting Wed 11/17/2021, Until Fri 12/17/2021, Normal      nicotine (NICODERM CQ) 21 mg/24 hr TD 24 hr patch Place 1 patch on the skin daily Do not start before November 27, 2022 , Starting Sun 11/27/2022, Normal      spironolactone (ALDACTONE) 25 mg tablet Take 0 5 tablets (12 5 mg total) by mouth daily Do not start before November 29, 2022 , Starting Tue 11/29/2022, Until u 12/29/2022, Normal           No discharge procedures on file  Prior to Admission Medications   Prescriptions Last Dose Informant Patient Reported? Taking? Insulin Pen Needle 29G X 12MM MISC   No No   Sig: by Does not apply route daily at bedtime   Patient not taking: Reported on 4/1/2022   acetaminophen (TYLENOL) 650 mg CR tablet   No No   Sig: Take 1 tablet (650 mg total) by mouth every 8 (eight) hours as needed for mild pain   atorvastatin (LIPITOR) 40 mg tablet   No No   Sig: Take 1 tablet (40 mg total) by mouth daily with dinner   clopidogrel (PLAVIX) 75 mg tablet   No No   Sig: Take 1 tablet (75 mg total) by mouth daily Do not start before November 27, 2022    furosemide (LASIX) 20 mg tablet   No No   Sig: Take 1 tablet (20 mg total) by mouth daily Do not start before November 27, 2022  "  ipratropium-albuterol (DUO-NEB) 0 5-2 5 mg/3 mL nebulizer solution   No No   Sig: Take 3 mL by nebulization 3 (three) times a day   methadone (DOLOPHINE) 10 MG/5ML solution   Yes No   Sig: Take 30 mg by mouth in the morning [facility]   metoprolol succinate (TOPROL-XL) 50 mg 24 hr tablet   No No   Sig: Take 1 tablet (50 mg total) by mouth daily Do not start before November 29, 2022  multivitamin (THERAGRAN) TABS   No No   Sig: Take 1 tablet by mouth daily   nicotine (NICODERM CQ) 21 mg/24 hr TD 24 hr patch   No No   Sig: Place 1 patch on the skin daily Do not start before November 27, 2022  spironolactone (ALDACTONE) 25 mg tablet   No No   Sig: Take 0 5 tablets (12 5 mg total) by mouth daily Do not start before November 29, 2022  Facility-Administered Medications: None       Portions of the record may have been created with voice recognition software  Occasional wrong word or \"sound a like\" substitutions may have occurred due to the inherent limitations of voice recognition software  Read the chart carefully and recognize, using context, where substitutions have occurred      Electronically signed by:  MD Christi Beltre MD  05/23/23 [id number]    "

## 2023-05-25 LAB
ATRIAL RATE: 108 BPM
P AXIS: 72 DEGREES
PR INTERVAL: 142 MS
QRS AXIS: -1 DEGREES
QRSD INTERVAL: 84 MS
QT INTERVAL: 372 MS
QTC INTERVAL: 498 MS
T WAVE AXIS: 79 DEGREES
VENTRICULAR RATE: 108 BPM

## 2023-06-18 NOTE — ASSESSMENT & PLAN NOTE
· Documented that patient drinks 1 pink vodka a day  · Has had many elevated alcohol levels in the past, most recently 261 on 5/18   · [facility] protocol ordered  · Thiamine/folic acid supplementation

## 2023-06-18 NOTE — ED NOTES
Magnesium held 15 min prior to ICU transfer,  made aware     Horacio Urena, RN  06/18/23 [phone]

## 2023-06-18 NOTE — ED PROVIDER NOTES
History  Chief Complaint   Patient presents with   • Shortness of Breath     Shortness of breath since last night, brought in by medics  Per report sat was in 70's on room air, placed on non rebreather sat 97%  Patient refusing bipap during triage, refusing breathing treatments  C/o pain, hasnt had methadone since Friday      66year-old female presents to the ED by way of medics short of breath since last night states she has been using her nonrebreather and she received albuterol nebulizer medications in the ambulance  Patient became very belligerent trying to push people away her sat was 97% of the time did not want the BiPAP machine on did not want the high flow oxygen on  She even refused nasal cannula at this point  Her oxygen sats started dropping I asked if she was okay with this putting her to sleep and intubating her putting her on a ventilator machine she said go ahead and do it  History provided by:  Patient and EMS personnel   used: No        Prior to Admission Medications   Prescriptions Last Dose Informant Patient Reported? Taking? Insulin Pen Needle 29G X 12MM MISC   No No   Sig: by Does not apply route daily at bedtime   Patient not taking: Reported on 4/1/2022   acetaminophen (TYLENOL) 650 mg CR tablet   No No   Sig: Take 1 tablet (650 mg total) by mouth every 8 (eight) hours as needed for mild pain   atorvastatin (LIPITOR) 40 mg tablet   No No   Sig: Take 1 tablet (40 mg total) by mouth daily with dinner   clopidogrel (PLAVIX) 75 mg tablet   No No   Sig: Take 1 tablet (75 mg total) by mouth daily Do not start before November 27, 2022    furosemide (LASIX) 20 mg tablet   No No   Sig: Take 1 tablet (20 mg total) by mouth daily Do not start before November 27, 2022     ipratropium-albuterol (DUO-NEB) 0 5-2 5 mg/3 mL nebulizer solution   No No   Sig: Take 3 mL by nebulization 3 (three) times a day   methadone (DOLOPHINE) 10 MG/5ML solution  Self Yes No   Sig: Take 30 mg by mouth in the morning [facility]   metoprolol succinate (TOPROL-XL) 50 mg 24 hr tablet   No No   Sig: Take 1 tablet (50 mg total) by mouth daily Do not start before November 29, 2022  multivitamin (THERAGRAN) TABS   No No   Sig: Take 1 tablet by mouth daily   nicotine (NICODERM CQ) 21 mg/24 hr TD 24 hr patch   No No   Sig: Place 1 patch on the skin daily Do not start before November 27, 2022  spironolactone (ALDACTONE) 25 mg tablet   No No   Sig: Take 0 5 tablets (12 5 mg total) by mouth daily Do not start before November 29, 2022  Facility-Administered Medications: None       Past Medical History:   Diagnosis Date   • Alcohol abuse    • Arthritis    • Asthma    • Bipolar disorder ([facility] )    • Diabetes mellitus ([facility] )    • Opiate abuse, continuous ([facility] )    • Smoker        Past Surgical History:   Procedure Laterality Date   • ABDOMINAL SURGERY      stomach uler with perforation       No family history on file  I have reviewed and agree with the history as documented  E-Cigarette/Vaping   • E-Cigarette Use Never User      E-Cigarette/Vaping Substances   • Nicotine No    • THC No    • CBD No    • Flavoring No    • Other No    • Unknown No      Social History     Tobacco Use   • Smoking status: Every Day     Packs/day: 2 00     Types: Cigarettes   • Smokeless tobacco: Never   Vaping Use   • Vaping Use: Never used   Substance Use Topics   • Alcohol use: Yes     Comment: 1 pint of vodka daily   • Drug use: Yes     Comment: on methadone       Review of Systems   Constitutional: Negative for activity change, chills, diaphoresis and fever  HENT: Negative for congestion, ear pain, nosebleeds, sore throat, trouble swallowing and voice change  Eyes: Negative for pain, discharge and redness  Respiratory: Positive for shortness of breath  Negative for apnea, cough, choking, wheezing and stridor  Cardiovascular: Negative for chest pain and palpitations     Gastrointestinal: Negative for abdominal distention, abdominal pain, constipation, diarrhea, nausea and vomiting  Endocrine: Negative for polydipsia  Genitourinary: Negative for difficulty urinating, dysuria, flank pain, frequency, hematuria and urgency  Musculoskeletal: Negative for back pain, gait problem, joint swelling, myalgias, neck pain and neck stiffness  Skin: Negative for pallor and rash  Neurological: Negative for dizziness, tremors, syncope, speech difficulty, weakness, numbness and headaches  Hematological: Negative for adenopathy  Psychiatric/Behavioral: Negative for confusion, hallucinations, self-injury and suicidal ideas  The patient is not nervous/anxious  Physical Exam  Physical Exam  Vitals and nursing note reviewed  Constitutional:       General: She is not in acute distress  Appearance: She is well-developed  She is not diaphoretic  HENT:      Head: Normocephalic and atraumatic  Right Ear: External ear normal       Left Ear: External ear normal       Nose: Nose normal    Eyes:      Conjunctiva/sclera: Conjunctivae normal       Pupils: Pupils are equal, round, and reactive to light  Cardiovascular:      Rate and Rhythm: Normal rate and regular rhythm  Heart sounds: Normal heart sounds  Pulmonary:      Effort: Pulmonary effort is normal       Breath sounds: Examination of the right-upper field reveals rales  Examination of the left-upper field reveals rales  Examination of the right-middle field reveals rales  Examination of the left-middle field reveals rales  Examination of the right-lower field reveals rales  Examination of the left-lower field reveals rales  Rales present  Abdominal:      General: Bowel sounds are normal       Palpations: Abdomen is soft  Musculoskeletal:         General: Normal range of motion  Cervical back: Normal range of motion and neck supple  Skin:     General: Skin is warm and dry     Neurological:      Mental Status: She is alert and oriented to person, place, and time  Deep Tendon Reflexes: Reflexes are normal and symmetric           Vital Signs  ED Triage Vitals   Temp Pulse Respirations Blood Pressure SpO2   -- 06/18/23 1420 06/18/23 1420 06/18/23 1430 06/18/23 1430    (!) 128 (!) 44 134/90 (!) 83 %      Temp src Heart Rate Source Patient Position - Orthostatic VS BP Location FiO2 (%)   -- 06/18/23 1420 06/18/23 1430 06/18/23 1430 --    Monitor Lying Right arm       Pain Score       --                  Vitals:    06/18/23 1501 06/18/23 1521 06/18/23 1525 06/18/23 1531   BP: 103/77 134/85 109/65 136/96   Pulse: (!) 109 (!) 110 103 (!) 110   Patient Position - Orthostatic VS:             Visual Acuity      ED Medications  Medications   propofol (DIPRIVAN) 1000 mg in 100 mL infusion (premix) (25 mcg/kg/min × 42 1 kg Intravenous Rate/Dose Change 6/18/23 1543)   potassium chloride 20 mEq IVPB (premix) (has no administration in time range)     Followed by   potassium chloride 20 mEq IVPB (premix) (has no administration in time range)   chlorhexidine (PERIDEX) 0 12 % oral rinse 15 mL (has no administration in time range)   omeprazole (PRILOSEC) suspension 2 mg/mL (has no administration in time range)   fentanyl citrate (PF) 100 MCG/2ML 50 mcg (has no administration in time range)   magnesium sulfate 2 g/50 mL IVPB (premix) 2 g (has no administration in time range)   methylPREDNISolone sodium succinate (Solu-MEDROL) injection 40 mg (has no administration in time range)   etomidate (AMIDATE) 2 mg/mL injection 20 mg (20 mg Intravenous Given 6/18/23 1327)   Succinylcholine Chloride 100 mg/5 mL syringe 100 mg (100 mg Intravenous Given 6/18/23 1328)   furosemide (LASIX) injection 40 mg (40 mg Intravenous Given 6/18/23 1537)       Diagnostic Studies  Results Reviewed     Procedure Component Value Units Date/Time    UA (URINE) with reflex to Scope [[id number]] Collected: 06/18/23 9969    Lab Status: No result Specimen: Urine, Indwelling Pino Catheter     Magnesium [[id number]]     Lab Status: No result Specimen: Blood     Phosphorus [[id number]]     Lab Status: No result Specimen: Blood     Blood gas, arterial [[id number]]     Lab Status: No result Specimen: Blood, Arterial     B-Type Natriuretic Peptide(BNP) [[id number]]  (Abnormal) Collected: 06/18/23 1506    Lab Status: Final result Specimen: Blood from Arm, Right Updated: 06/18/23 1537      pg/mL     HS Troponin I 2hr [[id number]]     Lab Status: No result Specimen: Blood     HS Troponin 0hr (reflex protocol) [[id number]]  (Normal) Collected: 06/18/23 1506    Lab Status: Final result Specimen: Blood from Arm, Right Updated: 06/18/23 1533     hs TnI 0hr 26 ng/L     Comprehensive metabolic panel [[id number]]  (Abnormal) Collected: 06/18/23 1506    Lab Status: Final result Specimen: Blood from Arm, Right Updated: 06/18/23 1530     Sodium 141 mmol/L      Potassium 3 5 mmol/L      Chloride 103 mmol/L      CO2 24 mmol/L      ANION GAP 14 mmol/L      BUN 12 mg/dL      Creatinine 0 62 mg/dL      Glucose 241 mg/dL      Calcium 8 0 mg/dL      AST 49 U/L      ALT 22 U/L      Alkaline Phosphatase 178 U/L      Total Protein 7 0 g/dL      Albumin 3 7 g/dL      Total Bilirubin 0 76 mg/dL      eGFR 97 ml/min/1 73sq m     Narrative:      [place] guidelines for Chronic Kidney Disease (CKD):   •  Stage 1 with normal or high GFR (GFR > 90 mL/min/1 73 square meters)  •  Stage 2 Mild CKD (GFR = 60-89 mL/min/1 73 square meters)  •  Stage 3A Moderate CKD (GFR = 45-59 mL/min/1 73 square meters)  •  Stage 3B Moderate CKD (GFR = 30-44 mL/min/1 73 square meters)  •  Stage 4 Severe CKD (GFR = 15-29 mL/min/1 73 square meters)  •  Stage 5 End Stage CKD (GFR <15 mL/min/1 73 square meters)  Note: GFR calculation is accurate only with a steady state creatinine    POCT Blood Gas (CG8+) [[id number]]  (Abnormal) Collected: 06/18/23 1515    Lab Status: Final result Specimen: Arterial Updated: 06/18/23 1526     pH, Art i-STAT 7 075 pH, i-STAT Temp Corrected 7 099     pCO2, Art i-STAT 86 4 mm HG      pCO2, i-STAT TC 80 5 mm HG      pO2, ART i-STAT 387 0 mm HG      pO2, i-STAT  mm HG      BE, i-STAT -7 mmol/L      HCO3, Art i-STAT 25 6 mmol/L      CO2, i-STAT 28 mmol/L      O2 Sat, i-STAT 100 %      SODIUM, I-STAT 140 mmol/l      Potassium, i-STAT 2 7 mmol/L      Calcium, Ionized i-STAT 1 15 mmol/L      Hct, i-STAT 44 %      Hgb, i-STAT 15 0 g/dl      Glucose, i-STAT 245 mg/dl      POC FIO2 100 L      Specimen Type ARTERIAL    Protime-INR [[id number]]  (Normal) Collected: 06/18/23 1506    Lab Status: Final result Specimen: Blood from Arm, Right Updated: 06/18/23 1521     Protime 14 0 seconds      INR 1 07    APTT [[id number]]  (Normal) Collected: 06/18/23 1506    Lab Status: Final result Specimen: Blood from Arm, Right Updated: 06/18/23 1521     PTT 23 seconds     CBC and differential [[id number]]  (Abnormal) Collected: 06/18/23 1506    Lab Status: Final result Specimen: Blood from Arm, Right Updated: 06/18/23 1512     WBC 7 41 Thousand/uL      RBC 4 93 Million/uL      Hemoglobin 15 2 g/dL      Hematocrit 48 1 %      MCV 98 fL      MCH 30 8 pg      MCHC 31 6 g/dL      RDW 17 2 %      MPV 10 0 fL      Platelets 677 Thousands/uL      nRBC 0 /100 WBCs      Neutrophils Relative 43 %      Immat GRANS % 1 %      Lymphocytes Relative 49 %      Monocytes Relative 5 %      Eosinophils Relative 1 %      Basophils Relative 1 %      Neutrophils Absolute 3 20 Thousands/µL      Immature Grans Absolute 0 07 Thousand/uL      Lymphocytes Absolute 3 59 Thousands/µL      Monocytes Absolute 0 37 Thousand/µL      Eosinophils Absolute 0 10 Thousand/µL      Basophils Absolute 0 08 Thousands/µL                  XR chest 1 view portable    (Results Pending)              Procedures  Intubation    Date/Time: 6/18/2023 2:58 PM    Performed by: Judge Dre DO  Authorized by: Judge Dre DO    Patient location:  ED  Other Assisting Provider: No    Consent: Consent obtained:  Verbal and emergent situation    Consent given by:  Patient  Universal protocol:     Procedure explained and questions answered to patient or proxy's satisfaction: yes      Relevant documents present and verified: yes      Test results available and properly labeled: yes      Radiology Images displayed and confirmed  If images not available, report reviewed: yes      Required blood products, implants, devices, and special equipment available: yes      Site/side marked: yes      Immediately prior to procedure, a time out was called: yes      Patient identity confirmed:  Verbally with patient and arm band  Pre-procedure details:     Patient status:  Awake    Mallampati score:  1    Pretreatment medications:  Etomidate    Paralytics:  Succinylcholine  Indications:     Indications for intubation: respiratory failure, airway protection and hypoxemia    Procedure details:     Preoxygenation:  Bag valve mask    CPR in progress: no      Intubation method:  Oral    Oral intubation technique:  Glidescope    Laryngoscope blade: Mac 3    Tube size (mm):  7 0    Tube type:  Cuffed    Number of attempts:  1    Ventilation between attempts: no      Cricoid pressure: no      Tube visualized through cords: yes    Placement assessment:     ETT to lip:  21    Tube secured with:  ETT salomon    Breath sounds:  Equal    Placement verification: chest rise, condensation, CXR verification, direct visualization, equal breath sounds and ETCO2 detector      CXR findings:  ETT in proper place  Post-procedure details:     Patient tolerance of procedure: Tolerated well, no immediate complications             ED Course                                             Medical Decision Making  79-year-old female with acute shortness of breath  Patient refusing to have us place mask on her face that she cannot breathe  We will need to intubate the patient    Amount and/or Complexity of Data Reviewed  Labs: ordered    Radiology: ordered  Details: Pulmonary edema noted on portable x-ray read by myself  Discussion of management or test interpretation with external provider(s): Patient has CHF versus COPD , respiratory illness with severe shortness of breath    Risk  Prescription drug management  Disposition  Final diagnoses:   CHF (congestive heart failure) ([facility] )     Time reflects when diagnosis was documented in both MDM as applicable and the Disposition within this note     Time User Action Codes Description Comment    6/18/2023  3:15 PM Arlin Lucia Add [J44 1] COPD with acute exacerbation ([facility] )     6/18/2023  3:58 PM Thiago FINNEGAN Add [I50 9] CHF (congestive heart failure) [facility])       ED Disposition     ED Disposition   Admit    Condition   Stable    Date/Time   Sun Jun 18, 2023  3:58 PM    Comment   Case was discussed with yamil pérez and the patient's admission status was agreed to be Admission Status: inpatient status to the service of Dr Shemar Vivar   Follow-up Information    None         Patient's Medications   Discharge Prescriptions    No medications on file       No discharge procedures on file      PDMP Review       Value Time User    PDMP Reviewed  Yes 5/6/2023  8:12 PM Nadja Pagan PA-C          ED Provider  Electronically Signed by           Arlin Brown,   06/18/23 [address] [place]ylie [place], DO  06/18/23 5009

## 2023-06-18 NOTE — H&P
[address] 47  H&P  Name: Naina Ruiz 66 y o  female I MRN: [id number]  Unit/Bed#: ICU 09 I Date of Admission: 6/18/2023   Date of Service: 6/18/2023 I Hospital Day: 0      Assessment/Plan   * Acute respiratory failure with hypoxia and hypercapnia [facility])  Assessment & Plan  Presented to ED with SOB/respiratory distress  Patient was placed on bipap but was agitated and unable to tolerate it  She was self removing bipap and dropped O2 sat to 58% on was refusing to wear oxygen and yelling and cursing at staff   Patient was in respiratory distress and when asked about intubation she agreed    · Vent settings: 24/350/6/40%  · Titrate fio2 for O2 sat 90% or above   · VAP bundle; chlorahexidine, PPI, HOB greater than 30 degrees   · Sedation: propofol  · Titrate to RAAS -1    Acute on chronic combined systolic and diastolic heart failure (HCC)  Assessment & Plan  Wt Readings from Last 3 Encounters:   05/22/23 42 1 kg (92 lb 13 oz)   03/18/23 42 1 kg (92 lb 13 oz)   01/22/23 45 5 kg (100 lb 5 oz)     · Echo 11/2022: EF 26%, G7HB, RV systolic function mildly reduced, severe TR, peak PA pressure 69  · CXR demonstrated interstitial edema consistent with CHF  · , trop 26  · Given 40mg IV Lasix in ED  · Patient on 20mg PO Lasix daily at home   · Strict I&O and daily weight   · Continue home metoprolol  · Would like benefit from ACE/ARB vs Entresto, d/w cardiology after stabilization       Suspected COPD with acute exacerbation (HCC)  Assessment & Plan  · No PFTs on file  · Long history of heavy smoking   · Diffuse wheezing on exam  · Start solumedrol 40mg q8h and duo-neb q6h     Smoking hx  Assessment & Plan  · Smokes 2 PPD per chart review  · Nicotine patch ordered  · Smoking cessation education when appropriate    Ischemic cardiomyopathy  Assessment & Plan  · See plan as above    Type 2 diabetes mellitus, without long-term current use of insulin ([facility] )  Assessment & Plan  Lab Results   Component Value Date    HGBA1C 5 6 03/17/2023       Recent Labs     06/18/23  1829   POCGLU 169*       Blood Sugar Average: Last 72 hrs:  (P) 169     · Start SSI coverage    CAD (coronary artery disease)  Assessment & Plan  · s/p PCI to RCA in 2021  · Continue home plavix, statin, and metoprolol  · Trop 26  · EKG: Sinus tachycardia rate 110, LVH criteria, ST depressions V4-V6    Alcohol abuse  Assessment & Plan  · Documented that patient drinks 1 pink vodka a day  · Has had many elevated alcohol levels in the past, most recently 261 on 5/18   · CIWA protocol ordered  · Thiamine/folic acid supplementation     Dyslipidemia  Assessment & Plan  · Continue statin     Bipolar disorder ([facility] )  Assessment & Plan  · No current medications     Essential hypertension  Assessment & Plan  · Briefly on nitro gtt in ED but stopped d/t soft BPs   · Continue home metoprolol  · Can use PRN hydralazine if needed for further afterload reduction     Polysubstance abuse ([facility] )  Assessment & Plan  · On methadone  · Will need to confirm dosing with [place] tomorrow          History of Present Illness     HPI: Lena Zimmerman is a 66year old female with PMH of CAD s/p PCI to RCA in 2021, ischemic cardiomyopathy (EF 22%), heavy smoker, polysubstance use history currently on methadone, DM2, hypertension, alcohol abuse, and bipolar disorder who presented to ED with SOB/respiratory distress  Patient was placed on bipap but was agitated and unable to tolerate it  She was self removing bipap and dropped O2 sat to 58% on was refusing to wear oxygen and yelling and cursing at staff  Patient was in respiratory distress and when asked about intubation she agreed  CXR demonstrated interstitial edema consistent with CHF  Post intubation ABG 7 0/86 4/387/25 6/-7  , trop 26  Patient was given 40mg IV Lasix  Patient also with diffuse wheezing, given 40mg solumedrol and duo-neb  Will admit to critical care for further medical management       History obtained from chart review  Review of Systems   Unable to perform ROS: Intubated         Historical Information   Past Medical History:  No date: Alcohol abuse  No date: Arthritis  No date: Asthma  No date: Bipolar disorder ([place] [address] )  No date: Diabetes mellitus ([place] [address] )  No date: Opiate abuse, continuous ([place] [address] )  No date: Smoker Past Surgical History:  No date: ABDOMINAL SURGERY      Comment:  stomach uler with perforation   Current Outpatient Medications   Medication Instructions   • acetaminophen (TYLENOL) 650 mg, Oral, Every 8 hours PRN   • atorvastatin (LIPITOR) 40 mg, Oral, Daily with dinner   • clopidogrel (PLAVIX) 75 mg, Oral, Daily   • furosemide (LASIX) 20 mg, Oral, Daily   • Insulin Pen Needle 29G X 12MM MISC Does not apply, Daily at bedtime   • ipratropium-albuterol (DUO-NEB) 0 5-2 5 mg/3 mL nebulizer solution 3 mL, Nebulization, 3 times daily   • methadone (DOLOPHINE) 30 mg, Oral, Daily, [facility]   • metoprolol succinate (TOPROL-XL) 50 mg, Oral, Daily   • multivitamin (THERAGRAN) TABS 1 tablet, Oral, Daily   • nicotine (NICODERM CQ) 21 mg/24 hr TD 24 hr patch 1 patch, Transdermal, Daily   • spironolactone (ALDACTONE) 12 5 mg, Oral, Daily    No Known Allergies   Social History     Tobacco Use   • Smoking status: Every Day     Packs/day: 2 00     Types: Cigarettes   • Smokeless tobacco: Never   Vaping Use   • Vaping Use: Never used   Substance Use Topics   • Alcohol use: Yes     Comment: 1 pint of vodka daily   • Drug use: Yes     Comment: on methadone    No family history on file         Objective                            Vitals I/O      Most Recent Min/Max in 24hrs   Temp 98 1 °F (36 7 °C) Temp  Min: 97 8 °F (36 6 °C)  Max: 98 1 °F (36 7 °C)   Pulse 100 Pulse  Min: 100  Max: 136   Resp (!) 24 Resp  Min: 14  Max: 44   /68 BP  Min: 103/77  Max: 186/103   O2 Sat 98 % SpO2  Min: 83 %  Max: 100 %      Intake/Output Summary (Last 24 hours) at 6/18/2023 1851  Last data filed at 6/18/2023 1800  Gross per 24 hour Intake 100 ml   Output 750 ml   Net -650 ml         Diet NPO     Invasive Monitoring Physical exam   N/A Physical Exam  Constitutional:       Appearance: She is ill-appearing  Interventions: She is sedated, intubated and restrained  HENT:      Head: Normocephalic and atraumatic  Eyes:      General: Lids are normal       Extraocular Movements: Extraocular movements intact  Conjunctiva/sclera: Conjunctivae normal       Pupils: Pupils are equal, round, and reactive to light  Neck:      Trachea: Trachea normal    Cardiovascular:      Rate and Rhythm: Regular rhythm  Tachycardia present  Pulses: Normal pulses  Radial pulses are 2+ on the right side and 2+ on the left side  Dorsalis pedis pulses are 2+ on the right side and 2+ on the left side  Heart sounds: Normal heart sounds, S1 normal and S2 normal    Pulmonary:      Effort: Tachypnea present  She is intubated  Breath sounds: Decreased air movement present  Wheezing and rales present  Abdominal:      General: Bowel sounds are normal       Palpations: Abdomen is soft  Musculoskeletal:      Cervical back: Full passive range of motion without pain, normal range of motion and neck supple  Comments: Normal ROM    Skin:     General: Skin is warm and dry  Capillary Refill: Capillary refill takes less than 2 seconds  Neurological:      General: No focal deficit present  Mental Status: She is oriented to person, place, and time  She is lethargic  GCS: GCS eye subscore is 3  GCS verbal subscore is 1  GCS motor subscore is 5                Diagnostic Studies      EKG: EKG: Sinus tachycardia rate 110, LVH criteria, ST depressions V4-V6  Imaging: CXR-interstitial edema consistent with CHF I have personally reviewed pertinent films in PACS     Medications:  Scheduled PRN   atorvastatin, 40 mg, Daily With Dinner  chlorhexidine, 15 mL, Q12H [facility]  [START ON 6/19/2023] clopidogrel, 75 mg, Daily  [START ON 6/19/2023] enoxaparin, 40 mg, Daily  famotidine, 20 mg, Q12H [address]  [START ON 5/48/7994] folic acid, 1 mg, Daily  insulin lispro, 1-5 Units, Q6H [address]  ipratropium-albuterol, 3 mL, Q6H  methylPREDNISolone sodium succinate, 40 mg, Q8H [facility]  metoprolol tartrate, 25 mg, Q12H [address]  [START ON 6/19/2023] nicotine, 1 patch, Daily  potassium chloride, 20 mEq, Once  [START ON 6/19/2023] thiamine, 100 mg, Daily      acetaminophen, 650 mg, Q6H PRN  fentanyl citrate (PF), 50 mcg, Q2H PRN       Continuous    propofol, 5-50 mcg/kg/min, Last Rate: 50 mcg/kg/min (06/18/23 1813)         Labs:    CBC    Recent Labs     06/18/23  1506 06/18/23  1515   WBC 7 41  --    HGB 15 2 15 0   HCT 48 1* 44     --      BMP    Recent Labs     06/18/23  1506 06/18/23  1515   SODIUM 141  --    K 3 5  --      --    CO2 24 28   AGAP 14*  --    BUN 12  --    CREATININE 0 62  --    CALCIUM 8 0*  --        Coags    Recent Labs     06/18/23  1506   INR 1 07   PTT 23        Additional Electrolytes  Recent Labs     06/18/23  1506 06/18/23  1515   MG 2 7  --    PHOS 5 1*  --    CAIONIZED  --  1 15          Blood Gas    Recent Labs     06/18/23  1759   PHART 7 394   JOA0UMG 39 3   PO2ART 159 5*   DYN3FDS 23 5   BEART -1 2     No recent results LFTs  Recent Labs     06/18/23  1506   ALT 22   AST 49*   ALKPHOS 178*   ALB 3 7   TBILI 0 76       Infectious  No recent results  Glucose  Recent Labs     06/18/23  1506   GLUC 241*             Critical Care Time Delivered: Upon my evaluation, this patient had a high probability of imminent or life-threatening deterioration due to acute respiratory failure with hypoxia and hypercapnia, COPD exacerbation, CHF exacerbation, which required my direct attention, intervention, and personal management  I have personally provided 35 minutes of critical care time, exclusive of procedures, teaching, family meetings, and any prior time recorded by providers other than myself     Anticipated Length of Stay is > 2 midnights  YASMIN Braxton

## 2023-06-18 NOTE — ASSESSMENT & PLAN NOTE
Presented to ED with SOB/respiratory distress  Patient was placed on bipap but was agitated and unable to tolerate it  She was self removing bipap and dropped O2 sat to 58% on was refusing to wear oxygen and yelling and cursing at staff   Patient was in respiratory distress and when asked about intubation she agreed    · Vent settings: 24/350/6/40%  · Titrate fio2 for O2 sat 90% or above   · VAP bundle; chlorahexidine, PPI, HOB greater than 30 degrees   · Sedation: propofol  · Titrate to RAAS -1

## 2023-06-18 NOTE — QUICK NOTE
Patient's son Gia Campos updated via phone regarding his mothers respiratory failure and placement on ventilator support  All questions were answered to his satisfaction

## 2023-06-18 NOTE — ASSESSMENT & PLAN NOTE
· Smokes 2 PPD per chart review  · Nicotine patch ordered  · Smoking cessation education when appropriate

## 2023-06-18 NOTE — RESPIRATORY THERAPY NOTE
PT was manually ventilated and transported from the ED to ICU bed #9  PT the placed back on the vent  PT tolerated transport well

## 2023-06-18 NOTE — ED PROCEDURE NOTE
PROCEDURE  CriticalCare Time    Date/Time: 6/18/2023 3:02 PM    Performed by: Arlin Brown DO  Authorized by: Arlin Brown DO    Critical care provider statement:     Critical care time (minutes):  30    Critical care start time:  6/18/2023 2:30 PM    Critical care end time:  6/18/2023 3:02 PM    Critical care time was exclusive of:  Separately billable procedures and treating other patients and teaching time    Critical care was necessary to treat or prevent imminent or life-threatening deterioration of the following conditions:  Respiratory failure    Critical care was time spent personally by me on the following activities:  Blood draw for specimens, obtaining history from patient or surrogate, development of treatment plan with patient or surrogate, discussions with consultants, evaluation of patient's response to treatment, examination of patient, interpretation of cardiac output measurements, ordering and performing treatments and interventions, ordering and review of laboratory studies, ordering and review of radiographic studies, re-evaluation of patient's condition, review of old charts and ventilator management    I assumed direction of critical care for this patient from another provider in my specialty: celestina Brown DO  06/18/23 1504

## 2023-06-18 NOTE — ASSESSMENT & PLAN NOTE
· s/p PCI to RCA in 2021  · Continue home plavix, statin, and metoprolol  · Trop 26  · EKG: Sinus tachycardia rate 110, LVH criteria, ST depressions V4-V6

## 2023-06-18 NOTE — ASSESSMENT & PLAN NOTE
Lab Results   Component Value Date    HGBA1C 5 6 03/17/2023       Recent Labs     06/18/23  1829   POCGLU 169*       Blood Sugar Average: Last 72 hrs:  (P) 169     · Start SSI coverage

## 2023-06-18 NOTE — ASSESSMENT & PLAN NOTE
Wt Readings from Last 3 Encounters:   05/22/23 42 1 kg (92 lb 13 oz)   03/18/23 42 1 kg (92 lb 13 oz)   01/22/23 45 5 kg (100 lb 5 oz)     · Echo 11/2022: EF 51%, S9BT, RV systolic function mildly reduced, severe TR, peak PA pressure 69  · CXR demonstrated interstitial edema consistent with CHF  · , trop 26  · Given 40mg IV Lasix in ED  · Patient on 20mg PO Lasix daily at home   · Strict I&O and daily weight   · Continue home metoprolol  · Would like benefit from ACE/ARB vs Entresto, d/w cardiology after stabilization

## 2023-06-18 NOTE — ASSESSMENT & PLAN NOTE
· Briefly on nitro gtt in ED but stopped d/t soft BPs   · Continue home metoprolol  · Can use PRN hydralazine if needed for further afterload reduction

## 2023-06-18 NOTE — RESPIRATORY THERAPY NOTE
Pt placed on Bipap per dr Joeslin Dillard request she pulled the mask off after a few seconds and was refusing to wear it, she refused to wear O2 mask and also refused to wear nasal cannula, Dr Joselin Dillard aware and at bedside   Pt was then intubated, this RT assisted and secured ETT verified bilateral equal breath sounds and placed on ventilator

## 2023-06-18 NOTE — ASSESSMENT & PLAN NOTE
· No PFTs on file  · Long history of heavy smoking   · Diffuse wheezing on exam  · Start solumedrol 40mg q8h and duo-neb q6h

## 2023-06-19 PROBLEM — J44.9 COPD WITHOUT EXACERBATION (HCC): Status: ACTIVE | Noted: 2022-01-01

## 2023-06-19 NOTE — PROGRESS NOTES
Recommend TF of Glucerna 1 2 at goal rate of 35 mL/hr for a total volume of 840 mL  This will provide 1008 kcals (1240 kcals with current propofol), 50 gms protein and 676 mL free water  Recommend flushes of 100 mL q 4 hrs for a total fluid intake of 1276 mL

## 2023-06-19 NOTE — ASSESSMENT & PLAN NOTE
· s/p PCI to RCA in 2021  · Trop 26  · EKG: Sinus tachycardia rate 110, LVH criteria, ST depressions V4-V6    Plan:  · Continue home plavix, statin  · Metoprolol held secondary to hypotension

## 2023-06-19 NOTE — PLAN OF CARE
Problem: SAFETY,RESTRAINT: NV/NON-SELF DESTRUCTIVE BEHAVIOR  Goal: Remains free of harm/injury (restraint for non violent/non self-detsructive behavior)  Description: INTERVENTIONS:  - Instruct patient/family regarding restraint use   - Assess and monitor physiologic and psychological status   - Provide interventions and comfort measures to meet assessed patient needs   - Identify and implement measures to help patient regain control  - Assess readiness for release of restraint   Outcome: Progressing  Goal: Returns to optimal restraint-free functioning  Description: INTERVENTIONS:  - Assess the patient's behavior and symptoms that indicate continued need for restraint  - Identify and implement measures to help patient regain control  - Assess readiness for release of restraint   Outcome: Progressing     Problem: RESPIRATORY - ADULT  Goal: Achieves optimal ventilation and oxygenation  Description: INTERVENTIONS:  - Assess for changes in respiratory status  - Assess for changes in mentation and behavior  - Position to facilitate oxygenation and minimize respiratory effort  - Oxygen administered by appropriate delivery if ordered  - Initiate smoking cessation education as indicated  - Encourage broncho-pulmonary hygiene including cough, deep breathe, Incentive Spirometry  - Assess the need for suctioning and aspirate as needed  - Assess and instruct to report SOB or any respiratory difficulty  - Respiratory Therapy support as indicated  Outcome: Progressing     Problem: MOBILITY - ADULT  Goal: Maintain or return to baseline ADL function  Description: INTERVENTIONS:  -  Assess patient's ability to carry out ADLs; assess patient's baseline for ADL function and identify physical deficits which impact ability to perform ADLs (bathing, care of mouth/teeth, toileting, grooming, dressing, etc )  - Assess/evaluate cause of self-care deficits   - Assess range of motion  - Assess patient's mobility; develop plan if impaired  - Assess patient's need for assistive devices and provide as appropriate  - Encourage maximum independence but intervene and supervise when necessary  - Involve family in performance of ADLs  - Assess for home care needs following discharge   - Consider OT consult to assist with ADL evaluation and planning for discharge  - Provide patient education as appropriate  Outcome: Progressing  Goal: Maintains/Returns to pre admission functional level  Description: INTERVENTIONS:  - Perform BMAT or MOVE assessment daily    - Set and communicate daily mobility goal to care team and patient/family/caregiver  - Collaborate with rehabilitation services on mobility goals if consulted  - Perform Range of Motion 4 times a day  - Reposition patient every 2 hours    - Record patient progress and toleration of activity level   Outcome: Progressing     Problem: CARDIOVASCULAR - ADULT  Goal: Maintains optimal cardiac output and hemodynamic stability  Description: INTERVENTIONS:  - Monitor I/O, vital signs and rhythm  - Monitor for S/S and trends of decreased cardiac output  - Administer and titrate ordered vasoactive medications to optimize hemodynamic stability  - Assess quality of pulses, skin color and temperature  - Assess for signs of decreased coronary artery perfusion  - Instruct patient to report change in severity of symptoms  Outcome: Progressing     Problem: GASTROINTESTINAL - ADULT  Goal: Maintains adequate nutritional intake  Description: INTERVENTIONS:  - Monitor percentage of each meal consumed  - Identify factors contributing to decreased intake, treat as appropriate  - Assist with meals as needed  - Monitor I&O, weight, and lab values if indicated  - Obtain nutrition services referral as needed  Outcome: Progressing  Goal: Oral mucous membranes remain intact  Description: INTERVENTIONS  - Assess oral mucosa and hygiene practices  - Implement preventative oral hygiene regimen  - Implement oral medicated treatments as ordered  - Initiate Nutrition services referral as needed  Outcome: Progressing     Problem: GENITOURINARY - ADULT  Goal: Maintains or returns to baseline urinary function  Description: INTERVENTIONS:  - Assess urinary function  - Encourage oral fluids to ensure adequate hydration if ordered  - Administer IV fluids as ordered to ensure adequate hydration  - Administer ordered medications as needed  - Offer frequent toileting  - Follow urinary retention protocol if ordered  Outcome: Progressing  Goal: Absence of urinary retention  Description: INTERVENTIONS:  - Assess patient's ability to void and empty bladder  - Monitor I/O  - Bladder scan as needed  - Discuss with physician/AP medications to alleviate retention as needed  - Discuss catheterization for long term situations as appropriate  Outcome: Progressing  Goal: Urinary catheter remains patent  Description: INTERVENTIONS:  - Assess patency of urinary catheter  - If patient has a chronic vaughn, consider changing catheter if non-functioning  - Follow guidelines for intermittent irrigation of non-functioning urinary catheter  Outcome: Progressing     Problem: METABOLIC, FLUID AND ELECTROLYTES - ADULT  Goal: Electrolytes maintained within normal limits  Description: INTERVENTIONS:  - Monitor labs and assess patient for signs and symptoms of electrolyte imbalances  - Administer electrolyte replacement as ordered  - Monitor response to electrolyte replacements, including repeat lab results as appropriate  - Instruct patient on fluid and nutrition as appropriate  Outcome: Progressing  Goal: Fluid balance maintained  Description: INTERVENTIONS:  - Monitor labs   - Monitor I/O and WT  - Instruct patient on fluid and nutrition as appropriate  - Assess for signs & symptoms of volume excess or deficit  Outcome: Progressing  Goal: Glucose maintained within target range  Description: INTERVENTIONS:  - Monitor Blood Glucose as ordered  - Assess for signs and symptoms of hyperglycemia and hypoglycemia  - Administer ordered medications to maintain glucose within target range  - Assess nutritional intake and initiate nutrition service referral as needed  Outcome: Progressing     Problem: SKIN/TISSUE INTEGRITY - ADULT  Goal: Skin Integrity remains intact(Skin Breakdown Prevention)  Description: Assess:  -Perform Elias assessment every shift  -Clean and moisturize skin   -Inspect skin when repositioning, toileting, and assisting with ADLS  -Assess under medical devices   -Assess extremities for adequate circulation and sensation     Bed Management:  -Have minimal linens on bed & keep smooth, unwrinkled  -Change linens as needed when moist or perspiring  -Avoid sitting or lying in one position for more than 2 hours while in bed  -Keep HOB at 30 degrees     Toileting:  -Offer bedside commode  -Assess for incontinence every 2 h  -Use incontinent care products after each incontinent episode     Activity:  -Mobilize patient   -Encourage activity and walks on unit  -Encourage or provide ROM exercises   -Turn and reposition patient every 2 Hours  -Use appropriate equipment to lift or move patient in bed  -Instruct/ Assist with weight shifting every 2 when out of bed in chair  -Consider limitation of chair time 4 hour intervals    Skin Care:  -Avoid use of baby powder, tape, friction and shearing, hot water or constrictive clothing  -Relieve pressure over bony prominences  -Do not massage red bony areas    Next Steps:  -Teach patient strategies to minimize risks    -Consider consults to  interdisciplinary teams   Outcome: Progressing

## 2023-06-19 NOTE — ASSESSMENT & PLAN NOTE
Lab Results   Component Value Date    HGBA1C 5 6 03/17/2023       Recent Labs     06/18/23  1829 06/18/23  2331 06/19/23  0635   POCGLU 169* 187* 202*       Blood Sugar Average: Last 72 hrs:  (P) 186     · Start SSI coverage

## 2023-06-19 NOTE — ASSESSMENT & PLAN NOTE
Wt Readings from Last 3 Encounters:   06/19/23 43 5 kg (95 lb 14 4 oz)   05/22/23 42 1 kg (92 lb 13 oz)   03/18/23 42 1 kg (92 lb 13 oz)     · Echo 11/2022: EF 71%, R6OQ, RV systolic function mildly reduced, severe TR, peak PA pressure 69  · CXR demonstrated interstitial edema consistent with CHF  · , trop 26  · Given 40mg IV Lasix in ED  · Re-dose this AM  · Patient on 20mg PO Lasix daily at home   · Strict I&O and daily weight   · Continue home metoprolol  · Would like benefit from ACE/ARB vs Entresto, d/w cardiology after stabilization

## 2023-06-19 NOTE — QUICK NOTE
Patient sat upright in bed and attempting to self-extubate despite propofol drip @50mcg/kg/min  Received fentanyl IVP for pain and now appearing more comfortable  Will hold off on transitioning to precedex overnight  Restraints are secure

## 2023-06-19 NOTE — PROGRESS NOTES
[address]  Progress Note  Name: Carlo Blackwood  MRN: [id number]  Unit/Bed#: ICU 09 I Date of Admission: 6/18/2023   Date of Service: 6/20/2023 I Hospital Day: 2    Assessment/Plan   * Acute respiratory failure with hypoxia and hypercapnia [facility])  Assessment & Plan  Presented to ED with SOB/respiratory distress  Patient was placed on bipap but was agitated and unable to tolerate it  She was self removing bipap and dropped O2 sat to 58% and was refusing to wear oxygen and yelling and cursing at staff   Patient was in respiratory distress and when asked about intubation she agreed therefore was intubated    · Suspect COPD exacerbation +/- CHF exacerbation   · RP2 + rhinovirus    Plan:   · SBT today  · Titrate fio2 for O2 sat 90% or above   · VAP bundle; chlorahexidine, PPI, HOB greater than 30 degrees   · Sedation: precedex and titrate to RAAS 0/ -1    Acute on chronic combined systolic and diastolic heart failure (HCC)  Assessment & Plan  · Echo 11/2022: EF 72%, C3KD, RV systolic function mildly reduced, severe TR, peak PA pressure 69  · CXR demonstrated interstitial edema consistent with CHF  · , trop 26  · Outpatient regimen consists of: lasix  + spironolactone + metoprolol   · Given 40mg IV Lasix in ED  · Negative 2 7L on admission     Plan:  · Continue PRN IV dosing   · Strict I&O and daily weight   · Resume metoprolol when BP stabilizes   · Would like benefit from ACE/ARB vs Entresto, d/w cardiology after stabilization       COPD with exacerbation ([facility] [address] )  Assessment & Plan  · No PFTs on file  · Long history of heavy smoking   · Steroids discontinued then again resumed yesterday after patient noted to have wheezing  · RP2 panel + rhinovirus    Plan:  · Continue duo-neb q6h   · Continue solu-medrol 20 Q8  · Will benefit from outpatient pulmonary follow up     Smoking hx  Assessment & Plan  · Smokes 2 PPD per chart review  · Nicotine patch ordered  · Smoking cessation education when appropriate    Ischemic cardiomyopathy  Assessment & Plan  · See plan as above    Type 2 diabetes mellitus, without long-term current use of insulin (HCC)  Assessment & Plan  Lab Results   Component Value Date    HGBA1C 5 6 03/17/2023     · SSI coverage    CAD (coronary artery disease)  Assessment & Plan  · s/p PCI to RCA in 2021  · Trop 26  · EKG: Sinus tachycardia rate 110, LVH criteria, ST depressions V4-V6    Plan:  · Continue home plavix, statin  · Metoprolol held secondary to hypotension     Alcohol abuse  Assessment & Plan  · Documented that patient drinks 1 pink vodka a day  · Has had many elevated alcohol levels in the past, most recently 261 on 5/18     Plan:  · CIWA protocol ordered  · Thiamine/folic acid supplementation     Dyslipidemia  Assessment & Plan  · Continue statin     Bipolar disorder ([facility] [address] )  Assessment & Plan  · No current medications     Essential hypertension  Assessment & Plan  · Briefly on nitro gtt in ED but stopped d/t soft BPs   · Continue home metoprolol  · Can use PRN hydralazine if needed for further afterload reduction     Polysubstance abuse ([facility] [address] )  Assessment & Plan  · On methadone  · Will need to confirm dosing with Garvin today        ICU Core Measures     Vented Patient  VAP Bundle  VAP bundle ordered     A: Assess, Prevent, and Manage Pain · Has pain been assessed? Yes  · Need for changes to pain regimen? No   B: Both Spontaneous Awakening Trials (SATs) and Spontaneous Breathing Trials (SBTs) · Plan to perform spontaneous awakening trial today? Yes   · Plan to perform spontaneous breathing trial today? Yes   · Obvious barriers to extubation? No   C: Choice of Sedation · RASS Goal: -1 Drowsy or 0 Alert and Calm  · Need for changes to sedation or analgesia regimen? No   D: Delirium · CAM-ICU: Negative   E: Early Mobility  · Plan for early mobility? Yes   F: Family Engagement · Plan for family engagement today? Yes       Review of Invasive Devices:     Alvarez Plan: Continue for accurate I/O monitoring for 48 hours        Prophylaxis:  VTE VTE covered by:  enoxaparin, Subcutaneous, 40 mg at 06/19/23 0977       Stress Ulcer  covered byFamotidine (PF) (PEPCID) injection 20 mg [[id number]]       Subjective   HPI/24hr events: Patient was restarted on steroids for treatment of COPD exacerbation  She was given additional 40 mg of Lasix, but later became hypotensive with decreased UOP  She was given 500 ml of Albumin overnight  RP2 panel came back positive for rhinovirus  No other significant events  Review of Systems   Unable to perform ROS: Intubated        Objective                            Vitals I/O      Most Recent Min/Max in 24hrs   Temp 99 2 °F (37 3 °C) Temp  Min: 97 7 °F (36 5 °C)  Max: 100 6 °F (38 1 °C)   Pulse 101 Pulse  Min: 91  Max: 133   Resp 20 Resp  Min: 20  Max: 31   /73 BP  Min: 84/57  Max: 158/105   O2 Sat 97 % SpO2  Min: 95 %  Max: 99 %      Intake/Output Summary (Last 24 hours) at 6/20/2023 0020  Last data filed at 6/19/2023 2118  Gross per 24 hour   Intake 1043 84 ml   Output 2195 ml   Net -1151 16 ml         Diet Enteral/Parenteral; Tube Feeding No Oral Diet; Glucerna 1 2; Continuous; 35; 100; Water; Every 4 hours     Invasive Monitoring Physical exam    Physical Exam  Vitals reviewed  Constitutional:       General: She is not in acute distress  Interventions: She is sedated and intubated  HENT:      Mouth/Throat:      Comments: ETT in situ  Eyes:      Extraocular Movements: Extraocular movements intact  Pupils: Pupils are equal, round, and reactive to light  Cardiovascular:      Rate and Rhythm: Regular rhythm  Tachycardia present  Pulses: Normal pulses  Heart sounds: Normal heart sounds  Pulmonary:      Effort: Pulmonary effort is normal  She is intubated  Breath sounds: Normal breath sounds  No wheezing, rhonchi or rales  Abdominal:      Palpations: Abdomen is soft     Genitourinary:     Comments: Pino in place  Skin: General: Skin is warm and dry  Neurological:      Mental Status: She is easily aroused  Comments: Follows commands          Diagnostic Studies      EKG: ST  Imaging: I have personally reviewed pertinent reports         Medications:  Scheduled PRN   atorvastatin, 40 mg, Daily With Dinner  chlorhexidine, 15 mL, Q12H [address]  clopidogrel, 75 mg, Daily  enoxaparin, 40 mg, Daily  famotidine, 20 mg, C63V ALEC  folic acid, 1 mg, Daily  insulin lispro, 1-5 Units, Q6H [address]  ipratropium-albuterol, 3 mL, Q6H  methylPREDNISolone sodium succinate, 20 mg, Q8H [address]  nicotine, 1 patch, Daily  thiamine, 100 mg, Daily      acetaminophen, 650 mg, Q6H PRN       Continuous    dexmedetomidine, 0 1-1 5 mcg/kg/hr, Last Rate: 1 2 mcg/kg/hr (06/19/23 2315)  propofol, 5-50 mcg/kg/min, Last Rate: Stopped (06/19/23 2312)         Labs:    CBC    Recent Labs     06/18/23  1506 06/18/23  1515 06/19/23  0506   WBC 7 41  --  6 38   HGB 15 2 15 0 14 9   HCT 48 1* 44 43 4     --  247     BMP    Recent Labs     06/18/23  1506 06/18/23  1515 06/19/23  0506   SODIUM 141  --  141   K 3 5  --  3 4*     --  102   CO2 24 28 26   AGAP 14*  --  13   BUN 12  --  15   CREATININE 0 62  --  0 66   CALCIUM 8 0*  --  8 6       Coags    Recent Labs     06/18/23  1506   INR 1 07   PTT 23        Additional Electrolytes  Recent Labs     06/18/23  1506 06/18/23  1515 06/19/23  0506   MG 2 7  --  2 0   PHOS 5 1*  --  3 7   CAIONIZED  --  1 15  --           Blood Gas    Recent Labs     06/18/23  1759   PHART 7 394   HPF0VWD 39 3   PO2ART 159 5*   [id number] 23 5   BEART -1 2     No recent results LFTs  Recent Labs     06/18/23  1506   ALT 22   AST 49*   ALKPHOS 178*   ALB 3 7   TBILI 0 76       Infectious  No recent results  Glucose  Recent Labs     06/18/23  1506 06/19/23  0506   GLUC 241* 181*               Critical Care Time Delivered: Upon my evaluation, this patient had a high probability of imminent or life-threatening deterioration due to acute hypercapnic and hypoxic respiratory failure, which required my direct attention, intervention, and personal management  I have personally provided 25 minutes of critical care time, exclusive of procedures, teaching, family meetings, and any prior time recorded by providers other than myself       YASMIN Lucio

## 2023-06-19 NOTE — UTILIZATION REVIEW
Initial Clinical Review    Admission: Date/Time/Statement:   Admission Orders (From admission, onward)     Ordered        06/18/23 1559  INPATIENT ADMISSION  Once                      Orders Placed This Encounter   Procedures   • INPATIENT ADMISSION     Standing Status:   Standing     Number of Occurrences:   1     Order Specific Question:   Level of Care     Answer:   Critical Care [15]     Order Specific Question:   Estimated length of stay     Answer:   More than 2 Midnights     Order Specific Question:   Certification     Answer:   I certify that inpatient services are medically necessary for this patient for a duration of greater than two midnights  See H&P and MD Progress Notes for additional information about the patient's course of treatment  ED Arrival Information     Expected   -    Arrival   6/18/2023 14:10    Acuity   Emergent            Means of arrival   Ambulance    Escorted by   [address] [place] [address]/ICU    Admission type   Emergency            Arrival complaint   sob           Chief Complaint   Patient presents with   • Shortness of Breath     Shortness of breath since last night, brought in by medics  Per report sat was in 70's on room air, placed on non rebreather sat 97%  Patient refusing bipap during triage, refusing breathing treatments  C/o pain, hasnt had methadone since Friday        Initial Presentation: 66 y o  female  From home to ED via ems admitted inpatient due to acute hypercapnic hypoxic respiratory failure/acute on chronic CHF/suspected COPD exacerbation/alcohol abuse, drinks 1 pint vodka daily  Presented due to shortness of breath stating night prior to arrival   Per ems given albuterol neb  On exam lungs rales  Hypoxic    Glucose 241  PH 7 079  PCO2 86 4  Post intubation  CxR CHF  In the ED refused Bipap, nasal cannula, desated  Agreeable to intubation and intubated   Started on propofol drip, ntg drip, IV lasix, KCL, Fentanyl and Magnesium given, started on IV solu medrol  Plan is continue intubation  Vent settings: 24/350/6/40%  Sedation propofol and titrate RASS -1  Continue home metoprolol hold home lasix  Continue Solu medrol and duo nebs  Start SSI  CIWA, thiamine and folate supplementation  On methadone with history of polysubstance abuse  Date: 6/19/23    Day 2: remains intubated  On exam orally intubated  Sedated  Cachetic  Bilateral faint wheezing  Tachycardia  Spontaneous movement of all  Extremities  Continue mechanical ventilation, propofol  Add Precedex  Start methadone and confirm OP medications  Continue Duonebs  Dc steroids  Additional dose of Lasix       ED Triage Vitals   Temperature Pulse Respirations Blood Pressure SpO2   06/18/23 1612 06/18/23 1420 06/18/23 1420 06/18/23 1430 06/18/23 1430   97 8 °F (36 6 °C) (!) 128 (!) 44 134/90 (!) 83 %      Temp Source Heart Rate Source Patient Position - Orthostatic VS BP Location FiO2 (%)   06/18/23 1612 06/18/23 1420 06/18/23 1430 06/18/23 1430 06/18/23 2019   Rectal Monitor Lying Right arm 40      Pain Score       06/18/23 1611       10 - Worst Possible Pain          Wt Readings from Last 1 Encounters:   06/19/23 43 5 kg (95 lb 14 4 oz)     Additional Vital Signs:   06/19/23 1300 -- 110 Abnormal  30 Abnormal  106/77 87 96 % -- -- -- --   06/19/23 1200 98 °F (36 7 °C) 118 Abnormal  28 Abnormal  126/85 100 95 % 40 Ventilator -- Lying   06/19/23 1100 -- 117 Abnormal  25 Abnormal  142/91 110 98 % -- -- -- --   06/19/23 1000 -- 126 Abnormal  31 Abnormal  158/105 Abnormal  127 97 % -- -- -- --   06/19/23 0900 -- 127 Abnormal  29 Abnormal  158/101 Abnormal  123 96 % -- -- -- --   06/19/23 0800 -- 133 Abnormal  29 Abnormal  154/107 Abnormal  125 97 % 40 Ventilator -- Lying   06/19/23 0700 98 3 °F (36 8 °C) 109 Abnormal  24 Abnormal  154/108 Abnormal  125 97 % -- -- -- --   06/19/23 0600 98 °F (36 7 °C) 116 Abnormal  26 Abnormal  153/105 Abnormal  122 97 % 40 Ventilator -- Lying   06/19/23 0400 98 2 °F (36 8 °C) 118 Abnormal  25 Abnormal  149/106 Abnormal  123 97 % 40 Ventilator -- Lying   06/19/23 0200 97 7 °F (36 5 °C) 106 Abnormal  24 Abnormal  158/105 Abnormal  128 97 % 40 Ventilator -- --   06/19/23 0000 97 1 °F (36 2 °C) Abnormal  103 24 Abnormal  157/106 Abnormal  127 99 % 40 Ventilator -- --   06/18/23 2300 -- 104 24 Abnormal  152/108 Abnormal  125 99 % -- -- -- --   06/18/23 2200 97 7 °F (36 5 °C) 90 24 Abnormal  140/85 107 99 % -- Ventilator -- Lying   06/18/23 2100 97 9 °F (36 6 °C) 114 Abnormal  25 Abnormal  180/99 Abnormal  132 99 % -- -- -- --   06/18/23 2019 -- -- -- -- -- 98 % 40 Ventilator -- --   06/18/23 2000 -- 94 -- 120/78 -- -- -- -- -- --   06/18/23 1800 -- 100 24 Abnormal  112/68 83 100 % -- -- -- --   06/18/23 1753 98 1 °F (36 7 °C) 102 24 Abnormal  111/72 88 100 % -- Ventilator -- Lying   06/18/23 1710 -- 111 Abnormal  24 Abnormal  -- -- 100 % -- -- -- --   06/18/23 1615 -- 111 Abnormal  24 Abnormal  179/107 Abnormal  138 99 % -- -- -- --   06/18/23 1612 97 8 °F (36 6 °C) -- -- -- -- -- -- -- -- --   06/18/23 1600 -- 104 26 Abnormal  155/96 120 100 % -- -- -- --   06/18/23 1500 -- 113 Abnormal  21 129/72 94 100 % --        Pertinent Labs/Diagnostic Test Results:   XR chest 1 view portable   Final Result by Sebas Urena DO (06/19 0710)      ET tube as above  Cardiomegaly with probable interstitial edema  Mild gaseous distention of the stomach                    Workstation performed: [id number]             Results from last 7 days   Lab Units 06/19/23  0506 06/18/23  1515 06/18/23  1506   WBC Thousand/uL 6 38  --  7 41   HEMOGLOBIN g/dL 14 9  --  15 2   I STAT HEMOGLOBIN g/dl  --  15 0  --    HEMATOCRIT % 43 4  --  48 1*   HEMATOCRIT, ISTAT %  --  44  --    PLATELETS Thousands/uL 247  --  268   NEUTROS ABS Thousands/µL 5 66  --  3 20     Results from last 7 days   Lab Units 06/19/23  0506 06/18/23  1515 06/18/23  1506   SODIUM mmol/L 141  --  141   POTASSIUM mmol/L 3 4*  --  3 5   CHLORIDE mmol/L 102  --  103   CO2 mmol/L 26  --  24   CO2, I-STAT mmol/L  --  28  --    ANION GAP mmol/L 13  --  14*   BUN mg/dL 15  --  12   CREATININE mg/dL 0 66  --  0 62   EGFR ml/min/1 73sq m 95  --  97   CALCIUM mg/dL 8 6  --  8 0*   CALCIUM, IONIZED, ISTAT mmol/L  --  1 15  --    MAGNESIUM mg/dL 2 0  --  2 7   PHOSPHORUS mg/dL 3 7  --  5 1*     Results from last 7 days   Lab Units 06/18/23  1506   AST U/L 49*   ALT U/L 22   ALK PHOS U/L 178*   TOTAL PROTEIN g/dL 7 0   ALBUMIN g/dL 3 7   TOTAL BILIRUBIN mg/dL 0 76     Results from last 7 days   Lab Units 06/19/23  1056 06/19/23  0659 06/19/23  0635 06/18/23  2331 06/18/23  1829   POC GLUCOSE mg/dl 199* 182* 202* 187* 169*     Results from last 7 days   Lab Units 06/19/23  0506 06/18/23  1506   GLUCOSE RANDOM mg/dL 181* 241*     BETA-HYDROXYBUTYRATE   Date Value Ref Range Status   05/25/2020 0 1 <0 6 mmol/L Final   07/10/2019 0 1 <0 6 mmol/L Final      Results from last 7 days   Lab Units 06/18/23  1759   PH ART  7 394   PCO2 ART mm Hg 39 3   PO2 ART mm Hg 159 5*   HCO3 ART mmol/L 23 5   BASE EXC ART mmol/L -1 2   O2 CONTENT ART mL/dL 21 5   O2 HGB, ARTERIAL % 97 4*     Results from last 7 days   Lab Units 06/18/23  1515   I STAT BASE EXC mmol/L -7*   I STAT O2 SAT % 100*   ISTAT PH ART  7 079*   I STAT ART PCO2 mm HG 86 4*   I STAT ART PO2 mm  0*   I STAT ART HCO3 mmol/L 25 6     Results from last 7 days   Lab Units 06/18/23  2108 06/18/23  1848 06/18/23  1506   HS TNI 0HR ng/L  --   --  26   HS TNI 2HR ng/L  --  40  --    HSTNI D2 ng/L  --  14  --    HS TNI 4HR ng/L 43  --   --    HSTNI D4 ng/L 17  --   --      Results from last 7 days   Lab Units 06/18/23  1506   PROTIME seconds 14 0   INR  1 07   PTT seconds 23     Results from last 7 days   Lab Units 06/18/23  1506   BNP pg/mL 906*     Results from last 7 days   Lab Units 06/18/23  1558   CLARITY UA  Clear   COLOR UA  Yellow   SPEC GRAV UA >=1 030   PH UA  6 0   GLUCOSE UA mg/dl Negative   KETONES UA mg/dl Negative   BLOOD UA  Small*   PROTEIN UA mg/dl 100 (2+)*   NITRITE UA  Negative   BILIRUBIN UA  Negative   UROBILINOGEN UA E U /dl 0 2   LEUKOCYTES UA  Negative   WBC UA /hpf 0-1   RBC UA /hpf 1-2   BACTERIA UA /hpf Occasional   EPITHELIAL CELLS WET PREP /hpf None Seen     Results from last 7 days   Lab Units 06/18/23  1844   AMPH/METH  Negative   BARBITURATE UR  Negative   BENZODIAZEPINE UR  Negative   COCAINE UR  Negative   METHADONE URINE  Positive*   OPIATE UR  Negative   PCP UR  Negative   THC UR  Negative     Results from last 7 days   Lab Units 06/18/23  1848   ETHANOL LVL mg/dL 114*       ED Treatment:   Medication Administration from 06/18/2023 1410 to 06/18/2023 1744       Date/Time Order Dose Route Action Comments     06/18/2023 1551 EDT propofol (DIPRIVAN) 1000 mg in 100 mL infusion (premix) 50 mcg/kg/min Intravenous Rate/Dose Change Per NP from ICU     06/18/2023 1543 EDT propofol (DIPRIVAN) 1000 mg in 100 mL infusion (premix) 25 mcg/kg/min Intravenous Rate/Dose Change --     06/18/2023 1530 EDT propofol (DIPRIVAN) 1000 mg in 100 mL infusion (premix) 20 mcg/kg/min Intravenous Rate/Dose Change --     06/18/2023 1515 EDT propofol (DIPRIVAN) 1000 mg in 100 mL infusion (premix) 15 mcg/kg/min Intravenous Rate/Dose Change --     06/18/2023 1505 EDT propofol (DIPRIVAN) 1000 mg in 100 mL infusion (premix) 10 mcg/kg/min Intravenous Rate/Dose Change --     06/18/2023 1448 EDT propofol (DIPRIVAN) 1000 mg in 100 mL infusion (premix) 5 mcg/kg/min Intravenous New Bag --     06/18/2023 1459 EDT nitroGLYcerin (TRIDIL) 50 mg in 250 mL infusion (premix) 5 mcg/min Intravenous New Bag --     06/18/2023 1537 EDT furosemide (LASIX) injection 40 mg 40 mg Intravenous Given --     06/18/2023 1601 EDT potassium chloride 20 mEq IVPB (premix) 20 mEq Intravenous New Bag --     06/18/2023 1611 EDT fentanyl citrate (PF) 100 MCG/2ML 50 mcg 50 mcg Intravenous Given -- 06/18/2023 1600 EDT magnesium sulfate 2 g/50 mL IVPB (premix) 2 g 2 g Intravenous New Bag --     06/18/2023 1600 EDT methylPREDNISolone sodium succinate (Solu-MEDROL) injection 40 mg 40 mg Intravenous Given --     06/18/2023 1710 EDT ipratropium-albuterol (DUO-NEB) 0 5-2 5 mg/3 mL inhalation solution 3 mL 3 mL Nebulization Given --        Past Medical History:   Diagnosis Date   • Alcohol abuse    • Arthritis    • Asthma    • Bipolar disorder ([facility])    • Diabetes mellitus ([place] [address] )    • Opiate abuse, continuous ([facility])    • Smoker      Present on Admission:  • Acute on chronic combined systolic and diastolic heart failure ([facility])  • COPD without exacerbation ([facility])  • Type 2 diabetes mellitus, without long-term current use of insulin ([facility])  • Ischemic cardiomyopathy  • CAD (coronary artery disease)  • Dyslipidemia  • Bipolar disorder ([facility])  • Essential hypertension  • Polysubstance abuse ([place] [address] )  • Alcohol abuse  • Acute respiratory failure with hypoxia and hypercapnia ([facility])      Admitting Diagnosis: CHF (congestive heart failure) ([facility]) [I50 9]  SOB (shortness of breath) [R06 02]  COPD with acute exacerbation ([place] [address] ) [J44 1]  Discharge planning issues [Z02 9]  Age/Sex: 66 y o  female  Admission Orders:  06/18/23 1559  Inpatient   Scheduled Medications:  atorvastatin, 40 mg, Per NG Tube, Daily With Dinner  chlorhexidine, 15 mL, Mouth/Throat, Q12H [address]  clopidogrel, 75 mg, Oral, Daily  enoxaparin, 40 mg, Subcutaneous, Daily  famotidine, 20 mg, Intravenous, Q76Y [address]  folic acid, 1 mg, Oral, Daily  insulin lispro, 1-5 Units, Subcutaneous, Q6H [address]  ipratropium-albuterol, 3 mL, Nebulization, Q6H  methylPREDNISolone sodium succinate, 20 mg, Intravenous, Q8H [address]  metoprolol tartrate, 25 mg, Oral, Q12H [address]  nicotine, 1 patch, Transdermal, Daily  spironolactone, 12 5 mg, Oral, Daily  thiamine, 100 mg, Oral, Daily    albumin human (FLEXBUMIN) 5 % injection 12 5 g  Dose: 12 5 g  Freq:  Once Route: IV  Start: 06/19/23 1515 End: 06/19/23 1508    furosemide (LASIX) injection 40 mg  Dose: 40 mg  Freq: Once Route: IV  Start: 06/19/23 0700 End: 06/19/23 0747    potassium chloride 20 mEq IVPB (premix)  Dose: 20 mEq  Freq: Once Route: IV  Last Dose: Stopped (06/19/23 0947)  Start: 06/19/23 0700 End: 06/19/23 0947  Followed by  potassium chloride 20 mEq IVPB (premix)  Dose: 20 mEq  Freq: Once Route: IV  Last Dose: Stopped (06/19/23 1150)  Start: 06/19/23 0900 End: 06/19/23 1150    potassium chloride oral solution 40 mEq  Dose: 40 mEq  Freq: Once Route: PO  Start: 06/19/23 0700 End: 06/19/23 0752    Continuous IV Infusions:  dexmedetomidine, 0 1-1 5 mcg/kg/hr, Intravenous, Titrated  propofol, 5-50 mcg/kg/min, Intravenous, Titrated      PRN Meds:  acetaminophen, 650 mg, Per NG Tube, Q6H PRN  fentanyl citrate (PF), 50 mcg, Intravenous, Q2H PRN x 2 6/18/23     Neurovascular checks every 4 hours  CIWA  Cardio pulmonary monitoring  Restraints non violent  Orogastric tube  Diet enteral no oral  Mechanical ventilation     Network Utilization Review Department  ATTENTION: Please call with any questions or concerns to [phone] and carefully listen to the prompts so that you are directed to the right person  All voicemails are confidential   Dotty Suleman all requests for admission clinical reviews, approved or denied determinations and any other requests to dedicated fax number below belonging to the campus where the patient is receiving treatment   List of dedicated fax numbers for the Facilities:  [address] [facility] [address] DENIALS (Administrative/Medical Necessity) [phone]   [address] [place] [address] (Maternity/NICU/Pediatrics) [phone]   [address] [phone]   [facility] [phone]   [address] [facility] [place] [address] [place] [address] [phone]   [address] [place] [address]   [address] [place] [address] 310 [facility] 134 211 [facility] [phone]

## 2023-06-19 NOTE — ASSESSMENT & PLAN NOTE
· Documented that patient drinks 1 pink vodka a day  · Has had many elevated alcohol levels in the past, most recently 261 on 5/18     Plan:  · [facility] protocol ordered  · Thiamine/folic acid supplementation

## 2023-06-19 NOTE — ASSESSMENT & PLAN NOTE
Presented to ED with SOB/respiratory distress  Patient was placed on bipap but was agitated and unable to tolerate it  She was self removing bipap and dropped O2 sat to 58% on was refusing to wear oxygen and yelling and cursing at staff   Patient was in respiratory distress and when asked about intubation she agreed    · Vent settings: 24/350/6/40%  · SBT today  · Titrate fio2 for O2 sat 90% or above   · VAP bundle; chlorahexidine, PPI, HOB greater than 30 degrees   · Sedation: propofol  · Titrate to RAAS -1

## 2023-06-19 NOTE — ASSESSMENT & PLAN NOTE
· No PFTs on file  · Long history of heavy smoking   · Initially felt to possibly be secondary to exacerbation, however found to likely be secondary to CHF  · Steroids discontinued  · Continue duo-neb q6h

## 2023-06-19 NOTE — ASSESSMENT & PLAN NOTE
· Echo 11/2022: EF 67%, U5BT, RV systolic function mildly reduced, severe TR, peak PA pressure 69  · CXR demonstrated interstitial edema consistent with CHF  · , trop 26  · Outpatient regimen consists of: lasix  + spironolactone + metoprolol   · Given 40mg IV Lasix in ED  · Negative 2 7L on admission     Plan:  · Continue PRN IV dosing   · Strict I&O and daily weight   · Resume metoprolol when BP stabilizes   · Would like benefit from ACE/ARB vs Entresto, d/w cardiology after stabilization

## 2023-06-19 NOTE — ASSESSMENT & PLAN NOTE
· No PFTs on file  · Long history of heavy smoking   · Steroids discontinued then again resumed yesterday after patient noted to have wheezing  · RP2 panel + rhinovirus    Plan:  · Continue duo-neb q6h   · Continue solu-medrol 20 Q8  · Will benefit from outpatient pulmonary follow up

## 2023-06-19 NOTE — ASSESSMENT & PLAN NOTE
· Documented that patient drinks 1 pink vodka a day  · Has had many elevated alcohol levels in the past, most recently 261 on 5/18   · [facility] protocol ordered  · Thiamine/folic acid supplementation

## 2023-06-19 NOTE — PROGRESS NOTES
[facility] [address]  Progress Note  Name: Donte Lantigua  MRN: [id number]  Unit/Bed#: ICU 09 I Date of Admission: 6/18/2023   Date of Service: 6/19/2023 I Hospital Day: 1    Assessment/Plan   * Acute respiratory failure with hypoxia and hypercapnia [facility])  Assessment & Plan  Presented to ED with SOB/respiratory distress  Patient was placed on bipap but was agitated and unable to tolerate it  She was self removing bipap and dropped O2 sat to 58% on was refusing to wear oxygen and yelling and cursing at staff   Patient was in respiratory distress and when asked about intubation she agreed    · Vent settings: 24/350/6/40%  · SBT today  · Titrate fio2 for O2 sat 90% or above   · VAP bundle; chlorahexidine, PPI, HOB greater than 30 degrees   · Sedation: propofol  · Titrate to RAAS -1    Acute on chronic combined systolic and diastolic heart failure (HCC)  Assessment & Plan  Wt Readings from Last 3 Encounters:   06/19/23 43 5 kg (95 lb 14 4 oz)   05/22/23 42 1 kg (92 lb 13 oz)   03/18/23 42 1 kg (92 lb 13 oz)     · Echo 11/2022: EF 85%, Y4SA, RV systolic function mildly reduced, severe TR, peak PA pressure 69  · CXR demonstrated interstitial edema consistent with CHF  · , trop 26  · Given 40mg IV Lasix in ED  · Re-dose this AM  · Patient on 20mg PO Lasix daily at home   · Strict I&O and daily weight   · Continue home metoprolol  · Would like benefit from ACE/ARB vs Entresto, d/w cardiology after stabilization       COPD without exacerbation (HCC)  Assessment & Plan  · No PFTs on file  · Long history of heavy smoking   · Initially felt to possibly be secondary to exacerbation, however found to likely be secondary to CHF  · Steroids discontinued  · Continue duo-neb q6h     Smoking hx  Assessment & Plan  · Smokes 2 PPD per chart review  · Nicotine patch ordered  · Smoking cessation education when appropriate    Ischemic cardiomyopathy  Assessment & Plan  · See plan as above    Type 2 diabetes mellitus, without long-term current use of insulin [facility])  Assessment & Plan  Lab Results   Component Value Date    HGBA1C 5 6 03/17/2023       Recent Labs     06/18/23  1829 06/18/23  2331 06/19/23  0635   POCGLU 169* 187* 202*       Blood Sugar Average: Last 72 hrs:  (P) 186     · Start SSI coverage    CAD (coronary artery disease)  Assessment & Plan  · s/p PCI to RCA in 2021  · Continue home plavix, statin, and metoprolol  · Trop 26  · EKG: Sinus tachycardia rate 110, LVH criteria, ST depressions V4-V6    Alcohol abuse  Assessment & Plan  · Documented that patient drinks 1 pink vodka a day  · Has had many elevated alcohol levels in the past, most recently 261 on 5/18   · CIWA protocol ordered  · Thiamine/folic acid supplementation     Dyslipidemia  Assessment & Plan  · Continue statin     Bipolar disorder ([facility] [address] )  Assessment & Plan  · No current medications     Essential hypertension  Assessment & Plan  · Briefly on nitro gtt in ED but stopped d/t soft BPs   · Continue home metoprolol  · Can use PRN hydralazine if needed for further afterload reduction     Polysubstance abuse ([facility] [address] )  Assessment & Plan  · On methadone  · Will need to confirm dosing with [place] today              ICU Core Measures     Vented Patient  VAP Bundle  VAP bundle ordered     A: Assess, Prevent, and Manage Pain · Has pain been assessed? Yes  · Need for changes to pain regimen? No   B: Both Spontaneous Awakening Trials (SATs) and Spontaneous Breathing Trials (SBTs) · Plan to perform spontaneous awakening trial today? Yes   · Plan to perform spontaneous breathing trial today? Yes   · Obvious barriers to extubation? No   C: Choice of Sedation · RASS Goal: 0 Alert and Calm  · Need for changes to sedation or analgesia regimen? No   D: Delirium · CAM-ICU: Negative   E: Early Mobility  · Plan for early mobility? Yes   F: Family Engagement · Plan for family engagement today? Yes       Review of Invasive Devices:     Alvarez Plan: Continue for accurate I/O monitoring for 48 hours        Prophylaxis:  VTE VTE covered by:  enoxaparin, Subcutaneous       Stress Ulcer  covered byFamotidine (PF) (PEPCID) injection 20 mg [[id number]]       Subjective   HPI/24hr events: intubated, no acute events overnight  Review of Systems   Unable to perform ROS: Intubated            Objective                            Vitals I/O      Most Recent Min/Max in 24hrs   Temp 98 °F (36 7 °C) Temp  Min: 97 1 °F (36 2 °C)  Max: 98 2 °F (36 8 °C)   Pulse (!) 116 Pulse  Min: 90  Max: 136   Resp (!) 26 Resp  Min: 14  Max: 44   BP (!) 153/105 BP  Min: 103/77  Max: 186/103   O2 Sat 97 % SpO2  Min: 83 %  Max: 100 %      Intake/Output Summary (Last 24 hours) at 6/19/2023 0703  Last data filed at 6/19/2023 0600  Gross per 24 hour   Intake 311 76 ml   Output 2500 ml   Net -2188 24 ml         Diet NPO     Invasive Monitoring Physical exam    Physical Exam  Vitals and nursing note reviewed  Constitutional:       Appearance: Normal appearance  She is not ill-appearing  HENT:      Head: Normocephalic  Mouth/Throat:      Mouth: Mucous membranes are moist    Eyes:      Pupils: Pupils are equal, round, and reactive to light  Cardiovascular:      Rate and Rhythm: Normal rate and regular rhythm  Pulses: Normal pulses  Heart sounds: No murmur heard  Pulmonary:      Effort: Pulmonary effort is normal  No respiratory distress  Abdominal:      General: Abdomen is flat  There is no distension  Musculoskeletal:         General: No swelling  Skin:     General: Skin is warm  Neurological:      General: No focal deficit present  Mental Status: She is alert  Comments: Follows commands              Diagnostic Studies      EKG: nsr on tele  Imaging:  I have personally reviewed pertinent reports         Medications:  Scheduled PRN   atorvastatin, 40 mg, Daily With Dinner  chlorhexidine, 15 mL, Q12H [facility]  clopidogrel, 75 mg, Daily  enoxaparin, 40 mg, Daily  famotidine, 20 mg, Q12H [address]  folic acid, 1 mg, Daily  furosemide, 40 mg, Once  insulin lispro, 1-5 Units, Q6H [address]  ipratropium-albuterol, 3 mL, Q6H  metoprolol tartrate, 25 mg, Q12H [address]  nicotine, 1 patch, Daily  potassium chloride, 20 mEq, Once   Followed by  potassium chloride, 20 mEq, Once  potassium chloride, 40 mEq, Once  spironolactone, 12 5 mg, Daily  thiamine, 100 mg, Daily      acetaminophen, 650 mg, Q6H PRN  fentanyl citrate (PF), 50 mcg, Q2H PRN       Continuous    propofol, 5-50 mcg/kg/min, Last Rate: 50 mcg/kg/min (06/19/23 0223)         Labs:    CBC    Recent Labs     06/18/23  1506 06/18/23  1515 06/19/23  0506   WBC 7 41  --  6 38   HGB 15 2 15 0 14 9   HCT 48 1* 44 43 4     --  247     BMP    Recent Labs     06/18/23  1506 06/18/23  1515 06/19/23  0506   SODIUM 141  --  141   K 3 5  --  3 4*     --  102   CO2 24 28 26   AGAP 14*  --  13   BUN 12  --  15   CREATININE 0 62  --  0 66   CALCIUM 8 0*  --  8 6       Coags    Recent Labs     06/18/23  1506   INR 1 07   PTT 23        Additional Electrolytes  Recent Labs     06/18/23  1506 06/18/23  1515 06/19/23  0506   MG 2 7  --  2 0   PHOS 5 1*  --  3 7   CAIONIZED  --  1 15  --           Blood Gas    Recent Labs     06/18/23  1759   PHART 7 394   YHJ7RWS 39 3   PO2ART 159 5*   GCY7SGA 23 5   BEART -1 2     No recent results LFTs  Recent Labs     06/18/23  1506   ALT 22   AST 49*   ALKPHOS 178*   ALB 3 7   TBILI 0 76       Infectious  No recent results  Glucose  Recent Labs     06/18/23  1506 06/19/23  0506   GLUC 241* 181*               Critical Care Time Delivered: Upon my evaluation, this patient had a high probability of imminent or life-threatening deterioration due to hypoxic respiratory failure, which required my direct attention, intervention, and personal management  I have personally provided 33 minutes of critical care time, exclusive of procedures, teaching, family meetings, and any prior time recorded by providers other than myself       Leodan Ryder [place], [place]

## 2023-06-19 NOTE — ASSESSMENT & PLAN NOTE
Presented to ED with SOB/respiratory distress  Patient was placed on bipap but was agitated and unable to tolerate it  She was self removing bipap and dropped O2 sat to 58% and was refusing to wear oxygen and yelling and cursing at staff   Patient was in respiratory distress and when asked about intubation she agreed therefore was intubated    · Suspect COPD exacerbation +/- CHF exacerbation   · RP2 + rhinovirus    Plan:   · SBT today  · Titrate fio2 for O2 sat 90% or above   · VAP bundle; chlorahexidine, PPI, HOB greater than 30 degrees   · Sedation: precedex and titrate to RAAS 0/ -1

## 2023-06-20 NOTE — QUICK NOTE
Critical Care Services- Self Extubation Progress Note   aDysi Seen 66 y o  female MRN: [id number]  Unit/Bed#: ICU 09 Encounter: [id number]    Date occurred: 6/20/2023   Time occurred ([facility]): [address] [place] [address]  Assigned Nurse: Alma Rosa Mcrae    Restraints: yes    Intubation Date: 06/18/23  Vent settings:  Mode: PS 5/6 40%                   Weaning trial: yes    Continuous medications:   dexmedetomidine, 0 1-1 5 mcg/kg/hr, Last Rate: 1 2 mcg/kg/hr (06/20/23 0836)      Sedation HOLD: no    If Yes- Date held: propofol discontinued prior evening, patient on precedex 1 5 mcg/kg/hr while on SBT    PRN medications:   acetaminophen, 650 mg, Q6H PRN  metoclopramide, 10 mg, Q6H PRN      Last date/ time () PRN sedation given:     RASS goal: 0 Alert and Calm  Last documented RASS: -1    Did patient need reintubation: no  If NO what is their oxygen requirement: 4L    Was the patient receiving therapy, testing, or procedure at time of extubation: no  If YES, what type: Was family notified: yes  Who was notified: Diamante Cueva, son - called and left voicemail as no pick-up    Narrative of Events/Additional comments: Patient previously had episodes of agitation and was requiring high levels of sedation  Sedation was weaned and adjusted to provide a window for extubation  Patient was on pressure support  She had been redirectable at times but was properly restrained and closely monitored  Patient was able to sit up-right and self-extubate      Yamel England PA-C

## 2023-06-20 NOTE — PLAN OF CARE
Problem: SAFETY,RESTRAINT: NV/NON-SELF DESTRUCTIVE BEHAVIOR  Goal: Remains free of harm/injury (restraint for non violent/non self-detsructive behavior)  Description: INTERVENTIONS:  - Instruct patient/family regarding restraint use   - Assess and monitor physiologic and psychological status   - Provide interventions and comfort measures to meet assessed patient needs   - Identify and implement measures to help patient regain control  - Assess readiness for release of restraint   Outcome: Progressing  Goal: Returns to optimal restraint-free functioning  Description: INTERVENTIONS:  - Assess the patient's behavior and symptoms that indicate continued need for restraint  - Identify and implement measures to help patient regain control  - Assess readiness for release of restraint   Outcome: Progressing     Problem: RESPIRATORY - ADULT  Goal: Achieves optimal ventilation and oxygenation  Description: INTERVENTIONS:  - Assess for changes in respiratory status  - Assess for changes in mentation and behavior  - Position to facilitate oxygenation and minimize respiratory effort  - Oxygen administered by appropriate delivery if ordered  - Initiate smoking cessation education as indicated  - Encourage broncho-pulmonary hygiene including cough, deep breathe, Incentive Spirometry  - Assess the need for suctioning and aspirate as needed  - Assess and instruct to report SOB or any respiratory difficulty  - Respiratory Therapy support as indicated  Outcome: Progressing     Problem: CARDIOVASCULAR - ADULT  Goal: Maintains optimal cardiac output and hemodynamic stability  Description: INTERVENTIONS:  - Monitor I/O, vital signs and rhythm  - Monitor for S/S and trends of decreased cardiac output  - Administer and titrate ordered vasoactive medications to optimize hemodynamic stability  - Assess quality of pulses, skin color and temperature  - Assess for signs of decreased coronary artery perfusion  - Instruct patient to report change in severity of symptoms  Outcome: Progressing     Problem: GASTROINTESTINAL - ADULT  Goal: Maintains adequate nutritional intake  Description: INTERVENTIONS:  - Monitor percentage of each meal consumed  - Identify factors contributing to decreased intake, treat as appropriate  - Assist with meals as needed  - Monitor I&O, weight, and lab values if indicated  - Obtain nutrition services referral as needed  Outcome: Progressing  Goal: Oral mucous membranes remain intact  Description: INTERVENTIONS  - Assess oral mucosa and hygiene practices  - Implement preventative oral hygiene regimen  - Implement oral medicated treatments as ordered  - Initiate Nutrition services referral as needed  Outcome: Progressing     Problem: GENITOURINARY - ADULT  Goal: Maintains or returns to baseline urinary function  Description: INTERVENTIONS:  - Assess urinary function  - Encourage oral fluids to ensure adequate hydration if ordered  - Administer IV fluids as ordered to ensure adequate hydration  - Administer ordered medications as needed  - Offer frequent toileting  - Follow urinary retention protocol if ordered  Outcome: Progressing  Goal: Absence of urinary retention  Description: INTERVENTIONS:  - Assess patient's ability to void and empty bladder  - Monitor I/O  - Bladder scan as needed  - Discuss with physician/AP medications to alleviate retention as needed  - Discuss catheterization for long term situations as appropriate  Outcome: Progressing  Goal: Urinary catheter remains patent  Description: INTERVENTIONS:  - Assess patency of urinary catheter  - If patient has a chronic vaughn, consider changing catheter if non-functioning  - Follow guidelines for intermittent irrigation of non-functioning urinary catheter  Outcome: Progressing     Problem: METABOLIC, FLUID AND ELECTROLYTES - ADULT  Goal: Electrolytes maintained within normal limits  Description: INTERVENTIONS:  - Monitor labs and assess patient for signs and symptoms of electrolyte imbalances  - Administer electrolyte replacement as ordered  - Monitor response to electrolyte replacements, including repeat lab results as appropriate  - Instruct patient on fluid and nutrition as appropriate  Outcome: Progressing  Goal: Fluid balance maintained  Description: INTERVENTIONS:  - Monitor labs   - Monitor I/O and WT  - Instruct patient on fluid and nutrition as appropriate  - Assess for signs & symptoms of volume excess or deficit  Outcome: Progressing  Goal: Glucose maintained within target range  Description: INTERVENTIONS:  - Monitor Blood Glucose as ordered  - Assess for signs and symptoms of hyperglycemia and hypoglycemia  - Administer ordered medications to maintain glucose within target range  - Assess nutritional intake and initiate nutrition service referral as needed  Outcome: Progressing     Problem: SKIN/TISSUE INTEGRITY - ADULT  Goal: Skin Integrity remains intact(Skin Breakdown Prevention)  Description: Assess:  -Perform Elias assessment every shift  -Clean and moisturize skin   -Inspect skin when repositioning, toileting, and assisting with ADLS  -Assess under medical devices   -Assess extremities for adequate circulation and sensation     Bed Management:  -Have minimal linens on bed & keep smooth, unwrinkled  -Change linens as needed when moist or perspiring  -Avoid sitting or lying in one position for more than 2 hours while in bed  -Keep HOB at 30 degrees     Toileting:  -Offer bedside commode  -Assess for incontinence every 2 h  -Use incontinent care products after each incontinent episode     Activity:  -Mobilize patient   -Encourage activity and walks on unit  -Encourage or provide ROM exercises   -Turn and reposition patient every 2 Hours  -Use appropriate equipment to lift or move patient in bed  -Instruct/ Assist with weight shifting every 2 when out of bed in chair  -Consider limitation of chair time 4 hour intervals    Skin Care:  -Avoid use of baby powder, tape, friction and shearing, hot water or constrictive clothing  -Relieve pressure over bony prominences  -Do not massage red bony areas    Next Steps:  -Teach patient strategies to minimize risks    -Consider consults to  interdisciplinary teams   Outcome: Progressing     Problem: MOBILITY - ADULT  Goal: Maintain or return to baseline ADL function  Description: INTERVENTIONS:  -  Assess patient's ability to carry out ADLs; assess patient's baseline for ADL function and identify physical deficits which impact ability to perform ADLs (bathing, care of mouth/teeth, toileting, grooming, dressing, etc )  - Assess/evaluate cause of self-care deficits   - Assess range of motion  - Assess patient's mobility; develop plan if impaired  - Assess patient's need for assistive devices and provide as appropriate  - Encourage maximum independence but intervene and supervise when necessary  - Involve family in performance of ADLs  - Assess for home care needs following discharge   - Consider OT consult to assist with ADL evaluation and planning for discharge  - Provide patient education as appropriate  Outcome: Progressing  Goal: Maintains/Returns to pre admission functional level  Description: INTERVENTIONS:  - Perform BMAT or MOVE assessment daily    - Set and communicate daily mobility goal to care team and patient/family/caregiver  - Collaborate with rehabilitation services on mobility goals if consulted  - Perform Range of Motion 4 times a day  - Reposition patient every 2 hours    - Record patient progress and toleration of activity level   Outcome: Progressing     Problem: Prexisting or High Potential for Compromised Skin Integrity  Goal: Skin integrity is maintained or improved  Description: INTERVENTIONS:  - Identify patients at risk for skin breakdown  - Assess and monitor skin integrity  - Assess and monitor nutrition and hydration status  - Monitor labs   - Assess for incontinence   - Turn and reposition patient  - Assist with mobility/ambulation  - Relieve pressure over bony prominences  - Avoid friction and shearing  - Provide appropriate hygiene as needed including keeping skin clean and dry  - Evaluate need for skin moisturizer/barrier cream  - Collaborate with interdisciplinary team   - Patient/family teaching  - Consider wound care consult   Outcome: Progressing     Problem: Nutrition/Hydration-ADULT  Goal: Nutrient/Hydration intake appropriate for improving, restoring or maintaining nutritional needs  Description: Monitor and assess patient's nutrition/hydration status for malnutrition  Collaborate with interdisciplinary team and initiate plan and interventions as ordered  Monitor patient's weight and dietary intake as ordered or per policy  Utilize nutrition screening tool and intervene as necessary  Determine patient's food preferences and provide high-protein, high-caloric foods as appropriate       INTERVENTIONS:  - Monitor oral intake, urinary output, labs, and treatment plans  - Assess nutrition and hydration status and recommend course of action  - Evaluate amount of meals eaten  - Assist patient with eating if necessary   - Allow adequate time for meals  - Recommend/ encourage appropriate diets, oral nutritional supplements, and vitamin/mineral supplements  - Order, calculate, and assess calorie counts as needed  - Recommend, monitor, and adjust tube feedings and TPN/PPN based on assessed needs  - Assess need for intravenous fluids  - Provide specific nutrition/hydration education as appropriate  - Include patient/family/caregiver in decisions related to nutrition  Outcome: Progressing

## 2023-06-20 NOTE — RESPIRATORY THERAPY NOTE
Call to room 9 in ICU  Patient was self extubated  Received on Non rebreather Mask  Patient assessed  Breath sound clear, RR 24 and saturation 100%  Place on Nasal Cannula 4 LPM  No distress at this time  RN at bedside  and JOSY castaneda

## 2023-06-20 NOTE — RESPIRATORY THERAPY NOTE
Received on AC20//+6/40%  Start weaning on P S 5/+6@ 40%  Tolerating well   RR 24 spontaneous  and saturation 100%  RN aware and P COLT morgan

## 2023-06-21 NOTE — RESPIRATORY THERAPY NOTE
"RN called at approx [phone] because pt did no \"look good\", placed on NC O2 4L was able to get SpO2 of 100% with difficulty due to extremities being cold O2 was then increased to 15L NC, nebulizer was given at 1044 then pt was placed on Bipap 100% and was tolerating   Pt became bradycardic at approx 1105 and became pulseless, CPR was started immediately and Code Blue was called, manually ventilated with BVM with SpO2 % until pt was able to be intubated then manually ventilated via ETT with 100% O2, ETT placement verified by ETCO2 color change and bilateral equal BS once ROSC was achieved placed on ventilator with verbal settings from [employer]

## 2023-06-21 NOTE — ASSESSMENT & PLAN NOTE
· s/p PCI to RCA in 2021  · Trop 26  · EKG: Sinus tachycardia rate 110, LVH criteria, ST depressions V4-V6    Plan:  · Continue home plavix, statin  · Metoprolol held secondary to hypotension yesterday   BP stable today

## 2023-06-21 NOTE — ACP (ADVANCE CARE PLANNING)
Critical Care Advanced Care Planning Note  Jennifer Sample 66 y o  female MRN: [id number]  Unit/Bed#: ICU 09 Encounter: [id number]    Jennifer Sample is a 66 y o  female requiring critical care evaluation and advanced care planning  The patient has chronic comorbidities, including but not limited to cardiomypathy,COPD, RV dysfunction which is now further complicated by the following acute conditions: cardiac arrest   Due to the severity of the patient's acute condition and/or the extent of chronic conditions, additional conversations pertaining to advanced care planning were required  Today's discussion, which was held in a face-to-face meeting, included son, daughter in law, and it was established that all stake holders understood the rationale for the advanced care planning  The patient was unable to participate in the discussion due to intubation  Summary of Discussion:  Discussed earlier events and cardiac arrest  Explained patient is extremely unstable, on multiple pressors, and likely to arrest again  Patient has underlying severe cardiomyopathy and possible cardiac event as etiology  Explained prognosis is very poor given length of arrest, profound hypotension following arrest despite multiple pressors, and her underlying cardiomyopathy  Family expresses understanding but wants to continue will full measures at this time  Total time spent, (15) minutes (1310 to 1330)        CODE STATUS: FULL CODE - Level 1  POA: Yes  POLST:        SIGNATURE: Reina Watkins PA-C  DATE: June 21, 2023  TIME: 1:25 PM

## 2023-06-21 NOTE — PROGRESS NOTES
[facility] [address]  Progress Note  Name: Mala Dvoer  MRN: [id number]  Unit/Bed#: ICU 09 I Date of Admission: 6/18/2023   Date of Service: 6/21/2023 I Hospital Day: 3    Assessment/Plan   * Acute respiratory failure with hypoxia and hypercapnia [facility])  Assessment & Plan  Presented to ED with SOB/respiratory distress  Patient was placed on bipap but was agitated and unable to tolerate it  She was self removing bipap and dropped O2 sat to 58% and was refusing to wear oxygen and yelling and cursing at staff   Patient was in respiratory distress and when asked about intubation she agreed therefore was intubated    · Suspect COPD exacerbation +/- CHF exacerbation   · RP2 + rhinovirus    Plan:   · Self-extubated 6/20  · Tolerating nasal canula    Acute on chronic combined systolic and diastolic heart failure ([facility] [address] )  Assessment & Plan  · Echo 11/2022: EF 30%, Q5FI, RV systolic function mildly reduced, severe TR, peak PA pressure 69  · CXR demonstrated interstitial edema consistent with CHF  · , trop 26  · Outpatient regimen consists of: lasix  + spironolactone + metoprolol   · Given 40mg IV Lasix in ED  · Negative 2 7L on admission   · Last dose of lasix 6/19    Plan:  · Strict I&O and daily weight   · Resume metoprolol today  · Would like benefit from ACE/ARB vs Entresto, d/w cardiology after stabilization       COPD with exacerbation ([facility] [address] )  Assessment & Plan  · No PFTs on file  · Long history of heavy smoking   · Steroids discontinued then again resumed yesterday after patient noted to have wheezing  · RP2 panel + rhinovirus    Plan:  · Continue duo-neb q6h   · Continue solu-medrol 20 Q8  · Will benefit from outpatient pulmonary follow up     Smoking hx  Assessment & Plan  · Smokes 2 PPD per chart review  · Nicotine patch ordered  · Smoking cessation education when appropriate    Ischemic cardiomyopathy  Assessment & Plan  · See plan as above    Type 2 diabetes mellitus, without long-term current use of insulin ([facility] )  Assessment & Plan  Lab Results   Component Value Date    HGBA1C 5 6 03/17/2023     · SSI coverage    CAD (coronary artery disease)  Assessment & Plan  · s/p PCI to RCA in 2021  · Trop 26  · EKG: Sinus tachycardia rate 110, LVH criteria, ST depressions V4-V6    Plan:  · Continue home plavix, statin  · Metoprolol held secondary to hypotension yesterday  BP stable today    Alcohol abuse  Assessment & Plan  · Documented that patient drinks 1 pink vodka a day  · Has had many elevated alcohol levels in the past, most recently 261 on 5/18     Plan:  · CIWA protocol ordered  · Thiamine/folic acid supplementation     Dyslipidemia  Assessment & Plan  · Continue statin     Bipolar disorder ([place] [address] )  Assessment & Plan  · No current medications     Essential hypertension  Assessment & Plan  · Resume antihypertensive meds as tolerated    Polysubstance abuse ([place] [address] )  Assessment & Plan  · On methadone  · Confirmed 30mg daily dosing  · Resume             ICU Core Measures     A: Assess, Prevent, and Manage Pain · Has pain been assessed? Yes  · Need for changes to pain regimen? No   B: Both SAT/SAT  · N/A   C: Choice of Sedation · RASS Goal: 0 Alert and Calm  · Need for changes to sedation or analgesia regimen? No   D: Delirium · CAM-ICU: Negative   E: Early Mobility  · Plan for early mobility? No   F: Family Engagement · Plan for family engagement today? Yes       Antibiotic Review: Patient on appropriate coverage based on culture data  Review of Invasive Devices:            Prophylaxis:  VTE VTE covered by:  enoxaparin, Subcutaneous, 40 mg at 06/20/23 4835       Stress Ulcer  covered byFamotidine (PF) (PEPCID) injection 20 mg [[id number]]       Subjective   HPI/24hr events: awake and alert  Would like her methadone  No other complaints  Review of Systems   Respiratory: Negative for chest tightness and shortness of breath  Cardiovascular: Negative for chest pain            Objective Vitals I/O      Most Recent Min/Max in 24hrs   Temp 98 3 °F (36 8 °C) Temp  Min: 97 6 °F (36 4 °C)  Max: 98 7 °F (37 1 °C)   Pulse (!) 121 Pulse  Min: 92  Max: 124   Resp 18 Resp  Min: 18  Max: 46   BP (!) 173/108 BP  Min: 66/48  Max: 173/108   O2 Sat 95 % SpO2  Min: 92 %  Max: 100 %      Intake/Output Summary (Last 24 hours) at 6/21/2023 0651  Last data filed at 6/20/2023 1200  Gross per 24 hour   Intake --   Output 250 ml   Net -250 ml         No diet orders on file     Invasive Monitoring Physical exam    Physical Exam  Vitals and nursing note reviewed  Constitutional:       Appearance: Normal appearance  She is not ill-appearing  HENT:      Head: Normocephalic and atraumatic  Mouth/Throat:      Mouth: Mucous membranes are moist    Eyes:      Pupils: Pupils are equal, round, and reactive to light  Cardiovascular:      Rate and Rhythm: Tachycardia present  Pulses: Normal pulses  Pulmonary:      Effort: Pulmonary effort is normal  No respiratory distress  Abdominal:      General: Abdomen is flat  There is no distension  Musculoskeletal:         General: No swelling  Skin:     General: Skin is warm  Neurological:      General: No focal deficit present  Mental Status: She is alert  Diagnostic Studies      EKG: nsr on tele  Imaging:  I have personally reviewed pertinent reports         Medications:  Scheduled PRN   atorvastatin, 40 mg, Daily With Dinner  azithromycin, 500 mg, Q24H  clopidogrel, 75 mg, Daily  enoxaparin, 40 mg, Daily  famotidine, 20 mg, H40K ALEC  folic acid, 1 mg, Daily  insulin lispro, 1-5 Units, Q6H ALEC  ipratropium-albuterol, 3 mL, Q6H  methadone, 30 mg, Daily  methylPREDNISolone sodium succinate, 20 mg, Q8H ALEC  metoprolol tartrate, 25 mg, Q12H ALEC  nicotine, 1 patch, Daily  senna, 8 8 mg, HS  thiamine, 100 mg, Daily      acetaminophen, 650 mg, Q6H PRN  metoclopramide, 10 mg, Q6H PRN       Continuous          Labs:    CBC    Recent Labs 06/20/23  0536   WBC 7 03   HGB 16 0*   HCT 48 7*        BMP    Recent Labs     06/20/23  0536   SODIUM 141   K 4 7      CO2 24   AGAP 15*   BUN 22   CREATININE 0 92   CALCIUM 9 4       Coags    No recent results     Additional Electrolytes  Recent Labs     06/20/23  0536   MG 2 2   PHOS 4 4*          Blood Gas    No recent results  No recent results LFTs  No recent results    Infectious  No recent results  Glucose  Recent Labs     06/20/23  0536   GLUC 185*                   Aga Cardona, [place]

## 2023-06-21 NOTE — ASSESSMENT & PLAN NOTE
Presented to ED with SOB/respiratory distress  Patient was placed on bipap but was agitated and unable to tolerate it  She was self removing bipap and dropped O2 sat to 58% and was refusing to wear oxygen and yelling and cursing at staff   Patient was in respiratory distress and when asked about intubation she agreed therefore was intubated    · Suspect COPD exacerbation +/- CHF exacerbation   · RP2 + rhinovirus    Plan:   · Self-extubated 6/20  · Tolerating nasal canula

## 2023-06-21 NOTE — PROCEDURES
Intubation    Date/Time: 6/21/2023 12:31 PM    Performed by: Haleigh Bonilla PA-C  Authorized by: Haleigh Bonilla PA-C    Patient location:  Bedside  Consent:     Consent obtained:  Emergent situation  Universal protocol:     Procedure explained and questions answered to patient or proxy's satisfaction: no      Relevant documents present and verified: no      Test results available and properly labeled: no      Radiology Images displayed and confirmed  If images not available, report reviewed: no      Required blood products, implants, devices, and special equipment available: no      Site/side marked: no      Immediately prior to procedure, a time out was called: no      Patient identity confirmed: Anonymous protocol, patient vented/unresponsive  Pre-procedure details:     Patient status:  Altered mental status    Pretreatment medications:  Etomidate    Paralytics:  Vecuronium  Indications:     Indications for intubation: respiratory distress    Procedure details:     Preoxygenation:  Bag valve mask    CPR in progress: yes      Intubation method:  Oral    Oral intubation technique: Ramirez 3  Tube size (mm):  7 0    Tube type:  Cuffed    Number of attempts:  2 (7 5 difficult to advance, 7 0 able to advance without resistance)    Tube visualized through cords: yes    Placement assessment:     Tube secured with:  ETT salomon    Placement verification: colorimetric ETCO2 device, direct visualization and ETCO2 detector    Post-procedure details:     Patient tolerance of procedure:   Tolerated well, no immediate complications

## 2023-06-21 NOTE — ASSESSMENT & PLAN NOTE
· Echo 11/2022: EF 43%, D1OF, RV systolic function mildly reduced, severe TR, peak PA pressure 69  · CXR demonstrated interstitial edema consistent with CHF  · , trop 26  · Outpatient regimen consists of: lasix  + spironolactone + metoprolol   · Given 40mg IV Lasix in ED  · Negative 2 7L on admission   · Last dose of lasix 6/19    Plan:  · Strict I&O and daily weight   · Resume metoprolol today  · Would like benefit from ACE/ARB vs Entresto, d/w cardiology after stabilization

## 2023-06-21 NOTE — ASSESSMENT & PLAN NOTE
· Documented that patient drinks 1 pink vodka a day  · Has had many elevated alcohol levels in the past, most recently 261 on 5/18     Plan:  · [facility] protocol ordered  · Thiamine/folic acid supplementation

## 2023-06-21 NOTE — SPEECH THERAPY NOTE
Speech-Language Pathology Bedside Swallow Evaluation      Patient Name: Jacklyn Soler    WNXTD'I Date: 6/21/2023     Problem List  Principal Problem:    Acute respiratory failure with hypoxia and hypercapnia (HCC)  Active Problems:    Type 2 diabetes mellitus, without long-term current use of insulin (HCC)    Polysubstance abuse (HCC)    Essential hypertension    Dyslipidemia    Alcohol abuse    Ischemic cardiomyopathy    Bipolar disorder (HCC)    CAD (coronary artery disease)    Smoking hx    COPD with exacerbation (HCC)    Acute on chronic combined systolic and diastolic heart failure (HCC)      Past Medical History  Past Medical History:   Diagnosis Date   • Alcohol abuse    • Arthritis    • Asthma    • Bipolar disorder ([facility] [address] )    • Diabetes mellitus ([facility] [address] )    • Opiate abuse, continuous ([facility] [address] )    • Smoker        Past Surgical History  Past Surgical History:   Procedure Laterality Date   • ABDOMINAL SURGERY      stomach uler with perforation       Summary   Pt presented with functional appearing oral and pharyngeal stage swallowing skills with soft moist foods and thin liquids when taken at slow/normal rate  She is edentulous and impulsive in her intake with liquids with cough noted x1 with successive sips of thin liquid (biut no overt s/s aspiration when rate controlled with liquids)  Recommended Diet: Cardiovascular per MD, consider adding chopped meats and soft cooked vegetables  Recommended Form of Meds: as tolerated  Aspiration precautions/swallowing strategies: slow rate with intake, 1-2 sips of liquids at a time for now        Current Medical Status  Jacklyn Soler is a 67 y/o F with chronic systolic diastolic CHF, CAD, T2 DM, ICM (EF 22%), polysubstance abuse, and COPD presented 6/18 with sob, hypoxia  Per ED physician, the patient refused BiPAP or HFNC and was subsequently intubated by the ED physician after patient agreed due to decreasing oxygen saturations  Initial labs significant for BNP of 906  "Post-intubation iSTAT ABG demonstrated 7 099/86 4/387 on 100% FiO2  CXR consistent with CHF  Primary diagnosis:  Acute hypoxic respiratory failure likely 2* acute CHF  Pt self extubated 6/20  SLP Swallowing Evaluation ordered at this time  Current Precautions:  Fall    Allergies:  No known food allergies    Past medical history:  Please see H&P for details    Special Studies:  6/20 (most recent) CXR:  No acute disease  Cardiomegaly    Social/Education/Vocational Hx:  Pt lives in an apt in [place] by report  Swallow Information   Current Risks for Dysphagia & Aspiration: recent intubation  Current Diet: NPO   Baseline Diet: regular diet and thin liquids per pt (\"But I don't eat breakfast and I don't eat a lot\")      Baseline Assessment   Behavior/Cognition: alert  Speech/Language Status: able to follow simple commands and express basic needs (s/s suggestive of impaired recall & health literacy when risk for aspiration s/p intubation discussed)  Patient Positioning: upright in chair  Pain Status/Interventions/Response to Interventions:  No report of or nonverbal indications of pain  Swallow Mechanism Exam  Facial: symmetrical  Labial: WFL  Lingual: WFL  Mandible: adequate ROM  Dentition: edentulous (and does not wear her dentures to eat)  Vocal quality:minimal hoarseness  Respiratory Status: on RA       Consistencies Assessed and Performance   Materials administered included water, juice, applesauce, Roxane Doone cookie (dry and moistened/mixed with applesauce)    Oral Stage: Edentulous impacting ability to bite/break off food & efficiency of mastication  Mastication was lengthened but adequate with the materials administered today  Bolus formation and transfer were functional with moist foods with no significant oral residue noted  Pharyngeal Stage: Cough with rapid successive sips of thin liquid only  Swallow Mechanics:  Swallowing initiation appeared prompt    Laryngeal rise was palpated " "and judged to be within functional limits  No coughing, throat clearing, change in vocal quality or respiratory status noted today  Esophageal Concerns: unknown (\"full\" this am with limited amt of intake but reported h/o \"not hungry and never eat breakfast\"    Strategies and Efficacy: use of slowed rate eliminated s/s aspiration with thin liquid    Summary and Recommendations (see above)    Results Reviewed with: patient and PA-C     Treatment Recommended: f/u briefly to ensure safe oral intake over time (may require only 1 f/u session at meal time  Dysphagia Goals  -Patient will demonstrate safe and effective oral intake (without overt s/s significant oral/pharyngeal dysphagia including s/s penetration or aspiration) for the highest appropriate diet level x24-72 hours    -Patient will utilize trained compensatory strategies (eg   controlled bite/sip size, controlled rate) with 90% accuracy to eliminate overt s/s penetration/aspiration with the least restrictive food/liquid consistencies    Thank you for this referral   Please do not hesitate to contact me with any questions or concerns      Mikael [facility]   Speech Pathologist  [place] License [id number]  [place] License [id number]  Available via [facility] FOR [facility] Text   "

## 2023-06-21 NOTE — DEATH NOTE
INPATIENT DEATH NOTE  Naina Ruiz 66 y o  female MRN: [id number]  Unit/Bed#: ICU 09 Encounter: [id number]    Date, Time and Cause of Death    Date of Death: 23  Time of Death:  1:47 PM  Preliminary Cause of Death: Cardiogenic shock          Patient's Information  Date of Death: 23  Time of Death: 1347  Pronounced by: Dr Mendiola  Did the patient's death occur in the ED?: No  Did the patient's death occur in the OR?: No  Did the patient's death occur less than 10 days post-op?: No  Did the patient's death occur within 24 hours of admission?: No  Was code status DNR at the time of death?: No    PHYSICAL EXAM:  Unresponsive to noxious stimuli, Spontaneous respirations absent, Breath sounds absent, Carotid pulse absent, Heart sounds absent, Pupillary light reflex absent and Corneal blink reflex absent    Medical Examiner notification criteria:  NONE APPLICABLE   Medical Examiner's office notified?:  No, does not meet ME notification criteria   Medical Examiner accepted case?:  No  Name of Medical Examiner:     Family Notification  Was the family notified?: Yes  Date Notified: 23  Time Notified: 1347  Notified by: myself  Name of Family Notified of Death: Yash at bedside   Relationship to Patient: Son  Family Notification Route:  At bedside  Was the family told to contact a  home?: Yes    Autopsy Options:  Post-mortem examination declined by next of kin    Primary Service Attending Physician notified?:  yes - Attending:  Adolfo Tillman MD    Physician/Resident responsible for completing Discharge Summary:  Ada Monzon

## 2023-06-21 NOTE — PROCEDURES
Central Line Insertion    Date/Time: 6/21/2023 11:25 AM    Performed by: Bee Tai DO  Authorized by: Bee Tai DO    Patient location:  Bedside  Other Assisting Provider: Yes (comment) (Dr Xavier Martino)    Consent:     Consent obtained:  Emergent situation  Universal protocol:     Procedure explained and questions answered to patient or proxy's satisfaction: no      Relevant documents present and verified: no      Test results available and properly labeled: no      Radiology Images displayed and confirmed  If images not available, report reviewed: no      Required blood products, implants, devices, and special equipment available: no      Site/side marked: no      Immediately prior to procedure, a time out was called: no    Pre-procedure details:     Hand hygiene: Hand hygiene performed prior to insertion      Sterile barrier technique: All elements of maximal sterile technique followed      Skin preparation:  2% chlorhexidine    Skin preparation agent: Skin preparation agent completely dried prior to procedure    Indications:     Central line indications: medications requiring central line    Procedure details:     Location:  Left femoral    Vessel type: vein      Laterality:  Left    Approach: percutaneous technique used      Catheter type:  Triple lumen 20cm    Catheter size:  7 Fr    Landmarks identified: yes      Ultrasound guidance: yes      Ultrasound image availability:  Not obtained due to urgency    Sterile ultrasound techniques: Sterile gel and sterile probe covers were used      Number of attempts:  2    Successful placement: yes    Post-procedure details:     Post-procedure:  Dressing applied and line sutured    Assessment:  Blood return through all ports    Post-procedure complications: none      Patient tolerance of procedure:   Tolerated well, no immediate complications

## 2023-06-21 NOTE — DISCHARGE SUMMARY
"Discharge Summary - Mike Alcantara 66 y o  female MRN: [id number]    Unit/Bed#: ICU 09 Encounter: [id number] PCP: Heather Connor MD    Admission Date:   Admission Orders (From admission, onward)     Ordered        23 1559  INPATIENT ADMISSION  Once                        Admitting Diagnosis: CHF (congestive heart failure) ([facility] [address] ) [I50 9]  SOB (shortness of breath) [R06 02]  COPD with acute exacerbation ([facility] [address] ) [J44 1]  Discharge planning issues [Z02 9]    HPI: \"57 y/o F with chronic systolic diastolic CHF, CAD, T2 DM, ICM (EF 22%), polysubstance abuse, and COPD presented to the ED today with SOB and hypoxia that began yesterday  Patient was reported to have oxygen saturations in the 70s on RA which improved into the high 90s on a NRB  Per ED physician, the patient refused BiPAP or HFNC and was subsequently intubated by the ED physician after patient agreed due to decreasing oxygen saturations  Initial labs significant for BNP of 906  Post-intubation iSTAT ABG demonstrated 7 / 4/387 on 100% FiO2  CXR consistent with CHF  \"    Procedures Performed:   Orders Placed This Encounter   Procedures   • Critical Care   • Intubation   • Intubation       Summary of Hospital Course: Patient was admitted and eventually extubated  She was treated with steroids for a COPD exacerbation, and treated for CHF exacerbation with diuretics  She initially improved and was able to transition to nasal canula  On the morning of , patient became acutely hypotensive, altered, and labored  Despite interventions, patient continued to decline, and PEA arrested  Patient was intubated, and ACLS protocol was followed  ROSC was obtained, but patient remained hypotensive despite multiple pressors  Patient had another cardiac arrest and ROSC was not obtained  Patient was pronounced at [phone] 21  Family at bedside      Significant Findings, Care, Treatment and Services Provided: as above    Complications: as above    Disposition:  " Final Diagnosis: cardiogenic shock, cardiac event? Medical Problems     Resolved Problems  Date Reviewed: 2023   None         Condition at Time of Death:     Date, Time and Cause of Death    Date of Death: 23  Time of Death:  1:47 PM  Preliminary Cause of Death: Cardiogenic shock         Death Note:    INPATIENT DEATH NOTE  Guille Pichardo 66 y o  female MRN: [id number]  Unit/Bed#: ICU 09 Encounter: [id number]    Date, Time and Cause of Death    Date of Death: 23  Time of Death:  1:47 PM  Preliminary Cause of Death: Cardiogenic shock          Patient's Information  Date of Death: 23  Time of Death: 1347  Pronounced by: Dr Mendoila  Did the patient's death occur in the ED?: No  Did the patient's death occur in the OR?: No  Did the patient's death occur less than 10 days post-op?: No  Did the patient's death occur within 24 hours of admission?: No  Was code status DNR at the time of death?: No    PHYSICAL EXAM:  Unresponsive to noxious stimuli, Spontaneous respirations absent, Breath sounds absent, Carotid pulse absent, Heart sounds absent, Pupillary light reflex absent and Corneal blink reflex absent    Medical Examiner notification criteria:  NONE APPLICABLE   Medical Examiner's office notified?:  No, does not meet ME notification criteria   Medical Examiner accepted case?:  No  Name of Medical Examiner:     Family Notification  Was the family notified?: Yes  Date Notified: 23  Time Notified:   Notified by: myself  Name of Family Notified of Death: Yash at bedside   Relationship to Patient: Son  Family Notification Route:  At bedside  Was the family told to contact a  home?: Yes    Autopsy Options:  Post-mortem examination declined by next of kin    Primary Service Attending Physician notified?:  yes - Attending:  Ciro Rojo MD    Physician/Resident responsible for completing Discharge Summary:  Husam Franco

## 2023-06-22 NOTE — UTILIZATION REVIEW
NOTIFICATION OF ADMISSION DISCHARGE   This is a Notification of Discharge from [facility]  Please be advised that this patient has been discharge from our facility  Below you will find the admission and discharge date and time including the patient’s disposition  UTILIZATION REVIEW CONTACT:  Gume Jackson  Utilization   Network Utilization Review Department  Phone: [phone] x carefully listen to the prompts  All voicemails are confidential   Email: [email or web address]     ADMISSION INFORMATION  PRESENTATION DATE: 2023  2:16 PM  OBERVATION ADMISSION DATE:   INPATIENT ADMISSION DATE: 23  3:59 PM   DISCHARGE DATE: 2023  4:40 PM   DISPOSITION:    IMPORTANT INFORMATION:  Send all requests for admission clinical reviews, approved or denied determinations and any other requests to dedicated fax number below belonging to the campus where the patient is receiving treatment   List of dedicated fax numbers:  [address] DENIALS (Administrative/Medical Necessity) [phone]   [address] (Maternity/NICU/Pediatrics) [phone]   [facility] [phone]   [place] [address] [phone]   [address] [phone]   [address] [facility] [phone] [address] [facility] [phone]   [address] [place] [address] [phone]   [facility]  [phone]   [address] [facility] [phone]   [address] [facility] [address] [facility] [phone]

## 2023-06-23 LAB
ATRIAL RATE: 110 BPM
ATRIAL RATE: 111 BPM
BACTERIA SPT RESP CULT: ABNORMAL
BACTERIA SPT RESP CULT: ABNORMAL
GRAM STN SPEC: ABNORMAL
P AXIS: 54 DEGREES
P AXIS: 57 DEGREES
PR INTERVAL: 146 MS
PR INTERVAL: 146 MS
QRS AXIS: 3 DEGREES
QRS AXIS: 4 DEGREES
QRSD INTERVAL: 88 MS
QRSD INTERVAL: 88 MS
QT INTERVAL: 380 MS
QT INTERVAL: 472 MS
QTC INTERVAL: 514 MS
QTC INTERVAL: 641 MS
T WAVE AXIS: 118 DEGREES
T WAVE AXIS: 150 DEGREES
VENTRICULAR RATE: 110 BPM
VENTRICULAR RATE: 111 BPM